# Patient Record
Sex: MALE | Race: OTHER | HISPANIC OR LATINO | Employment: OTHER | URBAN - METROPOLITAN AREA
[De-identification: names, ages, dates, MRNs, and addresses within clinical notes are randomized per-mention and may not be internally consistent; named-entity substitution may affect disease eponyms.]

---

## 2018-04-16 ENCOUNTER — APPOINTMENT (OUTPATIENT)
Dept: LAB | Facility: HOSPITAL | Age: 73
End: 2018-04-16
Payer: COMMERCIAL

## 2018-04-16 ENCOUNTER — OFFICE VISIT (OUTPATIENT)
Dept: FAMILY MEDICINE CLINIC | Facility: CLINIC | Age: 73
End: 2018-04-16
Payer: COMMERCIAL

## 2018-04-16 VITALS
BODY MASS INDEX: 23.91 KG/M2 | TEMPERATURE: 98 F | RESPIRATION RATE: 18 BRPM | HEART RATE: 70 BPM | HEIGHT: 60 IN | WEIGHT: 121.8 LBS | DIASTOLIC BLOOD PRESSURE: 70 MMHG | SYSTOLIC BLOOD PRESSURE: 110 MMHG

## 2018-04-16 DIAGNOSIS — E11.9 TYPE 2 DIABETES MELLITUS WITHOUT COMPLICATION, WITHOUT LONG-TERM CURRENT USE OF INSULIN (HCC): Primary | ICD-10-CM

## 2018-04-16 DIAGNOSIS — L24.89 IRRITANT CONTACT DERMATITIS DUE TO OTHER AGENTS: ICD-10-CM

## 2018-04-16 DIAGNOSIS — E11.9 TYPE 2 DIABETES MELLITUS WITHOUT COMPLICATION, WITHOUT LONG-TERM CURRENT USE OF INSULIN (HCC): ICD-10-CM

## 2018-04-16 DIAGNOSIS — Z97.2 ILL-FITTING DENTURES: ICD-10-CM

## 2018-04-16 DIAGNOSIS — M54.2 CERVICAL PAIN: ICD-10-CM

## 2018-04-16 DIAGNOSIS — K08.89 ILL-FITTING DENTURES: ICD-10-CM

## 2018-04-16 DIAGNOSIS — H91.93 HEARING DIFFICULTY OF BOTH EARS: ICD-10-CM

## 2018-04-16 DIAGNOSIS — Z12.5 SCREENING PSA (PROSTATE SPECIFIC ANTIGEN): ICD-10-CM

## 2018-04-16 DIAGNOSIS — M53.82 DECREASED ROM OF INTERVERTEBRAL DISCS OF CERVICAL SPINE: ICD-10-CM

## 2018-04-16 LAB
ALBUMIN SERPL BCP-MCNC: 3.8 G/DL (ref 3.5–5)
ALP SERPL-CCNC: 121 U/L (ref 46–116)
ALT SERPL W P-5'-P-CCNC: 23 U/L (ref 12–78)
ANION GAP SERPL CALCULATED.3IONS-SCNC: 7 MMOL/L (ref 4–13)
AST SERPL W P-5'-P-CCNC: 21 U/L (ref 5–45)
BASOPHILS # BLD AUTO: 0.02 THOUSANDS/ΜL (ref 0–0.1)
BASOPHILS NFR BLD AUTO: 0 % (ref 0–1)
BILIRUB SERPL-MCNC: 0.44 MG/DL (ref 0.2–1)
BUN SERPL-MCNC: 19 MG/DL (ref 5–25)
CALCIUM SERPL-MCNC: 9 MG/DL
CHLORIDE SERPL-SCNC: 101 MMOL/L (ref 100–108)
CHOLEST SERPL-MCNC: 210 MG/DL (ref 50–200)
CO2 SERPL-SCNC: 29 MMOL/L (ref 21–32)
CREAT SERPL-MCNC: 0.73 MG/DL (ref 0.6–1.3)
EOSINOPHIL # BLD AUTO: 0.21 THOUSAND/ΜL (ref 0–0.61)
EOSINOPHIL NFR BLD AUTO: 5 % (ref 0–6)
ERYTHROCYTE [DISTWIDTH] IN BLOOD BY AUTOMATED COUNT: 13.2 % (ref 11.6–15.1)
EST. AVERAGE GLUCOSE BLD GHB EST-MCNC: 295 MG/DL
GFR SERPL CREATININE-BSD FRML MDRD: 93 ML/MIN/1.73SQ M
GLUCOSE P FAST SERPL-MCNC: 245 MG/DL (ref 65–99)
HBA1C MFR BLD: 11.9 % (ref 4.2–6.3)
HCT VFR BLD AUTO: 40.5 % (ref 36.5–49.3)
HDLC SERPL-MCNC: 35 MG/DL (ref 40–60)
HGB BLD-MCNC: 14.3 G/DL (ref 12–17)
LDLC SERPL CALC-MCNC: 139 MG/DL (ref 0–100)
LYMPHOCYTES # BLD AUTO: 1.78 THOUSANDS/ΜL (ref 0.6–4.47)
LYMPHOCYTES NFR BLD AUTO: 39 % (ref 14–44)
MCH RBC QN AUTO: 31.2 PG (ref 26.8–34.3)
MCHC RBC AUTO-ENTMCNC: 35.3 G/DL (ref 31.4–37.4)
MCV RBC AUTO: 88 FL (ref 82–98)
MONOCYTES # BLD AUTO: 0.18 THOUSAND/ΜL (ref 0.17–1.22)
MONOCYTES NFR BLD AUTO: 4 % (ref 4–12)
NEUTROPHILS # BLD AUTO: 2.39 THOUSANDS/ΜL (ref 1.85–7.62)
NEUTS SEG NFR BLD AUTO: 52 % (ref 43–75)
NONHDLC SERPL-MCNC: 175 MG/DL
NRBC BLD AUTO-RTO: 0 /100 WBCS
PLATELET # BLD AUTO: 201 THOUSANDS/UL (ref 149–390)
PMV BLD AUTO: 11.5 FL (ref 8.9–12.7)
POTASSIUM SERPL-SCNC: 4.2 MMOL/L (ref 3.5–5.3)
PROT SERPL-MCNC: 7.4 G/DL (ref 6.4–8.2)
RBC # BLD AUTO: 4.58 MILLION/UL (ref 3.88–5.62)
SODIUM SERPL-SCNC: 137 MMOL/L (ref 136–145)
TRIGL SERPL-MCNC: 178 MG/DL
WBC # BLD AUTO: 4.58 THOUSAND/UL (ref 4.31–10.16)

## 2018-04-16 PROCEDURE — 80061 LIPID PANEL: CPT | Performed by: NURSE PRACTITIONER

## 2018-04-16 PROCEDURE — 80053 COMPREHEN METABOLIC PANEL: CPT | Performed by: NURSE PRACTITIONER

## 2018-04-16 PROCEDURE — 36415 COLL VENOUS BLD VENIPUNCTURE: CPT | Performed by: NURSE PRACTITIONER

## 2018-04-16 PROCEDURE — 84153 ASSAY OF PSA TOTAL: CPT

## 2018-04-16 PROCEDURE — 84154 ASSAY OF PSA FREE: CPT

## 2018-04-16 PROCEDURE — 99204 OFFICE O/P NEW MOD 45 MIN: CPT | Performed by: NURSE PRACTITIONER

## 2018-04-16 PROCEDURE — 85025 COMPLETE CBC W/AUTO DIFF WBC: CPT

## 2018-04-16 PROCEDURE — 83036 HEMOGLOBIN GLYCOSYLATED A1C: CPT | Performed by: NURSE PRACTITIONER

## 2018-04-16 RX ORDER — FENOPROFEN CALCIUM 200 MG
CAPSULE ORAL 2 TIMES DAILY
Qty: 118 ML | Refills: 0 | Status: SHIPPED | OUTPATIENT
Start: 2018-04-16 | End: 2018-08-13

## 2018-04-16 RX ORDER — BLOOD-GLUCOSE METER
EACH MISCELLANEOUS 2 TIMES DAILY
Qty: 1 KIT | Refills: 0 | Status: SHIPPED | OUTPATIENT
Start: 2018-04-16 | End: 2018-10-17

## 2018-04-16 RX ORDER — LANCETS
EACH MISCELLANEOUS 2 TIMES DAILY
Qty: 102 EACH | Refills: 3 | Status: SHIPPED | OUTPATIENT
Start: 2018-04-16 | End: 2018-11-29

## 2018-04-16 RX ORDER — SIMVASTATIN 20 MG
20 TABLET ORAL
Qty: 90 TABLET | Refills: 0 | Status: SHIPPED | OUTPATIENT
Start: 2018-04-16 | End: 2018-10-22 | Stop reason: SDUPTHER

## 2018-04-16 NOTE — ASSESSMENT & PLAN NOTE
Patient has been out of medication since January due to move  He does not have glucometer currently

## 2018-04-16 NOTE — PROGRESS NOTES
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  Never received therapy after this  Irritant contact dermatitis due to other agents  Patient has sensitivity to the water in his home  Recommend filter or change water softener to avoid exposure  Use cortisone cream as needed  Type 2 diabetes mellitus without complication, without long-term current use of insulin [facility])  Patient has been out of medication since January due to move  He does not have glucometer currently  Diagnoses and all orders for this visit:    Type 2 diabetes mellitus without complication, without long-term current use of insulin ([facility])  -     Lipid panel  -     HEMOGLOBIN A1C W/ EAG ESTIMATION  -     Comprehensive metabolic panel  -     CBC and differential; Future  -     PSA, total and free; Future  -     metFORMIN (GLUCOPHAGE) 500 mg tablet; Take 1 tablet (500 mg total) by mouth 2 (two) times a day with meals  -     simvastatin (ZOCOR) 20 mg tablet; Take 1 tablet (20 mg total) by mouth daily at bedtime  -     Ambulatory referral to Ophthalmology; Future  -     Blood Glucose Monitoring Suppl (ACCU-CHEK CHUY PLUS) w/Device KIT; by Does not apply route 2 (two) times a day  -     glucose blood (ACCU-CHEK CHUY PLUS) test strip; 1 each by Other route 2 (two) times a day Use as instructed  -     ACCU-CHEK FASTCLIX LANCETS MISC; by Does not apply route 2 (two) times a day    Cervical pain  Comments:  wait on PT until cleared from X-ray results  Orders:  -     Ambulatory referral to Physical Therapy; Future  -     XR spine cervical complete 4 or 5 vw non injury; Future    Screening PSA (prostate specific antigen)  -     PSA, total and free; Future    Irritant contact dermatitis due to other agents  -     hydrocortisone 1 % lotion;  Apply topically 2 (two) times a day    Ill-fitting dentures  -     Ambulatory referral to Dentistry; Future    Decreased ROM of intervertebral discs of cervical spine  -     XR spine cervical complete 4 or 5 vw non injury; Future    Hearing difficulty of both ears  -     Ambulatory referral to Audiology; Future          Subjective:      Patient ID: Lorin Lamb is a 71 y o  male  HPI    The following portions of the patient's history were reviewed and updated as appropriate: allergies, current medications, past family history, past medical history, past social history, past surgical history and problem list     Review of Systems   Constitutional: Negative for fatigue and fever  HENT: Positive for hearing loss  Eyes: Negative for visual disturbance  Respiratory: Negative for chest tightness and shortness of breath  Cardiovascular: Negative for chest pain  Gastrointestinal: Positive for constipation  Endocrine: Negative for polyuria  Genitourinary: Negative for difficulty urinating  Musculoskeletal: Positive for neck pain and neck stiffness  Skin: Positive for rash  Neurological: Positive for weakness  Negative for headaches  Psychiatric/Behavioral: Negative for sleep disturbance  Objective:      /70 (BP Location: Left arm, Patient Position: Sitting, Cuff Size: Adult)   Pulse 70   Temp 98 °F (36 7 °C) (Temporal)   Resp 18   Ht 4' 10 75" (1 492 m)   Wt 55 2 kg (121 lb 12 8 oz)   BMI 24 81 kg/m²          Physical Exam   Constitutional: He is oriented to person, place, and time  Vital signs are normal  He appears well-developed and well-nourished  He does not have a sickly appearance  He does not appear ill  HENT:   Head: Normocephalic and atraumatic  Right Ear: Tympanic membrane normal    Left Ear: Tympanic membrane is scarred  Mouth/Throat: Mucous membranes are normal  He has dentures  Oral lesions present  Abnormal dentition  No posterior oropharyngeal erythema  Neck: No JVD present  Spinous process tenderness present  Carotid bruit is not present  Neck rigidity present  Decreased range of motion present  Cardiovascular: Normal rate, regular rhythm, S1 normal and S2 normal     No murmur heard  Pulmonary/Chest: Effort normal and breath sounds normal  No respiratory distress  Abdominal: Soft  Bowel sounds are normal  He exhibits no distension  Musculoskeletal:        Cervical back: He exhibits decreased range of motion and deformity  He exhibits no swelling and no edema  Neurological: He is alert and oriented to person, place, and time  Skin: Skin is warm and dry  Rash noted  Psychiatric: He has a normal mood and affect  Thought content normal         Hearing Screening    125Hz 250Hz 500Hz 1000Hz 2000Hz 3000Hz 4000Hz 6000Hz 8000Hz   Right ear: Fail Fail Pass Fail      Left ear:    Fail Fail Pass Fail

## 2018-04-16 NOTE — ASSESSMENT & PLAN NOTE
Patient has sensitivity to the water in his home  Recommend filter or change water softener to avoid exposure  Use cortisone cream as needed

## 2018-04-17 ENCOUNTER — TELEPHONE (OUTPATIENT)
Dept: FAMILY MEDICINE CLINIC | Facility: CLINIC | Age: 73
End: 2018-04-17

## 2018-04-17 DIAGNOSIS — R97.20 ELEVATED PSA, BETWEEN 10 AND LESS THAN 20 NG/ML: Primary | ICD-10-CM

## 2018-04-17 LAB
PSA FREE MFR SERPL: 6.7 %
PSA FREE SERPL-MCNC: 0.97 NG/ML
PSA SERPL-MCNC: 14.4 NG/ML (ref 0–4)

## 2018-04-17 NOTE — TELEPHONE ENCOUNTER
Patient's spouse notified of results and recommendations patient will come in some time tomorrow to  the referral           ----- Message from [address] [place] [address] sent at 4/17/2018  2:33 PM EDT -----  Cholesterol and sugar too high  We know since he was out of his medication to expect this  I would like him to call his blood sugars in after a month  take his blood sugar twice a day before breakfast and before dinner  Make sure to take medication every day  Now I did blood test for his prostate to check for if elevated  The blood level for his prostate is really really high  He needs to see urology very soon  When it is high we worry about prostate cancer   I will make referral

## 2018-06-05 ENCOUNTER — TELEPHONE (OUTPATIENT)
Dept: UROLOGY | Facility: AMBULATORY SURGERY CENTER | Age: 73
End: 2018-06-05

## 2018-06-05 ENCOUNTER — TELEPHONE (OUTPATIENT)
Dept: FAMILY MEDICINE CLINIC | Facility: CLINIC | Age: 73
End: 2018-06-05

## 2018-06-05 NOTE — TELEPHONE ENCOUNTER
Reason for appointment/Complaint/Diagnosis : ELEVATED PSA    Insurance: [employer] [place]  If yes, what kind? Previous urologist? NO                 Records requested/where? IN [facility]    Outside testing/where? Location Preference for office visit?  [place]

## 2018-06-05 NOTE — TELEPHONE ENCOUNTER
Rosa Jarrett from [address] called  Did not leave phone number to call back or ref#  Stated she still has not received clinicals on pt  I spoke w/ Regla Cox and she stated that pt needs to go for physical therapy first  They are looking for those clinicals  Once pt goes for p t  Can fax to [phone]  Did not leave number to call back

## 2018-06-12 ENCOUNTER — OFFICE VISIT (OUTPATIENT)
Dept: UROLOGY | Facility: CLINIC | Age: 73
End: 2018-06-12
Payer: COMMERCIAL

## 2018-06-12 VITALS
SYSTOLIC BLOOD PRESSURE: 120 MMHG | WEIGHT: 121 LBS | HEIGHT: 60 IN | DIASTOLIC BLOOD PRESSURE: 70 MMHG | HEART RATE: 76 BPM | BODY MASS INDEX: 23.75 KG/M2 | RESPIRATION RATE: 20 BRPM

## 2018-06-12 DIAGNOSIS — N40.2 PROSTATE NODULE: ICD-10-CM

## 2018-06-12 DIAGNOSIS — M54.2 CERVICAL PAIN: ICD-10-CM

## 2018-06-12 DIAGNOSIS — R97.20 ELEVATED PSA, BETWEEN 10 AND LESS THAN 20 NG/ML: Primary | ICD-10-CM

## 2018-06-12 DIAGNOSIS — M53.82 DECREASED ROM OF INTERVERTEBRAL DISCS OF CERVICAL SPINE: ICD-10-CM

## 2018-06-12 PROCEDURE — 99204 OFFICE O/P NEW MOD 45 MIN: CPT | Performed by: UROLOGY

## 2018-06-12 RX ORDER — CIPROFLOXACIN 500 MG/1
500 TABLET, FILM COATED ORAL EVERY 12 HOURS SCHEDULED
Qty: 6 TABLET | Refills: 0 | Status: SHIPPED | OUTPATIENT
Start: 2018-06-12 | End: 2018-06-15

## 2018-06-12 NOTE — PROGRESS NOTES
UROLOGY NEW CONSULT NOTE     CHIEF COMPLAINT   Josee Winchester is a 71 y o  male with a complaint of   Chief Complaint   Patient presents with    Elevated PSA     PSA 14 4 drawn on 04/16/2018       History of Present Illness:     71 y o  male with LUTS and an elevated PSA obtained by his PCP  Unclear if patient has had prior prostate cancer screening  He presents today with his wife who speaks in which  The patient is primarily [address] speaking  Patient has relatively mild urinary symptoms  He had a PSA drawn as part of his routine screening  This was elevated at 14 4  He presents today for discussion  It is unclear whether the patient has a family history of prostate cancer      Past Medical History:     Past Medical History:   Diagnosis Date    Diabetes mellitus ([address] )     Elevated PSA     Hyperlipidemia        PAST SURGICAL HISTORY:     Past Surgical History:   Procedure Laterality Date    NECK SURGERY         CURRENT MEDICATIONS:     Current Outpatient Prescriptions   Medication Sig Dispense Refill    ACCU-CHEK FASTCLIX LANCETS MISC by Does not apply route 2 (two) times a day 102 each 3    Blood Glucose Monitoring Suppl (ACCU-CHEK CHUY PLUS) w/Device KIT by Does not apply route 2 (two) times a day 1 kit 0    glucose blood (ACCU-CHEK CHUY PLUS) test strip 1 each by Other route 2 (two) times a day Use as instructed 100 each 3    metFORMIN (GLUCOPHAGE) 500 mg tablet Take 1 tablet (500 mg total) by mouth 2 (two) times a day with meals 180 tablet 0    simvastatin (ZOCOR) 20 mg tablet Take 1 tablet (20 mg total) by mouth daily at bedtime 90 tablet 0    bisacodyl (FLEET) 10 MG/30ML ENEM Insert 30 mL (10 mg total) into the rectum once for 1 dose Day of biopsy 30 mL 0    ciprofloxacin (CIPRO) 500 mg tablet Take 1 tablet (500 mg total) by mouth every 12 (twelve) hours for 3 days Start day prior to biopsy 6 tablet 0    hydrocortisone 1 % lotion Apply topically 2 (two) times a day 118 mL 0     No current facility-administered medications for this visit  ALLERGIES:   No Known Allergies    SOCIAL HISTORY:     Social History     Social History    Marital status: /Civil Union     Spouse name: N/A    Number of children: N/A    Years of education: N/A     Social History Main Topics    Smoking status: Never Smoker    Smokeless tobacco: Never Used    Alcohol use No    Drug use: No    Sexual activity: Not Currently     Other Topics Concern    None     Social History Narrative    None       SOCIAL HISTORY:     Family History   Problem Relation Age of Onset    No Known Problems Mother     No Known Problems Father        REVIEW OF SYSTEMS:     Review of Systems   Constitutional: Negative  Respiratory: Negative  Cardiovascular: Negative  Gastrointestinal: Negative  Genitourinary: Negative  Musculoskeletal: Negative  Neurological: Negative  Psychiatric/Behavioral: Negative  PHYSICAL EXAM:     /70   Pulse 76   Resp 20   Ht 4' 10" (1 473 m)   Wt 54 9 kg (121 lb)   BMI 25 29 kg/m²     General:  Healthy appearing male in no acute distress  They have a normal affect  There is not appear to be any gross neurologic defects or abnormalities  HEENT:  Normocephalic, atraumatic  Neck is supple without any palpable lymphadenopathy  Cardiovascular:  Patient has normal palpable distal radial pulses  There is no significant peripheral edema  No JVD is noted  Respiratory:  Patient has unlabored respirations  There is no audible wheeze or rhonchi  Abdomen:  Abdomen is without surgical scars  Abdomen is soft and nontender  There is no tympany  Inguinal and umbilical hernia are not appreciated      Genitourinary: no penile lesions or discharge, no testicular masses or tenderness, no hernias, JUANA very hard indurated 2cm nodule to the right of median sulcus toward apex    Musculoskeletal:  Patient does not have significant CVA tenderness in the  flank with palpation or percussion  They full range of motion in all 4 extremities  Strength in all 4 extremities appears congruent  Patient is able to ambulate without assistance or difficulty  Dermatologic:  Patient has no skin abnormalities or rashes  LABS:     CBC:   Lab Results   Component Value Date    WBC 4 58 04/16/2018    HGB 14 3 04/16/2018    HCT 40 5 04/16/2018    MCV 88 04/16/2018     04/16/2018       BMP:   Lab Results   Component Value Date    CALCIUM 9 0 04/16/2018     04/16/2018    K 4 2 04/16/2018    CO2 29 04/16/2018     04/16/2018    BUN 19 04/16/2018    CREATININE 0 73 04/16/2018     No results found for: PSA    ASSESSMENT:     71 y o  male with elevated PSA and prostate nodule    PLAN:     Patient is prostate biopsy  I have discussed with the patient's wife my concerns about his PSA and prostate nodule  We have discussed how the biopsy is performed its inherent risks and benefits  Fortunately patient is not taking any blood thinners  I did pass along the importance of starting antibiotics the day prior to the biopsy to reduce infection rate  Given the patient's language barrier, I have offered to ask one of our [place] speaking team to reach out to the patient by phone to answer any questions  The day of the procedure, we will call a  to obtain consent  Handouts about prostate cancer and prostate biopsies were given to the patient in his native [place]

## 2018-06-12 NOTE — PATIENT INSTRUCTIONS
Ayuda para la letty de decisiones en casos de cáncer de próstata clínicamente localizado   CUIDADO AMBULATORIO:   Lo que necesita saber acerca de la letty de decisiones en los casos de cáncer de próstata clínicamente localizado:  Puede trabajar con forbes médico para karishma decisiones acerca de la detección o el tratamiento del cáncer de próstata  La detección es elier prueba que se realiza para determinar la presencia de cáncer de próstata de manera temprana  La detección es diferente del diagnóstico porque la detección se utiliza cuando comienza a tener signos o síntomas  [place] implica que el control o el tratamiento puede comenzar de Angi temprana  También puede ayudar a planificar el tratamiento si se determina la presencia de cáncer en la detección o si maria luisa se desarrolla luego  Lo que necesita saber sobre el cáncer de próstata clínicamente localizado:   · La próstata es elier pequeña glándula que forma parte del sistema reproductivo  Se encuentra cerca de la uretra (tubo por donde la orina sale de la vejiga)  Las células cancerosas comienzan a crecer dentro de la glándula prostática  Las células janell elier masa que sigue creciendo si no se trata  Clínicamente localizado significa que el cáncer no se ha propagado más allá de la glándula prostática  · El cáncer de próstata es la segunda forma más frecuente de cáncer en los hombres (el cáncer de piel es el más frecuente)  Alrededor del 17 % de los hombres de los [place] desarrollan cáncer de próstata  Alrededor del 3 % de los hombres de los [place] mueren a causa del cáncer de próstata  · El cáncer de próstata a menudo crece lentamente  A veces el tumor no crece en absoluto  Es posible que en toda forbes dre el cáncer no crezca con la suficiente rapidez nanette para ser potencialmente mortal     · El riesgo de padecer cáncer de próstata aumenta con la edad  El riesgo es mayor si tiene más de 72 años  El riesgo es más bajo si tiene menos de [place]  El [address]  es mayor si forbes padre, un edvin o un hijo tienen antecedentes de cáncer de próstata  · Es posible que no presente signos ni síntomas del cáncer de próstata hasta que el tumor se vuelva de gran tamaño  Puede tener problemas para orinar o mantener un flujo justino de la orina debido al tumor  En etapas posteriores, el cáncer de próstata puede extenderse a los huesos o los ganglios linfáticos  Usted puede tener dolor o fracturas en los [place]  Cómo saber si es un buen candidato para la detección del cáncer de próstata:  La detección puede ser útil para usted si algo de lo siguiente es verdadero:  · Tiene 65 años o más de edad  · Usted tiene antecedentes familiares de cáncer de próstata u otros problemas de próstata  · No tiene ninguna otra enfermedad que le impediría vivir por más de otros 10 años  · Usted quiere recibir tratamiento tan pronto nanette sea posible, si es necesario  · Está dispuesto a someterse a detección con la frecuencia que forbes médico le recomiende  Cómo se realiza la detección:   · Un tacto rectal  se utiliza para revisar forbes próstata  Forbes médico introducirá un dedo cubierto con un guante en el recto  El médico será capaz de palpar la próstata para detectar bultos o zonas duras que podrían ser tumores  El examen se puede repetir con el paso del tiempo para verificar la aparición o el empeoramiento de [place]  · Un examen de APE  se utiliza para determinar la cantidad de elier proteína que la glándula prostática produce  Se letty elier muestra de stefanie para maria luisa examen  Un alto nivel de APE puede ser elier señal de cáncer de próstata  Beneficios y riesgos de la detección:  Hable con forbes médico sobre los riesgos y los beneficios de la detección:  · Los beneficios  incluyen el hallazgo del cáncer de próstata de [employer] temprana  El tratamiento para el cáncer a menudo es más exitoso cuando comienza de [employer] temprana   [place] significa que puede karishma [employer] tratamiento  · Los riesgos  incluye los siguientes:     ¨ Puede tener elier falsa creencia de que no desarrollará cáncer de próstata si el resultado de la detección es negativo  De todos modos, puede desarrollar cáncer de próstata más adelante  ¨ El examen de APE puede netta un resultado falso negativo   Zap significa que según el resultado parece que no tiene cáncer, justice en realidad sí lo tiene  El tratamiento o el control se pueden retrasar porque las pruebas indicaron que no tiene cáncer  ¨ El examen de APE también puede netta un resultado falso positivo   Zap significa que realmente no tiene cáncer, justice, según el examen, sí tiene  Usted puede realizarse otras pruebas, elier biopsia o incluso tratamientos innecesarios  También puede ser estresante creer que tiene cáncer de próstata cuando no es así  Preguntas que debe hacerle a forbes médico para que lo ayude a karishma decisiones sobre la detección:   · ¿Qué riesgo presento de padecer cáncer de próstata? · ¿Con qué frecuencia jason realizarme la detección? · ¿Dónde se realiza la detección? · ¿Necesito hacer algo para estar listo para someterme a la detección? Qué sucede después de la detección:   · Usted se reunirá con forbes médico para repasar los [employer] de la detección  · Jonathon vez necsite más pruebas para diagnosticar cualquier problema que se haya observado en la detección  Solo elier biopsia (muestra de tejido) puede demostrar con certeza si tiene cáncer de próstata  · Cuando se determina la presencia de cáncer, forbes médico le asignará un número llamado puntuación de [place]  El número puede ayudarlo a entender cuán rápidamente es probable que el cáncer crezca y si se puede propagar:     ¨ Elier puntuación de 6 o inferior significa que es probable que el cáncer crezca más lentamente  ¨ Elier puntuación de 7 significa que es probable que crezca más rápido, justice que puede no propagarse a otras partes       ¨ Elier puntuación de 8 a 10 significa que es probable que crezca más rápidamente y [place] que se propague  · Forbes médico también le asignará elier etapa T al tumor  Maria Luisa número muestra el crecimiento del tumor y si es probable que se extienda a otras partes  · Usted y forbes médico utilizarán la puntuación de [place], la etapa T y [place] de APE para conversar sobre las opciones de [place]  Forbes médico le dirá si el cáncer de próstata presenta bajo, medio o alto riesgo de crecer y propagarse  La necesidad de [facility] y la variedad de las opciones de tratamiento dependerán del nivel de [place]  Usted y el médico pueden crear un plan de tratamiento que sea adecuado para usted  Cómo se trata el cáncer de próstata clínicamente localizado, y los beneficios del tratamiento:   · Conducta expectante  significa que no recibirá tratamiento de inmediato  Si el cáncer no crece ni se propaga, es posible que nunca necesite tratamiento  La conducta expectante puede usarse si el riesgo del tumor es bajo  El beneficio de esta opción es que usted no se someterá a tratamientos invasivos o difíciles  Si los exámenes [employer] tumor crece o se propaga con el paso del [place], puede elegir otra opción de [place]  · Vigilancia activa  también significa que no recibirá tratamiento de inmediato  Usted deberá realizarse exámenes con el paso del tiempo para continuar controlando el tumor  Elier ventaja de esta opción es que los exámenes de seguimiento pueden mostrar un cambio de Angi temprana  Las opciones de tratamiento pueden ser menos invasivas que si el tumor se encuentra en elier etapa tardía  · La crioablación  se utiliza para eliminar las células cancerosas congelándolas  Se trata de un tratamiento menos invasivo  Es posible que no tenga dolor o que el dolor sea muy leve tras maria luisa procedimiento  · La radioterapia  puede usarse para destruir las células cancerosas   La radioterapia es clark tan eficaz nanette la cirugía para extirpar la glándula prostática  Yamilka tratamiento karin la próstata en el lugar y solo se dirige a las células cancerosas  · Cirugía  se [place] para extraer la glándula prostática  El tumor se encuentra en la glándula, por lo que se lo extraerá junto con la glándula  · La terapia hormonal  puede añadirse a la cirugía o la radioterapia  Forbes nivel de testosterona se reduce o se bloquea  [place] puede hacer que las células cancerosas dejen de crecer o demorar el crecimiento  La terapia hormonal puede administrarse nanette elier inyección cada 1 a 6 meses  Otra forma de disminuir el nivel de testosterona es la cirugía para extirpar los testículos  Forbes cuerpo ya no producirá testosterona si se extirpan los testículos  Riesgos del tratamiento:   · La conducta expectante y la vigilancia activa  pueden hacer que se preocupe acerca del crecimiento o la propagación del cáncer  · Cirugía  puede dañar el tejido que se encuentra alrededor de la próstata  La cirugía puede aumentar el riesgo de presentar dificultad para orinar, incontinencia (escapes) o retención Delia Cleaton  La cirugía también puede causar problemas con forbes capacidad para tener o mantener elier erección  Podría sangrar más de lo esperado leo la cirugía o contraer elier infección  También es posible que deba recibir tratamiento otra vez si la cirugía no dusty sandeep síntomas  Es posible que en la cirugía no se puedan extirpar todas las células tumorales  · La radioterapia  puede no eliminar todas las células cancerosas  La radioterapia puede aumentar el riesgo de presentar dificultad para orinar, incontinencia (escapes) o retención Delia Cleaton  Usted puede tener pérdida de vello temporal o permanente en el escroto  Forbes piel puede secarse o irritarse  Es posible que presente problemas para orinar o para tener elier evacuación intestinal  La radiación también puede causar problemas con forbes capacidad para tener o mantener elier erección       · La crioablación  puede no eliminar todas las células cancerosas  Usted podría desarrollar tejido cicatrizante  También puede tener hinchazón, problemas para orinar o dolor en la pelvis o el escroto  El tejido que se encuentra fuera de la próstata puede dañarse leo la crioablación  Puede tener problemas de erección permanentes  Preguntas que debe hacerle a forbes médico para que lo ayude a karishma decisiones sobre el tratamiento:   · ¿Cuáles son mi puntuación de [place], mi puntuación T y mi número de riesgo? · ¿Con qué frecuencia deberé realizarme exámenes de seguimiento si elijo la vigilancia activa elmer? · ¿Cómo afectará cada opción de tratamiento mis actividades diarias? · ¿Cuál es el riesgo de presentar disfunción eréctil o impotencia con cada tratamiento? · ¿Soy un buen candidato para la cirugía? · ¿Qué cirugía puede netta mejores resultados para tratar mis síntomas? · ¿Dónde se realiza la cirugía? · ¿Cuánto dura la recuperación tras la cirugía? · ¿Mi seguro cubrirá el tratamiento? Preguntas a tener en cuenta para ayudarse a karishma decisiones sobre el tratamiento:   · Si el riesgo de cáncer es [place], [place] sentiré cómodo con la conducta expectante o la vigilancia activa en lugar del tratamiento inmediato? ¿Cómo me sentiré si el cáncer crece o se propaga? · ¿Deberé realizarme exámenes de seguimiento con el paso del tiempo si no quiero someterme a tratamiento inmediato? · Si recibo tratamiento y pierdo la capacidad de tener elier erección, ¿cómo me sentiré? · ¿Estoy dispuesto a aceptar los problemas relacionados con la orina y las evacuaciones intestinales que podrían ocurrir con el tratamiento? · ¿Cómo se verán afectados mi jeromy y otras personas cercanas debido a mis decisiones de tratamiento? © 2017 [address] Information is for End User's use only and may not be sold, redistributed or otherwise used for commercial purposes   All illustrations and images included in CareNotes® are the copyrighted property of A  D A M [employer] Inc  or Peter Roosevelt  Esta información es sólo para uso en educación  Forbes intención no es darle un consejo médico sobre enfermedades o tratamientos  Colsulte con forbes [employer] farmacéutico antes de seguir cualquier régimen médico para saber si es seguro y efectivo para usted  Biopsia de próstata   CUIDADO AMBULATORIO:   Lo que usted necesita saber acerca de la biopsia de próstata:  Elier biopsia de próstata es un procedimiento para extraer muestras de tejido de forbes próstata  La próstata es la glándula sexual masculina que produce el líquido que se encuentra en el semen  Se localiza siobhan debajo de la vejiga  Después de que se francisco las Los natalie, se mandan al laboratorio para examinarla para cáncer  Cómo prepararse para elier biopsia de próstata:   · Forbes médico hablará con usted sobre cómo prepararse para maria luisa procedimiento  Le puede indicar que no consuma ningún alimento ni bebida después de la medianoche del día de la [place]  Usted necesitará dejar de karishma anticoagulantes varios días antes de forbes procedimiento  Ejemplos de anticoagulantes son Mariam [place] y los AINEs  Es posible que también necesite dejar de karishma suplementos herbales antes de forbes procedimiento  Forbes médico le indicará qué otros medicamentos karishma o no karishma en el día de forbes procedimiento  · A usted le darán antibióticos para ayudar a evitar elier infección bacteriana  Es posible que deba aplicarse un (medicamento líquido que se coloca en el recto) para vaciar sandeep intestinos antes de forbes procedimiento  Qué sucederá leo elier biopsia de próstata:   · Es posible que usted tenga que acostarse de lado con las rodillas levantadas hacia forbes pecho  Podrían aplicarle crema o gel para adormecer dentro de forbes recto, o podrían inyectarle medicamento anestésico cerca de forbes próstata  Es posible que en lugar de eso le administren anestesia general para mantenerlo dormido y sin dolor leo el procedimiento   Podrían tomarle [facility] muestra de biopsia a través del recto, la uretra o del perineo  El perineo es el [place] y el recto  La mayor parte del [place], las Los natalie de biopsia se francisco a través del recto  · Si forbes médico letty elier [employer] [place] a través del recto, introducirá elier sonda pequeña de ultrasonido dentro del mismo  El ultrasonido [place] imágenes de forbes próstata en un monitor y se [place] para guiar la aguja de la biopsia  Forbes médico empujará la aguja de la biopsia a través de la pared de forbes recto y hacia dentro de la glándula prostática  Forbes médico extraerá entre 6 a 12 muestras de tejido de diferentes áreas de forbes glándula prostática  Las muestras serán mandadas al laboratorio y examinadas para cáncer  Qué sucederá después de elier biopsia de próstata:  Usted podría sentir dolor en el área de la biopsia  Es posible que usted necesite karishma antibióticos hasta por 2 días después de forbes procedimiento para ayudar a evitar elier infección  Es posible que usted tenga sangrado en forbes recto  También podría tener stefanie en forbes orina, en sandeep evacuaciones intestinales o en forbes semen  Riesgos de elier biopsia de próstata:  Usted podría sangrar más de lo esperado o contraer elier infección en el tracto urinario o en la glándula prostática  La infección se podría propagar a forbes stefanie y al varghese de forbes cuerpo  Es posible que forbes vejiga no se vacíe completamente cuando orine  Usted podría necesitar un catéter para ayudar a vaciar forbes vejiga por un plazo corto de [place]  Es posible que no se detecten las células cancerosas leo forbes procedimiento de biopsia  Usted podría necesitar que le realicen otra biopsia de próstata para determinar si tiene cáncer  Busque atención médica de inmediato si:   · Usted tiene sangrado abundante saliendo de forbes recto  · Orina muy poco o no orina nada  · Usted tiene dolor debido a forbes procedimiento que empeora aun después de karishma medicamento para el dolor    Pregúntele a forbes [place] vitaminas y minerales son adecuados para usted  · Usted tiene fiebre o escalofríos  · Usted siente dolor o ardor al orinar  · [place] orina está turbia o tiene mal olor  · Usted tiene preguntas o inquietudes acerca de t[place] condición o cuidado  Medicamentos:  · Medicamentos,  pueden ayudar a aliviar el dolor  Usted podría necesitar medicamento para aliviar o tratar elier infección bacteriana  Pregunte cómo se debe karishma los analgésicos de forma granados  · [place] sandeep medicamentos nanette se le haya indicado  Consulte con tijerina médico si usted nina que tijerina medicamento no le está ayudando o si presenta efectos secundarios  Infórmele si es alérgico a cualquier medicamento  Mantenga elier lista actualizada de los [place], las vitaminas y los productos herbales que letty  Incluya los siguientes datos de los medicamentos: cantidad, frecuencia y motivo de administración  Traiga con usted la lista o los envases de la píldoras a sandeep citas de seguimiento  Lleve la lista de los medicamentos con usted en christy de elier emergencia  Programe elier eric con tijer[place] médico o urólogo nanette se le indique:  Es posible que usted necesite volver para que le realicen más exámenes o procedimientos  Anote sandeep preguntas para que se acuerde de hacerlas leo sandeep visitas  © 2017 [address] Information is for End User's use only and may not be sold, redistributed or otherwise used for commercial purposes  All illustrations and images included in CareNotes® are the copyrighted property of A D A M , Inc  or Peter Albert  Esta información es sólo para uso en educación  [place] intención no es darle un consejo médico sobre enfermedades o tratamientos  Colsulte con tijerina Jey Demetrio farmacéutico antes de seguir cualquier régimen médico para saber si es seguro y efectivo para usted

## 2018-07-18 ENCOUNTER — PROCEDURE VISIT (OUTPATIENT)
Dept: UROLOGY | Facility: CLINIC | Age: 73
End: 2018-07-18
Payer: COMMERCIAL

## 2018-07-18 VITALS
HEIGHT: 60 IN | BODY MASS INDEX: 23.75 KG/M2 | HEART RATE: 76 BPM | WEIGHT: 121 LBS | SYSTOLIC BLOOD PRESSURE: 130 MMHG | DIASTOLIC BLOOD PRESSURE: 80 MMHG | RESPIRATION RATE: 20 BRPM

## 2018-07-18 DIAGNOSIS — R97.20 ELEVATED PSA, BETWEEN 10 AND LESS THAN 20 NG/ML: ICD-10-CM

## 2018-07-18 DIAGNOSIS — N40.2 PROSTATE NODULE: Primary | ICD-10-CM

## 2018-07-18 PROCEDURE — G0416 PROSTATE BIOPSY, ANY MTHD: HCPCS | Performed by: PATHOLOGY

## 2018-07-18 PROCEDURE — 76942 ECHO GUIDE FOR BIOPSY: CPT | Performed by: UROLOGY

## 2018-07-18 PROCEDURE — 55700 PR BIOPSY OF PROSTATE,NEEDLE/PUNCH: CPT | Performed by: UROLOGY

## 2018-07-18 PROCEDURE — 76872 US TRANSRECTAL: CPT | Performed by: UROLOGY

## 2018-07-18 NOTE — PROGRESS NOTES
UROLOGY FOLLOWUP NOTE     CHIEF COMPLAINT   Liam Schwartz is a 71 y o  male with a complaint of   Chief Complaint   Patient presents with    Elevated PSA    Prostate Biopsy       History of Present Illness:     71 y o  male with LUTS and an elevated PSA obtained by his PCP  Unclear if patient has had prior prostate cancer screening  He presents today with his wife who speaks [place]  The patient is primarily [address] speaking  Patient has relatively mild urinary symptoms  He had a PSA drawn as part of his routine screening  This was elevated at 14 4  He presented initially discussion  It is unclear whether the patient has a family history of prostate cancer  Returns for prostate biopsy  Past Medical History:     Past Medical History:   Diagnosis Date    Diabetes mellitus ([address] )     Elevated PSA     Hyperlipidemia        PAST SURGICAL HISTORY:     Past Surgical History:   Procedure Laterality Date    NECK SURGERY      PROSTATE BIOPSY  07/18/2018       CURRENT MEDICATIONS:     Current Outpatient Prescriptions   Medication Sig Dispense Refill    ACCU-CHEK FASTCLIX LANCETS MISC by Does not apply route 2 (two) times a day 102 each 3    Blood Glucose Monitoring Suppl (ACCU-CHEK CHUY PLUS) w/Device KIT by Does not apply route 2 (two) times a day 1 kit 0    glucose blood (ACCU-CHEK CHUY PLUS) test strip 1 each by Other route 2 (two) times a day Use as instructed 100 each 3    hydrocortisone 1 % lotion Apply topically 2 (two) times a day 118 mL 0    metFORMIN (GLUCOPHAGE) 500 mg tablet Take 1 tablet (500 mg total) by mouth 2 (two) times a day with meals 180 tablet 0    simvastatin (ZOCOR) 20 mg tablet Take 1 tablet (20 mg total) by mouth daily at bedtime 90 tablet 0    bisacodyl (FLEET) 10 MG/30ML ENEM Insert 30 mL (10 mg total) into the rectum once for 1 dose Day of biopsy 30 mL 0     No current facility-administered medications for this visit          ALLERGIES:   No Known Allergies    SOCIAL HISTORY:     Social History     Social History    Marital status: /Civil Union     Spouse name: N/A    Number of children: N/A    Years of education: N/A     Social History Main Topics    Smoking status: Never Smoker    Smokeless tobacco: Never Used    Alcohol use No    Drug use: No    Sexual activity: Not Currently     Other Topics Concern    None     Social History Narrative    None       SOCIAL HISTORY:     Family History   Problem Relation Age of Onset    No Known Problems Mother     No Known Problems Father        REVIEW OF SYSTEMS:     Review of Systems   Constitutional: Negative  Respiratory: Negative  Cardiovascular: Negative  Gastrointestinal: Negative  Genitourinary: Negative  Musculoskeletal: Negative  Neurological: Negative  Psychiatric/Behavioral: Negative  PHYSICAL EXAM:     /80   Pulse 76   Resp 20   Ht 4' 10" (1 473 m)   Wt 54 9 kg (121 lb)   BMI 25 29 kg/m²     General:  Healthy appearing male in no acute distress  They have a normal affect  There is not appear to be any gross neurologic defects or abnormalities  HEENT:  Normocephalic, atraumatic  Neck is supple without any palpable lymphadenopathy  Cardiovascular:  Patient has normal palpable distal radial pulses  There is no significant peripheral edema  No JVD is noted  Respiratory:  Patient has unlabored respirations  There is no audible wheeze or rhonchi  Abdomen:  Abdomen is without surgical scars  Abdomen is soft and nontender  There is no tympany  Inguinal and umbilical hernia are not appreciated  Genitourinary: no penile lesions or discharge, no testicular masses or tenderness, no hernias, JUANA very hard indurated 2cm nodule to the right of median sulcus toward apex    Musculoskeletal:  Patient does not have significant CVA tenderness in the  flank with palpation or percussion  They full range of motion in all 4 extremities    Strength in all 4 extremities appears congruent  Patient is able to ambulate without assistance or difficulty  Dermatologic:  Patient has no skin abnormalities or rashes  LABS:     CBC:   Lab Results   Component Value Date    WBC 4 58 04/16/2018    HGB 14 3 04/16/2018    HCT 40 5 04/16/2018    MCV 88 04/16/2018     04/16/2018       BMP:   Lab Results   Component Value Date    CALCIUM 9 0 04/16/2018     04/16/2018    K 4 2 04/16/2018    CO2 29 04/16/2018     04/16/2018    BUN 19 04/16/2018    CREATININE 0 73 04/16/2018     No results found for: PSA    PROCEDURE:     SEE NOTE    ASSESSMENT:     71 y o  male with elevated PSA and prostate nodule    PLAN:     I discussed with the patient potential side effects from prostate biopsy including bleeding from his bladder, bleeding from his rectum, and bleeding in his semen up to about 2-4 weeks  The patient knows that should he have severe uncontrolled bleeding he is to call the office or proceed immediately to the emergency room  Additionally counseled the patient to monitor for fevers greater 100 3  He will finish out his course of antibiotics  Patient will return in 2 weeks for discussion of his pathology

## 2018-07-18 NOTE — PROGRESS NOTES
Biopsy prostate     Date/Time 7/18/2018 10:43 AM     Performed by  Olivia Hwang by Benjy Morton       Consent: Verbal consent obtained  Written consent obtained  Risks and benefits: risks, benefits and alternatives were discussed  Consent given by: patient and spouse  Patient understanding: patient states understanding of the procedure being performed  Patient identity confirmed: verbally with patient      Preparation: Patient was prepped and draped in the usual sterile fashion  Local anesthesia used: yes      Anesthesia: local infiltration and nerve block     Anesthesia   Local anesthesia used: yes  Local Anesthetic: lidocaine 1% without epinephrine     Sedation   Patient sedated: no      Specimen: yes   Procedure Details   Procedure Notes:   Prostate Biopsy note: The patient returns to the office today to undergo a transrectal ultrasound-guided biopsy of the prostate secondary to abnormal rectal exam and elevated PSA  Risks and benefits of the procedure were discussed  Informed consent was obtained  The patient's prebiopsy preparation was deemed to be adequate  Antibiotics had been taken as prescribed  If appropriate blood thinners, had been placed on hold  The patient completed an enema as prescribed  The patient was placed in the lateral decubitus position  Digital rectal examination was performed revealing a 30 gram prostate  Viscous lidocaine jelly was instilled into the rectum  Transrectal ultrasonography was then performed  The prostate measured 27 07 grams  5 cc of 2% lidocaine were then injected bilaterally between the junction of the base of the prostate and the seminal vesicles  Ultrasound guidance was utilized to place the needle into proper position for the administration of the local anesthetic    A standard 12 core biopsy was then performed with one core from each the right later base, mid and apex; right medial base, mid and apex; left medial base, mid and apex; left lateral base, mid, apex  Direct pressure was held for 5 minutes for hemostasis  Overall the patient tolerated the procedure and there were no complications

## 2018-07-27 ENCOUNTER — OFFICE VISIT (OUTPATIENT)
Dept: UROLOGY | Facility: CLINIC | Age: 73
End: 2018-07-27
Payer: COMMERCIAL

## 2018-07-27 VITALS
SYSTOLIC BLOOD PRESSURE: 120 MMHG | HEIGHT: 60 IN | HEART RATE: 80 BPM | BODY MASS INDEX: 23.75 KG/M2 | WEIGHT: 121 LBS | DIASTOLIC BLOOD PRESSURE: 70 MMHG

## 2018-07-27 DIAGNOSIS — C61 PROSTATE CANCER (HCC): ICD-10-CM

## 2018-07-27 DIAGNOSIS — N40.2 PROSTATE NODULE: Primary | ICD-10-CM

## 2018-07-27 PROCEDURE — 99214 OFFICE O/P EST MOD 30 MIN: CPT | Performed by: UROLOGY

## 2018-07-27 PROCEDURE — 96402 CHEMO HORMON ANTINEOPL SQ/IM: CPT | Performed by: UROLOGY

## 2018-07-27 NOTE — PATIENT INSTRUCTIONS
Cáncer de próstata   CUIDADO AMBULATORIO:   El cáncer de próstata  se desarrolla en la glándula sexual masculina que ayuda a la producción del semen  Es aproximadamente del tamaño de [place] y envuelve a la uretra  La uretra es el tubo que transporta la orina de la vejiga al final del pene  En la [employer], el cáncer de próstata se desarrolla lentamente  Los síntomas más comunes incluyen los siguientes:   · Dificultad para empezar o dejar de orinar    · Sentir la necesidad de orinar con frecuencia, especialmente leo la noche    · Sentir dolor o ardor al orinar o al eyacular semen    · Dificultad para [employer] erección    · [employer] en la orina o en el semen    · No poder orinar en lo absoluto    · Dolor o rigidez en la parte inferior de la espalda, las caderas o en la parte superior de los muslos  Llame al 911 en christy de presentar lo siguiente:   · Forbes pierna se siente cálida, sensible y adolorida  Se podría laney inflamado y prather  · Le duele el pecho cuando respira hondo o tose  · De repente se siente mareado y sin aire  · Usted expectora stefanie  Comuníquese con forbes urólogo u oncólogo si:   · Usted tiene fiebre  · Usted siente que no puede hacerle frente a forbes enfermedad  · Usted tiene stefanie en la orina o dificultad para orinar  · Usted tiene dolor que no disminuye ni desaparece aun después de karishma forbes medicamento  · Usted tiene dificultad para tener elier erección  · Usted tiene preguntas o inquietudes acerca de forbes condición o cuidado  El tratamiento para el cáncer de próstata:  Si usted está en la etapa temprana del cáncer, forbes médico podría recomendarle exámenes frecuentes y citas de seguimiento regulares para vigilar si hay cambios  Es posible que también necesite alguno de los siguientes tratamientos:  · La terapia hormonal  es un medicamento que se [place] para disminuir los niveles de testosterona (hormona Dobson)      · La radioterapia  [place] saud x de jayla energía para [address] células cancerosas  Usted podría recibir radioterapia desde fuera de forbes cuerpo o de pequeños gránulos o varillas dentro de la próstata  · Cirugía  puede necesitarse dependiendo de la etapa del cáncer  Es posible que le extraigan parte o la totalidad de forbes próstata  Usted también podría necesitar que le extraigan algunos ganglios linfáticos  [place] podría ayudar a evitar que el cáncer se propague a otras partes del cuerpo  Maneje forbes cáncer de próstata:   · No fume  La nicotina puede dañar vasos sanguíneos y dificultarle el control de forbes cáncer de próstata  El fumar aumenta forbes riesgo de presentar un nuevo cáncer o que el cáncer reaparezca y demorar el tiempo de recuperación después del [place]  No use cigarrillos electrónicos o tabaco sin humo en vez de cigarrillos o para tratar de dejar de fumar  Todos estos aún contienen nicotina  Pida a forbes médico información si usted fuma actualmente y [place] para dejar de hacerlo  · Limite o no consuma bebidas alcohólicas, según indicaciones  Limite el consumo de alcohol a 2 tragos por día  Malinda bebida equivale a 12 onzas de cerveza, 1½ onzas de licor o 5 onzas de vino  · Consuma alimentos saludables y variados  Los alimentos saludables incluyen frutas, verduras, pan integral, productos lácteos bajos en grasa, frijoles, sedrick magras y pescado  Forbes médico también podría recomendarle cambios en la cantidad de calcio y vitamina D que usted consume cada día  · Controle forbes peso  La obesidad podría aumentar el riesgo de cáncer de próstata  Limite o no consuma alimentos o bebidas altos en calorías  · Ejercítese según indicaciones  El ejercicio podría ayudarlo a recuperarse después del tratamiento y podría ayudarlo a evitar que regrese forbes cáncer de próstata  También puede ayudarle a controlar forbes peso  Trate de hacer ejercicio por lo menos 30 minutos 5 días por semana, nanette por ejemplo caminar       · Pregunte sobre la actividad sexual   Lloyd Weiss a tijerina médico cuándo [employer] a llevar a cabo la actividad sexual sin peligro después del tratamiento  Le pueden recetar medicamentos en christy que tenga dificultad para tener o mantener elier erección  · Controle la incontinencia  Es posible que presente incontinencia (dificultad para controlar las ganas de orinar) después del [place]  Pida a tijerina médico más información para lidiar con la incontinencia urinaria  Es posible que pueda volver a tener el control para orinar con técnicas o medicamentos  · [employer] se le haya indicado  Pregunte cuánto líquido debe karishma cada día y cuáles líquidos son los más adecuados para usted  [place] más líquidos para evitar la deshidratación  Usted también tendrá que reemplazar líquidos si vomita o tiene diarrea debido a los tratamientos del cáncer  Programe elier eric de seguimiento con tijerina urólogo o con tijerina oncólogo según indicaciones:  Anote sandeep preguntas para que se acuerde de hacerlas leo sandeep visitas  © 2017 [address] Information is for End User's use only and may not be sold, redistributed or otherwise used for commercial purposes  All illustrations and images included in CareNotes® are the copyrighted property of [employer]  or Peter Albert  Esta información es sólo para uso en educación  Tijerina intención no es darle un consejo médico sobre enfermedades o tratamientos  Colsulte con tijerina [place] farmacéutico antes de seguir cualquier régimen médico para saber si es seguro y efectivo para usted

## 2018-07-27 NOTE — PROGRESS NOTES
Subq hormone pellet implantation     Date/Time 7/27/2018 2:40 PM     Performed by  Margareth Méndez by Kyara Patton      Procedure Details   Procedure Notes: 240mg IM Degarelix administered

## 2018-07-27 NOTE — PROGRESS NOTES
UROLOGY FOLLOWUP NOTE     CHIEF COMPLAINT   Laith Brown is a 68 y o  male with a complaint of   Chief Complaint   Patient presents with    Elevated PSA     S/P PROSTATE BIOPSY 7/18/2018 REVIEW PATH REPORT    Prostate Cancer       History of Present Illness:     68 y o  male with LUTS and an elevated PSA obtained by his PCP  Unclear if patient has had prior prostate cancer screening  He presents today with his wife who speaks [place]  The patient is primarily [address] speaking  Patient has relatively mild urinary symptoms  He had a PSA drawn as part of his routine screening  This was elevated at 14 4  He presented initially discussion  It is unclear whether the patient has a family history of prostate cancer  Returns for prostate biopsy results  No issues following biopsy      Past Medical History:     Past Medical History:   Diagnosis Date    [facility] [address] [facility]     Elevated PSA     Hyperlipidemia     Prostate cancer ([facility] [address] )        PAST SURGICAL HISTORY:     Past Surgical History:   Procedure Laterality Date    NECK SURGERY      PROSTATE BIOPSY  07/18/2018       CURRENT MEDICATIONS:     Current Outpatient Prescriptions   Medication Sig Dispense Refill    ACCU-CHEK FASTCLIX LANCETS MISC by Does not apply route 2 (two) times a day 102 each 3    Blood Glucose Monitoring Suppl (ACCU-CHEK CHUY PLUS) w/Device KIT by Does not apply route 2 (two) times a day 1 kit 0    glucose blood (ACCU-CHEK CHUY PLUS) test strip 1 each by Other route 2 (two) times a day Use as instructed 100 each 3    hydrocortisone 1 % lotion Apply topically 2 (two) times a day 118 mL 0    metFORMIN (GLUCOPHAGE) 500 mg tablet Take 1 tablet (500 mg total) by mouth 2 (two) times a day with meals 180 tablet 0    simvastatin (ZOCOR) 20 mg tablet Take 1 tablet (20 mg total) by mouth daily at bedtime 90 tablet 0    bisacodyl (FLEET) 10 MG/30ML ENEM Insert 30 mL (10 mg total) into the rectum once for 1 dose Day of biopsy 30 mL 0 Current Facility-Administered Medications   Medication Dose Route Frequency Provider Last Rate Last Dose    degarelix acetate (FIRMAGON) injection 240 mg  240 mg Subcutaneous Once Yana Chery MD           ALLERGIES:   No Known Allergies    SOCIAL HISTORY:     Social History     Social History    Marital status: /Civil Union     Spouse name: N/A    Number of children: N/A    Years of education: N/A     Social History Main Topics    Smoking status: Never Smoker    Smokeless tobacco: Never Used    Alcohol use No    Drug use: No    Sexual activity: Not Currently     Other Topics Concern    None     Social History Narrative    None       SOCIAL HISTORY:     Family History   Problem Relation Age of Onset    No Known Problems Mother     No Known Problems Father        REVIEW OF SYSTEMS:     Review of Systems   Constitutional: Negative  Respiratory: Negative  Cardiovascular: Negative  Gastrointestinal: Negative  Genitourinary: Negative  Musculoskeletal: Negative  Neurological: Negative  Psychiatric/Behavioral: Negative  PHYSICAL EXAM:     /70   Pulse 80   Ht 4' 10" (1 473 m)   Wt 54 9 kg (121 lb)   BMI 25 29 kg/m²     General:  Healthy appearing male in no acute distress  They have a normal affect  There is not appear to be any gross neurologic defects or abnormalities  HEENT:  Normocephalic, atraumatic  Neck is supple without any palpable lymphadenopathy  Cardiovascular:  Patient has normal palpable distal radial pulses  There is no significant peripheral edema  No JVD is noted  Respiratory:  Patient has unlabored respirations  There is no audible wheeze or rhonchi  Abdomen:  Abdomen is without surgical scars  Abdomen is soft and nontender  There is no tympany  Inguinal and umbilical hernia are not appreciated      Genitourinary: no penile lesions or discharge, no testicular masses or tenderness, no hernias, JUANA very hard indurated 2cm nodule to the right of median sulcus toward apex    Musculoskeletal:  Patient does not have significant CVA tenderness in the  flank with palpation or percussion  They full range of motion in all 4 extremities  Strength in all 4 extremities appears congruent  Patient is able to ambulate without assistance or difficulty  Dermatologic:  Patient has no skin abnormalities or rashes  LABS:     CBC:   Lab Results   Component Value Date    WBC 4 58 04/16/2018    HGB 14 3 04/16/2018    HCT 40 5 04/16/2018    MCV 88 04/16/2018     04/16/2018       BMP:   Lab Results   Component Value Date    CALCIUM 9 0 04/16/2018     04/16/2018    K 4 2 04/16/2018    CO2 29 04/16/2018     04/16/2018    BUN 19 04/16/2018    CREATININE 0 73 04/16/2018 4/16/18 PSA 14 4    PATHOLOGY:     Final Diagnosis   A  Prostate, right lateral base:  - Prostatic adenocarcinoma, Jennie score 4 + 4, Prognostic Grade Group IV, discontinuously involving 80% of one of one core:   * periprostatic fat invasion: not identified  * lymph-vascular invasion:  not identified  * perineural invasion: focally present  - Additional pathologic findings: N/A     B  Prostate, right lateral mid:  - Prostatic adenocarcinoma, Jennie score 4 + 3, Prognostic Grade Group III, discontinuously involving 100% of one of one core:   * periprostatic fat invasion: not identified  * lymph-vascular invasion:  not identified  * perineural invasion: not identified   - Additional pathologic findings: N/A     Note: Unstained slides from Block B1 are suggested if additional studies are indicated (at least 0 5 mm of tumor for Prolaris)     C  Prostate, right lateral apex:  - Prostatic adenocarcinoma, [place] score 4 + 4, Prognostic Grade Group IV, discontinuously involving 100% of one of one core:   * periprostatic fat invasion: not identified  * lymph-vascular invasion:  not identified     * perineural invasion: focally present  - Additional pathologic findings: N/A     D  Prostate, right medial base:  - Prostatic adenocarcinoma, Jennie score 4 + 5, Prognostic Grade Group V, discontinuously involving 100% of one of one core:   * periprostatic fat invasion: not identified  * lymph-vascular invasion:  not identified  * perineural invasion: not identified   - Additional pathologic findings: N/A     E  Prostate, right medial mid:  - Prostatic adenocarcinoma, Jennie score 4 + 4, Prognostic Grade Group IV, discontinuously involving 70% of one of one core:   * periprostatic fat invasion: not identified  * lymph-vascular invasion:  not identified  * perineural invasion: not identified   - Additional pathologic findings: N/A     F  Prostate, right medial apex:  - Prostatic adenocarcinoma, [place] score 4 + 4, Prognostic Grade Group IV, discontinuously involving 75% of one of one core:   * periprostatic fat invasion: not identified  * lymph-vascular invasion:  not identified  * perineural invasion: not identified   - Additional pathologic findings: N/A     G  Prostate, left medial base:  - Prostatic adenocarcinoma, Jennie score 4 + 5, Prognostic Grade Group V, discontinuously involving 12% of one of one core:   * periprostatic fat invasion: not identified  * lymph-vascular invasion:  not identified  * perineural invasion: not identified   - Additional pathologic findings: N/A     H  Prostate, left medial mid:  - Prostatic adenocarcinoma, [place] score 4 + 4, Prognostic Grade Group IV, continuously involving 12% of one of one core:   * periprostatic fat invasion: not identified  * lymph-vascular invasion:  not identified  * perineural invasion: not identified   - Additional pathologic findings: N/A     I   Prostate, left medial apex:  - Benign prostate tissue      J  Prostate, left lateral base:  - Prostatic adenocarcinoma, [place] score 4 + 4, Prognostic Grade Group IV, continuously involving 6% of one of one core:   * periprostatic fat invasion: not identified  * lymph-vascular invasion:  not identified  * perineural invasion: not identified   - Additional pathologic findings: N/A     K  Prostate, left lateral mid:  - Prostatic adenocarcinoma, Jennie score 4 + 4, Prognostic Grade Group IV, continuously involving 6% of one of one core:   * periprostatic fat invasion: not identified  * lymph-vascular invasion:  not identified  * perineural invasion: not identified   - Additional pathologic findings: N/A     L  Prostate, left lateral apex:  - Benign prostate tissue      M  Prostate, right lateral nodule:  - Prostatic adenocarcinoma, [place] score 4 + 4, Prognostic Grade Group IV, discontinuously involving 100% of each of two cores:   * periprostatic fat invasion: not identified  * lymph-vascular invasion:  not identified  * perineural invasion: not identified   - Additional pathologic findings: N/A     ASSESSMENT:     68 y o  male with high volume, high risk [place] 4+5=9 prostate cancer    PLAN:      #[id number]    I had a very lengthy discussion with the patient detailing the pathology from his prostate biopsy  At this point time, he has a clinical T2a prostate cancer, Jennie score 4+ 5 equals 9 in 2 cores, 4+ 4 equals 8 in 8 cores, 4+ 3 equals 7 in 1 core  This can be considered a high volume high risk prostate cancer  We discussed all potential options for the treatment of prostate cancer  These include watchful waiting, active surveillance, local thermal therapies like cryoablation or high-frequency ultrasound, surgical extirpation via open and robotic approaches, and external beam radiation  Based on their NCCN risk profile, I have opted to obtain additional imaging in the form or CT scan of the pelvis and bone scan  I gave the patient a brief overview of each of these options    Patient understands that I am recommending concomitant hormonal manipulation and external beam radiotherapy assuming that the cancer is not diffusely widespread  I have provided the patient a copy of the pathology report  I've given the patient resources to more completely understand their diagnosis  The patient was administered Firmagon 240 mg injection today to start his hormonal manipulation plans for upcoming radiation therapy  He and his wife understand they will need a another injection of Lupron in 1 month  Lupron injections will continue every 6 months for at least 3 years if not indefinitely  I also discussed pearly injections for bone health with the patient and his wife  These will be administered again in 1 month  I will plan to see the patient back within the month to further discuss their decision for treatment after they have had time to digest their diagnosis  All questions and concerns have been answered  I've made myself available for a sooner appointment or available to discuss further by phone

## 2018-08-02 ENCOUNTER — APPOINTMENT (OUTPATIENT)
Dept: LAB | Facility: HOSPITAL | Age: 73
End: 2018-08-02
Attending: UROLOGY
Payer: COMMERCIAL

## 2018-08-02 LAB
ANION GAP SERPL CALCULATED.3IONS-SCNC: 6 MMOL/L (ref 4–13)
BUN SERPL-MCNC: 15 MG/DL (ref 5–25)
CALCIUM SERPL-MCNC: 9.6 MG/DL (ref 8.3–10.1)
CHLORIDE SERPL-SCNC: 99 MMOL/L (ref 100–108)
CO2 SERPL-SCNC: 29 MMOL/L (ref 21–32)
CREAT SERPL-MCNC: 0.82 MG/DL (ref 0.6–1.3)
GFR SERPL CREATININE-BSD FRML MDRD: 88 ML/MIN/1.73SQ M
GLUCOSE P FAST SERPL-MCNC: 259 MG/DL (ref 65–99)
POTASSIUM SERPL-SCNC: 4.2 MMOL/L (ref 3.5–5.3)
SODIUM SERPL-SCNC: 134 MMOL/L (ref 136–145)

## 2018-08-02 PROCEDURE — 36415 COLL VENOUS BLD VENIPUNCTURE: CPT | Performed by: UROLOGY

## 2018-08-02 PROCEDURE — 80048 BASIC METABOLIC PNL TOTAL CA: CPT | Performed by: UROLOGY

## 2018-08-06 ENCOUNTER — OFFICE VISIT (OUTPATIENT)
Dept: FAMILY MEDICINE CLINIC | Facility: CLINIC | Age: 73
End: 2018-08-06
Payer: COMMERCIAL

## 2018-08-06 VITALS
SYSTOLIC BLOOD PRESSURE: 110 MMHG | HEART RATE: 80 BPM | TEMPERATURE: 98.5 F | HEIGHT: 60 IN | DIASTOLIC BLOOD PRESSURE: 70 MMHG | WEIGHT: 119 LBS | BODY MASS INDEX: 23.36 KG/M2

## 2018-08-06 DIAGNOSIS — R19.2: ICD-10-CM

## 2018-08-06 DIAGNOSIS — R63.0 POOR APPETITE: Primary | ICD-10-CM

## 2018-08-06 PROCEDURE — 3008F BODY MASS INDEX DOCD: CPT | Performed by: NURSE PRACTITIONER

## 2018-08-06 PROCEDURE — 1160F RVW MEDS BY RX/DR IN RCRD: CPT | Performed by: NURSE PRACTITIONER

## 2018-08-06 PROCEDURE — 99213 OFFICE O/P EST LOW 20 MIN: CPT | Performed by: NURSE PRACTITIONER

## 2018-08-06 RX ORDER — FAMOTIDINE 20 MG/1
20 TABLET, FILM COATED ORAL DAILY
Qty: 30 TABLET | Refills: 0 | Status: SHIPPED | OUTPATIENT
Start: 2018-08-06 | End: 2018-09-11 | Stop reason: SDUPTHER

## 2018-08-06 NOTE — PROGRESS NOTES
Chief Complaint   Patient presents with    Nausea     69 y/o male complains of nausea and states he think he may have a "parasite"       Assessment/Plan:       Diagnoses and all orders for this visit:    Poor appetite  -     Ambulatory referral to Gastroenterology; Future  -     famotidine (PEPCID) 20 mg tablet; Take 1 tablet (20 mg total) by mouth daily    Increased peristalsis  -     famotidine (PEPCID) 20 mg tablet; Take 1 tablet (20 mg total) by mouth daily          Subjective:      Patient ID: Marie Zamorano is a 68 y o  male  Daughter states he was diagnosed with prostate cancer and he is receiving treatment already  Nausea   This is a new problem  The current episode started more than 1 year ago  The problem occurs constantly  The problem has been waxing and waning  Associated symptoms include fatigue and nausea  Pertinent negatives include no abdominal pain, chest pain, diaphoresis, fever, rash or vomiting  The following portions of the patient's history were reviewed and updated as appropriate: allergies, current medications, past family history, past medical history, past social history, past surgical history and problem list     Review of Systems   Constitutional: Positive for fatigue and unexpected weight change  Negative for diaphoresis and fever  HENT: Negative for trouble swallowing  Cardiovascular: Negative for chest pain and palpitations  Gastrointestinal: Positive for constipation and nausea  Negative for abdominal distention, abdominal pain, diarrhea and vomiting  Skin: Negative for rash  Objective:      /70   Pulse 80   Temp 98 5 °F (36 9 °C)   Ht 4' 10" (1 473 m)   Wt 54 kg (119 lb)   BMI 24 87 kg/m²          Physical Exam   Constitutional: He is oriented to person, place, and time  He appears well-developed  No distress  HENT:   Head: Normocephalic and atraumatic     Right Ear: Tympanic membrane normal    Left Ear: Tympanic membrane normal    Nose: Nose normal    Mouth/Throat: Uvula is midline, oropharynx is clear and moist and mucous membranes are normal    Cardiovascular: Normal rate, regular rhythm, S1 normal, S2 normal and normal heart sounds  No murmur heard  Pulmonary/Chest: Effort normal and breath sounds normal  No respiratory distress  Abdominal: Soft  He exhibits no distension and no mass  Bowel sounds are increased  There is tenderness in the epigastric area  There is no rebound  Neurological: He is alert and oriented to person, place, and time

## 2018-08-09 ENCOUNTER — HOSPITAL ENCOUNTER (OUTPATIENT)
Dept: CT IMAGING | Facility: HOSPITAL | Age: 73
Discharge: HOME | End: 2018-08-09
Attending: UROLOGY
Payer: COMMERCIAL

## 2018-08-09 ENCOUNTER — HOSPITAL ENCOUNTER (OUTPATIENT)
Dept: NUCLEAR MEDICINE | Facility: HOSPITAL | Age: 73
Discharge: HOME | End: 2018-08-09
Attending: UROLOGY
Payer: COMMERCIAL

## 2018-08-09 DIAGNOSIS — N40.2 PROSTATE NODULE: ICD-10-CM

## 2018-08-09 DIAGNOSIS — C61 PROSTATE CANCER (HCC): ICD-10-CM

## 2018-08-09 PROCEDURE — 72193 CT PELVIS W/DYE: CPT

## 2018-08-09 PROCEDURE — A9503 TC99M MEDRONATE: HCPCS

## 2018-08-09 PROCEDURE — 78306 BONE IMAGING WHOLE BODY: CPT

## 2018-08-09 RX ADMIN — IOHEXOL 100 ML: 350 INJECTION, SOLUTION INTRAVENOUS at 12:28

## 2018-08-13 ENCOUNTER — CLINICAL SUPPORT (OUTPATIENT)
Dept: RADIATION ONCOLOGY | Facility: CLINIC | Age: 73
End: 2018-08-13
Attending: RADIOLOGY
Payer: COMMERCIAL

## 2018-08-13 VITALS
HEIGHT: 60 IN | BODY MASS INDEX: 23.44 KG/M2 | RESPIRATION RATE: 12 BRPM | WEIGHT: 119.4 LBS | SYSTOLIC BLOOD PRESSURE: 110 MMHG | TEMPERATURE: 98 F | DIASTOLIC BLOOD PRESSURE: 62 MMHG | OXYGEN SATURATION: 98 % | HEART RATE: 76 BPM

## 2018-08-13 DIAGNOSIS — C61 PROSTATE CANCER (HCC): ICD-10-CM

## 2018-08-13 DIAGNOSIS — C61 PROSTATE CANCER (HCC): Primary | ICD-10-CM

## 2018-08-13 PROCEDURE — 99215 OFFICE O/P EST HI 40 MIN: CPT | Performed by: RADIOLOGY

## 2018-08-13 NOTE — PROGRESS NOTES
Rosannefinesse Bhaton  1945  Mr Jeffy Benton is a 68 y o  male    Chief Complaint   Patient presents with    Consult     Radiation Oncology       Cancer Staging  No matching staging information was found for the patient  68year old with prostatic adenocarcinoma, [place] 4+5=9  PSA obtained by PCP  Primarily Turkmen speaking  Referred by Dr Michelle Lund  PSA  4/16/2018 - 14 4 ng/mL    6/12/2018 -Dr Gabrielle Gilbert, urology  Discussed need for biopsy  7/18/18 prostate biopsy    7/27/2018 - Dr Michelle Lund  240 mg IM Degarelix administered  Patient has LUTS and elevated PSA  He will need another injection of Lupron in 1 month and will continue every 6 months for at least 3 years  Discussed pearly injections for bone health  8/9/2019 - CT pelvis: Heterogeneous prostate gland with 1 3 cm enhancing right central zone nodule  No evidence of metastatic disease in the pelvis  Bone scan: No osseous mets  8/29/2018 - F/U with Dr Michelle Lund         Prostate cancer [facility])    7/18/2018 Initial Diagnosis     Prostate cancer ([facility] [address] )         7/18/2018 Biopsy     Biopsy Pathology  Prostatic adenocarcinoma   LT Lateral LT Medial RT Medial RT Lateral   [place] 4+4=8, discontinuously involving 6% of 1 of 1 core  [place] 4+5=9, discontinuously involving 12% of 1 of 1 core  [place] 4+5=9, discontinuously involving 100% of 1 of 1 core  [place] 4+4=8, discontinuously involving 80% of 1 of 1 core  PNI   [place] 4+4=8, discontinuously involving 6% of 1 of 1 core  [place] 4+4=8, discontinuously involving 12% of 1 of 1 core  [place] 4+4=8, discontinuously involving 70% of 1 of 1 core  [place] 4+3=7, discontinuously involving 100% of 1 of 1 core  [place] Benign Benign [place] 4+4=8, discontinuously involving 75% of 1 of 1 core  [place] 4+4=8, discontinuously involving 100% of 1 of 1 core   PNI     Prostate, right lateral nodule:  - Prostatic adenocarcinoma, [place]ie score 4 + 4, Prognostic Grade Group IV, discontinuously involving 100% of each of two cores:   * periprostatic fat invasion: not identified  * lymph-vascular invasion:  not identified  * perineural invasion: not identified   - Additional pathologic findings: N/A         7/27/2018 -  Hormone Therapy     240mg IM Degarelix administered  Lupron in 1 month then every 6 months for 3 years or indefinitely  Clinical Trial: no    Screening  Tobacco  Current tobacco user: no  If yes, brief counseling provided: Yes    Hypertension  Hypertension screening performed: no  Normotensive:  yes  If no, referred to PCP: n/a    Depression Screening  Screened for depression using PHQ-2: yes    Screened for depression using PHQ-9:  no  Screening positive or negative:  negative  If score >4, was any of the following actions taken? Additional evaluation for depression, suicide risk assesment, referral to PCP or psychiatry, medication started:  [address] [place] [address] for Patients >65 years  Advanced Care Planning Discussed:  no  Patient named surrogate decision maker or care plan in chart: no    Will refer to MSW per request of patient      Health Maintenance   Topic Date Due    Hepatitis C Screening  1945    Depression Screening PHQ-9  1945    CRC Screening: Colonoscopy  1945    Diabetic Foot Exam  07/21/1956    DM Eye Exam  07/21/1956    URINE MICROALBUMIN  07/21/1956    DTaP,Tdap,and Td Vaccines (1 - Tdap) 07/21/1967    Fall Risk  07/21/2011    PNEUMOCOCCAL POLYSACCHARIDE VACCINE AGE 65 AND OVER  07/21/2011    GLAUCOMA SCREENING 72 + YR  07/21/2013    INFLUENZA VACCINE  09/01/2018    HEMOGLOBIN A1C  10/16/2018       Patient Active Problem List   Diagnosis    Type 2 diabetes mellitus without complication, without long-term current use of insulin (HCC)    Cervical pain    Irritant contact dermatitis due to other agents    Ill-fitting dentures    Hearing difficulty of both ears    Prostate cancer ([facility] [address] )    Prostate nodule     Past Medical History:   Diagnosis Date    Diabetes mellitus ([address] )     Elevated PSA     Hyperlipidemia     Prostate cancer [facility])      Past Surgical History:   Procedure Laterality Date    NECK SURGERY      PROSTATE BIOPSY  07/18/2018     Family History   Problem Relation Age of Onset    No Known Problems Mother     No Known Problems Father      Social History     Social History    Marital status: /Civil Union     Spouse name: N/A    Number of children: N/A    Years of education: N/A     Occupational History    Not on file  Social History Main Topics    Smoking status: Never Smoker    Smokeless tobacco: Never Used    Alcohol use No    Drug use: No    Sexual activity: Not Currently     Other Topics Concern    Not on file     Social History Narrative    No narrative on file       Current Outpatient Prescriptions:     ACCU-CHEK FASTCLIX LANCETS MISC, by Does not apply route 2 (two) times a day, Disp: 102 each, Rfl: 3    Blood Glucose Monitoring Suppl (ACCU-CHEK CHUY PLUS) w/Device KIT, by Does not apply route 2 (two) times a day, Disp: 1 kit, Rfl: 0    glucose blood (ACCU-CHEK CHUY PLUS) test strip, 1 each by Other route 2 (two) times a day Use as instructed, Disp: 100 each, Rfl: 3    metFORMIN (GLUCOPHAGE) 500 mg tablet, Take 1 tablet (500 mg total) by mouth 2 (two) times a day with meals, Disp: 180 tablet, Rfl: 0    simvastatin (ZOCOR) 20 mg tablet, Take 1 tablet (20 mg total) by mouth daily at bedtime, Disp: 90 tablet, Rfl: 0    bisacodyl (FLEET) 10 MG/30ML ENEM, Insert 30 mL (10 mg total) into the rectum once for 1 dose Day of biopsy, Disp: 30 mL, Rfl: 0    famotidine (PEPCID) 20 mg tablet, Take 1 tablet (20 mg total) by mouth daily, Disp: 30 tablet, Rfl: 0    No Known Allergies    Review of Systems:  Review of Systems   Constitutional: Positive for appetite change (Decreased appetite X 1 week  )  HENT: Negative  Eyes: Negative  Respiratory: Negative      Cardiovascular: Positive for palpitations (Feels a skipped beat occasionally)  Gastrointestinal: Negative  Endocrine: Negative  Genitourinary:        Sometimes only a small amount of urine  Nocturia X 2   Musculoskeletal: Positive for neck pain (Still has some neck pain )  Skin: Negative  Allergic/Immunologic: Negative  Neurological: Positive for headaches (Occasional mild)  Hematological: Negative  Psychiatric/Behavioral: Negative  Vitals:    08/13/18 1308   BP: 110/62   BP Location: Left arm   Patient Position: Sitting   Cuff Size: Standard   Pulse: 76   Resp: 12   Temp: 98 °F (36 7 °C)   TempSrc: Tympanic   SpO2: 98%   Weight: 54 2 kg (119 lb 6 4 oz)   Height: 4' 10" (1 473 m)       Pain Score: 0-No pain    Imaging:Ct Pelvis W Contrast    Result Date: 8/13/2018  Narrative: CT PELVIS WITH IV CONTRAST INDICATION:   N40 2: Nodular prostate without lower urinary tract symptoms C61: Malignant neoplasm of prostate  COMPARISON:  None  TECHNIQUE: CT examination of the pelvis was performed  Axial, sagittal, and coronal 2D reformatted images were created from the source data and submitted for interpretation  Radiation dose length product (DLP) for this visit:  143 mGy-cm   This examination, like all CT scans performed in the [facility], was performed utilizing techniques to minimize radiation dose exposure, including the use of iterative reconstruction and automated exposure control  IV Contrast:  100 mL of iohexol (OMNIPAQUE) Enteric Contrast:  Enteric contrast was not administered  FINDINGS: VISUALIZED KIDNEYS/URETERS:  No significant abnormality identified in the partially imaged right kidney and ureters  REPRODUCTIVE ORGANS:  Heterogeneous prostate gland with enhancing nodule in the right central zone measuring 1 3 cm  URINARY BLADDER:  Unremarkable  APPENDIX:  No findings to suggest appendicitis  VISUALIZED BOWEL:  Colonic diverticulosis  No bowel obstruction   ABDOMINOPELVIC CAVITY:  No ascites or free intraperitoneal air  No lymphadenopathy  VISUALIZED VESSELS:  Unremarkable for patient's age  ABDOMINOPELVIC WALL/INGUINAL REGIONS: Unremarkable  OSSEOUS STRUCTURES:  No acute fracture or destructive osseous lesion  Impression: Heterogeneous prostate gland with 1 3 cm enhancing right central zone nodule  No evidence of metastatic disease in the pelvis  Colonic diverticulosis  Workstation performed: [id number]     Nm Bone Scan Whole Body    Result Date: 8/9/2018  Narrative: BONE SCAN  WHOLE BODY INDICATION:  Staging for treatment management, N40 2: Nodular prostate without lower urinary tract symptoms C61: Malignant neoplasm of prostate PREVIOUS FILM CORRELATION:    No prior pertinent studies for comparison  TECHNIQUE:   This study was performed following the intravenous administration of 25 8 mCi Tc-99m labeled MDP  Delayed, anterior and posterior whole body images were acquired, 2-3 hours after radiopharmaceutical administration  FINDINGS:  Probable bilateral shoulder degenerative change, right greater than left  Slightly greater activity in the right [facility] joint region may be correlated with radiographs  Additional degenerative change in the spine, with more prominent endplate degenerative change in the lumbar spine  Degenerative change in the bilateral wrists and knees  Symmetric renal uptake  Probable dental disease involving the mandible  There is no scintigraphic evidence of osseous metastasis  Impression: 1  No scintigraphic evidence of osseous metastasis  2   Bilateral shoulder degenerative change  Slightly greater activity in the right [facility] joint region may be correlated with radiographs, if clinically warranted  Workstation performed: [id number]       Teaching NCI packet given and explained  Patient speaks [place]  Wife present and speaks [place] and [address]

## 2018-08-13 NOTE — PROGRESS NOTES
Consultation - Radiation Oncology     MQR:[id number] : 1945  Encounter: [id number]  Patient Information: [address]  Chief Complaint   Patient presents with    Consult     Radiation Oncology     Cancer Staging  No matching staging information was found for the patient  History of Present Illness   Claudia Ash is a 68y o  year old male who presentED with ELEVATED PSA obtained by PCP  Primarily Danish speaking  Referred by Dr Gordon Horowitz  [place] LINE  WAS UTILIZED DURING THE CONSULTATION [id number]       PSA  2018 - 14 4 ng/mL     2018 -Dr Heydi Luo, urology  Discussed need for biopsy       18 prostate biopsy     2018 - Dr Gordon Horowitz  240 mg IM Degarelix administered  Patient has LUTS and elevated PSA  He will need another injection of Lupron in 1 month and will continue every 6 months for at least 3 years  Discussed pearly injections for bone health       2019 - CT pelvis: Heterogeneous prostate gland with 1 3 cm enhancing right central zone nodule   No evidence of metastatic disease in the pelvis  Bone scan: No osseous mets      2018 - F/U with Dr Gordon Horowitz     He denies any significant urinary symptomatology  No bowel symptomatology  Historical Information      Prostate cancer ([facility] [address] )    2018 Initial Diagnosis     Prostate cancer ([facility] [address] )         2018 Biopsy     Biopsy Pathology  Prostatic adenocarcinoma   LT Lateral LT Medial RT Medial RT Lateral   Base Jennie 4+4=8, discontinuously involving 6% of 1 of 1 core  Jennie 4+5=9, discontinuously involving 12% of 1 of 1 core  Jennie 4+5=9, discontinuously involving 100% of 1 of 1 core  Jennie 4+4=8, discontinuously involving 80% of 1 of 1 core  PNI   Mid Jennie 4+4=8, discontinuously involving 6% of 1 of 1 core  Jennie 4+4=8, discontinuously involving 12% of 1 of 1 core  Jennie 4+4=8, discontinuously involving 70% of 1 of 1 core    Jennie 4+3=7, discontinuously involving 100% of 1 of 1 core  [place] Benign Benign Jennie 4+4=8, discontinuously involving 75% of 1 of 1 core  Jennie 4+4=8, discontinuously involving 100% of 1 of 1 core  PNI     Prostate, right lateral nodule:  - Prostatic adenocarcinoma, [place] score 4 + 4, Prognostic Grade Group IV, discontinuously involving 100% of each of two cores:   * periprostatic fat invasion: not identified  * lymph-vascular invasion:  not identified  * perineural invasion: not identified   - Additional pathologic findings: N/A         7/27/2018 -  Hormone Therapy     240mg IM Degarelix administered  Lupron in 1 month then every 6 months for 3 years or indefinitely                Past Medical History:   Diagnosis Date    [facility] mellitus ([facility] [address] )     Elevated PSA     Hyperlipidemia     Prostate cancer [facility])      Past Surgical History:   Procedure Laterality Date    NECK SURGERY      PROSTATE BIOPSY  07/18/2018       Family History   Problem Relation Age of Onset    No Known Problems Mother     No Known Problems Father        Social History   History   Alcohol Use No     History   Drug Use No     History   Smoking Status    Never Smoker   Smokeless Tobacco    Never Used         Meds/Allergies     Current Outpatient Prescriptions:     ACCU-CHEK FASTCLIX LANCETS MISC, by Does not apply route 2 (two) times a day, Disp: 102 each, Rfl: 3    Blood Glucose Monitoring Suppl (ACCU-CHEK CHUY PLUS) w/Device KIT, by Does not apply route 2 (two) times a day, Disp: 1 kit, Rfl: 0    glucose blood (ACCU-CHEK CHUY PLUS) test strip, 1 each by Other route 2 (two) times a day Use as instructed, Disp: 100 each, Rfl: 3    metFORMIN (GLUCOPHAGE) 500 mg tablet, Take 1 tablet (500 mg total) by mouth 2 (two) times a day with meals, Disp: 180 tablet, Rfl: 0    simvastatin (ZOCOR) 20 mg tablet, Take 1 tablet (20 mg total) by mouth daily at bedtime, Disp: 90 tablet, Rfl: 0    bisacodyl (FLEET) 10 MG/30ML ENEM, Insert 30 mL (10 mg total) into the rectum once for 1 dose Day of biopsy, Disp: 30 mL, Rfl: 0    famotidine (PEPCID) 20 mg tablet, Take 1 tablet (20 mg total) by mouth daily, Disp: 30 tablet, Rfl: 0  No Known Allergies      Review of Systems  Constitutional: Positive for appetite change (Decreased appetite X 1 week  )  HENT: Negative  Eyes: Negative  Respiratory: Negative  Cardiovascular: Positive for palpitations (Feels a skipped beat occasionally)  Gastrointestinal: Negative  Endocrine: Negative  Genitourinary:        Sometimes only a small amount of urine  Nocturia X 2   Musculoskeletal: Positive for neck pain (Still has some neck pain )  Skin: Negative  Allergic/Immunologic: Negative  Neurological: Positive for headaches (Occasional mild)  Hematological: Negative  Psychiatric/Behavioral: Negative          Screening  Tobacco  Current tobacco user: no  If yes, brief counseling provided: Yes     Hypertension  Hypertension screening performed: no  Normotensive:  yes  If no, referred to PCP: n/a     Depression Screening  Screened for depression using PHQ-2: yes     Screened for depression using PHQ-9:  no  Screening positive or negative:  negative  If score >4, was any of the following actions taken? Additional evaluation for depression, suicide risk assesment, referral to PCP or psychiatry, medication started:  n/a     Advanced Care Planning for Patients >65 years  Advanced Care Planning Discussed:  no  Patient named surrogate decision maker or care plan in chart: no       OBJECTIVE:   /62 (BP Location: Left arm, Patient Position: Sitting, Cuff Size: Standard)   Pulse 76   Temp 98 °F (36 7 °C) (Tympanic)   Resp 12   Ht 4' 10" (1 473 m)   Wt 54 2 kg (119 lb 6 4 oz)   SpO2 98%   BMI 24 95 kg/m²   Pain Assessment:  0  Performance Status: ECOG/Zubrod/WHO: 0 - Asymptomatic    Physical Exam   Constitutional: He appears well-developed  HENT:   Head: Normocephalic     Mouth/Throat: Oropharynx is clear and moist    Eyes: EOM are normal  Pupils are equal, round, and reactive to light  Neck: Normal range of motion  Neck supple  Cardiovascular: Normal rate, regular rhythm and normal heart sounds  Pulmonary/Chest: Effort normal and breath sounds normal  He has no wheezes  He exhibits no tenderness  Abdominal: Soft  Bowel sounds are normal  He exhibits no distension and no mass  There is no tenderness  Genitourinary: Rectum normal    Genitourinary Comments: He has a hard nodule in the right lobe of the prostate  No blood at the tip of the examining glove   Musculoskeletal: Normal range of motion  He exhibits no edema  Lymphadenopathy:     He has no cervical adenopathy  Neurological: He is alert  No cranial nerve deficit  Coordination normal    Skin: Skin is warm  No rash noted  No erythema  Psychiatric: He has a normal mood and affect  His behavior is normal          RESULTS  Lab Results    Chemistry        Component Value Date/Time     (L) 08/02/2018 1155    K 4 2 08/02/2018 1155    CL 99 (L) 08/02/2018 1155    CO2 29 08/02/2018 1155    BUN 15 08/02/2018 1155    CREATININE 0 82 08/02/2018 1155        Component Value Date/Time    CALCIUM 9 6 08/02/2018 1155    ALKPHOS 121 (H) 04/16/2018 1415    AST 21 04/16/2018 1415    ALT 23 04/16/2018 1415    BILITOT 0 44 04/16/2018 1415            Lab Results   Component Value Date    WBC 4 58 04/16/2018    HGB 14 3 04/16/2018    HCT 40 5 04/16/2018    MCV 88 04/16/2018     04/16/2018         Imaging Studies  Ct Pelvis W Contrast    Result Date: 8/13/2018  Narrative: CT PELVIS WITH IV CONTRAST INDICATION:   N40 2: Nodular prostate without lower urinary tract symptoms C61: Malignant neoplasm of prostate  COMPARISON:  None  TECHNIQUE: CT examination of the pelvis was performed  Axial, sagittal, and coronal 2D reformatted images were created from the source data and submitted for interpretation   Radiation dose length product (DLP) for this visit:  143 mGy-cm   This examination, like all CT scans performed in the [facility], was performed utilizing techniques to minimize radiation dose exposure, including the use of iterative reconstruction and automated exposure control  IV Contrast:  100 mL of iohexol (OMNIPAQUE) Enteric Contrast:  Enteric contrast was not administered  FINDINGS: VISUALIZED KIDNEYS/URETERS:  No significant abnormality identified in the partially imaged right kidney and ureters  REPRODUCTIVE ORGANS:  Heterogeneous prostate gland with enhancing nodule in the right central zone measuring 1 3 cm  URINARY BLADDER:  Unremarkable  APPENDIX:  No findings to suggest appendicitis  VISUALIZED BOWEL:  Colonic diverticulosis  No bowel obstruction  ABDOMINOPELVIC CAVITY:  No ascites or free intraperitoneal air  No lymphadenopathy  VISUALIZED VESSELS:  Unremarkable for patient's age  ABDOMINOPELVIC WALL/INGUINAL REGIONS: Unremarkable  OSSEOUS STRUCTURES:  No acute fracture or destructive osseous lesion  Impression: Heterogeneous prostate gland with 1 3 cm enhancing right central zone nodule  No evidence of metastatic disease in the pelvis  Colonic diverticulosis  Workstation performed: [id number]     Nm Bone Scan Whole Body    Result Date: 8/9/2018  Narrative: BONE SCAN  WHOLE BODY INDICATION:  Staging for treatment management, N40 2: Nodular prostate without lower urinary tract symptoms C61: Malignant neoplasm of prostate PREVIOUS FILM CORRELATION:    No prior pertinent studies for comparison  TECHNIQUE:   This study was performed following the intravenous administration of 25 8 mCi Tc-99m labeled MDP  Delayed, anterior and posterior whole body images were acquired, 2-3 hours after radiopharmaceutical administration  FINDINGS:  Probable bilateral shoulder degenerative change, right greater than left  Slightly greater activity in the right [facility] joint region may be correlated with radiographs    Additional degenerative change in the spine, with more prominent endplate degenerative change in the lumbar spine  Degenerative change in the bilateral wrists and knees  Symmetric renal uptake  Probable dental disease involving the mandible  There is no scintigraphic evidence of osseous metastasis  Impression: 1  No scintigraphic evidence of osseous metastasis  2   Bilateral shoulder degenerative change  Slightly greater activity in the right [facility] joint region may be correlated with radiographs, if clinically warranted  Workstation performed: [id number]         Pathology:    Prostate, right lateral base:  - Prostatic adenocarcinoma, [place] score 4 + 4, Prognostic Grade Group IV, discontinuously involving 80% of one of one core:   * periprostatic fat invasion: not identified  * lymph-vascular invasion:  not identified  * perineural invasion: focally present  - Additional pathologic findings: N/A     B  Prostate, right lateral mid:  - Prostatic adenocarcinoma, [place] score 4 + 3, Prognostic Grade Group III, discontinuously involving 100% of one of one core:   * periprostatic fat invasion: not identified  * lymph-vascular invasion:  not identified  * perineural invasion: not identified   - Additional pathologic findings: N/A     Note: Unstained slides from Block B1 are suggested if additional studies are indicated (at least 0 5 mm of tumor for Prolaris)     C  Prostate, right lateral apex:  - Prostatic adenocarcinoma, [place] score 4 + 4, Prognostic Grade Group IV, discontinuously involving 100% of one of one core:   * periprostatic fat invasion: not identified  * lymph-vascular invasion:  not identified  * perineural invasion: focally present  - Additional pathologic findings: N/A     D  Prostate, right medial base:  - Prostatic adenocarcinoma, [place] score 4 + 5, Prognostic Grade Group V, discontinuously involving 100% of one of one core:   * periprostatic fat invasion: not identified     * lymph-vascular invasion:  not identified  * perineural invasion: not identified   - Additional pathologic findings: N/A     E  Prostate, right medial mid:  - Prostatic adenocarcinoma, [place] score 4 + 4, Prognostic Grade Group IV, discontinuously involving 70% of one of one core:   * periprostatic fat invasion: not identified  * lymph-vascular invasion:  not identified  * perineural invasion: not identified   - Additional pathologic findings: N/A     F  Prostate, right medial apex:  - Prostatic adenocarcinoma, [place] score 4 + 4, Prognostic Grade Group IV, discontinuously involving 75% of one of one core:   * periprostatic fat invasion: not identified  * lymph-vascular invasion:  not identified  * perineural invasion: not identified   - Additional pathologic findings: N/A     G  Prostate, left medial base:  - Prostatic adenocarcinoma, [place] score 4 + 5, Prognostic Grade Group V, discontinuously involving 12% of one of one core:   * periprostatic fat invasion: not identified  * lymph-vascular invasion:  not identified  * perineural invasion: not identified   - Additional pathologic findings: N/A     H  Prostate, left medial mid:  - Prostatic adenocarcinoma, [place] score 4 + 4, Prognostic Grade Group IV, continuously involving 12% of one of one core:   * periprostatic fat invasion: not identified  * lymph-vascular invasion:  not identified  * perineural invasion: not identified   - Additional pathologic findings: N/A     I  Prostate, left medial apex:  - Benign prostate tissue      J  Prostate, left lateral base:  - Prostatic adenocarcinoma, [place] score 4 + 4, Prognostic Grade Group IV, continuously involving 6% of one of one core:   * periprostatic fat invasion: not identified  * lymph-vascular invasion:  not identified  * perineural invasion: not identified   - Additional pathologic findings: N/A     K   Prostate, left lateral mid:  - Prostatic adenocarcinoma, [place] score 4 + 4, Prognostic Grade Group IV, continuously involving 6% of one of one core:   * periprostatic fat invasion: not identified  * lymph-vascular invasion:  not identified  * perineural invasion: not identified   - Additional pathologic findings: N/A     L  Prostate, left lateral apex:  - Benign prostate tissue      M  Prostate, right lateral nodule:  - Prostatic adenocarcinoma, [place] score 4 + 4, Prognostic Grade Group IV, discontinuously involving 100% of each of two cores:   * periprostatic fat invasion: not identified  * lymph-vascular invasion:  not identified  * perineural invasion: not identified  ASSESSMENT  1  Prostate cancer [facility])  Ambulatory referral to Radiation Oncology     Cancer Staging  No matching staging information was found for the patient  PLAN/DISCUSSION  No orders of the defined types were placed in this encounter  Harmony Gupta is a 68y o  year old male with clinical T2 a [place] 9 (4+ 5) prostate carcinoma  He has high-volume high risk prostate cancer  I have reviewed his imaging studies with CT scan and bone scan which does not identify any extraprostatic disease  Therefore we discussed local treatment options including observation, surgery or radiation treatment  He would like to pursue external beam radiation therapy  He has initiated androgen deprivation On 7/27/2018  I have described with him and his wife along with utilizing the   phone line the rationale between behind definitive external beam radiation  In addition we discussed the possible acute as well as long-term side effects including sexual dysfunction, cystitis and proctitis  He has been instructed that prior to his radiation treatment he should keep his bladder full  We will contact the urologist for fiducial marker placement  He will return following marker placement for CT simulation  The plan will be for him to initiate is radiation approximately 8 weeks from his hormone injection    He is agreeable to the above outlined plan  Bryan Koroma MD  1/39/1578,6:65 PM      Portions of the record may have been created with voice recognition software   Occasional wrong word or "sound a like" substitutions may have occurred due to the inherent limitations of voice recognition software   Read the chart carefully and recognize, using context, where substitutions have occurred

## 2018-08-14 ENCOUNTER — TELEPHONE (OUTPATIENT)
Dept: UROLOGY | Facility: CLINIC | Age: 73
End: 2018-08-14

## 2018-08-14 DIAGNOSIS — N40.2 PROSTATE NODULE: ICD-10-CM

## 2018-08-14 DIAGNOSIS — R97.20 ELEVATED PSA, BETWEEN 10 AND LESS THAN 20 NG/ML: ICD-10-CM

## 2018-08-14 DIAGNOSIS — C61 PROSTATE CANCER (HCC): Primary | ICD-10-CM

## 2018-08-14 RX ORDER — CIPROFLOXACIN 500 MG/1
500 TABLET, FILM COATED ORAL EVERY 12 HOURS SCHEDULED
Qty: 6 TABLET | Refills: 0 | Status: SHIPPED | OUTPATIENT
Start: 2018-08-14 | End: 2018-08-17

## 2018-08-14 NOTE — TELEPHONE ENCOUNTER
Pt managed by Dr Paula Hunter at the [facility]  Recently pt diagnosed with prostate cancer  Received phone call from Cassie at [facility] [phone] re gold seed marker placement to be done at the same time pt receiving Lupron injection  Plan to have one of our Tuvaluan speaking co workers call the pt and  review instructions for the procedure  Ivana  Please prescribe antibiotics for the procedure    Thanks

## 2018-08-21 NOTE — PROGRESS NOTES
I have passed this information on to Alberta Rosales so that she can call [facility] to give him instructions on his marker placement  I will also tell her to remind him of his appt

## 2018-08-21 NOTE — PROGRESS NOTES
Spoke with patients wife and she confirmed appointment  Patient is aware that he is to take antibiotics and fleet before appointment and is aware to take fleet 2 hrs prior to appointment  All questions were answered at time of call  Patient and wife are aware that if they have any questions they can call the office and ask to speak with me

## 2018-08-29 ENCOUNTER — OFFICE VISIT (OUTPATIENT)
Dept: UROLOGY | Facility: CLINIC | Age: 73
End: 2018-08-29
Payer: COMMERCIAL

## 2018-08-29 VITALS
SYSTOLIC BLOOD PRESSURE: 120 MMHG | HEIGHT: 60 IN | DIASTOLIC BLOOD PRESSURE: 80 MMHG | BODY MASS INDEX: 23.36 KG/M2 | WEIGHT: 119 LBS | HEART RATE: 84 BPM

## 2018-08-29 DIAGNOSIS — C61 PROSTATE CANCER (HCC): Primary | ICD-10-CM

## 2018-08-29 PROCEDURE — 96402 CHEMO HORMON ANTINEOPL SQ/IM: CPT | Performed by: UROLOGY

## 2018-08-29 PROCEDURE — 76872 US TRANSRECTAL: CPT | Performed by: UROLOGY

## 2018-08-29 PROCEDURE — 96372 THER/PROPH/DIAG INJ SC/IM: CPT | Performed by: UROLOGY

## 2018-08-29 PROCEDURE — 55876 PLACE RT DEVICE/MARKER PROS: CPT | Performed by: UROLOGY

## 2018-08-29 NOTE — PROGRESS NOTES
Placement of interstitial device     Date/Time 8/29/2018 11:33 AM     Performed by  Jose Milner by Jackie Lynch       Consent: Verbal consent obtained  Written consent obtained  Risks and benefits: risks, benefits and alternatives were discussed  Consent given by: patient  Patient understanding: patient states understanding of the procedure being performed      Local anesthesia used: yes      Anesthesia: local infiltration and nerve block     Anesthesia   Local anesthesia used: yes  Local Anesthetic: lidocaine 1% without epinephrine     Sedation   Patient sedated: no      Specimen: no    Culture: no   Procedure Details   Procedure Notes:   IMRT marker insertion procedure note:    Risk and benefits of marker insertion were discussed in the office today  Informed consent was obtained  His prep was deemed to be adequate  The patient was placed in the lateral decubitus position  Transrectal ultrasonography was performed after viscous lidocaine was instilled into the rectum  5 cc of 2% lidocaine were injected bilaterally between the junction of the base of the prostate and the seminal vesicles  3 gold markers were then inserted under ultrasound guidance  The first was inserted laterally at the right base  The second was inserted laterally towards the right apex  The third marker was inserted in the left mid prostate laterally  Six month depot of Lupron and Prolia administered today

## 2018-08-29 NOTE — PROGRESS NOTES
UROLOGY FOLLOWUP NOTE     CHIEF COMPLAINT   Claudia Ash is a 68 y o  male with a complaint of   Chief Complaint   Patient presents with    Prostate Cancer       History of Present Illness:     68 y o  male with LUTS and an elevated PSA obtained by his PCP  Unclear if patient has had prior prostate cancer screening  He presents today with his wife who speaks [place]  The patient is primarily [address] speaking  Patient has relatively mild urinary symptoms  He had a PSA drawn as part of his routine screening  This was elevated at 14 4  He presented initially discussion  It is unclear whether the patient has a family history of prostate cancer  Patient has seen radiation oncology  Returns for fiducial marker placement  Received Firmagon at last visit  Will need Lupron and Prolia today      Past Medical History:     Past Medical History:   Diagnosis Date    Diabetes mellitus ([facility] [address] )     Elevated PSA     Hyperlipidemia     Prostate cancer ([facility] [address] )        PAST SURGICAL HISTORY:     Past Surgical History:   Procedure Laterality Date    NECK SURGERY      PROSTATE BIOPSY  07/18/2018       CURRENT MEDICATIONS:     Current Outpatient Prescriptions   Medication Sig Dispense Refill    ACCU-CHEK FASTCLIX LANCETS MISC by Does not apply route 2 (two) times a day 102 each 3    Blood Glucose Monitoring Suppl (ACCU-CHEK CHUY PLUS) w/Device KIT by Does not apply route 2 (two) times a day 1 kit 0    famotidine (PEPCID) 20 mg tablet Take 1 tablet (20 mg total) by mouth daily 30 tablet 0    glucose blood (ACCU-CHEK CHUY PLUS) test strip 1 each by Other route 2 (two) times a day Use as instructed 100 each 3    metFORMIN (GLUCOPHAGE) 500 mg tablet Take 1 tablet (500 mg total) by mouth 2 (two) times a day with meals 180 tablet 0    simvastatin (ZOCOR) 20 mg tablet Take 1 tablet (20 mg total) by mouth daily at bedtime 90 tablet 0    bisacodyl (FLEET) 10 MG/30ML ENEM Insert 30 mL (10 mg total) into the rectum once for 1 dose Day of biopsy 30 mL 0     No current facility-administered medications for this visit  ALLERGIES:   No Known Allergies    SOCIAL HISTORY:     Social History     Social History    Marital status: /Civil Union     Spouse name: N/A    Number of children: N/A    Years of education: N/A     Social History Main Topics    Smoking status: Never Smoker    Smokeless tobacco: Never Used    Alcohol use No    Drug use: No    Sexual activity: Not Currently     Other Topics Concern    None     Social History Narrative    None       SOCIAL HISTORY:     Family History   Problem Relation Age of Onset    No Known Problems Mother     No Known Problems Father        REVIEW OF SYSTEMS:     Review of Systems   Constitutional: Negative  Respiratory: Negative  Cardiovascular: Negative  Gastrointestinal: Negative  Genitourinary: Negative  Musculoskeletal: Negative  Neurological: Negative  Psychiatric/Behavioral: Negative  PHYSICAL EXAM:     /80   Pulse 84   Ht 4' 10" (1 473 m)   Wt 54 kg (119 lb)   BMI 24 87 kg/m²     General:  Healthy appearing male in no acute distress  They have a normal affect  There is not appear to be any gross neurologic defects or abnormalities  HEENT:  Normocephalic, atraumatic  Neck is supple without any palpable lymphadenopathy  Cardiovascular:  Patient has normal palpable distal radial pulses  There is no significant peripheral edema  No JVD is noted  Respiratory:  Patient has unlabored respirations  There is no audible wheeze or rhonchi  Abdomen:  Abdomen is without surgical scars  Abdomen is soft and nontender  There is no tympany  Inguinal and umbilical hernia are not appreciated      Genitourinary: no penile lesions or discharge, no testicular masses or tenderness, no hernias, JUANA very hard indurated 2cm nodule to the right of median sulcus toward apex    Musculoskeletal:  Patient does not have significant CVA tenderness in the flank with palpation or percussion  They full range of motion in all 4 extremities  Strength in all 4 extremities appears congruent  Patient is able to ambulate without assistance or difficulty  Dermatologic:  Patient has no skin abnormalities or rashes  LABS:     CBC:   Lab Results   Component Value Date    WBC 4 58 2018    HGB 14 3 2018    HCT 40 5 2018    MCV 88 2018     2018       BMP:   Lab Results   Component Value Date    CALCIUM 9 6 2018     (L) 2018    K 4 2 2018    CO2 29 2018    CL 99 (L) 2018    BUN 15 2018    CREATININE 0 82 2018 PSA 14 4    IMAGIN/9/18  BONE SCAN  WHOLE BODY     INDICATION:  Staging for treatment management, N40 2: Nodular prostate without lower urinary tract symptoms  C61: Malignant neoplasm of prostate     PREVIOUS FILM CORRELATION:    No prior pertinent studies for comparison      TECHNIQUE:   This study was performed following the intravenous administration of 25 8 mCi Tc-99m labeled MDP  Delayed, anterior and posterior whole body images were acquired, 2-3 hours after radiopharmaceutical administration      FINDINGS:     Probable bilateral shoulder degenerative change, right greater than left  Slightly greater activity in the right [facility] [place], operated by [facility] joint region may be correlated with radiographs  Additional degenerative change in the spine, with more prominent endplate degenerative   change in the lumbar spine  Degenerative change in the bilateral wrists and knees  Symmetric renal uptake  Probable dental disease involving the mandible  There is no scintigraphic evidence of osseous metastasis        IMPRESSION:     1  No scintigraphic evidence of osseous metastasis  2   Bilateral shoulder degenerative change    Slightly greater activity in the right [facility] [place] by [facility] joint region may be correlated with radiographs, if clinically warranted      18  CT PELVIS WITH IV CONTRAST     INDICATION:   N40 2: Nodular prostate without lower urinary tract symptoms  C61: Malignant neoplasm of prostate      COMPARISON:  None      TECHNIQUE: CT examination of the pelvis was performed  Axial, sagittal, and coronal 2D reformatted images were created from the source data and submitted for interpretation      Radiation dose length product (DLP) for this visit:  143 mGy-cm   This examination, like all CT scans performed in the [facility], was performed utilizing techniques to minimize radiation dose exposure, including the use of iterative   reconstruction and automated exposure control      IV Contrast:  100 mL of iohexol (OMNIPAQUE)  Enteric Contrast:  Enteric contrast was not administered       FINDINGS:     VISUALIZED KIDNEYS/URETERS:  No significant abnormality identified in the partially imaged right kidney and ureters      REPRODUCTIVE ORGANS:  Heterogeneous prostate gland with enhancing nodule in the right central zone measuring 1 3 cm      URINARY BLADDER:  Unremarkable      APPENDIX:  No findings to suggest appendicitis      VISUALIZED BOWEL:  Colonic diverticulosis  No bowel obstruction      ABDOMINOPELVIC CAVITY:  No ascites or free intraperitoneal air  No lymphadenopathy  VISUALIZED VESSELS:  Unremarkable for patient's age  ABDOMINOPELVIC WALL/INGUINAL REGIONS: Unremarkable      OSSEOUS STRUCTURES:  No acute fracture or destructive osseous lesion      IMPRESSION:     Heterogeneous prostate gland with 1 3 cm enhancing right central zone nodule  No evidence of metastatic disease in the pelvis      Colonic diverticulosis  PATHOLOGY:     Final Diagnosis   A  Prostate, right lateral base:  - Prostatic adenocarcinoma, Jennie score 4 + 4, Prognostic Grade Group IV, discontinuously involving 80% of one of one core:   * periprostatic fat invasion: not identified  * lymph-vascular invasion:  not identified     * perineural invasion: focally present  - Additional pathologic findings: N/A     B  Prostate, right lateral mid:  - Prostatic adenocarcinoma, Windham score 4 + 3, Prognostic Grade Group III, discontinuously involving 100% of one of one core:   * periprostatic fat invasion: not identified  * lymph-vascular invasion:  not identified  * perineural invasion: not identified   - Additional pathologic findings: N/A     Note: Unstained slides from Block B1 are suggested if additional studies are indicated (at least 0 5 mm of tumor for Prolaris)     C  Prostate, right lateral apex:  - Prostatic adenocarcinoma, Jennie score 4 + 4, Prognostic Grade Group IV, discontinuously involving 100% of one of one core:   * periprostatic fat invasion: not identified  * lymph-vascular invasion:  not identified  * perineural invasion: focally present  - Additional pathologic findings: N/A     D  Prostate, right medial base:  - Prostatic adenocarcinoma, Windham score 4 + 5, Prognostic Grade Group V, discontinuously involving 100% of one of one core:   * periprostatic fat invasion: not identified  * lymph-vascular invasion:  not identified  * perineural invasion: not identified   - Additional pathologic findings: N/A     E  Prostate, right medial mid:  - Prostatic adenocarcinoma, Jennie score 4 + 4, Prognostic Grade Group IV, discontinuously involving 70% of one of one core:   * periprostatic fat invasion: not identified  * lymph-vascular invasion:  not identified  * perineural invasion: not identified   - Additional pathologic findings: N/A     F  Prostate, right medial apex:  - Prostatic adenocarcinoma, Jennie score 4 + 4, Prognostic Grade Group IV, discontinuously involving 75% of one of one core:   * periprostatic fat invasion: not identified  * lymph-vascular invasion:  not identified  * perineural invasion: not identified   - Additional pathologic findings: N/A     G   Prostate, left medial base:  - Prostatic adenocarcinoma, Jennie score 4 + 5, Prognostic Grade Group V, discontinuously involving 12% of one of one core:   * periprostatic fat invasion: not identified  * lymph-vascular invasion:  not identified  * perineural invasion: not identified   - Additional pathologic findings: N/A     H  Prostate, left medial mid:  - Prostatic adenocarcinoma, Coggon score 4 + 4, Prognostic Grade Group IV, continuously involving 12% of one of one core:   * periprostatic fat invasion: not identified  * lymph-vascular invasion:  not identified  * perineural invasion: not identified   - Additional pathologic findings: N/A     I  Prostate, left medial apex:  - Benign prostate tissue      J  Prostate, left lateral base:  - Prostatic adenocarcinoma, Coggon score 4 + 4, Prognostic Grade Group IV, continuously involving 6% of one of one core:   * periprostatic fat invasion: not identified  * lymph-vascular invasion:  not identified  * perineural invasion: not identified   - Additional pathologic findings: N/A     K  Prostate, left lateral mid:  - Prostatic adenocarcinoma, Jennie score 4 + 4, Prognostic Grade Group IV, continuously involving 6% of one of one core:   * periprostatic fat invasion: not identified  * lymph-vascular invasion:  not identified  * perineural invasion: not identified   - Additional pathologic findings: N/A     L  Prostate, left lateral apex:  - Benign prostate tissue      M  Prostate, right lateral nodule:  - Prostatic adenocarcinoma, Coggon score 4 + 4, Prognostic Grade Group IV, discontinuously involving 100% of each of two cores:   * periprostatic fat invasion: not identified  * lymph-vascular invasion:  not identified  * perineural invasion: not identified   - Additional pathologic findings: N/A     ASSESSMENT:     See NOTE for fiducial marker placement    6month depot of Lupron and Prolia administered today      ASSESSMENT:     68 y o  male with high volume, high risk Jennie 4+5=9 prostate cancer    PLAN:     I discussed with the patient potential side effects from prostate fiducial placement including bleeding from his bladder, bleeding from his rectum, and bleeding in his semen up to about 2-4 weeks  The patient knows that should he have severe uncontrolled bleeding he is to call the office or proceed immediately to the emergency room  Additionally counseled the patient to monitor for fevers greater 100 3  He will finish out his course of antibiotics  Patient was administered 6 month depot of Lupron/Prolia today  Will return after XRT  6 months at least for repeat Lupron/Prolia

## 2018-08-29 NOTE — LETTER
August 29, 2018     Zoie Leone MD  [address]     Patient: Rosemarie Gamino   YOB: 1945   Date of Visit: 8/29/2018       Dear Dr Arleen Barr: Thank you for referring Vane Lorenzo to me for evaluation  Below are my notes for this consultation  If you have questions, please do not hesitate to call me  I look forward to following your patient along with you  Sincerely,        Andreas Mandel MD        CC: No Recipients  Andreas Mandel MD  8/29/2018 11:33 AM  Sign at close encounter       Placement of interstitial device     Date/Time 8/29/2018 11:33 AM     Performed by  Velma Fraga by Lety Gaston       Consent: Verbal consent obtained  Written consent obtained  Risks and benefits: risks, benefits and alternatives were discussed  Consent given by: patient  Patient understanding: patient states understanding of the procedure being performed      Local anesthesia used: yes      Anesthesia: local infiltration and nerve block     Anesthesia   Local anesthesia used: yes  Local Anesthetic: lidocaine 1% without epinephrine     Sedation   Patient sedated: no      Specimen: no    Culture: no   Procedure Details   Procedure Notes:   IMRT marker insertion procedure note:    Risk and benefits of marker insertion were discussed in the office today  Informed consent was obtained  His prep was deemed to be adequate  The patient was placed in the lateral decubitus position  Transrectal ultrasonography was performed after viscous lidocaine was instilled into the rectum  5 cc of 2% lidocaine were injected bilaterally between the junction of the base of the prostate and the seminal vesicles  3 gold markers were then inserted under ultrasound guidance  The first was inserted laterally at the right base  The second was inserted laterally towards the right apex  The third marker was inserted in the left mid prostate laterally          Six month depot of Lupron and Prolia administered today  Turner Grady MD  8/29/2018 11:32 AM  Sign at close encounter    Rosalva Okeefe is a 68 y o  male with a complaint of   Chief Complaint   Patient presents with    Prostate Cancer       History of Present Illness:     68 y o  male with LUTS and an elevated PSA obtained by his PCP  Unclear if patient has had prior prostate cancer screening  He presents today with his wife who speaks [place]  The patient is primarily [place] speaking  Patient has relatively mild urinary symptoms  He had a PSA drawn as part of his routine screening  This was elevated at 14 4  He presented initially discussion  It is unclear whether the patient has a family history of prostate cancer  Patient has seen radiation oncology  Returns for fiducial marker placement  Received Firmagon at last visit  Will need Lupron and Prolia today      Past Medical History:     Past Medical History:   Diagnosis Date    Diabetes mellitus ([facility] [address] )     Elevated PSA     Hyperlipidemia     Prostate cancer ([facility] [address] )        PAST SURGICAL HISTORY:     Past Surgical History:   Procedure Laterality Date    NECK SURGERY      PROSTATE BIOPSY  07/18/2018       CURRENT MEDICATIONS:     Current Outpatient Prescriptions   Medication Sig Dispense Refill    ACCU-CHEK FASTCLIX LANCETS MISC by Does not apply route 2 (two) times a day 102 each 3    Blood Glucose Monitoring Suppl (ACCU-CHEK CHUY PLUS) w/Device KIT by Does not apply route 2 (two) times a day 1 kit 0    famotidine (PEPCID) 20 mg tablet Take 1 tablet (20 mg total) by mouth daily 30 tablet 0    glucose blood (ACCU-CHEK CHUY PLUS) test strip 1 each by Other route 2 (two) times a day Use as instructed 100 each 3    metFORMIN (GLUCOPHAGE) 500 mg tablet Take 1 tablet (500 mg total) by mouth 2 (two) times a day with meals 180 tablet 0    simvastatin (ZOCOR) 20 mg tablet Take 1 tablet (20 mg total) by mouth daily at bedtime 90 tablet 0    bisacodyl (FLEET) 10 MG/30ML ENEM Insert 30 mL (10 mg total) into the rectum once for 1 dose Day of biopsy 30 mL 0     No current facility-administered medications for this visit  ALLERGIES:   No Known Allergies    SOCIAL HISTORY:     Social History     Social History    Marital status: /Civil Union     Spouse name: N/A    Number of children: N/A    Years of education: N/A     Social History Main Topics    Smoking status: Never Smoker    Smokeless tobacco: Never Used    Alcohol use No    Drug use: No    Sexual activity: Not Currently     Other Topics Concern    None     Social History Narrative    None       SOCIAL HISTORY:     Family History   Problem Relation Age of Onset    No Known Problems Mother     No Known Problems Father        REVIEW OF SYSTEMS:     Review of Systems   Constitutional: Negative  Respiratory: Negative  Cardiovascular: Negative  Gastrointestinal: Negative  Genitourinary: Negative  Musculoskeletal: Negative  Neurological: Negative  Psychiatric/Behavioral: Negative  PHYSICAL EXAM:     /80   Pulse 84   Ht 4' 10" (1 473 m)   Wt 54 kg (119 lb)   BMI 24 87 kg/m²      General:  Healthy appearing male in no acute distress  They have a normal affect  There is not appear to be any gross neurologic defects or abnormalities  HEENT:  Normocephalic, atraumatic  Neck is supple without any palpable lymphadenopathy  Cardiovascular:  Patient has normal palpable distal radial pulses  There is no significant peripheral edema  No JVD is noted  Respiratory:  Patient has unlabored respirations  There is no audible wheeze or rhonchi  Abdomen:  Abdomen is without surgical scars  Abdomen is soft and nontender  There is no tympany  Inguinal and umbilical hernia are not appreciated      Genitourinary: no penile lesions or discharge, no testicular masses or tenderness, no hernias, JUANA very hard indurated 2cm nodule to the right of median sulcus toward apex    Musculoskeletal:  Patient does not have significant CVA tenderness in the  flank with palpation or percussion  They full range of motion in all 4 extremities  Strength in all 4 extremities appears congruent  Patient is able to ambulate without assistance or difficulty  Dermatologic:  Patient has no skin abnormalities or rashes  LABS:     CBC:   Lab Results   Component Value Date    WBC 4 58 2018    HGB 14 3 2018    HCT 40 5 2018    MCV 88 2018     2018       BMP:   Lab Results   Component Value Date    CALCIUM 9 6 2018     (L) 2018    K 4 2 2018    CO2 29 2018    CL 99 (L) 2018    BUN 15 2018    CREATININE 0 82 2018 PSA 14 4    IMAGIN/9/18  BONE SCAN  WHOLE BODY     INDICATION:  Staging for treatment management, N40 2: Nodular prostate without lower urinary tract symptoms  C61: Malignant neoplasm of prostate     PREVIOUS FILM CORRELATION:    No prior pertinent studies for comparison      TECHNIQUE:   This study was performed following the intravenous administration of 25 8 mCi Tc-99m labeled MDP  Delayed, anterior and posterior whole body images were acquired, 2-3 hours after radiopharmaceutical administration      FINDINGS:     Probable bilateral shoulder degenerative change, right greater than left  Slightly greater activity in the right [facility] joint region may be correlated with radiographs  Additional degenerative change in the spine, with more prominent endplate degenerative   change in the lumbar spine  Degenerative change in the bilateral wrists and knees  Symmetric renal uptake  Probable dental disease involving the mandible  There is no scintigraphic evidence of osseous metastasis        IMPRESSION:     1  No scintigraphic evidence of osseous metastasis  2   Bilateral shoulder degenerative change    Slightly greater activity in the right [facility] joint region may be correlated with radiographs, if clinically warranted      8/9/18  CT PELVIS WITH IV CONTRAST     INDICATION:   N40 2: Nodular prostate without lower urinary tract symptoms  C61: Malignant neoplasm of prostate      COMPARISON:  None      TECHNIQUE: CT examination of the pelvis was performed  Axial, sagittal, and coronal 2D reformatted images were created from the source data and submitted for interpretation      Radiation dose length product (DLP) for this visit:  143 mGy-cm   This examination, like all CT scans performed in the [facility], was performed utilizing techniques to minimize radiation dose exposure, including the use of iterative   reconstruction and automated exposure control      IV Contrast:  100 mL of iohexol (OMNIPAQUE)  Enteric Contrast:  Enteric contrast was not administered       FINDINGS:     VISUALIZED KIDNEYS/URETERS:  No significant abnormality identified in the partially imaged right kidney and ureters      REPRODUCTIVE ORGANS:  Heterogeneous prostate gland with enhancing nodule in the right central zone measuring 1 3 cm      URINARY BLADDER:  Unremarkable      APPENDIX:  No findings to suggest appendicitis      VISUALIZED BOWEL:  Colonic diverticulosis  No bowel obstruction      ABDOMINOPELVIC CAVITY:  No ascites or free intraperitoneal air  No lymphadenopathy  VISUALIZED VESSELS:  Unremarkable for patient's age  ABDOMINOPELVIC WALL/INGUINAL REGIONS: Unremarkable      OSSEOUS STRUCTURES:  No acute fracture or destructive osseous lesion      IMPRESSION:     Heterogeneous prostate gland with 1 3 cm enhancing right central zone nodule  No evidence of metastatic disease in the pelvis      Colonic diverticulosis  PATHOLOGY:     Final Diagnosis   A   Prostate, right lateral base:  - Prostatic adenocarcinoma, [place] score 4 + 4, Prognostic Grade Group IV, discontinuously involving 80% of one of one core:   * periprostatic fat invasion: not identified  * lymph-vascular invasion:  not identified  * perineural invasion: focally present  - Additional pathologic findings: N/A     B  Prostate, right lateral mid:  - Prostatic adenocarcinoma, [place] score 4 + 3, Prognostic Grade Group III, discontinuously involving 100% of one of one core:   * periprostatic fat invasion: not identified  * lymph-vascular invasion:  not identified  * perineural invasion: not identified   - Additional pathologic findings: N/A     Note: Unstained slides from Block B1 are suggested if additional studies are indicated (at least 0 5 mm of tumor for Prolaris)     C  Prostate, right lateral apex:  - Prostatic adenocarcinoma, Jennie score 4 + 4, Prognostic Grade Group IV, discontinuously involving 100% of one of one core:   * periprostatic fat invasion: not identified  * lymph-vascular invasion:  not identified  * perineural invasion: focally present  - Additional pathologic findings: N/A     D  Prostate, right medial base:  - Prostatic adenocarcinoma, Jennie score 4 + 5, Prognostic Grade Group V, discontinuously involving 100% of one of one core:   * periprostatic fat invasion: not identified  * lymph-vascular invasion:  not identified  * perineural invasion: not identified   - Additional pathologic findings: N/A     E  Prostate, right medial mid:  - Prostatic adenocarcinoma, Jennie score 4 + 4, Prognostic Grade Group IV, discontinuously involving 70% of one of one core:   * periprostatic fat invasion: not identified  * lymph-vascular invasion:  not identified  * perineural invasion: not identified   - Additional pathologic findings: N/A     F  Prostate, right medial apex:  - Prostatic adenocarcinoma, [place] score 4 + 4, Prognostic Grade Group IV, discontinuously involving 75% of one of one core:   * periprostatic fat invasion: not identified  * lymph-vascular invasion:  not identified     * perineural invasion: not identified   - Additional pathologic findings: N/A     G  Prostate, left medial base:  - Prostatic adenocarcinoma, Jennie score 4 + 5, Prognostic Grade Group V, discontinuously involving 12% of one of one core:   * periprostatic fat invasion: not identified  * lymph-vascular invasion:  not identified  * perineural invasion: not identified   - Additional pathologic findings: N/A     H  Prostate, left medial mid:  - Prostatic adenocarcinoma, [place] score 4 + 4, Prognostic Grade Group IV, continuously involving 12% of one of one core:   * periprostatic fat invasion: not identified  * lymph-vascular invasion:  not identified  * perineural invasion: not identified   - Additional pathologic findings: N/A     I  Prostate, left medial apex:  - Benign prostate tissue      J  Prostate, left lateral base:  - Prostatic adenocarcinoma, Jennie score 4 + 4, Prognostic Grade Group IV, continuously involving 6% of one of one core:   * periprostatic fat invasion: not identified  * lymph-vascular invasion:  not identified  * perineural invasion: not identified   - Additional pathologic findings: N/A     K  Prostate, left lateral mid:  - Prostatic adenocarcinoma, [place] score 4 + 4, Prognostic Grade Group IV, continuously involving 6% of one of one core:   * periprostatic fat invasion: not identified  * lymph-vascular invasion:  not identified  * perineural invasion: not identified   - Additional pathologic findings: N/A     L  Prostate, left lateral apex:  - Benign prostate tissue      M  Prostate, right lateral nodule:  - Prostatic adenocarcinoma, Jennie score 4 + 4, Prognostic Grade Group IV, discontinuously involving 100% of each of two cores:   * periprostatic fat invasion: not identified  * lymph-vascular invasion:  not identified     * perineural invasion: not identified   - Additional pathologic findings: N/A     ASSESSMENT:     See NOTE for fiducial marker placement    6month depot of Lupron and Prolia administered today     ASSESSMENT:     68 y o  male with high volume, high risk Jennie 4+5=9 prostate cancer    PLAN:     I discussed with the patient potential side effects from prostate fiducial placement including bleeding from his bladder, bleeding from his rectum, and bleeding in his semen up to about 2-4 weeks  The patient knows that should he have severe uncontrolled bleeding he is to call the office or proceed immediately to the emergency room  Additionally counseled the patient to monitor for fevers greater 100 3  He will finish out his course of antibiotics  Patient was administered 6 month depot of Lupron/Prolia today  Will return after XRT  6 months at least for repeat Lupron/Prolia

## 2018-09-11 ENCOUNTER — APPOINTMENT (OUTPATIENT)
Dept: RADIATION ONCOLOGY | Facility: CLINIC | Age: 73
End: 2018-09-11
Attending: RADIOLOGY
Payer: COMMERCIAL

## 2018-09-11 DIAGNOSIS — R19.2: ICD-10-CM

## 2018-09-11 DIAGNOSIS — R63.0 POOR APPETITE: ICD-10-CM

## 2018-09-11 PROCEDURE — 77334 RADIATION TREATMENT AID(S): CPT | Performed by: RADIOLOGY

## 2018-09-11 RX ORDER — FAMOTIDINE 20 MG/1
TABLET, FILM COATED ORAL
Qty: 30 TABLET | Refills: 0 | Status: SHIPPED | OUTPATIENT
Start: 2018-09-11 | End: 2019-02-14 | Stop reason: SDUPTHER

## 2018-09-19 PROCEDURE — 77300 RADIATION THERAPY DOSE PLAN: CPT | Performed by: RADIOLOGY

## 2018-09-19 PROCEDURE — 77338 DESIGN MLC DEVICE FOR IMRT: CPT | Performed by: RADIOLOGY

## 2018-09-19 PROCEDURE — 77301 RADIOTHERAPY DOSE PLAN IMRT: CPT | Performed by: RADIOLOGY

## 2018-09-24 DIAGNOSIS — C61 MALIGNANT NEOPLASM OF PROSTATE (HCC): Primary | ICD-10-CM

## 2018-09-25 PROCEDURE — 77417 THER RADIOLOGY PORT IMAGE(S): CPT | Performed by: RADIOLOGY

## 2018-09-26 PROCEDURE — 77385 HB NTSTY MODUL RAD TX DLVR SMPL: CPT | Performed by: RADIOLOGY

## 2018-09-27 PROCEDURE — 77385 HB NTSTY MODUL RAD TX DLVR SMPL: CPT | Performed by: RADIOLOGY

## 2018-09-28 PROCEDURE — 77385 HB NTSTY MODUL RAD TX DLVR SMPL: CPT | Performed by: RADIOLOGY

## 2018-10-01 ENCOUNTER — APPOINTMENT (OUTPATIENT)
Dept: RADIATION ONCOLOGY | Facility: CLINIC | Age: 73
End: 2018-10-01
Attending: RADIOLOGY
Payer: COMMERCIAL

## 2018-10-01 ENCOUNTER — APPOINTMENT (OUTPATIENT)
Dept: LAB | Facility: CLINIC | Age: 73
End: 2018-10-01
Attending: RADIOLOGY
Payer: COMMERCIAL

## 2018-10-01 DIAGNOSIS — C61 MALIGNANT NEOPLASM OF PROSTATE (HCC): ICD-10-CM

## 2018-10-01 LAB
ERYTHROCYTE [DISTWIDTH] IN BLOOD BY AUTOMATED COUNT: 13.3 % (ref 11.6–15.1)
GRANULOCYTES NFR BLD AUTO: 68.4 % (ref 47–80)
GRANULOCYTES NFR BLD: 3.6 THOUSAND/ΜL (ref 1.85–7.82)
HCT VFR BLD AUTO: 42.3 % (ref 36.5–49.3)
HGB BLD-MCNC: 13.3 G/DL (ref 12–17)
LYMPHOCYTES # BLD AUTO: 1.4 THOUSANDS/ΜL (ref 0.6–4.47)
LYMPHOCYTES NFR BLD AUTO: 26 % (ref 14–44)
MCH RBC QN AUTO: 28.6 PG (ref 26.8–34.3)
MCHC RBC AUTO-ENTMCNC: 31.5 G/DL (ref 31.4–37.4)
MCV RBC AUTO: 91 FL (ref 82–98)
MONOCYTES # BLD AUTO: 0.3 THOUSAND/ΜL (ref 0.17–1.22)
MONOCYTES NFR BLD AUTO: 6 % (ref 4–12)
PLATELET # BLD AUTO: 275 THOUSANDS/UL (ref 149–390)
PMV BLD AUTO: 7.7 FL (ref 8.9–12.7)
PSA SERPL-MCNC: 1.2 NG/ML (ref 0–4)
RBC # BLD AUTO: 4.66 MILLION/UL (ref 3.88–5.62)
WBC # BLD AUTO: 5.3 THOUSAND/UL (ref 4.31–10.16)
WBC NRBC COR # BLD: 5.3 THOUSAND/UL (ref 4.31–10.16)

## 2018-10-01 PROCEDURE — 77385 HB NTSTY MODUL RAD TX DLVR SMPL: CPT | Performed by: RADIOLOGY

## 2018-10-01 PROCEDURE — 36415 COLL VENOUS BLD VENIPUNCTURE: CPT

## 2018-10-01 PROCEDURE — 84153 ASSAY OF PSA TOTAL: CPT

## 2018-10-01 PROCEDURE — 85025 COMPLETE CBC W/AUTO DIFF WBC: CPT

## 2018-10-02 PROCEDURE — 77336 RADIATION PHYSICS CONSULT: CPT | Performed by: RADIOLOGY

## 2018-10-02 PROCEDURE — 77385 HB NTSTY MODUL RAD TX DLVR SMPL: CPT | Performed by: RADIOLOGY

## 2018-10-02 PROCEDURE — 77417 THER RADIOLOGY PORT IMAGE(S): CPT | Performed by: RADIOLOGY

## 2018-10-03 PROCEDURE — 77385 HB NTSTY MODUL RAD TX DLVR SMPL: CPT | Performed by: RADIOLOGY

## 2018-10-04 PROCEDURE — 77385 HB NTSTY MODUL RAD TX DLVR SMPL: CPT | Performed by: RADIOLOGY

## 2018-10-05 PROCEDURE — 77385 HB NTSTY MODUL RAD TX DLVR SMPL: CPT | Performed by: RADIOLOGY

## 2018-10-09 PROCEDURE — 77385 HB NTSTY MODUL RAD TX DLVR SMPL: CPT | Performed by: RADIOLOGY

## 2018-10-10 PROCEDURE — 77336 RADIATION PHYSICS CONSULT: CPT | Performed by: RADIOLOGY

## 2018-10-10 PROCEDURE — 77385 HB NTSTY MODUL RAD TX DLVR SMPL: CPT | Performed by: RADIOLOGY

## 2018-10-11 PROCEDURE — 77385 HB NTSTY MODUL RAD TX DLVR SMPL: CPT | Performed by: RADIOLOGY

## 2018-10-11 PROCEDURE — 77417 THER RADIOLOGY PORT IMAGE(S): CPT | Performed by: RADIOLOGY

## 2018-10-12 PROCEDURE — 77385 HB NTSTY MODUL RAD TX DLVR SMPL: CPT | Performed by: RADIOLOGY

## 2018-10-16 ENCOUNTER — TELEPHONE (OUTPATIENT)
Dept: FAMILY MEDICINE CLINIC | Facility: CLINIC | Age: 73
End: 2018-10-16

## 2018-10-16 DIAGNOSIS — E11.9 TYPE 2 DIABETES MELLITUS WITHOUT COMPLICATION, WITHOUT LONG-TERM CURRENT USE OF INSULIN (HCC): Primary | ICD-10-CM

## 2018-10-16 PROCEDURE — 77385 HB NTSTY MODUL RAD TX DLVR SMPL: CPT | Performed by: RADIOLOGY

## 2018-10-16 NOTE — TELEPHONE ENCOUNTER
Patient wife states she wants patients to have a combo med including metformin, not just metformin plain due to the sugars coming down faster when its combined  She states he was taking a combine metformin in [place]  Please advise?

## 2018-10-17 ENCOUNTER — TELEPHONE (OUTPATIENT)
Dept: FAMILY MEDICINE CLINIC | Facility: CLINIC | Age: 73
End: 2018-10-17

## 2018-10-17 RX ORDER — LANCETS 28 GAUGE
EACH MISCELLANEOUS
Qty: 100 EACH | Refills: 3 | Status: SHIPPED | OUTPATIENT
Start: 2018-10-17 | End: 2018-11-29

## 2018-10-17 RX ORDER — BLOOD-GLUCOSE METER
KIT MISCELLANEOUS ONCE
Qty: 1 EACH | Refills: 0 | Status: SHIPPED | OUTPATIENT
Start: 2018-10-17 | End: 2018-11-29

## 2018-10-17 NOTE — TELEPHONE ENCOUNTER
Pt called in and stated back in April we called in a BS meter for accu check that was not covered pt will like to change rx to free style meter  please advise

## 2018-10-18 PROCEDURE — 77385 HB NTSTY MODUL RAD TX DLVR SMPL: CPT | Performed by: RADIOLOGY

## 2018-10-19 PROCEDURE — 77385 HB NTSTY MODUL RAD TX DLVR SMPL: CPT | Performed by: RADIOLOGY

## 2018-10-19 PROCEDURE — 77336 RADIATION PHYSICS CONSULT: CPT | Performed by: RADIOLOGY

## 2018-10-22 DIAGNOSIS — E11.9 TYPE 2 DIABETES MELLITUS WITHOUT COMPLICATION, WITHOUT LONG-TERM CURRENT USE OF INSULIN (HCC): ICD-10-CM

## 2018-10-22 PROCEDURE — 77385 HB NTSTY MODUL RAD TX DLVR SMPL: CPT | Performed by: RADIOLOGY

## 2018-10-22 RX ORDER — SIMVASTATIN 20 MG
20 TABLET ORAL
Qty: 90 TABLET | Refills: 0 | Status: SHIPPED | OUTPATIENT
Start: 2018-10-22 | End: 2019-02-14 | Stop reason: SDUPTHER

## 2018-10-23 PROCEDURE — 77385 HB NTSTY MODUL RAD TX DLVR SMPL: CPT | Performed by: RADIOLOGY

## 2018-10-23 PROCEDURE — 77417 THER RADIOLOGY PORT IMAGE(S): CPT | Performed by: RADIOLOGY

## 2018-10-24 DIAGNOSIS — C61 PROSTATE CANCER (HCC): Primary | ICD-10-CM

## 2018-10-24 PROCEDURE — 77385 HB NTSTY MODUL RAD TX DLVR SMPL: CPT | Performed by: RADIOLOGY

## 2018-10-24 RX ORDER — TAMSULOSIN HYDROCHLORIDE 0.4 MG/1
0.4 CAPSULE ORAL
Qty: 30 CAPSULE | Refills: 0 | Status: SHIPPED | OUTPATIENT
Start: 2018-10-24 | End: 2019-06-03 | Stop reason: ALTCHOICE

## 2018-10-25 PROCEDURE — 77385 HB NTSTY MODUL RAD TX DLVR SMPL: CPT | Performed by: RADIOLOGY

## 2018-10-26 PROCEDURE — 77385 HB NTSTY MODUL RAD TX DLVR SMPL: CPT | Performed by: RADIOLOGY

## 2018-10-26 PROCEDURE — 77336 RADIATION PHYSICS CONSULT: CPT | Performed by: RADIOLOGY

## 2018-10-29 PROCEDURE — 77385 HB NTSTY MODUL RAD TX DLVR SMPL: CPT | Performed by: RADIOLOGY

## 2018-10-30 PROCEDURE — 77385 HB NTSTY MODUL RAD TX DLVR SMPL: CPT | Performed by: RADIOLOGY

## 2018-10-30 PROCEDURE — 77417 THER RADIOLOGY PORT IMAGE(S): CPT | Performed by: RADIOLOGY

## 2018-10-31 PROCEDURE — 77385 HB NTSTY MODUL RAD TX DLVR SMPL: CPT | Performed by: RADIOLOGY

## 2018-11-01 ENCOUNTER — APPOINTMENT (OUTPATIENT)
Dept: RADIATION ONCOLOGY | Facility: CLINIC | Age: 73
End: 2018-11-01
Attending: RADIOLOGY
Payer: COMMERCIAL

## 2018-11-01 PROCEDURE — 77385 HB NTSTY MODUL RAD TX DLVR SMPL: CPT | Performed by: RADIOLOGY

## 2018-11-02 PROCEDURE — 77336 RADIATION PHYSICS CONSULT: CPT | Performed by: RADIOLOGY

## 2018-11-02 PROCEDURE — 77385 HB NTSTY MODUL RAD TX DLVR SMPL: CPT | Performed by: RADIOLOGY

## 2018-11-05 PROCEDURE — 77385 HB NTSTY MODUL RAD TX DLVR SMPL: CPT | Performed by: RADIOLOGY

## 2018-11-07 PROCEDURE — 77385 HB NTSTY MODUL RAD TX DLVR SMPL: CPT | Performed by: RADIOLOGY

## 2018-11-08 PROCEDURE — 77417 THER RADIOLOGY PORT IMAGE(S): CPT | Performed by: RADIOLOGY

## 2018-11-08 PROCEDURE — 77385 HB NTSTY MODUL RAD TX DLVR SMPL: CPT | Performed by: RADIOLOGY

## 2018-11-09 PROCEDURE — 77385 HB NTSTY MODUL RAD TX DLVR SMPL: CPT | Performed by: RADIOLOGY

## 2018-11-12 PROCEDURE — 77385 HB NTSTY MODUL RAD TX DLVR SMPL: CPT | Performed by: RADIOLOGY

## 2018-11-12 PROCEDURE — 77336 RADIATION PHYSICS CONSULT: CPT | Performed by: RADIOLOGY

## 2018-11-13 PROCEDURE — 77385 HB NTSTY MODUL RAD TX DLVR SMPL: CPT | Performed by: RADIOLOGY

## 2018-11-14 PROCEDURE — 77385 HB NTSTY MODUL RAD TX DLVR SMPL: CPT | Performed by: RADIOLOGY

## 2018-11-15 PROCEDURE — 77417 THER RADIOLOGY PORT IMAGE(S): CPT | Performed by: RADIOLOGY

## 2018-11-15 PROCEDURE — 77385 HB NTSTY MODUL RAD TX DLVR SMPL: CPT | Performed by: RADIOLOGY

## 2018-11-16 PROCEDURE — 77385 HB NTSTY MODUL RAD TX DLVR SMPL: CPT | Performed by: RADIOLOGY

## 2018-11-19 PROCEDURE — 77336 RADIATION PHYSICS CONSULT: CPT | Performed by: RADIOLOGY

## 2018-11-19 PROCEDURE — 77385 HB NTSTY MODUL RAD TX DLVR SMPL: CPT | Performed by: RADIOLOGY

## 2018-11-20 PROCEDURE — 77385 HB NTSTY MODUL RAD TX DLVR SMPL: CPT | Performed by: RADIOLOGY

## 2018-11-21 PROCEDURE — 77385 HB NTSTY MODUL RAD TX DLVR SMPL: CPT | Performed by: RADIOLOGY

## 2018-11-23 PROCEDURE — 77385 HB NTSTY MODUL RAD TX DLVR SMPL: CPT | Performed by: RADIOLOGY

## 2018-11-23 PROCEDURE — 77417 THER RADIOLOGY PORT IMAGE(S): CPT | Performed by: RADIOLOGY

## 2018-11-26 PROCEDURE — 77385 HB NTSTY MODUL RAD TX DLVR SMPL: CPT | Performed by: RADIOLOGY

## 2018-11-27 PROCEDURE — 77336 RADIATION PHYSICS CONSULT: CPT | Performed by: RADIOLOGY

## 2018-11-27 PROCEDURE — 77385 HB NTSTY MODUL RAD TX DLVR SMPL: CPT | Performed by: RADIOLOGY

## 2018-11-28 ENCOUNTER — TELEPHONE (OUTPATIENT)
Dept: FAMILY MEDICINE CLINIC | Facility: CLINIC | Age: 73
End: 2018-11-28

## 2018-11-28 DIAGNOSIS — E11.9 TYPE 2 DIABETES MELLITUS WITHOUT COMPLICATION, WITHOUT LONG-TERM CURRENT USE OF INSULIN (HCC): Primary | ICD-10-CM

## 2018-11-28 PROCEDURE — 77385 HB NTSTY MODUL RAD TX DLVR SMPL: CPT | Performed by: RADIOLOGY

## 2018-11-29 PROCEDURE — 77385 HB NTSTY MODUL RAD TX DLVR SMPL: CPT | Performed by: RADIOLOGY

## 2018-11-29 RX ORDER — BLOOD-GLUCOSE METER
EACH MISCELLANEOUS 2 TIMES DAILY
Qty: 1 KIT | Refills: 0 | Status: SHIPPED | OUTPATIENT
Start: 2018-11-29

## 2018-11-30 PROCEDURE — 77385 HB NTSTY MODUL RAD TX DLVR SMPL: CPT | Performed by: RADIOLOGY

## 2018-12-03 ENCOUNTER — APPOINTMENT (OUTPATIENT)
Dept: RADIATION ONCOLOGY | Facility: CLINIC | Age: 73
End: 2018-12-03
Attending: RADIOLOGY
Payer: COMMERCIAL

## 2018-12-03 PROCEDURE — 77336 RADIATION PHYSICS CONSULT: CPT | Performed by: RADIOLOGY

## 2018-12-03 PROCEDURE — 77385 HB NTSTY MODUL RAD TX DLVR SMPL: CPT | Performed by: RADIOLOGY

## 2018-12-19 ENCOUNTER — OFFICE VISIT (OUTPATIENT)
Dept: FAMILY MEDICINE CLINIC | Facility: CLINIC | Age: 73
End: 2018-12-19
Payer: COMMERCIAL

## 2018-12-19 VITALS
RESPIRATION RATE: 18 BRPM | BODY MASS INDEX: 23.61 KG/M2 | DIASTOLIC BLOOD PRESSURE: 70 MMHG | HEART RATE: 72 BPM | TEMPERATURE: 98 F | SYSTOLIC BLOOD PRESSURE: 118 MMHG | WEIGHT: 120.25 LBS | HEIGHT: 60 IN

## 2018-12-19 DIAGNOSIS — Z11.59 ENCOUNTER FOR HEPATITIS C SCREENING TEST FOR LOW RISK PATIENT: ICD-10-CM

## 2018-12-19 DIAGNOSIS — Z00.00 MEDICARE ANNUAL WELLNESS VISIT, INITIAL: ICD-10-CM

## 2018-12-19 DIAGNOSIS — E11.9 TYPE 2 DIABETES MELLITUS WITHOUT COMPLICATION, WITHOUT LONG-TERM CURRENT USE OF INSULIN (HCC): Primary | ICD-10-CM

## 2018-12-19 DIAGNOSIS — T78.40XA ALLERGIC REACTION, INITIAL ENCOUNTER: ICD-10-CM

## 2018-12-19 DIAGNOSIS — E78.2 MIXED HYPERLIPIDEMIA: ICD-10-CM

## 2018-12-19 DIAGNOSIS — Z12.11 SCREEN FOR COLON CANCER: ICD-10-CM

## 2018-12-19 DIAGNOSIS — B35.1 NAIL FUNGUS: ICD-10-CM

## 2018-12-19 PROBLEM — E78.5 HYPERLIPIDEMIA: Status: ACTIVE | Noted: 2018-12-19

## 2018-12-19 LAB
ANION GAP SERPL CALCULATED.3IONS-SCNC: 6 MMOL/L (ref 4–13)
BUN SERPL-MCNC: 11 MG/DL (ref 5–25)
CALCIUM SERPL-MCNC: 9.7 MG/DL (ref 8.3–10.1)
CHLORIDE SERPL-SCNC: 102 MMOL/L (ref 100–108)
CHOLEST SERPL-MCNC: 203 MG/DL (ref 50–200)
CO2 SERPL-SCNC: 27 MMOL/L (ref 21–32)
CREAT SERPL-MCNC: 0.73 MG/DL (ref 0.6–1.3)
CREAT UR-MCNC: <13 MG/DL
GFR SERPL CREATININE-BSD FRML MDRD: 93 ML/MIN/1.73SQ M
GLUCOSE P FAST SERPL-MCNC: 129 MG/DL (ref 65–99)
HDLC SERPL-MCNC: 37 MG/DL (ref 40–60)
LDLC SERPL CALC-MCNC: 121 MG/DL (ref 0–100)
MICROALBUMIN UR-MCNC: <5 MG/L (ref 0–20)
NONHDLC SERPL-MCNC: 166 MG/DL
POTASSIUM SERPL-SCNC: 4.3 MMOL/L (ref 3.5–5.3)
SL AMB POCT HEMOGLOBIN AIC: 8.8
SODIUM SERPL-SCNC: 135 MMOL/L (ref 136–145)
TRIGL SERPL-MCNC: 226 MG/DL

## 2018-12-19 PROCEDURE — 82570 ASSAY OF URINE CREATININE: CPT | Performed by: NURSE PRACTITIONER

## 2018-12-19 PROCEDURE — 1170F FXNL STATUS ASSESSED: CPT | Performed by: NURSE PRACTITIONER

## 2018-12-19 PROCEDURE — 83036 HEMOGLOBIN GLYCOSYLATED A1C: CPT | Performed by: NURSE PRACTITIONER

## 2018-12-19 PROCEDURE — 80061 LIPID PANEL: CPT | Performed by: NURSE PRACTITIONER

## 2018-12-19 PROCEDURE — 99214 OFFICE O/P EST MOD 30 MIN: CPT | Performed by: NURSE PRACTITIONER

## 2018-12-19 PROCEDURE — G0438 PPPS, INITIAL VISIT: HCPCS | Performed by: NURSE PRACTITIONER

## 2018-12-19 PROCEDURE — 80048 BASIC METABOLIC PNL TOTAL CA: CPT | Performed by: NURSE PRACTITIONER

## 2018-12-19 PROCEDURE — 86803 HEPATITIS C AB TEST: CPT | Performed by: NURSE PRACTITIONER

## 2018-12-19 PROCEDURE — 1125F AMNT PAIN NOTED PAIN PRSNT: CPT | Performed by: NURSE PRACTITIONER

## 2018-12-19 PROCEDURE — 82043 UR ALBUMIN QUANTITATIVE: CPT | Performed by: NURSE PRACTITIONER

## 2018-12-19 RX ORDER — LORATADINE 10 MG/1
10 TABLET ORAL DAILY
Qty: 14 TABLET | Refills: 0 | Status: SHIPPED | OUTPATIENT
Start: 2018-12-19 | End: 2021-10-20 | Stop reason: ALTCHOICE

## 2018-12-19 RX ORDER — PRENATAL VIT 91/IRON/FOLIC/DHA 28-975-200
COMBINATION PACKAGE (EA) ORAL 2 TIMES DAILY
Qty: 42 G | Refills: 0 | Status: SHIPPED | OUTPATIENT
Start: 2018-12-19 | End: 2019-06-03 | Stop reason: ALTCHOICE

## 2018-12-19 NOTE — PROGRESS NOTES
Assessment and Plan:    Problem List Items Addressed This Visit        Endocrine    Type 2 diabetes mellitus without complication, without long-term current use of insulin ([facility] [address] ) - Primary     Lab Results   Component Value Date    HGBA1C 8 8 12/19/2018       No results for input(s): POCGLU in the last 72 hours  Blood Sugar Average: Last 72 hrs:    A1C is doing well  He is currently on janumet 50/500 BID  And diet control  Continue with making diet better  Relevant Medications    ONE TOUCH LANCETS MISC    Other Relevant Orders    POCT hemoglobin A1c (Completed)    Microalbumin / creatinine urine ratio    Lipid panel    Basic metabolic panel       Other    Mixed hyperlipidemia     Cue for cholesterol check  Currently on simvastatin 20mg  Relevant Orders    Lipid panel      Other Visit Diagnoses     Screen for colon cancer        Relevant Orders    Occult Blood, Fecal Immunochemical    Encounter for hepatitis C screening test for low risk patient        Relevant Orders    Hepatitis C antibody    Medicare annual wellness visit, initial        Allergic reaction, initial encounter        Relevant Medications    loratadine (CLARITIN) 10 mg tablet    Nail fungus        Relevant Medications    terbinafine (LamISIL) 1 % cream        Health Maintenance Due   Topic Date Due    Hepatitis C Screening  1945    Medicare Annual Wellness Visit (AWV)  1945    Diabetic Foot Exam  07/21/1956    DM Eye Exam  07/21/1956    URINE MICROALBUMIN  07/21/1956    CRC Screening: FOBTx3/FIT  07/21/1996    Fall Risk  07/21/2011    Pneumococcal PPSV23/PCV13 65+ Years / High and Highest Risk (1 of 2 - PCV13) 07/21/2011    HEMOGLOBIN A1C  10/16/2018         HPI:  Deanne Mccormick is a 68 y o  male here for his Initial Wellness Visit      Patient Active Problem List   Diagnosis    Type 2 diabetes mellitus without complication, without long-term current use of insulin (HCC)    Cervical pain    Irritant contact dermatitis due to other agents    Ill-fitting dentures    Hearing difficulty of both ears    Prostate cancer ([facility] [address] )    Prostate nodule    Mixed hyperlipidemia     Past Medical History:   Diagnosis Date    Diabetes mellitus ([facility] [address] )     Elevated PSA     Hyperlipidemia     Prostate cancer (HCC)      Past Surgical History:   Procedure Laterality Date    NECK SURGERY      PROSTATE BIOPSY  07/18/2018     Family History   Problem Relation Age of Onset    No Known Problems Mother     No Known Problems Father      History   Smoking Status    Never Smoker   Smokeless Tobacco    Never Used     History   Alcohol Use No      History   Drug Use No       Current Outpatient Prescriptions   Medication Sig Dispense Refill    simvastatin (ZOCOR) 20 mg tablet Take 1 tablet (20 mg total) by mouth daily at bedtime 90 tablet 0    sitaGLIPtin-metFORMIN (JANUMET)  MG per tablet Take 1 tablet by mouth 2 (two) times a day with meals 180 tablet 0    bisacodyl (FLEET) 10 MG/30ML ENEM Insert 30 mL (10 mg total) into the rectum once for 1 dose Day of biopsy 30 mL 0    Blood Glucose Monitoring Suppl (Milo Kohler) w/Device KIT by Does not apply route 2 (two) times a day 1 kit 0    famotidine (PEPCID) 20 mg tablet TAKE 1 TABLET BY MOUTH EVERY DAY (Patient not taking: Reported on 12/19/2018) 30 tablet 0    glucose blood (ONETOUCH VERIO) test strip Test two times daily 100 each 5    loratadine (CLARITIN) 10 mg tablet Take 1 tablet (10 mg total) by mouth daily 14 tablet 0    ONE TOUCH LANCETS MISC by Does not apply route 2 (two) times a day Test two times daily 100 each 5    tamsulosin (FLOMAX) 0 4 mg Take 1 capsule (0 4 mg total) by mouth daily with dinner (Patient not taking: Reported on 12/19/2018 ) 30 capsule 0    terbinafine (LamISIL) 1 % cream Apply topically 2 (two) times a day 42 g 0     No current facility-administered medications for this visit        No Known Allergies    There is no immunization history on file for this patient  Patient Care Team:  Kannan Schroeder as PCP - General (Family Medicine)  Carl Kelly MD (Radiation Oncology)  Peggyann Bosworth, MD (Urology)    Medicare Screening Tests and Risk Assessments:      Health Risk Assessment:  Patient rates overall health as good  Patient feels that their physical health rating is Much better  Eyesight was rated as Same  Hearing was rated as Same  Patient feels that their emotional and mental health rating is Much better  Pain experienced by patient in the last 7 days has been None  Emotional/Mental Health:  Patient has been feeling nervous/anxious  PHQ-9 Depression Screening:    Frequency of the following problems over the past two weeks:      1  Little interest or pleasure in doing things: 0 - not at all      2  Feeling down, depressed, or hopeless: 0 - not at all  PHQ-2 Score: 0          Broken Bones/Falls: Fall Risk Assessment:    In the past year, patient has experienced: No history of falling in past year          Bladder/Bowel:  Patient has not leaked urine accidently in the last six months  Patient reports no loss of bowel control  Immunizations:  Patient has not had a flu vaccination within the last year  Patient has not received a pneumonia shot  Patient has not received a shingles shot  Patient has not received tetanus/diphtheria shot  Home Safety:  Patient does not have trouble with stairs inside or outside of their home  Patient currently reports that there are no safety hazards present in home, working smoke alarms, no working carbon monoxide detectors  Preventative Screenings:   prostate cancer screen performed, colon cancer screen completed, cholesterol screen completed, no glaucoma eye exam completed    Nutrition:  Current diet: Limited junk food and Regular with servings of the following:    Medications:  Patient is currently taking over-the-counter supplements       List of OTC medications includes: Multivitamin  Patient is not able to manage medications  Lifestyle Choices:  Patient reports no tobacco use  Patient has not smoked or used tobacco in the past   Patient reports no alcohol use  Patient drives a vehicle  Patient wears seat belt  Activities of Daily Living:  Can get out of bed by his or her self, able to dress self, able to make own meals, able to do own shopping, able to bathe self, can do own laundry/housekeeping, can manage own money, pay bills and track expenses    Previous Hospitalizations:  No hospitalization or ED visit in past 12 months        Advanced Directives:  Patient has not decided on power of   Patient has not completed advanced directive  Preventative Screening/Counseling:      Cardiovascular:      General: Risks and Benefits Discussed and Screening Current      Counseling: Healthy Diet and Healthy Weight          Diabetes:      General: Risks and Benefits Discussed and Screening Current          Colorectal Cancer:      General: Risks and Benefits Discussed      Counseling: high fiber diet      Due for studies: Fecal Occult Blood          Prostate Cancer:      General: Risks and Benefits Discussed and Screening Current          Osteoporosis:      General: Screening Not Indicated          AAA:      General: Screening Not Indicated          Glaucoma:      General: Risks and Benefits Discussed      Comments: Patient has not gone yet but has family referral will make appointment in the near future  HIV:      General: Screening Not Indicated          Hepatitis C:      General: Risks and Benefits Discussed      Counseling: has received general HCV counseling        Advanced Directives:   Patient has no living will for healthcare, does not have durable POA for healthcare, patient does not have an advanced directive  Information on ACP and/or AD provided  5 wishes given       Immunizations:      Influenza: Risks & Benefits Discussed and Patient Declines Pneumococcal: Risks & Benefits Discussed and Patient Declines      Other Preventative Counseling (Non-Medicare):   Fall Prevention

## 2018-12-19 NOTE — PROGRESS NOTES
Chief Complaint   Patient presents with   [facility] Wellness Visit     Patient present today for Annual Wellness Visit and diabetic follow up  Per patient he does not have any concerns at this time  Assessment/Plan:    Type 2 diabetes mellitus without complication, without long-term current use of insulin (HCC)  Lab Results   Component Value Date    HGBA1C 8 8 12/19/2018       No results for input(s): POCGLU in the last 72 hours  Blood Sugar Average: Last 72 hrs:    A1C is doing well  He is currently on janumet 50/500 BID  And diet control  Continue with making diet better  Mixed hyperlipidemia  Cue for cholesterol check  Currently on simvastatin 20mg  Diagnoses and all orders for this visit:    Type 2 diabetes mellitus without complication, without long-term current use of insulin (HCC)  -     POCT hemoglobin A1c  -     Microalbumin / creatinine urine ratio  -     Lipid panel  -     Basic metabolic panel  -     ONE TOUCH LANCETS MISC; by Does not apply route 2 (two) times a day Test two times daily    Mixed hyperlipidemia  -     Lipid panel    Screen for colon cancer  -     Occult Blood, Fecal Immunochemical; Future    Encounter for hepatitis C screening test for low risk patient  -     Hepatitis C antibody; Future  -     Hepatitis C antibody    Medicare annual wellness visit, initial    Allergic reaction, initial encounter  -     loratadine (CLARITIN) 10 mg tablet; Take 1 tablet (10 mg total) by mouth daily    Nail fungus  -     terbinafine (LamISIL) 1 % cream; Apply topically 2 (two) times a day    Other orders  -     Cancel: PREFERRED: influenza vaccine, [id number], high-dose, PF 0 5 mL, for patients 65 yr+ (FLUZONE HIGH-DOSE)  -     Cancel: TDAP VACCINE GREATER THAN OR EQUAL TO 8YO IM  -     Cancel: PNEUMOCOCCAL CONJUGATE VACCINE 13-VALENT GREATER THAN 6 MONTHS          Subjective:      Patient ID: Miesha Looney is a 68 y o  male  Diabetes   He presents for his follow-up diabetic visit  He has type 2 diabetes mellitus  His disease course has been improving  There are no hypoglycemic associated symptoms  Pertinent negatives for hypoglycemia include no dizziness or headaches  Associated symptoms include polyuria  Pertinent negatives for diabetes include no blurred vision, no chest pain, no fatigue, no polydipsia, no polyphagia and no visual change  Symptoms are stable  Risk factors for coronary artery disease include male sex  Current diabetic treatment includes oral agent (dual therapy)  He is compliant with treatment all of the time  His weight is stable  He is following a generally healthy diet  Meal planning includes avoidance of concentrated sweets  He has not had a previous visit with a dietitian  He rarely participates in exercise  His home blood glucose trend is decreasing steadily  His overall blood glucose range is 130-140 mg/dl  An ACE inhibitor/angiotensin II receptor blocker is being taken  He does not see a podiatrist Eye exam is not current  Hyperlipidemia   This is a chronic problem  The current episode started more than 1 year ago  The problem is controlled  Recent lipid tests were reviewed and are normal  Exacerbating diseases include diabetes  Pertinent negatives include no chest pain, myalgias or shortness of breath  Current antihyperlipidemic treatment includes statins  The current treatment provides significant improvement of lipids  There are no compliance problems  The following portions of the patient's history were reviewed and updated as appropriate: allergies, current medications, past family history, past medical history, past social history, past surgical history and problem list     Review of Systems   Constitutional: Negative for diaphoresis, fatigue and unexpected weight change  HENT: Negative for trouble swallowing  Eyes: Negative for blurred vision and visual disturbance  Respiratory: Negative for cough, chest tightness and shortness of breath  Cardiovascular: Negative for chest pain, palpitations and leg swelling  Gastrointestinal: Negative for constipation  Endocrine: Positive for polyuria  Negative for polydipsia and polyphagia  Genitourinary: Negative for difficulty urinating  Musculoskeletal: Negative for arthralgias and myalgias  Neurological: Negative for dizziness, light-headedness and headaches  Psychiatric/Behavioral: Negative for sleep disturbance  Objective:      /70 (BP Location: Right arm, Patient Position: Sitting, Cuff Size: Standard)   Pulse 72   Temp 98 °F (36 7 °C) (Temporal)   Resp 18   Ht 4' 10" (1 473 m)   Wt 54 5 kg (120 lb 4 oz)   BMI 25 13 kg/m²          Physical Exam   Constitutional: Vital signs are normal  He appears well-developed and well-nourished  He does not have a sickly appearance  He does not appear ill  HENT:   Head: Normocephalic and atraumatic  Mouth/Throat: Uvula is midline, oropharynx is clear and moist and mucous membranes are normal    Eyes: Pupils are equal, round, and reactive to light  Neck: No JVD present  Carotid bruit is not present  No thyromegaly present  Cardiovascular: Normal rate, regular rhythm, S1 normal, S2 normal and normal heart sounds  Pulses are no weak pulses  No murmur heard  Pulses:       Dorsalis pedis pulses are 2+ on the right side, and 2+ on the left side  Posterior tibial pulses are 2+ on the right side, and 2+ on the left side  Pulmonary/Chest: Effort normal and breath sounds normal    Abdominal: Soft  Normal appearance and bowel sounds are normal    Musculoskeletal:        Feet:    Feet:   Right Foot:   Skin Integrity: Positive for callus and dry skin  Negative for ulcer, skin breakdown, erythema or warmth  Left Foot:   Skin Integrity: Positive for callus and dry skin  Negative for ulcer, skin breakdown, erythema or warmth  Lymphadenopathy:     He has no cervical adenopathy  Skin: Skin is warm, dry and intact       Patient's shoes and socks removed  Right Foot/Ankle   Right Foot Inspection  Skin Exam: skin normal, skin intact, dry skin, callus and callus no warmth, no erythema, no maceration, no abnormal color, no pre-ulcer and no ulcer                          Toe Exam: ROM and strength within normal limits  Sensory   Vibration: intact  Proprioception: intact   Monofilament testing: diminished  Vascular  Capillary refills: < 3 seconds  The right DP pulse is 2+  The right PT pulse is 2+  Left Foot/Ankle  Left Foot Inspection  Skin Exam: skin normal, skin intact, dry skin and callusno warmth, no erythema, no maceration, normal color, no pre-ulcer and no ulcer                         Toe Exam: ROM and strength within normal limits                   Sensory   Vibration: intact  Proprioception: intact  Monofilament: diminished  Vascular  Capillary refills: < 3 seconds  The left DP pulse is 2+  The left PT pulse is 2+  Assign Risk Category:  No deformity present; Loss of protective sensation;  No weak pulses       Risk: 1

## 2018-12-19 NOTE — ASSESSMENT & PLAN NOTE
Lab Results   Component Value Date    HGBA1C 8 8 12/19/2018       No results for input(s): POCGLU in the last 72 hours  Blood Sugar Average: Last 72 hrs:    A1C is doing well  He is currently on janumet 50/500 BID  And diet control  Continue with making diet better

## 2018-12-19 NOTE — PATIENT INSTRUCTIONS
Urinary Incontinence   WHAT YOU NEED TO KNOW:   What is urinary incontinence? Urinary incontinence (UI) is when you lose control of your bladder  What causes UI? UI occurs because your bladder cannot store or empty urine properly  The following are the most common types of UI:  · Stress incontinence  is when you leak urine due to increased bladder pressure  This may happen when you cough, sneeze, or exercise  · Urge incontinence  is when you feel the need to urinate right away and leak urine accidentally  · Mixed incontinence  is when you have both stress and urge UI  What are the signs and symptoms of UI?   · You feel like your bladder does not empty completely when you urinate  · You urinate often and need to urinate immediately  · You leak urine when you sleep, or you wake up with the urge to urinate  · You leak urine when you cough, sneeze, exercise, or laugh  How is UI diagnosed? Your healthcare provider will ask how often you leak urine and whether you have stress or urge symptoms  Tell him which medicines you take, how often you urinate, and how much liquid you drink each day  You may need any of the following tests:  · Urine tests  may show infection or kidney function  · A pelvic exam  may be done to check for blockages  A pelvic exam will also show if your bladder, uterus, or other organs have moved out of place  · An x-ray, ultrasound, or CT  may show problems with parts of your urinary system  You may be given contrast liquid to help your organs show up better in the pictures  Tell the healthcare provider if you have ever had an allergic reaction to contrast liquid  Do not enter the MRI room with anything metal  Metal can cause serious injury  Tell the healthcare provider if you have any metal in or on your body  · A bladder scan  will show how much urine is left in your bladder after you urinate   You will be asked to urinate and then healthcare providers will use a small ultrasound machine to check the urine left in your bladder  · Cystometry  is used to check the function of your urinary system  Your healthcare provider checks the pressure in your bladder while filling it with fluid  Your bladder pressure may also be tested when your bladder is full and while you urinate  How is UI treated? · Medicines  can help strengthen your bladder control  · Electrical stimulation  is used to send a small amount of electrical energy to your pelvic floor muscles  This helps control your bladder function  Electrodes may be placed outside your body or in your rectum  For women, the electrodes may be placed in the vagina  · A bulking agent  may be injected into the wall of your urethra to make it thicker  This helps keep your urethra closed and decreases urine leakage  · Devices  such as a clamp, pessary, or tampon may help stop urine leaks  Ask your healthcare provider for more information about these and other devices  · Surgery  may be needed if other treatments do not work  Several types of surgery can help improve your bladder control  Ask your healthcare provider for more information about the surgery you may need  How can I manage my symptoms? · Do pelvic muscle exercises often  Your pelvic muscles help you stop urinating  Squeeze these muscles tight for 5 seconds, then relax for 5 seconds  Gradually work up to squeezing for 10 seconds  Do 3 sets of 15 repetitions a day, or as directed  This will help strengthen your pelvic muscles and improve bladder control  · A catheter  may be used to help empty your bladder  A catheter is a tiny, plastic tube that is put into your bladder to drain your urine  Your healthcare provider may tell you to use a catheter to prevent your bladder from getting too full and leaking urine  · Keep a UI record  Write down how often you leak urine and how much you leak   Make a note of what you were doing when you leaked urine     · Train your bladder  Go to the bathroom at set times, such as every 2 hours, even if you do not feel the urge to go  You can also try to hold your urine when you feel the urge to go  For example, hold your urine for 5 minutes when you feel the urge to go  As that becomes easier, hold your urine for 10 minutes  · Drink liquids as directed  Ask your healthcare provider how much liquid to drink each day and which liquids are best for you  You may need to limit the amount of liquid you drink to help control your urine leakage  Limit or do not have drinks that contain caffeine or alcohol  Do not drink any liquid right before you go to bed  · Prevent constipation  Eat a variety of high-fiber foods  Good examples are high-fiber cereals, beans, vegetables, and whole-grain breads  Prune juice may help make your bowel movement softer  Walking is the best way to trigger your intestines to have a bowel movement  · Exercise regularly and maintain a healthy weight  Ask your healthcare provider how much you should weigh and about the best exercise plan for you  Weight loss and exercise will decrease pressure on your bladder and help you control your leakage  Ask him to help you create a weight loss plan if you are overweight  When should I seek immediate care? · You have severe pain  · You are confused or cannot think clearly  When should I contact my healthcare provider? · You have a fever  · You see blood in your urine  · You have pain when you urinate  · You have new or worse pain, even after treatment  · Your mouth feels dry or you have vision changes  · Your urine is cloudy or smells bad  · You have questions or concerns about your condition or care  CARE AGREEMENT:   You have the right to help plan your care  Learn about your health condition and how it may be treated  Discuss treatment options with your caregivers to decide what care you want to receive   You always have the right to refuse treatment  The above information is an  only  It is not intended as medical advice for individual conditions or treatments  Talk to your doctor, nurse or pharmacist before following any medical regimen to see if it is safe and effective for you  © 2017 [address] [employer] Information is for End User's use only and may not be sold, redistributed or otherwise used for commercial purposes  All illustrations and images included in CareNotes® are the copyrighted property of [employer]  or Peter Albert  Cigarette Smoking and Your Health   AMBULATORY CARE:   Risks to your health if you smoke:  Nicotine and other chemicals found in tobacco damage every cell in your body  Even if you are a light smoker, you have an increased risk for cancer, heart disease, and lung disease  If you are pregnant or have diabetes, smoking increases your risk for complications  Benefits to your health if you stop smoking:   · You decrease respiratory symptoms such as coughing, wheezing, and shortness of breath  · You reduce your risk for cancers of the lung, mouth, throat, kidney, bladder, pancreas, stomach, and cervix  If you already have cancer, you increase the benefits of chemotherapy  You also reduce your risk for cancer returning or a second cancer from developing  · You reduce your risk for heart disease, blood clots, heart attack, and stroke  · You reduce your risk for lung infections, and diseases such as pneumonia, asthma, chronic bronchitis, and emphysema  · Your circulation improves  More oxygen can be delivered to your body  If you have diabetes, you lower your risk for complications, such as kidney, artery, and eye diseases  You also lower your risk for nerve damage  Nerve damage can lead to amputations, poor vision, and blindness  · You improve your body's ability to heal and to fight infections    Benefits to the health of others if you stop smoking:  Tobacco is harmful to nonsmokers who breathe in your secondhand smoke  The following are ways the health of others around you may improve when you stop smoking:  · You lower the risks for lung cancer and heart disease in nonsmoking adults  · If you are pregnant, you lower the risk for miscarriage, early delivery, low birth weight, and stillbirth  You also lower your baby's risk for SIDS, obesity, developmental delay, and neurobehavioral problems, such as ADHD  · If you have children, you lower their risk for ear infections, colds, pneumonia, bronchitis, and asthma  For more information and support to stop smoking:   · [employer]  [employer]  Phone: 2- 972 - 209-4484  Web Address: www [employer]  Follow up with your healthcare provider as directed:  Write down your questions so you remember to ask them during your visits  © 2017 [address] [employer] Information is for End User's use only and may not be sold, redistributed or otherwise used for commercial purposes  All illustrations and images included in CareNotes® are the copyrighted property of A D A M [employer]  or Peter Albert  The above information is an  only  It is not intended as medical advice for individual conditions or treatments  Talk to your doctor, nurse or pharmacist before following any medical regimen to see if it is safe and effective for you  Fall Prevention   WHAT YOU NEED TO KNOW:   What is fall prevention? Fall prevention includes ways to make your home and other areas safer  It also includes ways you can move more carefully to prevent a fall  What increases my risk for falls?    · Lack of vitamin D    · Not getting enough sleep each night    · Trouble walking or keeping your balance, or foot problems    · Health conditions that cause changes in your blood pressure, vision, or muscle strength and coordination    · Medicines that make you dizzy, weak, or sleepy    · Problems seeing clearly    · Shoes that have high heels or are not supportive    · Tripping hazards, such as items left on the floor, no handrails on the stairs, or broken steps  How can I help protect myself from falls? · Stand or sit up slowly  This may help you keep your balance and prevent falls  If you need to get up during the night, sit up first  Be sure you are fully awake before you stand  Turn on the light before you start walking  Go slowly in case you are still sleepy  Make sure you will not trip over any pets sleeping in the bedroom  · Use assistive devices as directed  Your healthcare provider may suggest that you use a cane or walker to help you keep your balance  You may need to have grab bars put in your bathroom near the toilet or in the shower  · Wear shoes that fit well and have soles that   Wear shoes both inside and outside  Use slippers with good   Do not wear shoes with high heels  · Wear a personal alarm  This is a device that allows you to call 911 if you fall and need help  Ask your healthcare provider for more information  · Stay active  Exercise can help strengthen your muscles and improve your balance  Your healthcare provider may recommend water aerobics or walking  He or she may also recommend physical therapy to improve your coordination  Never start an exercise program without talking to your healthcare provider first      · Manage medical conditions  Keep all appointments with your healthcare providers  Visit your eye doctor as directed  How can I make my home safer? · Add items to prevent falls in the bathroom  Put nonslip strips on your bath or shower floor to prevent you from slipping  Use a bath mat if you do not have carpet in the bathroom  This will prevent you from falling when you step out of the bath or shower  Use a shower seat so you do not need to stand while you shower   Sit on the toilet or a chair in your bathroom to dry yourself and put on clothing  This will prevent you from losing your balance from drying or dressing yourself while you are standing  · Keep paths clear  Remove books, shoes, and other objects from walkways and stairs  Place cords for telephones and lamps out of the way so that you do not need to walk over them  Tape them down if you cannot move them  Remove small rugs  If you cannot remove a rug, secure it with double-sided tape  This will prevent you from tripping  · Install bright lights in your home  Use night lights to help light paths to the bathroom or kitchen  Always turn on the light before you start walking  · Keep items you use often on shelves within reach  Do not use a step stool to help you reach an item  · Paint or place reflective tape on the edges of your stairs  This will help you see the stairs better  Call 911 or have someone else call if:   · You have fallen and are unconscious  · You have fallen and cannot move part of your body  Contact your healthcare provider if:   · You have fallen and have pain or a headache  · You have questions or concerns about your condition or care  CARE AGREEMENT:   You have the right to help plan your care  Learn about your health condition and how it may be treated  Discuss treatment options with your caregivers to decide what care you want to receive  You always have the right to refuse treatment  The above information is an  only  It is not intended as medical advice for individual conditions or treatments  Talk to your doctor, nurse or pharmacist before following any medical regimen to see if it is safe and effective for you  © 2017 [address] [employer] Information is for End User's use only and may not be sold, redistributed or otherwise used for commercial purposes  All illustrations and images included in CareNotes® are the copyrighted property of A D A M , Inc  or Peter Albert    Advance Directives   WHAT YOU NEED TO KNOW:   What are advance directives? Advance directives are legal documents that state your wishes and plans for medical care  These plans are made ahead of time in case you lose your ability to make decisions for yourself  Advance directives can apply to any medical decision, such as the treatments you want, and if you want to donate organs  What are the types of advance directives? There are many types of advance directives, and each state has rules about how to use them  You may choose a combination of any of the following:  · Living will: This is a written record of the treatment you want  You can also choose which treatments you do not want, which to limit, and which to stop at a certain time  This includes surgery, medicine, IV fluid, and tube feedings  · Durable power of  for healthcare [facility] [place]): This is a written record that states who you want to make healthcare choices for you when you are unable to make them for yourself  This person, called a proxy, is usually a family member or a friend  You may choose more than 1 proxy  · Do not resuscitate (DNR) order:  A DNR order is used in case your heart stops beating or you stop breathing  It is a request not to have certain forms of treatment, such as CPR  A DNR order may be included in other types of advance directives  · Medical directive: This covers the care that you want if you are in a coma, near death, or unable to make decisions for yourself  You can list the treatments you want for each condition  Treatment may include pain medicine, surgery, blood transfusions, dialysis, IV or tube feedings, and a ventilator (breathing machine)  · Values history: This document has questions about your views, beliefs, and how you feel and think about life  This information can help others choose the care that you would choose  Why are advance directives important? An advance directive helps you control your care   Although spoken wishes may be used, it is better to have your wishes written down  Spoken wishes can be misunderstood, or not followed  Treatments may be given even if you do not want them  An advance directive may make it easier for your family to make difficult choices about your care  How do I decide what to put in my advance directives? · Make informed decisions:  Make sure you fully understand treatments or care you may receive  Think about the benefits and problems your decisions could cause for you or your family  Talk to healthcare providers if you have concerns or questions before you write down your wishes  You may also want to talk with your [employer] or , or a   Check your state laws to make sure that what you put in your advance directive is legal      · Sign all forms:  Sign and date your advance directive when you have finished  You may also need 2 witnesses to sign the forms  Witnesses cannot be your doctor or his staff, your spouse, heirs or beneficiaries, people you owe money to, or your chosen proxy  Talk to your family, proxy, and healthcare providers about your advance directive  Give each person a copy, and keep one for yourself in a place you can get to easily  Do not keep it hidden or locked away  · Review and revise your plans: You can revise your advance directive at any time, as long as you are able to make decisions  Review your plan every year, and when there are changes in your life, or your health  When you make changes, let your family, proxy, and healthcare providers know  Give each a new copy  Where can I find more information? · [employer]  [address] , [address]   Phone: 5- 338 - [phone]  Phone: 0- [phone] - [phone]  Web Address: http://www  aafp org  · [address] [facility])  [address], [address]  Phone: 2- [phone] - [phone]  Phone: 7- 116 - [phone]  Web Address: Janet bennett  CARE AGREEMENT:   You have the right to help plan your care  To help with this plan, you must learn about your health condition and treatment options  You must also learn about advance directives and how they are used  Work with your healthcare providers to decide what care will be used to treat you  You always have the right to refuse treatment  The above information is an  only  It is not intended as medical advice for individual conditions or treatments  Talk to your doctor, nurse or pharmacist before following any medical regimen to see if it is safe and effective for you  © 2017 [address] [facility] Information is for End User's use only and may not be sold, redistributed or otherwise used for commercial purposes  All illustrations and images included in CareNotes® are the copyrighted property of A D A M , Inc  or Peter Albert

## 2018-12-20 ENCOUNTER — TELEPHONE (OUTPATIENT)
Dept: FAMILY MEDICINE CLINIC | Facility: CLINIC | Age: 73
End: 2018-12-20

## 2018-12-20 LAB — HCV AB SER QL: NORMAL

## 2018-12-20 NOTE — TELEPHONE ENCOUNTER
Spoke to spouse and related to him regarding his BW, and Blood work ok  Cholesterol a little up but no change in medication  She said that will work on reducing the cholesterol, by cutting down on grease food  Jackelyn LAWRENCE

## 2018-12-20 NOTE — TELEPHONE ENCOUNTER
----- Message from [address] [place] [address] sent at 12/20/2018  2:49 PM EST -----  Blood work ok  Cholesterol a little up but no change in medication

## 2019-01-04 ENCOUNTER — CLINICAL SUPPORT (OUTPATIENT)
Dept: RADIATION ONCOLOGY | Facility: CLINIC | Age: 74
End: 2019-01-04

## 2019-01-04 ENCOUNTER — RADIATION ONCOLOGY FOLLOW-UP (OUTPATIENT)
Dept: RADIATION ONCOLOGY | Facility: CLINIC | Age: 74
End: 2019-01-04
Attending: RADIOLOGY
Payer: COMMERCIAL

## 2019-01-04 ENCOUNTER — ONCOLOGY SURVIVORSHIP (OUTPATIENT)
Dept: RADIATION ONCOLOGY | Facility: CLINIC | Age: 74
End: 2019-01-04

## 2019-01-04 VITALS
WEIGHT: 124.2 LBS | DIASTOLIC BLOOD PRESSURE: 64 MMHG | HEIGHT: 60 IN | SYSTOLIC BLOOD PRESSURE: 110 MMHG | OXYGEN SATURATION: 95 % | BODY MASS INDEX: 24.39 KG/M2 | RESPIRATION RATE: 16 BRPM | TEMPERATURE: 98.7 F | HEART RATE: 81 BPM

## 2019-01-04 DIAGNOSIS — C61 PROSTATE CANCER (HCC): Primary | ICD-10-CM

## 2019-01-04 PROCEDURE — 99215 OFFICE O/P EST HI 40 MIN: CPT | Performed by: RADIOLOGY

## 2019-01-04 NOTE — PROGRESS NOTES
Survivorship treatment summary and care plan provided to patient, in [place] and [facility] [place] and reviewed  All questions were answered and form signed  Copies made and sent to PCP on record and to [facility] Right Fax for scanning to be placed into electronic chart

## 2019-01-04 NOTE — PROGRESS NOTES
Follow-up - 0 [facility] 1945 68 y o  male [id number]      History of Present Illness   Cancer Staging  No matching staging information was found for the patient  Sukhjinder Witt is a 68y o  year old male with a history of prostate cancer      Interval History:  He returns today for first follow up post prostate radiation completed on 12/3/18  Overall he tolerated treatment well  During the initial course of treatment he did have some obstructive symptomatology and Flomax was prescribed however he never took this medication  At completion of treatment he denied dysuria or any symptoms of proctitis               Lab Results   Component Value Date     PSA 1 2 10/01/2018     PSA 14 4 (H) 04/16/2018            Future Appointments:  3/1/19 Dr Lorna Garcia for follow up, Lupron and Abbey Saldaña denies any dysuria  He claims a stream is back to baseline  No rectal or bowel symptomatology  Screening  Tobacco  Current tobacco user: no  If yes, brief counseling provided: NA     Hypertension  Hypertension screening performed: yes  Normotensive:  yes  If no, referred to PCP: n/a     Depression Screening  Screened for depression using PHQ-2: yes     Screened for depression using PHQ-9:  no  Screening positive or negative:  negative  If score >4, was any of the following actions taken?    Additional evaluation for depression, suicide risk assesment, referral to PCP or psychiatry, medication started:  n/a     Advanced Care Planning for Patients >65 years  Advanced Care Planning Discussed:  yes  Patient named surrogate decision maker or care plan in chart: no                Historical Information      Prostate cancer ([facility] [address] )    7/18/2018 Initial Diagnosis     Prostate cancer ([facility] [address] )    Pre-treatment PSA 14 4 ng/mL on 4/16/2018 7/18/2018 Biopsy     Biopsy Pathology  Prostatic adenocarcinoma   LT Lateral LT Medial RT Medial RT Lateral   Base Jennie 4+4=8, discontinuously involving 6% of 1 of 1 core  [place] 4+5=9, discontinuously involving 12% of 1 of 1 core  Jennie 4+5=9, discontinuously involving 100% of 1 of 1 core  [place] 4+4=8, discontinuously involving 80% of 1 of 1 core  PNI   Mid [place] 4+4=8, discontinuously involving 6% of 1 of 1 core  [place] 4+4=8, discontinuously involving 12% of 1 of 1 core  [place] 4+4=8, discontinuously involving 70% of 1 of 1 core  [place] 4+3=7, discontinuously involving 100% of 1 of 1 core  [place] Benign Benign Jennie 4+4=8, discontinuously involving 75% of 1 of 1 core  Jennie 4+4=8, discontinuously involving 100% of 1 of 1 core  PNI     Prostate, right lateral nodule:  - Prostatic adenocarcinoma, Jennie score 4 + 4, Prognostic Grade Group IV, discontinuously involving 100% of each of two cores:   * periprostatic fat invasion: not identified  * lymph-vascular invasion:  not identified  * perineural invasion: not identified   - Additional pathologic findings: N/A    - Dr Blanca Aldrich         7/27/2018 -  Hormone Therapy     240mg IM Degarelix administered  Lupron on 8/29/18 then every 6 months for 3 years or indefinitely           9/26/2018 - 12/3/2018 Radiation     Plan ID Energy Fractions Dose per Fraction (cGy) Dose Correction (cGy) Total Dose Delivered (cGy) Elapsed Days   Prostate_SV 6X 40 / 40 180 0 7,920 68      Treatment dates:  9/26/2018 - 12/3/2018     -  [facility]            Past Medical History:   Diagnosis Date    Diabetes mellitus ([facility] [address] )     Elevated PSA     Hyperlipidemia     Prostate cancer [facility])      Past Surgical History:   Procedure Laterality Date    NECK SURGERY      PROSTATE BIOPSY  07/18/2018       Social History   History   Alcohol Use No     History   Drug Use No     History   Smoking Status    Never Smoker   Smokeless Tobacco    Never Used         Meds/Allergies     Current Outpatient Prescriptions:     loratadine (CLARITIN) 10 mg tablet, Take 1 tablet (10 mg total) by mouth daily, Disp: 14 tablet, Rfl: 0    simvastatin (ZOCOR) 20 mg tablet, Take 1 tablet (20 mg total) by mouth daily at bedtime, Disp: 90 tablet, Rfl: 0    sitaGLIPtin-metFORMIN (JANUMET)  MG per tablet, Take 1 tablet by mouth 2 (two) times a day with meals, Disp: 180 tablet, Rfl: 0    bisacodyl (FLEET) 10 MG/30ML ENEM, Insert 30 mL (10 mg total) into the rectum once for 1 dose Day of biopsy, Disp: 30 mL, Rfl: 0    Blood Glucose Monitoring Suppl (ONETOUCH VERIO) w/Device KIT, by Does not apply route 2 (two) times a day, Disp: 1 kit, Rfl: 0    famotidine (PEPCID) 20 mg tablet, TAKE 1 TABLET BY MOUTH EVERY DAY (Patient not taking: Reported on 12/19/2018), Disp: 30 tablet, Rfl: 0    glucose blood (ONETOUCH VERIO) test strip, Test two times daily, Disp: 100 each, Rfl: 5    ONE TOUCH LANCETS MISC, by Does not apply route 2 (two) times a day Test two times daily, Disp: 100 each, Rfl: 5    tamsulosin (FLOMAX) 0 4 mg, Take 1 capsule (0 4 mg total) by mouth daily with dinner (Patient not taking: Reported on 12/19/2018 ), Disp: 30 capsule, Rfl: 0    terbinafine (LamISIL) 1 % cream, Apply topically 2 (two) times a day (Patient not taking: Reported on 1/4/2019 ), Disp: 42 g, Rfl: 0  No Known Allergies      Review of Systems    Constitutional: Positive for fatigue (mild)  HENT: Negative  Eyes: Negative  Respiratory: Negative  Cardiovascular: Negative  Gastrointestinal: Negative  Endocrine: Negative  Genitourinary:        Nocturia 2-3x  Musculoskeletal: Negative  Skin: Negative  Allergic/Immunologic: Positive for food allergies  Neurological: Negative  Hematological: Negative  Psychiatric/Behavioral: Negative          OBJECTIVE:   /64   Pulse 81   Temp 98 7 °F (37 1 °C) (Temporal)   Resp 16   Ht 4' 10" (1 473 m)   Wt 56 3 kg (124 lb 3 2 oz)   SpO2 95%   BMI 25 96 kg/m²   Pain Assessment:  0  ECOG/Zubrod/WHO: 0 - Asymptomatic    Physical Exam    Abdomen soft nontender  He is ambulating independently      RESULTS    Lab Results:   Recent Results (from the past 672 hour(s))   Lipid panel    Collection Time: 12/19/18 10:53 AM   Result Value Ref Range    Cholesterol 203 (H) 50 - 200 mg/dL    Triglycerides 226 (H) <=150 mg/dL    HDL, Direct 37 (L) 40 - 60 mg/dL    LDL Calculated 121 (H) 0 - 100 mg/dL    Non-HDL-Chol (CHOL-HDL) 166 mg/dl   Basic metabolic panel    Collection Time: 12/19/18 10:53 AM   Result Value Ref Range    Sodium 135 (L) 136 - 145 mmol/L    Potassium 4 3 3 5 - 5 3 mmol/L    Chloride 102 100 - 108 mmol/L    CO2 27 21 - 32 mmol/L    ANION GAP 6 4 - 13 mmol/L    BUN 11 5 - 25 mg/dL    Creatinine 0 73 0 60 - 1 30 mg/dL    Glucose, Fasting 129 (H) 65 - 99 mg/dL    Calcium 9 7 8 3 - 10 1 mg/dL    eGFR 93 ml/min/1 73sq m   Hepatitis C antibody    Collection Time: 12/19/18 10:53 AM   Result Value Ref Range    Hepatitis C Ab Non-reactive Non-reactive   POCT hemoglobin A1c    Collection Time: 12/19/18 10:55 AM   Result Value Ref Range    Hemoglobin A1C 8 8    Microalbumin / creatinine urine ratio    Collection Time: 12/19/18 10:55 AM   Result Value Ref Range    Creatinine, Ur <13 0 mg/dL    Microalbum  ,U,Random <5 0 0 0 - 20 0 mg/L    Microalb Creat Ratio  0 - 30 mg/g creatinine       Imaging Studies:No results found  Assessment/Plan:  No orders of the defined types were placed in this encounter  Carlotta Greco is a 68y o  year old male who is 1 month post radiation therapy for prostate carcinoma  He also receives androgen deprivation  He has tolerated radiation treatment will well without any significant post treatment side effects  He follows up with urology in March at which time he will undergo follow-up PS A  I have asked him to return in 6 months for follow-up        Mariela Silva MD  1/4/2019,2:04 PM    Portions of the record may have been created with voice recognition software   Occasional wrong word or "sound a like" substitutions may have occurred due to the inherent limitations of voice recognition software   Read the chart carefully and recognize, using context, where substitutions have occurred

## 2019-01-04 NOTE — PROGRESS NOTES
Bhavani Olivas  1945   Mr Jossy Sawyer is a 68 y o  male       Chief Complaint   Patient presents with    Follow-up     radiation oncology       Cancer Staging  No matching staging information was found for the patient  Bhavani Olivas is a 68y o  year old male with clinical T2 a [place] 9 (4+ 5) prostate carcinoma  He has high-volume high risk prostate cancer  Prostate cancer ([facility] [address] )    7/18/2018 Initial Diagnosis     Prostate cancer ([facility] [address] )    Pre-treatment PSA 14 4 ng/mL on 4/16/2018 7/18/2018 Biopsy     Biopsy Pathology  Prostatic adenocarcinoma   LT Lateral LT Medial RT Medial RT Lateral   Base [place] 4+4=8, discontinuously involving 6% of 1 of 1 core  [place] 4+5=9, discontinuously involving 12% of 1 of 1 core  [place] 4+5=9, discontinuously involving 100% of 1 of 1 core  [place] 4+4=8, discontinuously involving 80% of 1 of 1 core  PNI   Mid [place] 4+4=8, discontinuously involving 6% of 1 of 1 core  [place] 4+4=8, discontinuously involving 12% of 1 of 1 core  [place] 4+4=8, discontinuously involving 70% of 1 of 1 core  [place] 4+3=7, discontinuously involving 100% of 1 of 1 core  [place] Benign Benign [place] 4+4=8, discontinuously involving 75% of 1 of 1 core  [place] 4+4=8, discontinuously involving 100% of 1 of 1 core  PNI     Prostate, right lateral nodule:  - Prostatic adenocarcinoma, Jennie score 4 + 4, Prognostic Grade Group IV, discontinuously involving 100% of each of two cores:   * periprostatic fat invasion: not identified  * lymph-vascular invasion:  not identified  * perineural invasion: not identified   - Additional pathologic findings: N/A    - Dr Carli Andrews         7/27/2018 -  Hormone Therapy     240mg IM Degarelix administered  Lupron on 8/29/18 then every 6 months for 3 years or indefinitely           9/26/2018 - 12/3/2018 Radiation     Plan ID Energy Fractions Dose per Fraction (cGy) Dose Correction (cGy) Total Dose Delivered (cGy) Elapsed Days   Prostate_SV 6X 44 / 44 180 0 7,920 68      Treatment dates:  9/26/2018 - 12/3/2018     - Dr Blanquita Montesinos            Clinical Trial: no    Interval History:  He returns today for first follow up post prostate radiation completed on 12/3/18  Overall he tolerated treatment well  During the initial course of treatment he did have some obstructive symptomatology and Flomax was prescribed however he never took this medication  At completion of treatment he denied dysuria or any symptoms of proctitis  Lab Results   Component Value Date    PSA 1 2 10/01/2018    PSA 14 4 (H) 04/16/2018         Future Appointments:  3/1/19 Dr Tony Morse for follow up, Lupron and Prolia      Screening  Tobacco  Current tobacco user: no  If yes, brief counseling provided: NA    Hypertension  Hypertension screening performed: yes  Normotensive:  yes  If no, referred to PCP: n/a    Depression Screening  Screened for depression using PHQ-2: yes    Screened for depression using PHQ-9:  no  Screening positive or negative:  negative  If score >4, was any of the following actions taken?    Additional evaluation for depression, suicide risk assesment, referral to PCP or psychiatry, medication started:  n/a    Advanced Care Planning for Patients >65 years  Advanced Care Planning Discussed:  yes  Patient named surrogate decision maker or care plan in chart: no      Health Maintenance   Topic Date Due    DM Eye Exam  07/21/1955    CRC Screening: FOBTx3/FIT  07/21/1996    Pneumococcal PPSV23/PCV13 65+ Years / High and Highest Risk (1 of 2 - PCV13) 07/21/2011    INFLUENZA VACCINE  09/05/2019 (Originally 7/1/2018)    DTaP,Tdap,and Td Vaccines (1 - Tdap) 12/18/2019 (Originally 7/21/1967)    HEMOGLOBIN A1C  06/19/2019    Fall Risk  12/19/2019    Depression Screening PHQ  12/19/2019    Medicare Annual Wellness Visit (AWV)  12/19/2019    Diabetic Foot Exam  12/19/2019    URINE MICROALBUMIN  12/19/2019    Hepatitis C Screening  Completed       Patient Active Problem List Diagnosis    Type 2 diabetes mellitus without complication, without long-term current use of insulin (HCC)    Cervical pain    Irritant contact dermatitis due to other agents    Ill-fitting dentures    Hearing difficulty of both ears    Prostate cancer ([facility] [address] )    Prostate nodule    Mixed hyperlipidemia     Past Medical History:   Diagnosis Date    Diabetes mellitus ([facility] [address] )     Elevated PSA     Hyperlipidemia     Prostate [facility])      Past Surgical History:   Procedure Laterality Date    NECK SURGERY      PROSTATE BIOPSY  07/18/2018     Family History   Problem Relation Age of Onset    No Known Problems Mother     No Known Problems Father      Social History     Social History    Marital status: /Civil Union     Spouse name: N/A    Number of children: N/A    Years of education: N/A     Occupational History    Not on file       Social History Main Topics    Smoking status: Never Smoker    Smokeless tobacco: Never Used    Alcohol use No    Drug use: No    Sexual activity: Not Currently     Other Topics Concern    Not on file     Social History Narrative    No narrative on file       Current Outpatient Prescriptions:     loratadine (CLARITIN) 10 mg tablet, Take 1 tablet (10 mg total) by mouth daily, Disp: 14 tablet, Rfl: 0    simvastatin (ZOCOR) 20 mg tablet, Take 1 tablet (20 mg total) by mouth daily at bedtime, Disp: 90 tablet, Rfl: 0    sitaGLIPtin-metFORMIN (JANUMET)  MG per tablet, Take 1 tablet by mouth 2 (two) times a day with meals, Disp: 180 tablet, Rfl: 0    bisacodyl (FLEET) 10 MG/30ML ENEM, Insert 30 mL (10 mg total) into the rectum once for 1 dose Day of biopsy, Disp: 30 mL, Rfl: 0    Blood Glucose Monitoring Suppl (ONETOUCH VERIO) w/Device KIT, by Does not apply route 2 (two) times a day, Disp: 1 kit, Rfl: 0    famotidine (PEPCID) 20 mg tablet, TAKE 1 TABLET BY MOUTH EVERY DAY (Patient not taking: Reported on 12/19/2018), Disp: 30 tablet, Rfl: 0    glucose blood (ONETOUCH VERIO) test strip, Test two times daily, Disp: 100 each, Rfl: 5    ONE TOUCH LANCETS MISC, by Does not apply route 2 (two) times a day Test two times daily, Disp: 100 each, Rfl: 5    tamsulosin (FLOMAX) 0 4 mg, Take 1 capsule (0 4 mg total) by mouth daily with dinner (Patient not taking: Reported on 12/19/2018 ), Disp: 30 capsule, Rfl: 0    terbinafine (LamISIL) 1 % cream, Apply topically 2 (two) times a day (Patient not taking: Reported on 1/4/2019 ), Disp: 42 g, Rfl: 0  No Known Allergies    Review of Systems:  Review of Systems   Constitutional: Positive for fatigue (mild)  HENT: Negative  Eyes: Negative  Respiratory: Negative  Cardiovascular: Negative  Gastrointestinal: Negative  Endocrine: Negative  Genitourinary:        Nocturia 2-3x  Musculoskeletal: Negative  Skin: Negative  Allergic/Immunologic: Positive for food allergies  Neurological: Negative  Hematological: Negative  Psychiatric/Behavioral: Negative  Vitals:    01/04/19 1126   BP: 110/64   Pulse: 81   Resp: 16   Temp: 98 7 °F (37 1 °C)   TempSrc: Temporal   SpO2: 95%   Weight: 56 3 kg (124 lb 3 2 oz)   Height: 4' 10" (1 473 m)            Imaging:No results found

## 2019-02-14 DIAGNOSIS — R63.0 POOR APPETITE: ICD-10-CM

## 2019-02-14 DIAGNOSIS — E11.9 TYPE 2 DIABETES MELLITUS WITHOUT COMPLICATION, WITHOUT LONG-TERM CURRENT USE OF INSULIN (HCC): ICD-10-CM

## 2019-02-14 DIAGNOSIS — R19.2: ICD-10-CM

## 2019-02-15 RX ORDER — FAMOTIDINE 20 MG/1
TABLET, FILM COATED ORAL
Qty: 30 TABLET | Refills: 3 | Status: SHIPPED | OUTPATIENT
Start: 2019-02-15 | End: 2019-06-03 | Stop reason: ALTCHOICE

## 2019-02-15 RX ORDER — SIMVASTATIN 20 MG
20 TABLET ORAL
Qty: 90 TABLET | Refills: 0 | Status: SHIPPED | OUTPATIENT
Start: 2019-02-15 | End: 2019-05-20 | Stop reason: SDUPTHER

## 2019-02-15 RX ORDER — SITAGLIPTIN AND METFORMIN HYDROCHLORIDE 500; 50 MG/1; MG/1
TABLET, FILM COATED ORAL
Qty: 180 TABLET | Refills: 0 | Status: SHIPPED | OUTPATIENT
Start: 2019-02-15 | End: 2019-05-20 | Stop reason: SDUPTHER

## 2019-03-04 ENCOUNTER — TELEPHONE (OUTPATIENT)
Dept: UROLOGY | Facility: HOSPITAL | Age: 74
End: 2019-03-04

## 2019-03-04 DIAGNOSIS — C61 PROSTATE CANCER (HCC): Primary | ICD-10-CM

## 2019-03-04 NOTE — TELEPHONE ENCOUNTER
Patient seen in the [facility] office and being managed by Dr Rothman Sis  Was informed that patient has upcoming appointment on 3- to have Lupron Injection as well as Prolia in office  Patient was informed by staff to have PSA done as well  As of now patient currently does not have PSA done  Called home / mobile line and unable to reach patient or wife  RN informed that patient does not want injections due to side affects  Left detailed message in both English and [place] for patient to have PSA done prior to appointment with doctor  Noticed that PSA order was not in system so PSA order was put into Epic for patient  Left patient number to office and my name since patients chart states that he does not speak [address]

## 2019-03-06 ENCOUNTER — DOCUMENTATION (OUTPATIENT)
Dept: UROLOGY | Facility: CLINIC | Age: 74
End: 2019-03-06

## 2019-03-06 NOTE — PROGRESS NOTES
Patient was scheduled again today for post radiation follow-up of his high risk Jennie 9 prostate cancer  Patient was given androgen deprivation therapy prior and I recommended this for at least 3 years  The patient has now cancel 2 appointments on the same day as the appointment  There is some question as to whether not the patient will refused additional hormone therapy given the side effect profile  While this is certainly acceptable the patient refuses, I would like to discuss this with him in person and given his high risk disease, he will need close follow-up  He has been rescheduled for this Friday  If the patient cancel is a another appointment, I would recommend follow-up in 6 months with a PSA  We have stressed the importance of close follow-up to the patient and his wife given his aggressive disease

## 2019-03-08 ENCOUNTER — OFFICE VISIT (OUTPATIENT)
Dept: UROLOGY | Facility: CLINIC | Age: 74
End: 2019-03-08
Payer: COMMERCIAL

## 2019-03-08 VITALS
SYSTOLIC BLOOD PRESSURE: 130 MMHG | DIASTOLIC BLOOD PRESSURE: 80 MMHG | HEART RATE: 64 BPM | BODY MASS INDEX: 26.54 KG/M2 | WEIGHT: 127 LBS

## 2019-03-08 DIAGNOSIS — C61 PROSTATE CANCER (HCC): Primary | ICD-10-CM

## 2019-03-08 DIAGNOSIS — N40.2 PROSTATE NODULE: ICD-10-CM

## 2019-03-08 PROCEDURE — 99213 OFFICE O/P EST LOW 20 MIN: CPT | Performed by: UROLOGY

## 2019-03-08 PROCEDURE — 96372 THER/PROPH/DIAG INJ SC/IM: CPT | Performed by: UROLOGY

## 2019-03-08 PROCEDURE — 96402 CHEMO HORMON ANTINEOPL SQ/IM: CPT | Performed by: UROLOGY

## 2019-03-08 NOTE — PROGRESS NOTES
UROLOGY FOLLOWUP NOTE     CHIEF COMPLAINT   Dian Hull is a 68 y o  male with a complaint of   Chief Complaint   Patient presents with    Prostate Cancer       History of Present Illness:     68 y o  male with LUTS and an elevated PSA obtained by his PCP  Unclear if patient has had prior prostate cancer screening  He presents today with his wife who speaks [place]  The patient is primarily [address] speaking  Patient has relatively mild urinary symptoms  He had a PSA drawn as part of his routine screening  This was elevated at 14 4  He presented initially discussion  It is unclear whether the patient has a family history of prostate cancer  Patient given induction ADT/prolia and underwent fiducial marker placement prior to XRT  This completed 12/3/18  Patient has missed several appointments  We discussed the patient's wife that it is extremely important he continues regular follow-up given his high risk prostate cancer  I believe that some of the patient's hesitancy to return to the office visit is that he is having some significant hot flashes from the androgen deprivation therapy  Patient's PSA as of last check has improved  We do not have a recent value for review      Lab Results   Component Value Date    PSA 1 2 10/01/2018    PSA 14 4 (H) 04/16/2018     Past Medical History:     Past Medical History:   Diagnosis Date    Diabetes mellitus ([address] )     Elevated PSA     Hyperlipidemia     Prostate cancer ([facility] [address] )        PAST SURGICAL HISTORY:     Past Surgical History:   Procedure Laterality Date    NECK SURGERY      PROSTATE BIOPSY  07/18/2018       CURRENT MEDICATIONS:     Current Outpatient Medications   Medication Sig Dispense Refill    Blood Glucose Monitoring Suppl (Scooter Coello) w/Device KIT by Does not apply route 2 (two) times a day 1 kit 0    famotidine (PEPCID) 20 mg tablet TAKE 1 TABLET BY MOUTH DAILY 30 tablet 3    glucose blood (ONETOUCH VERIO) test strip Test two times daily 100 each 5    JANUMET  MG per tablet TAKE 1 TABLET BY MOUTH 2 (TWICE)  A DAY WITH MEALS 180 tablet 0    loratadine (CLARITIN) 10 mg tablet Take 1 tablet (10 mg total) by mouth daily 14 tablet 0    ONE TOUCH LANCETS MISC by Does not apply route 2 (two) times a day Test two times daily 100 each 5    simvastatin (ZOCOR) 20 mg tablet TAKE 1 TABLET (20 MG TOTAL) BY MOUTH DAILY AT BEDTIME 90 tablet 0    bisacodyl (FLEET) 10 MG/30ML ENEM Insert 30 mL (10 mg total) into the rectum once for 1 dose Day of biopsy 30 mL 0    tamsulosin (FLOMAX) 0 4 mg Take 1 capsule (0 4 mg total) by mouth daily with dinner (Patient not taking: Reported on 12/19/2018 ) 30 capsule 0    terbinafine (LamISIL) 1 % cream Apply topically 2 (two) times a day (Patient not taking: Reported on 1/4/2019 ) 42 g 0     Current Facility-Administered Medications   Medication Dose Route Frequency Provider Last Rate Last Dose    denosumab (PROLIA) subcutaneous injection 60 mg  60 mg Subcutaneous Once Ubaldo Stanton MD        leuprolide (LUPRON DEPOT 6 MONTH KIT) IM injection kit 45 mg  45 mg Intramuscular Once Ubaldo Stanton MD           ALLERGIES:   No Known Allergies    SOCIAL HISTORY:     Social History     Socioeconomic History    Marital status: /Civil Union     Spouse name: None    Number of children: None    Years of education: None    Highest education level: None   Occupational History    Occupation:    retired   Social Needs    Financial resource strain: None    Food insecurity:     Worry: None     Inability: None    Transportation needs:     Medical: None     Non-medical: None   Tobacco Use    Smoking status: Never Smoker    Smokeless tobacco: Never Used   Substance and Sexual Activity    Alcohol use: No    Drug use: No    Sexual activity: Not Currently   Lifestyle    Physical activity:     Days per week: None     Minutes per session: None    Stress: None   Relationships    Social connections:     Talks on phone: None     Gets together: None     Attends Orthodoxy service: None     Active member of club or organization: None     Attends meetings of clubs or organizations: None     Relationship status: None    Intimate partner violence:     Fear of current or ex partner: None     Emotionally abused: None     Physically abused: None     Forced sexual activity: None   Other Topics Concern    None   Social History Narrative    None       SOCIAL HISTORY:     Family History   Problem Relation Age of Onset    No Known Problems Mother     No Known Problems Father        REVIEW OF SYSTEMS:     Review of Systems   Constitutional: Negative  Respiratory: Negative  Cardiovascular: Negative  Gastrointestinal: Negative  Endocrine: Positive for heat intolerance (  Hot flashes)  Genitourinary: Negative  Musculoskeletal: Negative  Neurological: Negative  Psychiatric/Behavioral: Negative  PHYSICAL EXAM:     /80   Pulse 64   Wt 57 6 kg (127 lb)   BMI 26 54 kg/m²     General:  Healthy appearing male in no acute distress  They have a normal affect  There is not appear to be any gross neurologic defects or abnormalities  HEENT:  Normocephalic, atraumatic  Neck is supple without any palpable lymphadenopathy  Cardiovascular:  Patient has normal palpable distal radial pulses  There is no significant peripheral edema  No JVD is noted  Respiratory:  Patient has unlabored respirations  There is no audible wheeze or rhonchi  Abdomen:  Abdomen is without surgical scars  Abdomen is soft and nontender  There is no tympany  Inguinal and umbilical hernia are not appreciated  Musculoskeletal:  Patient does not have significant CVA tenderness in the  flank with palpation or percussion  They full range of motion in all 4 extremities  Strength in all 4 extremities appears congruent  Patient is able to ambulate without assistance or difficulty    Dermatologic:  Patient has no skin abnormalities or rashes  LABS:     CBC:   Lab Results   Component Value Date    WBC 5 30 10/01/2018    HGB 13 3 10/01/2018    HCT 42 3 10/01/2018    MCV 91 10/01/2018     10/01/2018       BMP:   Lab Results   Component Value Date    CALCIUM 9 7 2018    K 4 3 2018    CO2 27 2018     2018    BUN 11 2018    CREATININE 0 73 2018     Lab Results   Component Value Date    PSA 1 2 10/01/2018    PSA 14 4 (H) 2018       IMAGIN/9/18  BONE SCAN  WHOLE BODY     INDICATION:  Staging for treatment management, N40 2: Nodular prostate without lower urinary tract symptoms  C61: Malignant neoplasm of prostate     PREVIOUS FILM CORRELATION:    No prior pertinent studies for comparison      TECHNIQUE:   This study was performed following the intravenous administration of 25 8 mCi Tc-99m labeled MDP  Delayed, anterior and posterior whole body images were acquired, 2-3 hours after radiopharmaceutical administration      FINDINGS:     Probable bilateral shoulder degenerative change, right greater than left  Slightly greater activity in the right [facility] joint region may be correlated with radiographs  Additional degenerative change in the spine, with more prominent endplate degenerative   change in the lumbar spine  Degenerative change in the bilateral wrists and knees  Symmetric renal uptake  Probable dental disease involving the mandible  There is no scintigraphic evidence of osseous metastasis        IMPRESSION:     1  No scintigraphic evidence of osseous metastasis  2   Bilateral shoulder degenerative change  Slightly greater activity in the right AC joint region may be correlated with radiographs, if clinically warranted      18  CT PELVIS WITH IV CONTRAST     INDICATION:   N40 2: Nodular prostate without lower urinary tract symptoms  C61: Malignant neoplasm of prostate      COMPARISON:  None      TECHNIQUE: CT examination of the pelvis was performed  Axial, sagittal, and coronal 2D reformatted images were created from the source data and submitted for interpretation      Radiation dose length product (DLP) for this visit:  143 mGy-cm   This examination, like all CT scans performed in the [facility], was performed utilizing techniques to minimize radiation dose exposure, including the use of iterative   reconstruction and automated exposure control      IV Contrast:  100 mL of iohexol (OMNIPAQUE)  Enteric Contrast:  Enteric contrast was not administered       FINDINGS:     VISUALIZED KIDNEYS/URETERS:  No significant abnormality identified in the partially imaged right kidney and ureters      REPRODUCTIVE ORGANS:  Heterogeneous prostate gland with enhancing nodule in the right central zone measuring 1 3 cm      URINARY BLADDER:  Unremarkable      APPENDIX:  No findings to suggest appendicitis      VISUALIZED BOWEL:  Colonic diverticulosis  No bowel obstruction      ABDOMINOPELVIC CAVITY:  No ascites or free intraperitoneal air  No lymphadenopathy  VISUALIZED VESSELS:  Unremarkable for patient's age  ABDOMINOPELVIC WALL/INGUINAL REGIONS: Unremarkable      OSSEOUS STRUCTURES:  No acute fracture or destructive osseous lesion      IMPRESSION:     Heterogeneous prostate gland with 1 3 cm enhancing right central zone nodule  No evidence of metastatic disease in the pelvis      Colonic diverticulosis  PATHOLOGY:     Final Diagnosis   A  Prostate, right lateral base:  - Prostatic adenocarcinoma, Holden score 4 + 4, Prognostic Grade Group IV, discontinuously involving 80% of one of one core:   * periprostatic fat invasion: not identified  * lymph-vascular invasion:  not identified  * perineural invasion: focally present  - Additional pathologic findings: N/A     B   Prostate, right lateral mid:  - Prostatic adenocarcinoma, Holden score 4 + 3, Prognostic Grade Group III, discontinuously involving 100% of one of one core:   * periprostatic fat invasion: not identified  * lymph-vascular invasion:  not identified  * perineural invasion: not identified   - Additional pathologic findings: N/A     Note: Unstained slides from Block B1 are suggested if additional studies are indicated (at least 0 5 mm of tumor for Prolaris)     C  Prostate, right lateral apex:  - Prostatic adenocarcinoma, Barcelon[place] score 4 + 4, Prognostic Grade Group IV, discontinuously involving 100% of one of one core:   * periprostatic fat invasion: not identified  * lymph-vascular invasion:  not identified  * perineural invasion: focally present  - Additional pathologic findings: N/A     D  Prostate, right medial base:  - Prostatic adenocarcinoma, Barcelon[place] score 4 + 5, Prognostic Grade Group V, discontinuously involving 100% of one of one core:   * periprostatic fat invasion: not identified  * lymph-vascular invasion:  not identified  * perineural invasion: not identified   - Additional pathologic findings: N/A     E  Prostate, right medial mid:  - Prostatic adenocarcinoma, Barceloneta score 4 + 4, Prognostic Grade Group IV, discontinuously involving 70% of one of one core:   * periprostatic fat invasion: not identified  * lymph-vascular invasion:  not identified  * perineural invasion: not identified   - Additional pathologic findings: N/A     F  Prostate, right medial apex:  - Prostatic adenocarcinoma, Barceloneta score 4 + 4, Prognostic Grade Group IV, discontinuously involving 75% of one of one core:   * periprostatic fat invasion: not identified  * lymph-vascular invasion:  not identified  * perineural invasion: not identified   - Additional pathologic findings: N/A     G  Prostate, left medial base:  - Prostatic adenocarcinoma, Barceloneta score 4 + 5, Prognostic Grade Group V, discontinuously involving 12% of one of one core:   * periprostatic fat invasion: not identified  * lymph-vascular invasion:  not identified     * perineural invasion: not identified   - Additional pathologic findings: N/A     H  Prostate, left medial mid:  - Prostatic adenocarcinoma, Jennie score 4 + 4, Prognostic Grade Group IV, continuously involving 12% of one of one core:   * periprostatic fat invasion: not identified  * lymph-vascular invasion:  not identified  * perineural invasion: not identified   - Additional pathologic findings: N/A     I  Prostate, left medial apex:  - Benign prostate tissue      J  Prostate, left lateral base:  - Prostatic adenocarcinoma, [place] score 4 + 4, Prognostic Grade Group IV, continuously involving 6% of one of one core:   * periprostatic fat invasion: not identified  * lymph-vascular invasion:  not identified  * perineural invasion: not identified   - Additional pathologic findings: N/A     K  Prostate, left lateral mid:  - Prostatic adenocarcinoma, [place] score 4 + 4, Prognostic Grade Group IV, continuously involving 6% of one of one core:   * periprostatic fat invasion: not identified  * lymph-vascular invasion:  not identified  * perineural invasion: not identified   - Additional pathologic findings: N/A     L  Prostate, left lateral apex:  - Benign prostate tissue      M  Prostate, right lateral nodule:  - Prostatic adenocarcinoma, Jennie score 4 + 4, Prognostic Grade Group IV, discontinuously involving 100% of each of two cores:   * periprostatic fat invasion: not identified  * lymph-vascular invasion:  not identified  * perineural invasion: not identified   - Additional pathologic findings: N/A     PROCEDURE:     6 month depot of Lupron and Prolia administered today  ASSESSMENT:     68 y o  male with high volume, high risk Jennie 4+5=9 prostate cancer, completed XRT 12/3/18    PLAN:     Patient has completed his radiation therapy  I do not have any post radiation therapy PSA values to discuss with him today  I have ordered PSA testing for now and again in 6 months        The patient is having hot flashes related to androgen deprivation therapy  We discussed is a known side effect of the medication  Recommendation for his high risk Jennie 9 prostate cancer would be 3 years of continuous androgen deprivation therapy  I discussed this recommendation with the patient  He has agreed to proceed and so we have administered Lupron and Prolia today  I will see him in 6 months

## 2019-03-11 ENCOUNTER — APPOINTMENT (OUTPATIENT)
Dept: LAB | Facility: HOSPITAL | Age: 74
End: 2019-03-11
Attending: UROLOGY
Payer: COMMERCIAL

## 2019-03-11 DIAGNOSIS — C61 PROSTATE CANCER (HCC): ICD-10-CM

## 2019-03-11 LAB — PSA SERPL-MCNC: 0.2 NG/ML (ref 0–4)

## 2019-03-11 PROCEDURE — 84153 ASSAY OF PSA TOTAL: CPT

## 2019-04-02 ENCOUNTER — TELEPHONE (OUTPATIENT)
Dept: UROLOGY | Facility: AMBULATORY SURGERY CENTER | Age: 74
End: 2019-04-02

## 2019-05-20 DIAGNOSIS — E11.9 TYPE 2 DIABETES MELLITUS WITHOUT COMPLICATION, WITHOUT LONG-TERM CURRENT USE OF INSULIN (HCC): ICD-10-CM

## 2019-05-20 RX ORDER — SITAGLIPTIN AND METFORMIN HYDROCHLORIDE 500; 50 MG/1; MG/1
1 TABLET, FILM COATED ORAL 2 TIMES DAILY WITH MEALS
Qty: 180 TABLET | Refills: 0 | Status: SHIPPED | OUTPATIENT
Start: 2019-05-20 | End: 2019-08-29 | Stop reason: SDUPTHER

## 2019-05-21 RX ORDER — SIMVASTATIN 20 MG
20 TABLET ORAL
Qty: 90 TABLET | Refills: 0 | Status: SHIPPED | OUTPATIENT
Start: 2019-05-21 | End: 2019-08-29 | Stop reason: SDUPTHER

## 2019-06-03 ENCOUNTER — OFFICE VISIT (OUTPATIENT)
Dept: FAMILY MEDICINE CLINIC | Facility: CLINIC | Age: 74
End: 2019-06-03
Payer: COMMERCIAL

## 2019-06-03 VITALS
BODY MASS INDEX: 25.29 KG/M2 | HEART RATE: 88 BPM | RESPIRATION RATE: 18 BRPM | DIASTOLIC BLOOD PRESSURE: 78 MMHG | SYSTOLIC BLOOD PRESSURE: 120 MMHG | HEIGHT: 60 IN | TEMPERATURE: 98.6 F | WEIGHT: 128.8 LBS

## 2019-06-03 DIAGNOSIS — E78.2 MIXED HYPERLIPIDEMIA: ICD-10-CM

## 2019-06-03 DIAGNOSIS — E11.9 TYPE 2 DIABETES MELLITUS WITHOUT COMPLICATION, WITHOUT LONG-TERM CURRENT USE OF INSULIN (HCC): Primary | ICD-10-CM

## 2019-06-03 DIAGNOSIS — Z23 ENCOUNTER FOR IMMUNIZATION: ICD-10-CM

## 2019-06-03 DIAGNOSIS — E66.3 OVERWEIGHT (BMI 25.0-29.9): ICD-10-CM

## 2019-06-03 LAB — SL AMB POCT HEMOGLOBIN AIC: 7 (ref ?–6.5)

## 2019-06-03 PROCEDURE — 90732 PPSV23 VACC 2 YRS+ SUBQ/IM: CPT | Performed by: NURSE PRACTITIONER

## 2019-06-03 PROCEDURE — 1036F TOBACCO NON-USER: CPT | Performed by: NURSE PRACTITIONER

## 2019-06-03 PROCEDURE — G0009 ADMIN PNEUMOCOCCAL VACCINE: HCPCS | Performed by: NURSE PRACTITIONER

## 2019-06-03 PROCEDURE — 99214 OFFICE O/P EST MOD 30 MIN: CPT | Performed by: NURSE PRACTITIONER

## 2019-06-03 PROCEDURE — 3045F PR MOST RECENT HEMOGLOBIN A1C LEVEL 7.0-9.0%: CPT | Performed by: NURSE PRACTITIONER

## 2019-06-03 PROCEDURE — 83036 HEMOGLOBIN GLYCOSYLATED A1C: CPT | Performed by: NURSE PRACTITIONER

## 2019-06-03 PROCEDURE — 4040F PNEUMOC VAC/ADMIN/RCVD: CPT | Performed by: NURSE PRACTITIONER

## 2019-06-03 PROCEDURE — 1160F RVW MEDS BY RX/DR IN RCRD: CPT | Performed by: NURSE PRACTITIONER

## 2019-06-03 PROCEDURE — 3008F BODY MASS INDEX DOCD: CPT | Performed by: NURSE PRACTITIONER

## 2019-06-05 ENCOUNTER — APPOINTMENT (OUTPATIENT)
Dept: LAB | Facility: HOSPITAL | Age: 74
End: 2019-06-05
Payer: COMMERCIAL

## 2019-06-05 ENCOUNTER — TELEPHONE (OUTPATIENT)
Dept: FAMILY MEDICINE CLINIC | Facility: CLINIC | Age: 74
End: 2019-06-05

## 2019-06-05 LAB
ANION GAP SERPL CALCULATED.3IONS-SCNC: 10 MMOL/L (ref 4–13)
BUN SERPL-MCNC: 23 MG/DL (ref 5–25)
CALCIUM SERPL-MCNC: 9.6 MG/DL (ref 8.3–10.1)
CHLORIDE SERPL-SCNC: 105 MMOL/L (ref 100–108)
CHOLEST SERPL-MCNC: 196 MG/DL (ref 50–200)
CO2 SERPL-SCNC: 24 MMOL/L (ref 21–32)
CREAT SERPL-MCNC: 0.88 MG/DL (ref 0.6–1.3)
GFR SERPL CREATININE-BSD FRML MDRD: 86 ML/MIN/1.73SQ M
GLUCOSE P FAST SERPL-MCNC: 143 MG/DL (ref 65–99)
HDLC SERPL-MCNC: 32 MG/DL (ref 40–60)
LDLC SERPL CALC-MCNC: 122 MG/DL (ref 0–100)
NONHDLC SERPL-MCNC: 164 MG/DL
POTASSIUM SERPL-SCNC: 4.2 MMOL/L (ref 3.5–5.3)
SODIUM SERPL-SCNC: 139 MMOL/L (ref 136–145)
TRIGL SERPL-MCNC: 209 MG/DL

## 2019-06-05 PROCEDURE — 36415 COLL VENOUS BLD VENIPUNCTURE: CPT | Performed by: NURSE PRACTITIONER

## 2019-06-05 PROCEDURE — 80061 LIPID PANEL: CPT | Performed by: NURSE PRACTITIONER

## 2019-06-05 PROCEDURE — 80048 BASIC METABOLIC PNL TOTAL CA: CPT | Performed by: NURSE PRACTITIONER

## 2019-08-29 DIAGNOSIS — E11.9 TYPE 2 DIABETES MELLITUS WITHOUT COMPLICATION, WITHOUT LONG-TERM CURRENT USE OF INSULIN (HCC): ICD-10-CM

## 2019-08-29 RX ORDER — SITAGLIPTIN AND METFORMIN HYDROCHLORIDE 500; 50 MG/1; MG/1
1 TABLET, FILM COATED ORAL 2 TIMES DAILY WITH MEALS
Qty: 180 TABLET | Refills: 1 | Status: SHIPPED | OUTPATIENT
Start: 2019-08-29 | End: 2020-07-30 | Stop reason: SDUPTHER

## 2019-08-29 RX ORDER — SIMVASTATIN 20 MG
20 TABLET ORAL
Qty: 90 TABLET | Refills: 1 | Status: SHIPPED | OUTPATIENT
Start: 2019-08-29 | End: 2020-07-30 | Stop reason: SDUPTHER

## 2019-08-30 NOTE — TELEPHONE ENCOUNTER
I was reviewing patient chart   Patient appointment for January 3 2020 should be rescheduled for December 20th 2019 for subsequent awv and DM follow up

## 2020-02-11 DIAGNOSIS — E11.9 TYPE 2 DIABETES MELLITUS WITHOUT COMPLICATION, WITHOUT LONG-TERM CURRENT USE OF INSULIN (HCC): ICD-10-CM

## 2020-02-12 RX ORDER — SITAGLIPTIN AND METFORMIN HYDROCHLORIDE 500; 50 MG/1; MG/1
1 TABLET, FILM COATED ORAL 2 TIMES DAILY WITH MEALS
Qty: 180 TABLET | Refills: 0 | OUTPATIENT
Start: 2020-02-12 | End: 2020-05-12

## 2020-03-20 DIAGNOSIS — E11.9 TYPE 2 DIABETES MELLITUS WITHOUT COMPLICATION, WITHOUT LONG-TERM CURRENT USE OF INSULIN (HCC): ICD-10-CM

## 2020-03-26 RX ORDER — SIMVASTATIN 20 MG
20 TABLET ORAL
Qty: 30 TABLET | Refills: 0 | OUTPATIENT
Start: 2020-03-26

## 2020-03-26 RX ORDER — SITAGLIPTIN AND METFORMIN HYDROCHLORIDE 500; 50 MG/1; MG/1
1 TABLET, FILM COATED ORAL 2 TIMES DAILY WITH MEALS
Qty: 60 TABLET | Refills: 0 | OUTPATIENT
Start: 2020-03-26 | End: 2020-06-24

## 2020-05-22 ENCOUNTER — TELEPHONE (OUTPATIENT)
Dept: FAMILY MEDICINE CLINIC | Facility: CLINIC | Age: 75
End: 2020-05-22

## 2020-07-02 ENCOUNTER — APPOINTMENT (OUTPATIENT)
Dept: LAB | Facility: HOSPITAL | Age: 75
End: 2020-07-02
Payer: COMMERCIAL

## 2020-07-02 ENCOUNTER — TELEPHONE (OUTPATIENT)
Dept: OTHER | Facility: OTHER | Age: 75
End: 2020-07-02

## 2020-07-02 DIAGNOSIS — E11.9 TYPE 2 DIABETES MELLITUS WITHOUT COMPLICATION, WITHOUT LONG-TERM CURRENT USE OF INSULIN (HCC): ICD-10-CM

## 2020-07-02 LAB
ALBUMIN SERPL BCP-MCNC: 3.5 G/DL (ref 3.5–5)
ALP SERPL-CCNC: 104 U/L (ref 46–116)
ALT SERPL W P-5'-P-CCNC: 31 U/L (ref 12–78)
ANION GAP SERPL CALCULATED.3IONS-SCNC: 7 MMOL/L (ref 4–13)
AST SERPL W P-5'-P-CCNC: 26 U/L (ref 5–45)
BILIRUB SERPL-MCNC: 0.61 MG/DL (ref 0.2–1)
BUN SERPL-MCNC: 19 MG/DL (ref 5–25)
CALCIUM SERPL-MCNC: 9.2 MG/DL (ref 8.3–10.1)
CHLORIDE SERPL-SCNC: 102 MMOL/L (ref 100–108)
CHOLEST SERPL-MCNC: 202 MG/DL (ref 50–200)
CO2 SERPL-SCNC: 27 MMOL/L (ref 21–32)
CREAT SERPL-MCNC: 0.93 MG/DL (ref 0.6–1.3)
CREAT UR-MCNC: 219 MG/DL
EST. AVERAGE GLUCOSE BLD GHB EST-MCNC: 246 MG/DL
GFR SERPL CREATININE-BSD FRML MDRD: 81 ML/MIN/1.73SQ M
GLUCOSE P FAST SERPL-MCNC: 258 MG/DL (ref 65–99)
HBA1C MFR BLD: 10.2 %
HDLC SERPL-MCNC: 33 MG/DL
LDLC SERPL CALC-MCNC: 135 MG/DL (ref 0–100)
MICROALBUMIN UR-MCNC: 13 MG/L (ref 0–20)
MICROALBUMIN/CREAT 24H UR: 6 MG/G CREATININE (ref 0–30)
NONHDLC SERPL-MCNC: 169 MG/DL
POTASSIUM SERPL-SCNC: 4.1 MMOL/L (ref 3.5–5.3)
PROT SERPL-MCNC: 7.3 G/DL (ref 6.4–8.2)
SODIUM SERPL-SCNC: 136 MMOL/L (ref 136–145)
T4 FREE SERPL-MCNC: 0.88 NG/DL (ref 0.76–1.46)
TRIGL SERPL-MCNC: 172 MG/DL
TSH SERPL DL<=0.05 MIU/L-ACNC: 6.46 UIU/ML (ref 0.36–3.74)

## 2020-07-02 PROCEDURE — 82043 UR ALBUMIN QUANTITATIVE: CPT

## 2020-07-02 PROCEDURE — 83036 HEMOGLOBIN GLYCOSYLATED A1C: CPT

## 2020-07-02 PROCEDURE — 3046F HEMOGLOBIN A1C LEVEL >9.0%: CPT | Performed by: NURSE PRACTITIONER

## 2020-07-02 PROCEDURE — 80061 LIPID PANEL: CPT

## 2020-07-02 PROCEDURE — 36415 COLL VENOUS BLD VENIPUNCTURE: CPT

## 2020-07-02 PROCEDURE — 84443 ASSAY THYROID STIM HORMONE: CPT

## 2020-07-02 PROCEDURE — 3061F NEG MICROALBUMINURIA REV: CPT | Performed by: NURSE PRACTITIONER

## 2020-07-02 PROCEDURE — 84439 ASSAY OF FREE THYROXINE: CPT

## 2020-07-02 PROCEDURE — 80053 COMPREHEN METABOLIC PANEL: CPT

## 2020-07-02 PROCEDURE — 82570 ASSAY OF URINE CREATININE: CPT

## 2020-07-28 ENCOUNTER — TELEPHONE (OUTPATIENT)
Dept: UROLOGY | Facility: CLINIC | Age: 75
End: 2020-07-28

## 2020-07-28 NOTE — TELEPHONE ENCOUNTER
Patient managed by Dr Tonya Christine, seen in the [facility] [place] office  Patient with history of prostate cancer  Last seen 3/8/19  At that time patient was given Lupron 45 mg and Prolia  Patient S/P radiation, completed in December 2018  At time of last follow up patient was instructed on 6 month follow up, and continuous Androgen deprivation for 3 years  Patient previously scheduled for follow up on 9/20/19  Patient cancelled that appointment and failed to reschedule  Please contact patient to advise on importance of follow up and schedule appointment

## 2020-07-30 ENCOUNTER — OFFICE VISIT (OUTPATIENT)
Dept: FAMILY MEDICINE CLINIC | Facility: CLINIC | Age: 75
End: 2020-07-30
Payer: COMMERCIAL

## 2020-07-30 VITALS
BODY MASS INDEX: 23.16 KG/M2 | HEIGHT: 60 IN | DIASTOLIC BLOOD PRESSURE: 54 MMHG | WEIGHT: 118 LBS | HEART RATE: 76 BPM | SYSTOLIC BLOOD PRESSURE: 100 MMHG | TEMPERATURE: 98.9 F

## 2020-07-30 DIAGNOSIS — Z12.11 COLON CANCER SCREENING: ICD-10-CM

## 2020-07-30 DIAGNOSIS — E11.9 TYPE 2 DIABETES MELLITUS WITHOUT COMPLICATION, WITHOUT LONG-TERM CURRENT USE OF INSULIN (HCC): Primary | ICD-10-CM

## 2020-07-30 DIAGNOSIS — Z00.00 MEDICARE ANNUAL WELLNESS VISIT, SUBSEQUENT: ICD-10-CM

## 2020-07-30 DIAGNOSIS — E78.2 MIXED HYPERLIPIDEMIA: ICD-10-CM

## 2020-07-30 DIAGNOSIS — L24.89 IRRITANT CONTACT DERMATITIS DUE TO OTHER AGENTS: ICD-10-CM

## 2020-07-30 DIAGNOSIS — E03.8 SUBCLINICAL HYPOTHYROIDISM: ICD-10-CM

## 2020-07-30 DIAGNOSIS — C61 PROSTATE CANCER (HCC): ICD-10-CM

## 2020-07-30 PROCEDURE — G0439 PPPS, SUBSEQ VISIT: HCPCS | Performed by: NURSE PRACTITIONER

## 2020-07-30 PROCEDURE — 3046F HEMOGLOBIN A1C LEVEL >9.0%: CPT | Performed by: NURSE PRACTITIONER

## 2020-07-30 PROCEDURE — 1170F FXNL STATUS ASSESSED: CPT | Performed by: NURSE PRACTITIONER

## 2020-07-30 PROCEDURE — 1160F RVW MEDS BY RX/DR IN RCRD: CPT | Performed by: NURSE PRACTITIONER

## 2020-07-30 PROCEDURE — 1036F TOBACCO NON-USER: CPT | Performed by: NURSE PRACTITIONER

## 2020-07-30 PROCEDURE — 4040F PNEUMOC VAC/ADMIN/RCVD: CPT | Performed by: NURSE PRACTITIONER

## 2020-07-30 PROCEDURE — 99214 OFFICE O/P EST MOD 30 MIN: CPT | Performed by: NURSE PRACTITIONER

## 2020-07-30 PROCEDURE — 1125F AMNT PAIN NOTED PAIN PRSNT: CPT | Performed by: NURSE PRACTITIONER

## 2020-07-30 PROCEDURE — 3008F BODY MASS INDEX DOCD: CPT | Performed by: NURSE PRACTITIONER

## 2020-07-30 RX ORDER — TRIAMCINOLONE ACETONIDE 0.25 MG/G
OINTMENT TOPICAL 2 TIMES DAILY
Qty: 30 G | Refills: 3 | Status: SHIPPED | OUTPATIENT
Start: 2020-07-30 | End: 2021-03-18 | Stop reason: ALTCHOICE

## 2020-07-30 RX ORDER — SITAGLIPTIN AND METFORMIN HYDROCHLORIDE 500; 50 MG/1; MG/1
1 TABLET, FILM COATED ORAL 2 TIMES DAILY WITH MEALS
Qty: 180 TABLET | Refills: 1 | Status: SHIPPED | OUTPATIENT
Start: 2020-07-30 | End: 2021-03-18 | Stop reason: SDUPTHER

## 2020-07-30 RX ORDER — PHENOL 1.4 %
600 AEROSOL, SPRAY (ML) MUCOUS MEMBRANE 2 TIMES DAILY WITH MEALS
COMMUNITY
Start: 2020-07-30

## 2020-07-30 RX ORDER — SIMVASTATIN 40 MG
40 TABLET ORAL
Qty: 90 TABLET | Refills: 1 | Status: SHIPPED | OUTPATIENT
Start: 2020-07-30 | End: 2021-03-18 | Stop reason: SDUPTHER

## 2020-07-30 RX ORDER — LANCETS 33 GAUGE
EACH MISCELLANEOUS 2 TIMES DAILY
Qty: 100 EACH | Refills: 3 | Status: SHIPPED | OUTPATIENT
Start: 2020-07-30 | End: 2021-03-18 | Stop reason: SDUPTHER

## 2020-07-30 NOTE — ASSESSMENT & PLAN NOTE
Patient's cholesterol is currently not at goal he is on simvastatin 20 mg  I will increase this to 40 mg daily recheck lipid panel in 3 months

## 2020-07-30 NOTE — ASSESSMENT & PLAN NOTE
Lab Results   Component Value Date    HGBA1C 10 2 (H) 07/02/2020       Patient's A1c is significantly elevated today  There has been a time  Which she has not had his medications  Today I will reorder his medications he is currently on Janumet  2 times a day  Statin: Yes   ACE/ARB: no patient has low BP   Micro normal  Insulin: No   GLP-1: No   SGLT-2: No   Metformin: Yes   DDP4: Yes  Eye exam: No  Iris completed today  Podiatry: No

## 2020-07-30 NOTE — PROGRESS NOTES
Assessment and Plan:    Problem List Items Addressed This Visit        Endocrine    Type 2 diabetes mellitus without complication, without long-term current use of insulin ([facility] [address] ) - Primary       Lab Results   Component Value Date    HGBA1C 10 2 (H) 07/02/2020       Patient's A1c is significantly elevated today  There has been a time  Which she has not had his medications  Today I will reorder his medications he is currently on Janumet  2 times a day  Statin: Yes   ACE/ARB: no patient has low BP  Micro normal  Insulin: No   GLP-1: No   SGLT-2: No   Metformin: Yes   DDP4: Yes  Eye exam: No  Iris completed today  Podiatry: No            Relevant Medications    JANUMET  MG per tablet    simvastatin (ZOCOR) 40 mg tablet    Lancets Micro Thin 33G MISC    glucose blood (OneTouch Verio) test strip    calcium carbonate (OS-CARMENZA) 600 MG tablet    Other Relevant Orders    IRIS Diabetic eye exam    Lipid panel    Basic metabolic panel    Hemoglobin A1C    TSH, 3rd generation with Free T4 reflex    Subclinical hypothyroidism     Patient TSH 6 45  T4 normal  Will monitor at next lab draw  Relevant Orders    TSH, 3rd generation with Free T4 reflex       Musculoskeletal and Integument    Irritant contact dermatitis due to other agents     Rash consistent with eczema dermatitis  Will prescribe triamcinolone 2 times a day as needed         Relevant Medications    triamcinolone (KENALOG) 0 025 % ointment       Genitourinary    Prostate cancer [facility])     Patient needs to set up follow up with urology  It has been greater than a year since follow up  He has radiation treatment in 2018  It appears the urology group has also attempted to have them make an appointment               Other    Mixed hyperlipidemia     Patient's cholesterol is currently not at goal he is on simvastatin 20 mg  I will increase this to 40 mg daily recheck lipid panel in 3 months         Relevant Medications    simvastatin (ZOCOR) 40 mg tablet    Other Relevant Orders    Lipid panel    Medicare annual wellness visit, subsequent      Other Visit Diagnoses     Colon cancer screening        Relevant Orders    Cologuard                 Diagnoses and all orders for this visit:    Type 2 diabetes mellitus without complication, without long-term current use of insulin (HCC)  -     JANUMET  MG per tablet; Take 1 tablet by mouth 2 (two) times a day with meals  -     simvastatin (ZOCOR) 40 mg tablet; Take 1 tablet (40 mg total) by mouth daily at bedtime  -     IRIS Diabetic eye exam  -     Lancets Micro Thin 33G MISC; by Does not apply route 2 (two) times a day Test two times daily DX:E11 9  -     glucose blood (OneTouch Verio) test strip; Test two times daily DX: E11 9  -     Lipid panel; Future  -     Basic metabolic panel; Future  -     Hemoglobin A1C; Future  -     TSH, 3rd generation with Free T4 reflex; Future  -     calcium carbonate (OS-CARMENZA) 600 MG tablet; Take 1 tablet (600 mg total) by mouth 2 (two) times a day with meals    Colon cancer screening  -     Cologuard; Future    Irritant contact dermatitis due to other agents  -     triamcinolone (KENALOG) 0 025 % ointment; Apply topically 2 (two) times a day    Mixed hyperlipidemia  -     Lipid panel; Future    Prostate cancer ([facility] [address] )    Subclinical hypothyroidism  -     TSH, 3rd generation with Free T4 reflex; Future    Medicare annual wellness visit, subsequent              Subjective:      Patient ID: Theodor Crew is a 77 y o  male  CC:    Chief Complaint   Patient presents with    Follow-up     patient is here for a medication refill/to review blood work results  ak       HPI:    Patient presents with his wife for follow up  He reports that they recently bought a house and he is doing much more exercise than before  He reports a very good appetite  Since getting new home and doing more yard work patient has developed rash mostly on inner part of his elbows and on his left ankle  They were using OTC cortisone cream which has been helping somewhat  Patient wife states he has been without his medications for 3 weeks  She notes she has been trying to manage with diet as well  The following portions of the patient's history were reviewed and updated as appropriate: allergies, current medications, past family history, past medical history, past social history, past surgical history and problem list       Review of Systems   Constitutional: Negative for diaphoresis, fatigue and unexpected weight change  HENT: Negative for trouble swallowing  Eyes: Negative for visual disturbance  Respiratory: Negative for cough, chest tightness and shortness of breath  Cardiovascular: Negative for chest pain, palpitations and leg swelling  Gastrointestinal: Negative for constipation  Endocrine: Negative for polydipsia, polyphagia and polyuria  Genitourinary: Negative for difficulty urinating  Musculoskeletal: Negative for arthralgias and myalgias  Skin: Positive for rash  As noted in the HPI   Neurological: Negative for dizziness, light-headedness and headaches  Psychiatric/Behavioral: Negative for sleep disturbance  Data to review:   Work from 07/02/2020  TSH 6 45, T4 0 8  Sodium 136, potassium 4 1, creatinine 0 93, GFR 81, glucose 258, total protein 7 3, albumin 3 5  Hemoglobin A1c 10 2%    Total cholesterol 202, triglycerides 172, HDL 33,     Objective:    Vitals:    07/30/20 1137   BP: 100/54   Pulse: 76   Temp: 98 9 °F (37 2 °C)   Weight: 53 5 kg (118 lb)   Height: 5' (1 524 m)        Physical Exam   Constitutional: He is oriented to person, place, and time  Vital signs are normal  He appears well-developed and well-nourished  He does not have a sickly appearance  He does not appear ill  No distress  HENT:   Head: Normocephalic and atraumatic     Right Ear: External ear normal    Left Ear: External ear normal    Mouth/Throat: Uvula is midline, oropharynx is clear and moist and mucous membranes are normal    Eyes: Pupils are equal, round, and reactive to light  Conjunctivae are normal    Neck: No JVD present  Carotid bruit is not present  No thyromegaly present  Cardiovascular: Normal rate, regular rhythm, S1 normal, S2 normal and normal heart sounds  Pulses are no weak pulses  No murmur heard  Pulses:       Dorsalis pedis pulses are 2+ on the right side, and 2+ on the left side  Posterior tibial pulses are 2+ on the right side, and 2+ on the left side  No lower extremity edema bilaterally   Pulmonary/Chest: Effort normal and breath sounds normal    Abdominal: Soft  Normal appearance and bowel sounds are normal    Musculoskeletal:        Feet:    Feet:   Right Foot:   Skin Integrity: Positive for callus (Heels) and dry skin  Negative for ulcer, skin breakdown, erythema or warmth  Left Foot:   Skin Integrity: Positive for callus ( heels) and dry skin  Negative for ulcer, skin breakdown, erythema or warmth  Lymphadenopathy:     He has no cervical adenopathy  Neurological: He is alert and oriented to person, place, and time  Skin: Skin is warm, dry and intact  He is not diaphoretic  Psychiatric: He has a normal mood and affect  Thought content normal    Nursing note and vitals reviewed  Diabetic Foot Exam    Patient's shoes and socks removed  Right Foot/Ankle   Right Foot Inspection  Skin Exam: skin normal, skin intact, dry skin, callus (Heels) and callus (Heels) no warmth, no erythema, no maceration, no abnormal color, no pre-ulcer and no ulcer                          Toe Exam: ROM and strength within normal limits  Sensory   Vibration: intact  Proprioception: intact   Monofilament testing: diminished  Vascular  Capillary refills: < 3 seconds  The right DP pulse is 2+  The right PT pulse is 2+       Left Foot/Ankle  Left Foot Inspection  Skin Exam: skin normal, skin intact, dry skin and callus ( heels)no warmth, no erythema, no maceration, normal color, no pre-ulcer and no ulcer                         Toe Exam: ROM and strength within normal limits                   Sensory   Vibration: intact  Proprioception: intact  Monofilament: diminished  Vascular  Capillary refills: < 3 seconds  The left DP pulse is 2+  The left PT pulse is 2+  Assign Risk Category:  No deformity present; Loss of protective sensation;  No weak pulses       Risk: 1

## 2020-07-30 NOTE — ASSESSMENT & PLAN NOTE
Patient needs to set up follow up with urology  It has been greater than a year since follow up  He has radiation treatment in 2018  It appears the urology group has also attempted to have them make an appointment

## 2020-07-30 NOTE — PATIENT INSTRUCTIONS
10% - bad control"> 10% - bad control,Hemoglobin A1c (HbA1c) greater than 10% indicating poor diabetic control,Haemoglobin A1c greater than 10% indicating poor diabetic control">   Diabetes mellitus tipo 2 en adultos, cuidados ambulatorios   INFORMACIÓN GENERAL:   La diabetes mellitus tipo 2 en adultos  es elier enfermedad que afecta la forma en que el cuerpo utiliza la glucosa (azúcar)  La insulina ayuda a extraer el azúcar de la stefanie para que pueda usarse en la producción de energía  Generalmente, cuando el nivel de azúcar [place], el páncreas produce más insulina  La diabetes tipo 2 se desarrolla ya sea porque el cuerpo no puede producir suficiente insulina, o no la puede usar correctamente  Después de [place], forbes páncreas podría dejar de producir insulina  Síntomas comunes incluyen los siguientes:   · Más hambre o sed de la usual    · Necesidad frecuente de orinar     · Pérdida de peso sin tratar     · Visión borrosa  Busque cuidados inmediatos para los siguientes síntomas:   · Dolor abdominal severo o dolor que se propaga hacia forbes espalda  Es probable que usted también vomite  · Dificultad para permanecer despierto o concentrado    · Temblores o sudoración    · Visión borrosa o doble    · Aliento con olor a frutas o meeta    · Respiración profunda y dificultosa o rápida y superficial    · Ritmo cardíaco rápido y débil  El tratamiento para la diabetes mellitus tipo 2  incluye mantener forbes nivel de azúcar en el stefanie a un rango normal  Usted debe comer los alimentos correctos y ejercitarse con regularidad  Además es probable que necesite medicamentos si no puede controlar forbes nivel de azúcar en la stefanie con nutrición y ejercicio  Maneje la diabetes mellitus tipo 2:   · Revise forbes nivel de azúcar en la stefanie  A usted le enseñarán cómo revisar elier pequeña gota de Phuc Curry en un medidor de glucosa  Pregúntele a forbes proveedor de sterling cuándo y con cuánta frecuencia es necesario revisar leo el día   Donalynn Fortis pregúntele a forbes proveedor de sterling cuáles deberían ser sandeep niveles de azúcar en la stefanie cuando usted se los revisa  · Mantenga un registro de los carbohidratos (azúcares y almidones)  Forbes nivel de azúcar en la stefanie puede elevarse demasiado si usted come demasiados carbohidratos  Forbes dietista le ayudará a planear comidas y meriendas que tengan la cantidad correcta de carbohidratos  · Consuma alimentos bajos en grasas  Jackie Messing son el timothy sin piel y la leche descremada  · Consuma menos sodio (sal)  Algunos ejemplos de alimentos altos en sodio que hay que limitar son la salsa de soya, las dara tostadas y la sopa  No le agregue sal a la comida que usted cocina  Limite forbes uso de sal de arana  · Coma alimentos altos en fibra  Alimentos que son buena staci de fibra incluyen los vegetales, el pan integral y los frijoles  · Limite el alcohol  El alcohol afecta forbes nivel de azúcar en la stefanie y puede dificultar el [place] de forbes diabetes  Las mujeres deben limitar el consumo de alcohol a 1 bebida al día  Los hombres deben limitarlo a 2 debidas al día  Elier bebida de alcohol equivale a 12 onzas de cerveza, 5 onzas de vino o 1½ onzas de licor  · Ejercítese regularmente  El ejercicio puede ayudar a mantener estable forbes nivel de azúcar en la stefanie, al mismo tiempo que disminuye forbes riesgo de enfermedad cardíaca y le ayuda a perder peso  Ejercítese por al menos 30 minutos, 5 tim a la semana  Cindi Hashimoto de fortalecimiento muscular 2 días a la semana  Colabore con forbes proveedor de sterling para crear un plan de ejercicios  · Revise sandeep pies a diario  para laney si tienen heridas o llagas abiertas  Pregúntele a forbes proveedor de [employer] que usted puede hacer si tiene elier llaga abierta  · Deje de fumar  Si usted fuma, nunca es tarde para dejar de hacerlo  El fumar puede [employer] problemas que puedan ocurrir con la diabetes   Pregúntele a forbes proveedor de [employer] información para aprender a dejar de fumar si usted tiene dificultad para hacerlo  · Pregunte sobre lee peso:  Pregúntele a sandeep proveedores de sterling si usted necesita perder peso y cuánto debe perder  Pídales que le ayuden con un programa de perdida de [place]  Incluso perder unas 10 a 15 libras puede ayudarle a manejar lee nivel de azúcar en la stefanie  · Tenga a mano lee identificación de [employer]  Use un brazalete de alerta médica o lleve consigo elier tarjeta que diga que usted tiene diabetes  Pregúntele a lee proveedor de sterling dónde conseguir estos artículos  · Pregunte sobre vacunas  La diabetes lo pone a usted en riesgo de enfermedad seria si usted se resfría, tiene neumonía o hepatitis  Pregúntele a lee proveedor de sterling si usted debe ponerse las vacunas contra la gripe, neumonía o hepatitis B, y cuándo ponérselas  Programe elier eric con lee proveedor de [employer] se le haya indicado: Anote sandeep preguntas para que se acuerde de hacerlas leo sandeep visitas  ACUERDOS SOBRE LEE CUIDADO:   Usted tiene el derecho de participar en la planificación de lee cuidado  Aprenda todo lo que pueda sobre lee condición y nanette darle tratamiento  Discuta con sandeep médicos sandeep opciones de tratamiento para juntos decidir el cuidado que usted quiere recibir  Usted siempre tiene el derecho a rechazar lee tratamiento  Esta información es sólo para uso en educación  Lee intención no es darle un consejo médico sobre enfermedades o tratamientos  Colsulte con lee Burlene [place] farmacéutico antes de seguir cualquier régimen médico para saber si es seguro y efectivo para usted  © [address] is for End User's use only and may not be sold, redistributed or otherwise used for commercial purposes  All illustrations and images included in CareNotes® are the copyrighted property of A D A M , Inc  or Peter Albert        Cómo revisar el azúcar en la stefanie   CUIDADO AMBULATORIO:   Por qué necesita revisar forbes nivel de azúcar en la stefanie: Los [employer] de azúcar en la stefanie aumentan forbes riesgo de presentar un ataque al corazón, un derrame cerebral, anomalías en los ojos y [employer] riñones  Usted puede disminuir forbes riesgo al controlar sandeep niveles de azúcar en la stefanie  Bajos niveles de azúcar en la stefanie pueden llevar a serios problemas de sterling y deben ser atendidos de inmediato  Revise el azúcar en forbes stefanie para que le ayude a determinar cómo la comida, el ejercicio, el estrés y los medicamentos alteran sandeep [place]  Mantenga un registro de sandeep niveles de forbes azúcar en la stefanie  Se puede utilizar para ajustar forbes plan de alimentación, régimen de ejercicios, dosis de insulina, o medicamentos para la diabetes en christy que sea necesario  Munir revisar forbes nivel de azúcar en la stefanie:   · Revise forbes nivel de azúcar con un medidor de glucosa (glucómetro)  Yamilka dispositivo [place] elier pequeña gota de stefanie para medir el nivel de azúcar en la stefanie  Algunos glucómetros miden elier gota de stefanie que se letty de forbes dedo usando un dispositivo especial que tiene elier lanceta  Otros medidores también medirán elier gota de stefanie de forbse muslo, antebrazo o la donis de forbes mano  · Los niveles de azúcar en la stefanie cambian rápidamente después de las comidas, después de aplicar la [place], leo el ejercicio, y cuando se siente estresado o enfermo  Es preferible usar stefanie de forbes dedo para revisar sandeep niveles de azúcar en la stefanie leo estos momentos  Forbes médico le va a enseñar munir usar el medidor de glucosa para revisar forbes nivel de azúcar  Pida más información a forbes médico sobre cómo karishma muestras de stefanie de otras partes de forbes cuerpo aparte de forbes dedo  Cuándo y con qué frecuencia debe revisar forbes nivel de azúcar en la stefanie:  Pregúntele a forbes médico cuándo y con qué frecuencia usted debe revisar el nivel de azúcar en forbes stefanie   Si ted revisa forbes nivel de azúcar en la stefanie  antes de [place] comida , debe estar entre 80 y 130 mg/dL  Si usted revisa forbes nivel de azúcar en la stefanie 2 horas  después de elier comida , el valor debe estar por debajo de 180 mg/dL  Pregúntele a forbes médico si estas son buenas metas para usted  Los niveles de azúcar en la stefanie se deben revisar con más frecuencia si usted está enfermo, le cambian forbes medicación o usted modifica forbes rutina diaria  Revise forbes nivel de azúcar en la stefanie si nina que sandeep niveles se encuentran muy elevados (hiperglucemia) o demasiado bajos (hipoglucemia)  Mantenga un registro de sandeep niveles de azúcar en la stefanie:  Gonzalo Blonder forbes nivel de azúcar en la stefanie cada vez que se lo controle  Escriba la fecha, la hora del examen (incluyendo si fue antes o después de [place]) y [place]  Escriba la hora en que se administró la insulina o tomó las píldoras para la diabetes  Registre el tipo y cantidad de insulina o medicamento de diabetes que tomó  Escriba comentarios acerca de cualquier cosa que podría andrew hecho subir o bajar forbes nivel de azúcar en la stefanie  El nivel de azúcar en forbes stefanie puede verse afectado por el ejercicio, por comer más o menos de lo normal, o por el kulwinder Rome registro con usted a sandeep citas de seguimiento  Cómo cuidar sandeep medidores de glucosa y tiras reactivas:  Consulte con forbes médico cómo y con qué frecuencia debe controlar la precisión de forbes glucómetro  Usted podría necesitar lo siguiente:  · Guarde el equipo adecuadamente  [place] tiras reactivas alejadas del calor, el frío y la humedad   No saque las tiras reactivas de forbes contenedor hasta que esté listo para usarlas  Coloque jennifer ajustada la tapa del contenedor  No use tiras que se encuentren dañadas, mojadas ni dobladas  · Revise la fecha de caducidad  en el contenedor para asegurarse de que las tiras no hayan expirado  Las lecturas del azúcar en forbes stefanie podrían ser incorrectas si usted [place] tiras vencidas   Use únicamente el tipo de tiras para las pruebas de glucosa que funcionen con tijerina glucómetro  Use identificación de alerta médica:  Use un brazalete o collar de alerta médica o lleve consigo elier tarjeta que indique que tiene diabetes  Pregúntele a tijerina médico dónde conseguir estos artículos  Acuda a sandeep consultas de control con tijerina médico según le indicaron  Anote sandeep preguntas para que se acuerde de hacerlas leo sandeep visitas  © 2017 [address] Le[address] Information is for End User's use only and may not be sold, redistributed or otherwise used for commercial purposes  All illustrations and images included in CareNotes® are the copyrighted property of A D A M , Inc  or Peter Albert  Esta información es sólo para uso en educación  Tijerina intención no es darle un consejo médico sobre enfermedades o tratamientos  Colsulte con tijerina [place] farmacéutico antes de seguir cualquier régimen médico para saber si es seguro y efectivo para usted  Cuidado del pie para personas con diabetes   CUIDADO AMBULATORIO:   Lo que usted necesita saber acerca del cuidado del pie:   · El cuidado del pie ayuda a proteger tijerina pies y evitar úlceras o llagas en el pie  Los [employer] de azúcar en la stefanie a chandra plazo pueden dañar los vasos sanguíneos y los nervios en sandeep piernas y pies  Yamilka daño dificulta que sienta presión, dolor, temperatura y el tacto  Es posible que usted no sienta elier cortada o elier úlcera o que los zapatos están muy apretados  El cuidado del pie es necesario para evitar problemas graves, nanette elier infección o elier amputación  · La diabetes podría provocar que los dedos de sandeep pies se tuerzan o se encorven [place]  Estos cambios podrían afectar la manera en que usted camina y pueden conllevar al aumento de la presión en tijerina pie  La presión puede disminuir el flujo sanguíneo a sandeep pies  La falta del flujo sanguíneo aumenta tijerina riesgo de elier úlcera en [employer]   No ignore problemas pequeños, nanette la piel reseca o heridas pequeñas  Con el tiempo, estos puede representar elier amenaza para la dre si no se les da el cuidado apropiado  Pregúntele a forbes Kishore Obey vitaminas y minerales son adecuados para usted  · Edith pies se ponen entumecidos, débiles o difícil de   · Usted tiene pus drenando de elier llaga en forbes pie  · Usted tiene elier herida en forbes pie que se hace más geremias, más profunda o que no adrian  · Usted nota que tiene ampollas, cortadas, rasguños, callos o llagas en forbes pie  · Usted tiene fiebre y edith pies se ponen rojos, calientes e inflamados  · Las uñas de edith pies se vuelven gruesas, encorvadas o [id number]  · Le es difícil revisarse los pies porque forbes visión no está jennifer  · Usted tiene preguntas o inquietudes acerca de forbes condición o cuidado  Cómo cuidar de edith pies:   · Revísese los pies a diario  Observe todo el pie, incluyendo la planta del pie, entre y [employer] dedos  Revise si hay heridas y callos  Use un judie para verse la planta de los pies  La piel de los pies podría estar brillante, estirada o más obscura de lo normal  Edith pies también podrían estar fríos y pálidos  Pase edith catrachita por encima, por debajo, por los lados y [facility] dedos del pie para sentir la piel  El enrojecimiento, inflamación y calor son signos de problemas con el flujo sanguíneo que pueden conllevar a elier úlcera en el pie  No  trate de quitarse los callos usted mismo  · [employer] todos los días con agua tibia y [place]  No use [place], porque esto puede lesionarle el pie  Séquese los pies suavemente con elier toalla después de lavarlos  Seque entre y [employer] dedos  · Aplique elier loción o un humectante sobre edith pies secos  Pregunte a forbes médico cuáles lociones son las mejores que [employer]  No  aplique loción o humectante entre edith dedos  · [place] uñas de los pies correctamente  Lime o meek las uñas de los pies en línea recta   Use un cepillo suave para limpiar alrededor de las [place]  Si las [address] gruesas, es posible que necesite que un médico o especialista se las meek  · Proteja sandeep pies  No  camine descalzo ni use zapatos sin calcetines  Compruebe que no haya piedras u otros objetos dentro de sandeep zapatos que le podrían [employer]  Use calcetines de algodón para ayudar a [employer]  Use calcetines sin costura en los dedos o póngaselos con la costura hacia afuera  Cámbiese los calcetines diariamente  No use calcetines sucios ni húmedos  · Use zapatos que le calcen jennifer  Use zapatos que no rocen ninguna parte de sandeep pies  Forbes calzado debería ser de ½ a ¾ pulgada (1 a 2 centímetros) más grandes que sandeep pies  Forbes calzado también debería tener espacio adicional alrededor de la parte más ancha de sandeep pies  El calzado para caminar o atlético con cordones o correas que se ajustan son los mejores  Pida ayuda a forbes médico para elegir un par de zapatos que le calcen jennifer  Pregúntele si debe usar algún tipo de plantilla, soporte o vendaje en sandeep pies  · Asista a sandeep citas de seguimiento  Forbes médico hará elier revisión a sandeep pies al menos elier vez al [place]  Es posible que usted necesite hacerse un examen de pie más seguido si tiene los nervios dañados, deformidad en el pie o [place]  Él revisará si hay daño al nervio y qué tan jennifer usted puede sentir sandeep pies  Él le revisará forbes calzado para laney si le [employer]  Opal elier eric de seguimiento con forbes médico o especialista de los pies nanette le indiquen:  Usted va a necesitar que le revisen sandeep pies al menos elier vez al [place]  Es posible que usted necesite hacerse un examen de pie más seguido si tiene los nervios dañados, deformidad en el pie o [place]  Anote sandeep preguntas para que se acuerde de hacerlas leo sandeep visitas  © 2017 [address] Le[address] Information is for End User's use only and may not be sold, redistributed or otherwise used for commercial purposes   All illustrations and images included in CareNotes® are the copyrighted property of A D A M , Inc  or Peter Albert  Esta información es sólo para uso en educación  Forbes intención no es darle un consejo médico sobre enfermedades o tratamientos  Colsulte con forbes Catalina Shouts farmacéutico antes de seguir cualquier régimen médico para saber si es seguro y efectivo para usted  Hipoglucemia en elier persona con diabetes   CUIDADO AMBULATORIO:   La hipoglucemia  es elier afección grave que ocurre cuando el nivel de glucosa (azúcar) en la stefanie baja demasiado  El nivel de azúcar en la stefanie generalmente es demasiado alto en elier persona con diabetes, justice el azúcar en la stefanie también puede caer a un nivel excesivamente bajo  Es importante que usted siga forbes plan de manejo de la diabetes para mantener temi forbes nivel de azúcar en la stefanie  Los signos y síntomas más comunes incluyen los siguientes:   · Dolor de [place], hambre o nerviosismo     · Dificultad para pensar o altibajos del estado de ánimo     · Sudoración o latidos cardíacos antwon     · Dificultad para recordar, confusión o visión doble     · Debilidad o dificultad para caminar     · Entumecimiento y cosquilleo en sandeep dedos o alrededor de forbes boca     · Convulsiones o pérdida del conocimiento  Llame al 911 en christy de presentar lo siguiente:   · Usted tiene elier convulsión o se desmaya  · Usted siente que se va a desmayar  · Usted tiene dificultad para pensar con claridad  Busque atención médica de inmediato si:  · [place] nivel de glucosa en la stefanie es de menos de 50 mg/dL y no responde al tratamiento  Pregúntele a forbes Leamon Handsome vitaminas y minerales son adecuados para usted  · Usted ha tenido síntomas de un descenso del nivel de azúcar varias veces  · Usted tiene preguntas acerca de la cantidad de insulina o medicamento para la diabetes que está tomando  · Usted tiene preguntas o inquietudes acerca de forbes condición o cuidado    Manejo de la hipoglucemia:   · Revise forbes nivel de azúcar en la stefanie inmediatamente si usted tiene síntomas de hipoglucemia  Usualmente se considera hipoglucemia a un nivel de 70 mg/dL o menos  Pregunte a forbes médico cuál es un nivel de azúcar demasiado bajo para usted  · Si forbes nivel de azúcar en la stefanie está bajo, coma o tome 15 gramos de carbohidratos de acción rápida  Ejemplos de esta cantidad de carbohidratos de acción rápida son 4 onzas (1/2 taza) de jugo de fruta o 4 onzas de gaseosa regular  Otros ejemplos son 2 cucharadas de pasas de uva o entre 3 y 4 tabletas de glucosa  Revise forbes nivel de azúcar en la stefanie 15 minutos más tarde  Si el nivel es todavía bajo (menos de 100 mg/dL), ingiera otros 15 gramos de carbohidratos  Cuando el nivel vuelva a 100 mg/dL, coma un refrigerio o alimento que contenga carbohidratos  Eupora ayudará a evitar otra caída de forbes nivel de azúcar en la stefanie  Siempre siga cuidadosamente las instrucciones de forbes médico acerca de nanette tratar los niveles bajos de azúcar en la stefanie  · Siempre lleve consigo alguna staci de carbohidratos de acción rápida  Si usted presenta síntomas de hipoglucemia y no tiene un glucómetro, igual lleve consigo elier staci de carbohidratos de acción rápida  Evite carbohidratos de alimentos que son altos en grasa  El contendido de grasa podría hacer que forbes nivel de azúcar en stefanie tarde más en subir  Pregunte a forbes médico si usted debería llevar consigo un estuche de glucagón  El glucagón es un medicamento que se inyecta cuando usted desarrolla hipoglucemia grave y pierde el conocimiento  Revise la fecha de vencimiento todos los meses y reemplace el medicamento de forbes vencimiento  · Enseñe a otras personas cómo ayudarlo si usted tiene síntomas de hipoglucemia  Infórmeles acerca de los síntomas de la hipoglucemia  Pídales que le den elier staci de carbohidratos de acción rápida si usted no puede autoadministrársela   Pídales que le apliquen elier inyección de glucagón si tiene síntomas de hipoglucemia y usted pierde el conocimiento o si tiene elier convulsión  Pídales que llamen al 911   [place] es elier emergencia  Indíqueles que nunca lo delano que trague nada si se desmaya o si tiene elier convulsión  · Use accesorios de alerta médica  o lleve consigo elier tarjeta que indique que usted tiene diabetes  Pregunte en dónde puede conseguir estos artículos  Prevenga la hipoglucemia:   · [place] los medicamentos de diabetes nanette se le indique  Use sandeep medicamentos a la hora y en la cantidad exacta  Forbes médico podría cambiar sandeep metas esperadas de azúcar en la stefanie si usted padece de hipoglucemia frecuentemente  · Coma comidas y meriendas regularmente  Hable con forbes dietista o forbes médico sobre un plan de comidas que sea adecuado para usted  No se salte ninguna comida  · Revise forbes nivel de azúcar en la stefanie según indicaciones  Pregunte a forbes médico cuáles son los niveles de azúcar adecuados para usted antes y [address] comidas  Pregúntele cuándo y con qué frecuencia debe revisar forbes nivel de azúcar en la stefanie  Es posible que necesite controlarlos al menos 3 veces al día  Registre el resultado de forbes nivel de azúcar en la stefanie y lleve forbes registro con usted cuando tenga elier eric con forbes médico  Forbes médico podría usar el registro para hacer cambios en forbes medicación, forbes alimentación o sandeep horarios de ejercicios  · Revise forbes nivel de azúcar en la stefanie antes de hacer ejercicio  El ejercicio puede disminuir forbes nivel de azúcar en la stefanie  Si forbes nivel de azúcar en la stefanie es inferior a 100 mg /dL, consuma un bocadillo de carbohidratos  [place] son 4 a 6 galletas saladas, ½ plátano, 8 onzas (1 taza) de [employer] o del 1%, o 4 onzas de jugo (½ taza)  Si va a realizar actividad física por más de 1 hora, revise forbes nivel de azúcar en la stefanie cada 30 minutos   Forbes médico también podría recomendarle que revise forbes nivel de azúcar en la stefanie después del ejercicio  · Esté consciente de cómo el alcohol afecta tijerina nivel de azúcar en la stefanie  El consumo de alcohol puede provocar que sandeep niveles de azúcar en la stefanie bajen hasta por 12 horas después de andrew bebido  Pregúntele a tijerina médico si usted puede karishma alcohol  Si usted letty alcohol, siempre asegúrese de comer un bocadillo o elier comida a la vez  Las mujeres deberían limitar el consumo de alcohol a 1 bebida por día  Los hombres deberían limitar el consumo de alcohol a 2 tragos al día  Un trago equivale a 12 onzas de cerveza, 5 onzas de vino o 1 onza y ½ de licor  Acuda a sandeep consultas de control con tijerina médico según le indicaron  Si usted tiene hipoglucemia, es posible que necesite elier cambio en la dosis de tijerina insulina o medicamento oral para la diabetes  Anote sandeep preguntas para que se acuerde de hacerlas leo sandeep visitas  © 2017 [address]  Information is for End User's use only and may not be sold, redistributed or otherwise used for commercial purposes  All illustrations and images included in CareNotes® are the copyrighted property of A D A M , Inc  or Peter Albert  Esta información es sólo para uso en educación  Tijerina intención no es darle un consejo médico sobre enfermedades o tratamientos  Colsulte con tijerina Refugio Patch farmacéutico antes de seguir cualquier régimen médico para saber si es seguro y efectivo para usted  Planificación alimenticia con el método del plato   CUIDADO AMBULATORIO:   La planificación de las comidas con el método del plato  es un método sencillo que permite a las personas con diabetes planificar sandeep comidas  Yamilka método puede ayudar a comer la cantidad correcta de carbohidratos y [employer] niveles de azúcar en la stefanie bajo control  Los carbohidratos elevan naturalmente los niveles de azúcar en la stefanie  Tijerina nivel de azúcar en la stefanie puede subir demasiado alto si usted come muchos carbohidratos al [employer]   Luellen Rashmi se encuentran en los almidones (pan, cereal, verduras con almidón y frijoles), Stevphen David, yogur y [place]  Cómo [employer] del plato para planear los alimentos:   · Dibuje elier línea imaginaria en medio de un plato de comida de 9 pulgadas  En un lado, dibuje otra línea para dividir maritza sección a la mitad  El plato tendrá 3 secciones  · Llene la sección mas geremias del plato con verduras sin almidón  Estos incluyen al brócoli, espinacas, pepino, pimientos, coliflor y tomates  · Agregue un almidón a 1 sección pequeña del plato  Los almidones [employer], arroz, panes de giancarlo entero, tortillas, [place], dara y frijoles  · Agregue carne u otra staci de proteína a la otra sección pequeña de forbes plato  Por ejemplo, timothy o pavo sin piel, pescado, carne de res o de BOONOO BOONOO, queso bajo en grasa, tofu o huevos  · Agregue productos lácteos o fruta al lado de forbes plato si forbes plan alimenticio lo permite  Ejemplos de productos lácteos incluyen [employer] o del 1% o yogur bajo en grasa  Si usted no letty [place], es posible que pueda agregar otra porción de almidón en vez de eso  · Agregue elier bebida baja en calorías o sin calorías nanette agua o té o café sin azúcar         Tamaño de las porciones de los alimentos:   · Verduras sin almidón:      ¨ ½ de taza de verdura cocida o 1 taza de verdura cruda    ¨ ½ taza de jugo de verduras    · Almidones:      ¨ 1 onza de pan de giancarlo entero o 1 tortilla pequeña (6 pulgadas) de harina o maíz    ¨ 1 panqué (4 pulgadas) pequeño (de aproximadamente ¼ de pulgada de grosor)    ¨ ¾ de taza de cereal seco, sin endulzar, de grano entero y listo para comer, o ¼ de taza de granola baja en grasa    ¨ ½ taza de cereal o elly cocidos    ¨ ? de taza de arroz o pasta     ¨ ½ de taza de maíz, chícharos verdes, camote o puré de papa    ¨ ½ taza de frijoles y legumbres (garbanzo, frijol prado, tipo riñón, jacobo, partido, mariam) cocidos    · Carne y otras page de proteínas: ¨ De 3 a 4 onzas de cualquier carne magra, pescado o carne de ave    ¨ ½ taza de tofu o tempeh    ¨ 1 huevo geremias    ¨ 1½ onzas (alrededor de 2 cucharadas) de nueces o 2 cucharadas de crema de cacahuate    · Frutas:      ¨ 1 pedazo pequeño de fruta fresca     ¨ ½ taza de fruta enlatada o fresca, o jugo de fruta sin azúcar    ¨ ¼ de taza de fruta seca    · Leche y yogur:      ¨ 1 taza (8 onzas) de [place] sin grasa o del 1%    ¨ ¾ de taza (6 onzas) de yogur natural y sin grasa  Otras pautas que debe seguir:   · Limite el consumo de sal y azúcar  Seleccione y prepare alimentos y bebidas con menos sal y azúcares agregadas  Utilice la información nutricional en las etiquetas de los alimentos para ayudarle a hacer elier elección mas saludable  El valor del porcentaje diario escrito en las etiquetas de los alimentos, le informa si un alimento es bajo o alto en ciertos nutrientes  Un valor de 5% o menos en el porcentaje diario, significa que el alimento es bajo en el nutriente  Un valor de 20% o más en el porcentaje diario, significa que el alimento es alto en un nutriente  · Seleccione grasas saludables  Elija grasas saludables nanette la poliinsaturada y la monoinsaturada en lugar de las grasas malas para la [place]  Las grasas saludables se encuentran en los aceites vegetales tales nanette el frijol de soya, maíz, [place], [place]right y [place]  Las grasas que no son saludables son las grasas [address] grasas transgénicas y el colesterol  Las grasas no saludables se encuentran en la [facility], la [place], la margarina de tyrone y la grasa animal      · Consulte con forbes médico si usted puede karishma alcohol  y qué cantidad es granados para usted  Limite el consumo de alcohol  Si usted decide karishma alcohol, hágalo con alimentos  Cuando usted consume alcohol con el estómago vacío, el azúcar en la stefanie puede caer a un nivel bajo    © 2017 [address] Information is for End User's use only and may not be sold, redistributed or otherwise used for commercial purposes  All illustrations and images included in CareNotes® are the copyrighted property of A D A M , Inc  or Peter Albert  Esta información es sólo para uso en educación  Forbes intención no es darle un consejo médico sobre enfermedades o tratamientos  Colsulte con forbes Neelam Sean farmacéutico antes de seguir cualquier régimen médico para saber si es seguro y efectivo para usted  Medicare Preventive Visit Patient Instructions  Thank you for completing your Welcome to Medicare Visit or Medicare Annual Wellness Visit today  Your next wellness visit will be due in one year (7/30/2021)  The screening/preventive services that you may require over the next 5-10 years are detailed below  Some tests may not apply to you based off risk factors and/or age  Screening tests ordered at today's visit but not completed yet may show as past due  Also, please note that scanned in results may not display below  Preventive Screenings:  Service Recommendations Previous Testing/Comments   Colorectal Cancer Screening  · Colonoscopy    · Fecal Occult Blood Test (FOBT)/Fecal Immunochemical Test (FIT)  · Fecal DNA/Cologuard Test  · Flexible Sigmoidoscopy Age: 54-65 years old   Colonoscopy: every 10 years (May be performed more frequently if at higher risk)  OR  FOBT/FIT: every 1 year  OR  Cologuard: every 3 years  OR  Sigmoidoscopy: every 5 years  Screening may be recommended earlier than age 48 if at higher risk for colorectal cancer  Also, an individualized decision between you and your healthcare provider will decide whether screening between the ages of 74-80 would be appropriate   Colonoscopy: Not on file  FOBT/FIT: Not on file  Cologuard: Not on file  Sigmoidoscopy: Not on file         Prostate Cancer Screening Individualized decision between patient and health care provider in men between ages of 53-78   Medicare will cover every 12 months beginning on the day after your 50th birthday PSA: 0 2 ng/mL     History Prostate Cancer     Hepatitis C Screening Once for adults born between 1945 and 1965  More frequently in patients at high risk for Hepatitis C Hep C Antibody: 12/19/2018    Screening Current   Diabetes Screening 1-2 times per year if you're at risk for diabetes or have pre-diabetes Fasting glucose: 258 mg/dL   A1C: 10 2 %    Screening Not Indicated  History Diabetes   Cholesterol Screening Once every 5 years if you don't have a lipid disorder  May order more often based on risk factors  Lipid panel: 07/02/2020    Screening Not Indicated  History Lipid Disorder      Other Preventive Screenings Covered by Medicare:  1  Abdominal Aortic Aneurysm (AAA) Screening: covered once if your at risk  You're considered to be at risk if you have a family history of AAA or a male between the age of 73-68 who smoking at least 100 cigarettes in your lifetime  2  Lung Cancer Screening: covers low dose CT scan once per year if you meet all of the following conditions: (1) Age 50-69; (2) No signs or symptoms of lung cancer; (3) Current smoker or have quit smoking within the last 15 years; (4) You have a tobacco smoking history of at least 30 pack years (packs per day x number of years you smoked); (5) You get a written order from a healthcare provider  3  Glaucoma Screening: covered annually if you're considered high risk: (1) You have diabetes OR (2) Family history of glaucoma OR (3)  aged 48 and older OR (3)  American aged 72 and older  3  Osteoporosis Screening: covered every 2 years if you meet one of the following conditions: (1) Have a vertebral abnormality; (2) On glucocorticoid therapy for more than 3 months; (3) Have primary hyperparathyroidism; (4) On osteoporosis medications and need to assess response to drug therapy  5  HIV Screening: covered annually if you're between the age of 12-76   Also covered annually if you are younger than 13 and older than 72 with risk factors for HIV infection  For pregnant patients, it is covered up to 3 times per pregnancy  Immunizations:  Immunization Recommendations   Influenza Vaccine Annual influenza vaccination during flu season is recommended for all persons aged >= 6 months who do not have contraindications   Pneumococcal Vaccine (Prevnar and Pneumovax)  * Prevnar = PCV13  * Pneumovax = PPSV23 Adults 25-60 years old: 1-3 doses may be recommended based on certain risk factors  Adults 72 years old: Prevnar (PCV13) vaccine recommended followed by Pneumovax (PPSV23) vaccine  If already received PPSV23 since turning 65, then PCV13 recommended at least one year after PPSV23 dose  Hepatitis B Vaccine 3 dose series if at intermediate or high risk (ex: diabetes, end stage renal disease, liver disease)   Tetanus (Td) Vaccine - COST NOT COVERED BY MEDICARE PART B Following completion of primary series, a booster dose should be given every 10 years to maintain immunity against tetanus  Td may also be given as tetanus wound prophylaxis  Tdap Vaccine - COST NOT COVERED BY MEDICARE PART B Recommended at least once for all adults  For pregnant patients, recommended with each pregnancy  Shingles Vaccine (Shingrix) - COST NOT COVERED BY MEDICARE PART B  2 shot series recommended in those aged 48 and above     Health Maintenance Due:      Topic Date Due    CRC Screening: FOBTx3/FIT  07/21/1996    Hepatitis C Screening  Completed     Immunizations Due:      Topic Date Due    Pneumococcal Vaccine: 65+ Years (2 of 2 - PCV13) 06/03/2020     Advance Directives   What are advance directives? Advance directives are legal documents that state your wishes and plans for medical care  These plans are made ahead of time in case you lose your ability to make decisions for yourself  Advance directives can apply to any medical decision, such as the treatments you want, and if you want to donate organs     What are the types of advance directives? There are many types of advance directives, and each state has rules about how to use them  You may choose a combination of any of the following:  · Living will: This is a written record of the treatment you want  You can also choose which treatments you do not want, which to limit, and which to stop at a certain time  This includes surgery, medicine, IV fluid, and tube feedings  · Durable power of  for healthcare [place] [facility]): This is a written record that states who you want to make healthcare choices for you when you are unable to make them for yourself  This person, called a proxy, is usually a family member or a friend  You may choose more than 1 proxy  · Do not resuscitate (DNR) order:  A DNR order is used in case your heart stops beating or you stop breathing  It is a request not to have certain forms of treatment, such as CPR  A DNR order may be included in other types of advance directives  · Medical directive: This covers the care that you want if you are in a coma, near death, or unable to make decisions for yourself  You can list the treatments you want for each condition  Treatment may include pain medicine, surgery, blood transfusions, dialysis, IV or tube feedings, and a ventilator (breathing machine)  · Values history: This document has questions about your views, beliefs, and how you feel and think about life  This information can help others choose the care that you would choose  Why are advance directives important? An advance directive helps you control your care  Although spoken wishes may be used, it is better to have your wishes written down  Spoken wishes can be misunderstood, or not followed  Treatments may be given even if you do not want them  An advance directive may make it easier for your family to make difficult choices about your care        © Copyright [employer] 2018 Information is for End User's use only and may not be sold, redistributed or otherwise used for commercial purposes   All illustrations and images included in CareNotes® are the copyrighted property of A D A M , Inc  or [facility] Modesto Leos

## 2020-07-30 NOTE — PROGRESS NOTES
Assessment and Plan:     Problem List Items Addressed This Visit        Endocrine    Type 2 diabetes mellitus without complication, without long-term current use of insulin ([facility] [address] ) - Primary       Lab Results   Component Value Date    HGBA1C 10 2 (H) 07/02/2020       Patient's A1c is significantly elevated today  There has been a time  Which she has not had his medications  Today I will reorder his medications he is currently on Janumet  2 times a day  Statin: Yes   ACE/ARB: no patient has low BP  Micro normal  Insulin: No   GLP-1: No   SGLT-2: No   Metformin: Yes   DDP4: Yes  Eye exam: No  Iris completed today  Podiatry: No            Relevant Medications    JANUMET  MG per tablet    simvastatin (ZOCOR) 40 mg tablet    Lancets Micro Thin 33G MISC    glucose blood (OneTouch Verio) test strip    calcium carbonate (OS-CARMENZA) 600 MG tablet    Other Relevant Orders    IRIS Diabetic eye exam    Lipid panel    Basic metabolic panel    Hemoglobin A1C    TSH, 3rd generation with Free T4 reflex    Subclinical hypothyroidism     Patient TSH 6 45  T4 normal  Will monitor at next lab draw  Relevant Orders    TSH, 3rd generation with Free T4 reflex       Musculoskeletal and Integument    Irritant contact dermatitis due to other agents     Rash consistent with eczema dermatitis  Will prescribe triamcinolone 2 times a day as needed         Relevant Medications    triamcinolone (KENALOG) 0 025 % ointment       Genitourinary    Prostate cancer [facility])     Patient needs to set up follow up with urology  It has been greater than a year since follow up  He has radiation treatment in 2018  It appears the urology group has also attempted to have them make an appointment               Other    Mixed hyperlipidemia     Patient's cholesterol is currently not at goal he is on simvastatin 20 mg  I will increase this to 40 mg daily recheck lipid panel in 3 months         Relevant Medications    simvastatin (ZOCOR) 40 mg tablet    Other Relevant Orders    Lipid panel      Other Visit Diagnoses     Colon cancer screening        Relevant Orders    Cologuard    Medicare annual wellness visit, subsequent               Preventive health issues were discussed with patient, and age appropriate screening tests were ordered as noted in patient's After Visit Summary  Personalized health advice and appropriate referrals for health education or preventive services given if needed, as noted in patient's After Visit Summary       History of Present Illness:     Patient presents for Medicare Annual Wellness visit    Patient Care Team:  Keysha Dee as PCP - General (Family Medicine)  Conrad Ibarra DO as PCP - [address] [place] [address] (RTE)  Jan John MD (Radiation Oncology)  Richard Regan MD (Urology)     Problem List:     Patient Active Problem List   Diagnosis    Type 2 diabetes mellitus without complication, without long-term current use of insulin ([address] )    Cervical pain    Irritant contact dermatitis due to other agents    Ill-fitting dentures    Hearing difficulty of both ears    Prostate cancer ([address] )    Prostate nodule    Mixed hyperlipidemia    Subclinical hypothyroidism      Past Medical and Surgical History:     Past Medical History:   Diagnosis Date    Diabetes mellitus ([address] )     Elevated PSA     Hyperlipidemia     Prostate cancer [facility])      Past Surgical History:   Procedure Laterality Date    NECK SURGERY      PROSTATE BIOPSY  07/18/2018      Family History:     Family History   Problem Relation Age of Onset    No Known Problems Mother     No Known Problems Father       Social History:        Social History     Socioeconomic History    Marital status: /Civil Union     Spouse name: None    Number of children: None    Years of education: None    Highest education level: None   Occupational History    Occupation:    retired   Social Needs    Financial resource strain: None   [address] insecurity:     Worry: None     Inability: None    Transportation needs:     Medical: None     Non-medical: None   Tobacco Use    Smoking status: Never Smoker    Smokeless tobacco: Never Used   Substance and Sexual Activity    Alcohol use: No    Drug use: No    Sexual activity: Not Currently   Lifestyle    Physical activity:     Days per week: None     Minutes per session: None    Stress: None   Relationships    Social connections:     Talks on phone: None     Gets together: None     Attends Taoist service: None     Active member of club or organization: None     Attends meetings of clubs or organizations: None     Relationship status: None    Intimate partner violence:     Fear of current or ex partner: None     Emotionally abused: None     Physically abused: None     Forced sexual activity: None   Other Topics Concern    None   Social History Narrative    None      Medications and Allergies:     Current Outpatient Medications   Medication Sig Dispense Refill    Blood Glucose Monitoring Suppl (Yasmany Espinosa) w/Device KIT by Does not apply route 2 (two) times a day 1 kit 0    glucose blood (OneTouch Verio) test strip Test two times daily DX: E11 9 100 each 5    JANUMET  MG per tablet Take 1 tablet by mouth 2 (two) times a day with meals 180 tablet 1    loratadine (CLARITIN) 10 mg tablet Take 1 tablet (10 mg total) by mouth daily 14 tablet 0    simvastatin (ZOCOR) 40 mg tablet Take 1 tablet (40 mg total) by mouth daily at bedtime 90 tablet 1    bisacodyl (FLEET) 10 MG/30ML ENEM Insert 30 mL (10 mg total) into the rectum once for 1 dose Day of biopsy 30 mL 0    calcium carbonate (OS-CARMENZA) 600 MG tablet Take 1 tablet (600 mg total) by mouth 2 (two) times a day with meals      Lancets Micro Thin 33G MISC by Does not apply route 2 (two) times a day Test two times daily DX:E11 9 100 each 3    triamcinolone (KENALOG) 0 025 % ointment Apply topically 2 (two) times a day 30 g 3     No current facility-administered medications for this visit  Allergies   Allergen Reactions    Other      seasonal      Immunizations:     Immunization History   Administered Date(s) Administered    Pneumococcal Polysaccharide PPV23 06/03/2019      Health Maintenance:         Topic Date Due    CRC Screening: FOBTx3/FIT  07/21/1996    Hepatitis C Screening  Completed         Topic Date Due    Pneumococcal Vaccine: 65+ Years (2 of 2 - PCV13) 06/03/2020      Medicare Health Risk Assessment:     /54   Pulse 76   Temp 98 9 °F (37 2 °C)   Ht 5' (1 524 m)   Wt 53 5 kg (118 lb)   BMI 23 05 kg/m²      Earlene Fiore is here for his Subsequent Wellness visit  Health Risk Assessment:   Patient rates overall health as very good  Patient feels that their physical health rating is same  Eyesight was rated as same  Hearing was rated as same  Patient feels that their emotional and mental health rating is same  Pain experienced in the last 7 days has been none  Patient states that he has experienced no weight loss or gain in last 6 months  Depression Screening:   PHQ-2 Score: 0      Fall Risk Screening: In the past year, patient has experienced: no history of falling in past year      Home Safety:  Patient does not have trouble with stairs inside or outside of their home  Patient has working smoke alarms and has working carbon monoxide detector  Home safety hazards include: none  Nutrition:   Current diet is Low Cholesterol, No Added Salt and Regular  Medications:   Patient is not currently taking any over-the-counter supplements  Patient is able to manage medications  Activities of Daily Living (ADLs)/Instrumental Activities of Daily Living (IADLs):   Walk and transfer into and out of bed and chair?: Yes  Dress and groom yourself?: Yes    Bathe or shower yourself?: Yes    Feed yourself?  Yes  Do your laundry/housekeeping?: Yes  Manage your money, pay your bills and track your expenses?: No  Make your own meals?: Yes    Do your own shopping?: Yes    Previous Hospitalizations:   Any hospitalizations or ED visits within the last 12 months?: No      Advance Care Planning:   Living will: Yes    Durable POA for healthcare: No    Advanced directive: Yes      Comments: Patient did not bring a copy     Cognitive Screening:   Provider or family/friend/caregiver concerned regarding cognition?: No    PREVENTIVE SCREENINGS      Cardiovascular Screening:    General: Screening Not Indicated, History Lipid Disorder and Screening Current      Diabetes Screening:     General: Screening Not Indicated, History Diabetes and Screening Current      Colorectal Cancer Screening:     General: Risks and Benefits Discussed    Due for: Cologuard      Prostate Cancer Screening:    General: History Prostate Cancer and Screening Current      Osteoporosis Screening:    General: Screening Not Indicated      Abdominal Aortic Aneurysm (AAA) Screening:    Risk factors include: age between 73-67 yo        Lung Cancer Screening:     General: Screening Not Indicated      Hepatitis C Screening:    General: Screening Current    Other Counseling Topics:   Calcium and vitamin D intake         Sid Caraballo

## 2020-08-25 ENCOUNTER — TELEPHONE (OUTPATIENT)
Dept: FAMILY MEDICINE CLINIC | Facility: CLINIC | Age: 75
End: 2020-08-25

## 2020-08-25 NOTE — TELEPHONE ENCOUNTER
Pt wife called in because she will like a referral for pt to see someone in pulmonary   Wants lungs checked

## 2021-01-25 DIAGNOSIS — E11.9 TYPE 2 DIABETES MELLITUS WITHOUT COMPLICATION, WITHOUT LONG-TERM CURRENT USE OF INSULIN (HCC): ICD-10-CM

## 2021-01-25 RX ORDER — SIMVASTATIN 40 MG
40 TABLET ORAL
Refills: 0 | OUTPATIENT
Start: 2021-01-25

## 2021-01-25 RX ORDER — SITAGLIPTIN AND METFORMIN HYDROCHLORIDE 500; 50 MG/1; MG/1
1 TABLET, FILM COATED ORAL 2 TIMES DAILY WITH MEALS
Qty: 180 TABLET | Refills: 0 | OUTPATIENT
Start: 2021-01-25 | End: 2021-04-25

## 2021-03-12 ENCOUNTER — APPOINTMENT (OUTPATIENT)
Dept: LAB | Facility: CLINIC | Age: 76
End: 2021-03-12
Payer: COMMERCIAL

## 2021-03-12 DIAGNOSIS — E03.8 SUBCLINICAL HYPOTHYROIDISM: ICD-10-CM

## 2021-03-12 DIAGNOSIS — E11.9 TYPE 2 DIABETES MELLITUS WITHOUT COMPLICATION, WITHOUT LONG-TERM CURRENT USE OF INSULIN (HCC): ICD-10-CM

## 2021-03-12 DIAGNOSIS — E78.2 MIXED HYPERLIPIDEMIA: ICD-10-CM

## 2021-03-12 LAB
ANION GAP SERPL CALCULATED.3IONS-SCNC: 4 MMOL/L (ref 4–13)
BUN SERPL-MCNC: 16 MG/DL (ref 5–25)
CALCIUM SERPL-MCNC: 9.2 MG/DL (ref 8.3–10.1)
CHLORIDE SERPL-SCNC: 106 MMOL/L (ref 100–108)
CHOLEST SERPL-MCNC: 206 MG/DL (ref 50–200)
CO2 SERPL-SCNC: 29 MMOL/L (ref 21–32)
CREAT SERPL-MCNC: 0.73 MG/DL (ref 0.6–1.3)
EST. AVERAGE GLUCOSE BLD GHB EST-MCNC: 126 MG/DL
GFR SERPL CREATININE-BSD FRML MDRD: 91 ML/MIN/1.73SQ M
GLUCOSE P FAST SERPL-MCNC: 123 MG/DL (ref 65–99)
HBA1C MFR BLD: 6 %
HDLC SERPL-MCNC: 35 MG/DL
LDLC SERPL CALC-MCNC: 122 MG/DL (ref 0–100)
NONHDLC SERPL-MCNC: 171 MG/DL
POTASSIUM SERPL-SCNC: 3.8 MMOL/L (ref 3.5–5.3)
SODIUM SERPL-SCNC: 139 MMOL/L (ref 136–145)
T4 FREE SERPL-MCNC: 0.67 NG/DL (ref 0.76–1.46)
TRIGL SERPL-MCNC: 245 MG/DL
TSH SERPL DL<=0.05 MIU/L-ACNC: 6.25 UIU/ML (ref 0.36–3.74)

## 2021-03-12 PROCEDURE — 84439 ASSAY OF FREE THYROXINE: CPT

## 2021-03-12 PROCEDURE — 80061 LIPID PANEL: CPT

## 2021-03-12 PROCEDURE — 80048 BASIC METABOLIC PNL TOTAL CA: CPT

## 2021-03-12 PROCEDURE — 84443 ASSAY THYROID STIM HORMONE: CPT

## 2021-03-12 PROCEDURE — 36415 COLL VENOUS BLD VENIPUNCTURE: CPT

## 2021-03-12 PROCEDURE — 83036 HEMOGLOBIN GLYCOSYLATED A1C: CPT

## 2021-03-12 PROCEDURE — 3044F HG A1C LEVEL LT 7.0%: CPT | Performed by: NURSE PRACTITIONER

## 2021-03-18 ENCOUNTER — OFFICE VISIT (OUTPATIENT)
Dept: FAMILY MEDICINE CLINIC | Facility: CLINIC | Age: 76
End: 2021-03-18
Payer: COMMERCIAL

## 2021-03-18 VITALS
BODY MASS INDEX: 23.16 KG/M2 | HEART RATE: 60 BPM | SYSTOLIC BLOOD PRESSURE: 112 MMHG | WEIGHT: 118 LBS | DIASTOLIC BLOOD PRESSURE: 62 MMHG | HEIGHT: 60 IN

## 2021-03-18 DIAGNOSIS — E11.9 TYPE 2 DIABETES MELLITUS WITHOUT COMPLICATION, WITHOUT LONG-TERM CURRENT USE OF INSULIN (HCC): Primary | ICD-10-CM

## 2021-03-18 DIAGNOSIS — E03.9 PRIMARY HYPOTHYROIDISM: ICD-10-CM

## 2021-03-18 DIAGNOSIS — H91.93 HEARING DIFFICULTY OF BOTH EARS: ICD-10-CM

## 2021-03-18 DIAGNOSIS — E78.2 MIXED HYPERLIPIDEMIA: ICD-10-CM

## 2021-03-18 DIAGNOSIS — E11.42 DIABETIC PERIPHERAL NEUROPATHY ASSOCIATED WITH TYPE 2 DIABETES MELLITUS (HCC): ICD-10-CM

## 2021-03-18 DIAGNOSIS — N40.2 PROSTATE NODULE: ICD-10-CM

## 2021-03-18 DIAGNOSIS — C61 PROSTATE CANCER (HCC): ICD-10-CM

## 2021-03-18 DIAGNOSIS — I87.2 PERIPHERAL VASCULAR DISEASE WITH STASIS DERMATITIS: ICD-10-CM

## 2021-03-18 PROCEDURE — 1160F RVW MEDS BY RX/DR IN RCRD: CPT | Performed by: NURSE PRACTITIONER

## 2021-03-18 PROCEDURE — 3008F BODY MASS INDEX DOCD: CPT | Performed by: NURSE PRACTITIONER

## 2021-03-18 PROCEDURE — 92250 FUNDUS PHOTOGRAPHY W/I&R: CPT | Performed by: NURSE PRACTITIONER

## 2021-03-18 PROCEDURE — 1036F TOBACCO NON-USER: CPT | Performed by: NURSE PRACTITIONER

## 2021-03-18 PROCEDURE — 99214 OFFICE O/P EST MOD 30 MIN: CPT | Performed by: NURSE PRACTITIONER

## 2021-03-18 RX ORDER — LEVOTHYROXINE SODIUM 0.03 MG/1
25 TABLET ORAL DAILY
Qty: 90 TABLET | Refills: 0 | Status: SHIPPED | OUTPATIENT
Start: 2021-03-18 | End: 2021-05-19 | Stop reason: SDUPTHER

## 2021-03-18 RX ORDER — BLOOD SUGAR DIAGNOSTIC
STRIP MISCELLANEOUS
Qty: 100 EACH | Refills: 5 | Status: SHIPPED | OUTPATIENT
Start: 2021-03-18

## 2021-03-18 RX ORDER — SIMVASTATIN 80 MG
80 TABLET ORAL
Qty: 90 TABLET | Refills: 1 | Status: SHIPPED | OUTPATIENT
Start: 2021-03-18

## 2021-03-18 RX ORDER — SITAGLIPTIN AND METFORMIN HYDROCHLORIDE 500; 50 MG/1; MG/1
1 TABLET, FILM COATED ORAL 2 TIMES DAILY WITH MEALS
Qty: 180 TABLET | Refills: 1 | Status: SHIPPED | OUTPATIENT
Start: 2021-03-18 | End: 2021-06-16

## 2021-03-18 RX ORDER — LANCETS 33 GAUGE
EACH MISCELLANEOUS 2 TIMES DAILY
Qty: 100 EACH | Refills: 3 | Status: SHIPPED | OUTPATIENT
Start: 2021-03-18

## 2021-03-18 RX ORDER — BETAMETHASONE DIPROPIONATE 0.5 MG/G
CREAM TOPICAL 2 TIMES DAILY
Qty: 45 G | Refills: 3 | Status: SHIPPED | OUTPATIENT
Start: 2021-03-18

## 2021-03-18 NOTE — ASSESSMENT & PLAN NOTE
Patient has not seen urology since 2019  I will place another referral  Explained to wife it is important that he have follow up  Will also order PSA

## 2021-03-18 NOTE — ASSESSMENT & PLAN NOTE
Patient tsh is elevated with low t4  Patient was previously being monitored and t4 levels were always normal  Since there is now abnormal t4 it would be relevant to start replacement  Will start levothyroxine 25mcg  Recheck tsh in 6 weeks

## 2021-03-18 NOTE — ASSESSMENT & PLAN NOTE
Lab Results   Component Value Date    HGBA1C 6 0 (H) 03/12/2021       Patient with absent sensation to both feet  Has not seen podiatry   Will give referral

## 2021-03-18 NOTE — ASSESSMENT & PLAN NOTE
Patient cholesterol is poorly controlled  Currently on simvastatin 40mg  Will increase this to 80mg  Recheck in 4 months

## 2021-03-18 NOTE — ASSESSMENT & PLAN NOTE
Lab Results   Component Value Date    HGBA1C 6 0 (H) 03/12/2021       Patient blood sugar is doing very well much improved from 10 2 last time  He is on janumet 50/500 BID  No changes recommended

## 2021-03-18 NOTE — PROGRESS NOTES
Assessment and Plan:    Problem List Items Addressed This Visit        Endocrine    Type 2 diabetes mellitus without complication, without long-term current use of insulin ([facility] ) - Primary       Lab Results   Component Value Date    HGBA1C 6 0 (H) 03/12/2021       Patient blood sugar is doing very well much improved from 10 2 last time  He is on janumet 50/500 BID  No changes recommended  Relevant Medications    simvastatin (ZOCOR) 80 mg tablet    Janumet  MG per tablet    glucose blood (OneTouch Verio) test strip    Lancets Micro Thin 33G MISC    Other Relevant Orders    CBC and differential    Primary hypothyroidism     Patient tsh is elevated with low t4  Patient was previously being monitored and t4 levels were always normal  Since there is now abnormal t4 it would be relevant to start replacement  Will start levothyroxine 25mcg  Recheck tsh in 6 weeks  Relevant Medications    levothyroxine 25 mcg tablet    Other Relevant Orders    TSH, 3rd generation with Free T4 reflex    Diabetic peripheral neuropathy associated with type 2 diabetes mellitus ([facility] )       Lab Results   Component Value Date    HGBA1C 6 0 (H) 03/12/2021       Patient with absent sensation to both feet  Has not seen podiatry  Will give referral          Relevant Medications    Janumet  MG per tablet    Other Relevant Orders    VAS lower limb arterial duplex, complete bilateral    Ambulatory referral to Podiatry       Cardiovascular and Mediastinum    Peripheral vascular disease with stasis dermatitis     Discolored lower limbs with neuropathy  Assess for PAD with VAS arterial study  Relevant Medications    betamethasone dipropionate (DIPROSONE) 0 05 % cream    Other Relevant Orders    VAS lower limb arterial duplex, complete bilateral       Genitourinary    Prostate cancer [facility])     Patient has not seen urology since 2019  I will place another referral  Explained to wife it is important that he have follow up  Will also order PSA  Relevant Orders    Ambulatory referral to Urology    PSA Total, Diagnostic    Prostate nodule    Relevant Orders    Ambulatory referral to Urology    PSA Total, Diagnostic       Other    Hearing difficulty of both ears    Relevant Orders    Ambulatory referral to Audiology    Mixed hyperlipidemia     Patient cholesterol is poorly controlled  Currently on simvastatin 40mg  Will increase this to 80mg  Recheck in 4 months  Relevant Medications    simvastatin (ZOCOR) 80 mg tablet                 Diagnoses and all orders for this visit:    Type 2 diabetes mellitus without complication, without long-term current use of insulin (HCC)  -     simvastatin (ZOCOR) 80 mg tablet; Take 1 tablet (80 mg total) by mouth daily at bedtime  -     Janumet  MG per tablet; Take 1 tablet by mouth 2 (two) times a day with meals  -     glucose blood (OneTouch Verio) test strip; Test two times daily DX: E11 9  -     Lancets Micro Thin 33G MISC; Use 2 (two) times a day Test two times daily DX:E11 9  -     CBC and differential; Future    Hearing difficulty of both ears  -     Ambulatory referral to Audiology; Future    Mixed hyperlipidemia    Prostate cancer [facility])  -     Ambulatory referral to Urology; Future  -     PSA Total, Diagnostic; Future    Prostate nodule  -     Ambulatory referral to Urology; Future  -     PSA Total, Diagnostic; Future    Primary hypothyroidism  -     TSH, 3rd generation with Free T4 reflex; Future  -     levothyroxine 25 mcg tablet;  Take 1 tablet (25 mcg total) by mouth daily On empty stomach    Peripheral vascular disease with stasis dermatitis  -     VAS lower limb arterial duplex, complete bilateral; Future  -     betamethasone dipropionate (DIPROSONE) 0 05 % cream; Apply topically 2 (two) times a day    Diabetic peripheral neuropathy associated with type 2 diabetes mellitus (HCC)  -     VAS lower limb arterial duplex, complete bilateral; Future  -     Ambulatory referral to Podiatry; Future    Other orders  -     Cancel: IRIS Diabetic eye exam              Subjective:      Patient ID: Therese Putnam is a 77 y o  male  CC:    Chief Complaint   Patient presents with    Follow-up    Diabetes    Medication Refill       HPI:    Diabetes: patient is checking blood sugars at home they average 100-120  Patient wife has helped change his diet drastically  He has been compliant with is medications  currently on janumet 50/500 BID  Patient has not had recent eye exam      Subclinical thyroid: patient tsh has been around 6 with normal T4  He is currently not on medication but has recent blood work to review  Lipids: patient is on simvastatin 40mg  Has been taking regularly  Patient has blood work to review  The following portions of the patient's history were reviewed and updated as appropriate: allergies, current medications, past family history, past medical history, past social history, past surgical history and problem list       Review of Systems   Constitutional: Negative for diaphoresis, fatigue and unexpected weight change  HENT: Negative for trouble swallowing  Eyes: Negative for visual disturbance  Respiratory: Negative for cough, chest tightness and shortness of breath  Cardiovascular: Negative for chest pain, palpitations and leg swelling  Gastrointestinal: Negative for constipation  Endocrine: Negative for polydipsia, polyphagia and polyuria  Genitourinary: Negative for difficulty urinating  Musculoskeletal: Negative for arthralgias and myalgias  Neurological: Negative for dizziness, light-headedness and headaches  Psychiatric/Behavioral: Negative for sleep disturbance  Data to review:       Objective:    Vitals:    03/18/21 0937   BP: 112/62   BP Location: Left arm   Patient Position: Sitting   Pulse: 60   Weight: 53 5 kg (118 lb)   Height: 5' (1 524 m)        Physical Exam  Vitals signs and nursing note reviewed  Constitutional:       General: He is not in acute distress  Appearance: He is normal weight  He is not ill-appearing, toxic-appearing or diaphoretic  HENT:      Head: Normocephalic and atraumatic  Right Ear: Tympanic membrane normal       Left Ear: Tympanic membrane normal    Eyes:      Extraocular Movements: Extraocular movements intact  Conjunctiva/sclera: Conjunctivae normal    Neck:      Thyroid: No thyromegaly  Vascular: No carotid bruit or JVD  Cardiovascular:      Rate and Rhythm: Normal rate and regular rhythm  Pulses: no weak pulses          Carotid pulses are 2+ on the right side and 2+ on the left side  Dorsalis pedis pulses are 2+ on the right side and 2+ on the left side  Posterior tibial pulses are 2+ on the right side and 2+ on the left side  Heart sounds: Normal heart sounds, S1 normal and S2 normal  No murmur  Comments: Trophic changes to bilateral lower extremities   Pulmonary:      Effort: Pulmonary effort is normal  No respiratory distress  Breath sounds: Normal breath sounds and air entry  No wheezing  Abdominal:      General: Bowel sounds are normal  There is no distension  Palpations: Abdomen is soft  Musculoskeletal:      Right lower leg: No edema  Left lower leg: No edema  Feet:      Right foot:      Protective Sensation: 9 sites tested  0 sites sensed  Skin integrity: Callus and dry skin present  No ulcer, skin breakdown, erythema or warmth  Toenail Condition: Right toenails are abnormally thick  Fungal disease present  Left foot:      Protective Sensation: 9 sites tested  0 sites sensed  Skin integrity: Callus and dry skin present  No ulcer, skin breakdown, erythema or warmth  Toenail Condition: Left toenails are abnormally thick  Fungal disease present  Skin:     Coloration: Skin is not pale  Findings: No erythema  Neurological:      General: No focal deficit present        Mental Status: He is alert and oriented to person, place, and time  Psychiatric:         Mood and Affect: Mood normal          Behavior: Behavior normal          Thought Content: Thought content normal          Judgment: Judgment normal              Diabetic Foot Exam    Patient's shoes and socks removed  Right Foot/Ankle   Right Foot Inspection  Skin Exam: skin normal, skin intact, dry skin, callus, abnormal color and callus no warmth, no erythema, no maceration, no pre-ulcer and no ulcer                          Toe Exam: ROM and strength within normal limits  Sensory   Vibration: absent  Proprioception: intact   Monofilament testing: absent  Vascular  Capillary refills: < 3 seconds  The right DP pulse is 2+  The right PT pulse is 2+  Left Foot/Ankle  Left Foot Inspection  Skin Exam: skin normal, skin intact, dry skin, abnormal color and callusno warmth, no erythema, no maceration, no pre-ulcer and no ulcer                         Toe Exam: ROM and strength within normal limits                   Sensory   Vibration: absent  Proprioception: intact  Monofilament: absent  Vascular  Capillary refills: < 3 seconds  The left DP pulse is 2+  The left PT pulse is 2+  Assign Risk Category:  No deformity present; Loss of protective sensation;  No weak pulses       Risk: 3

## 2021-03-22 LAB
LEFT EYE DIABETIC RETINOPATHY: NORMAL
LEFT EYE IMAGE QUALITY: NORMAL
LEFT EYE MACULAR EDEMA: NORMAL
LEFT EYE OTHER RETINOPATHY: NORMAL
RIGHT EYE DIABETIC RETINOPATHY: NORMAL
RIGHT EYE IMAGE QUALITY: NORMAL
RIGHT EYE MACULAR EDEMA: NORMAL
RIGHT EYE OTHER RETINOPATHY: NORMAL
SEVERITY (EYE EXAM): NORMAL

## 2021-03-22 PROCEDURE — 2025F 7 FLD RTA PHOTO W/O RTNOPTHY: CPT | Performed by: NURSE PRACTITIONER

## 2021-04-02 LAB — MICROALBUMIN UR-MCNC: 1.3 MG/DL

## 2021-04-02 PROCEDURE — 3061F NEG MICROALBUMINURIA REV: CPT | Performed by: NURSE PRACTITIONER

## 2021-05-14 ENCOUNTER — RA CDI HCC (OUTPATIENT)
Dept: OTHER | Facility: HOSPITAL | Age: 76
End: 2021-05-14

## 2021-05-14 NOTE — PROGRESS NOTES
[facility]  coding opportunities             Chart reviewed, (number of) suggestions sent to provider: 1     Problem listed updated   Provider Accepted, (number of) suggestions accepted: 1                 [facility] 75  coding opportunities        5/17     Chart reviewed, (number of) suggestions sent to provider: 1    E11 51  Type 2 diabetes mellitus with diabetic peripheral angiopathy without gangrene - ICD10 combination code denoting the link between DM and PVD

## 2021-05-17 ENCOUNTER — OFFICE VISIT (OUTPATIENT)
Dept: FAMILY MEDICINE CLINIC | Facility: CLINIC | Age: 76
End: 2021-05-17
Payer: COMMERCIAL

## 2021-05-17 ENCOUNTER — HOSPITAL ENCOUNTER (OUTPATIENT)
Dept: CT IMAGING | Facility: HOSPITAL | Age: 76
Discharge: HOME | End: 2021-05-17
Payer: COMMERCIAL

## 2021-05-17 ENCOUNTER — APPOINTMENT (OUTPATIENT)
Dept: LAB | Facility: CLINIC | Age: 76
End: 2021-05-17
Payer: COMMERCIAL

## 2021-05-17 ENCOUNTER — TELEPHONE (OUTPATIENT)
Dept: FAMILY MEDICINE CLINIC | Facility: CLINIC | Age: 76
End: 2021-05-17

## 2021-05-17 VITALS
HEIGHT: 60 IN | TEMPERATURE: 97.2 F | SYSTOLIC BLOOD PRESSURE: 108 MMHG | DIASTOLIC BLOOD PRESSURE: 64 MMHG | HEART RATE: 68 BPM | WEIGHT: 118.2 LBS | BODY MASS INDEX: 23.2 KG/M2

## 2021-05-17 DIAGNOSIS — C61 PROSTATE CANCER (HCC): ICD-10-CM

## 2021-05-17 DIAGNOSIS — E03.9 PRIMARY HYPOTHYROIDISM: ICD-10-CM

## 2021-05-17 DIAGNOSIS — E11.9 TYPE 2 DIABETES MELLITUS WITHOUT COMPLICATION, WITHOUT LONG-TERM CURRENT USE OF INSULIN (HCC): ICD-10-CM

## 2021-05-17 DIAGNOSIS — R41.0 EPISODIC CONFUSION: Primary | ICD-10-CM

## 2021-05-17 DIAGNOSIS — R41.0 EPISODIC CONFUSION: ICD-10-CM

## 2021-05-17 DIAGNOSIS — N40.2 PROSTATE NODULE: ICD-10-CM

## 2021-05-17 PROBLEM — E11.51 TYPE 2 DIABETES MELLITUS WITH DIABETIC PERIPHERAL ANGIOPATHY WITHOUT GANGRENE (HCC): Status: ACTIVE | Noted: 2021-05-17

## 2021-05-17 LAB
BASOPHILS # BLD AUTO: 0.02 THOUSANDS/ΜL (ref 0–0.1)
BASOPHILS NFR BLD AUTO: 0 % (ref 0–1)
EOSINOPHIL # BLD AUTO: 0.33 THOUSAND/ΜL (ref 0–0.61)
EOSINOPHIL NFR BLD AUTO: 6 % (ref 0–6)
ERYTHROCYTE [DISTWIDTH] IN BLOOD BY AUTOMATED COUNT: 14.1 % (ref 11.6–15.1)
HCT VFR BLD AUTO: 37.9 % (ref 36.5–49.3)
HGB BLD-MCNC: 12.4 G/DL (ref 12–17)
IMM GRANULOCYTES # BLD AUTO: 0.01 THOUSAND/UL (ref 0–0.2)
IMM GRANULOCYTES NFR BLD AUTO: 0 % (ref 0–2)
LYMPHOCYTES # BLD AUTO: 1.88 THOUSANDS/ΜL (ref 0.6–4.47)
LYMPHOCYTES NFR BLD AUTO: 36 % (ref 14–44)
MCH RBC QN AUTO: 28.9 PG (ref 26.8–34.3)
MCHC RBC AUTO-ENTMCNC: 32.7 G/DL (ref 31.4–37.4)
MCV RBC AUTO: 88 FL (ref 82–98)
MONOCYTES # BLD AUTO: 0.46 THOUSAND/ΜL (ref 0.17–1.22)
MONOCYTES NFR BLD AUTO: 9 % (ref 4–12)
NEUTROPHILS # BLD AUTO: 2.47 THOUSANDS/ΜL (ref 1.85–7.62)
NEUTS SEG NFR BLD AUTO: 49 % (ref 43–75)
NRBC BLD AUTO-RTO: 0 /100 WBCS
PLATELET # BLD AUTO: 276 THOUSANDS/UL (ref 149–390)
PMV BLD AUTO: 10.1 FL (ref 8.9–12.7)
PSA SERPL-MCNC: <0.1 NG/ML (ref 0–4)
RBC # BLD AUTO: 4.29 MILLION/UL (ref 3.88–5.62)
SL AMB  POCT GLUCOSE, UA: NEGATIVE
SL AMB LEUKOCYTE ESTERASE,UA: NEGATIVE
SL AMB POCT BILIRUBIN,UA: NEGATIVE
SL AMB POCT BLOOD,UA: NEGATIVE
SL AMB POCT CLARITY,UA: CLEAR
SL AMB POCT COLOR,UA: YELLOW
SL AMB POCT KETONES,UA: NEGATIVE
SL AMB POCT NITRITE,UA: NEGATIVE
SL AMB POCT PH,UA: 6.5
SL AMB POCT SPECIFIC GRAVITY,UA: 1.02
SL AMB POCT URINE PROTEIN: NEGATIVE
SL AMB POCT UROBILINOGEN: 0.2
T4 FREE SERPL-MCNC: 0.75 NG/DL (ref 0.76–1.46)
TSH SERPL DL<=0.05 MIU/L-ACNC: 3.92 UIU/ML (ref 0.36–3.74)
WBC # BLD AUTO: 5.17 THOUSAND/UL (ref 4.31–10.16)

## 2021-05-17 PROCEDURE — 3061F NEG MICROALBUMINURIA REV: CPT | Performed by: NURSE PRACTITIONER

## 2021-05-17 PROCEDURE — 99214 OFFICE O/P EST MOD 30 MIN: CPT | Performed by: NURSE PRACTITIONER

## 2021-05-17 PROCEDURE — 1036F TOBACCO NON-USER: CPT | Performed by: NURSE PRACTITIONER

## 2021-05-17 PROCEDURE — G1004 CDSM NDSC: HCPCS

## 2021-05-17 PROCEDURE — 84443 ASSAY THYROID STIM HORMONE: CPT

## 2021-05-17 PROCEDURE — 3008F BODY MASS INDEX DOCD: CPT | Performed by: NURSE PRACTITIONER

## 2021-05-17 PROCEDURE — 84439 ASSAY OF FREE THYROXINE: CPT

## 2021-05-17 PROCEDURE — 36415 COLL VENOUS BLD VENIPUNCTURE: CPT

## 2021-05-17 PROCEDURE — 1160F RVW MEDS BY RX/DR IN RCRD: CPT | Performed by: NURSE PRACTITIONER

## 2021-05-17 PROCEDURE — 81002 URINALYSIS NONAUTO W/O SCOPE: CPT | Performed by: NURSE PRACTITIONER

## 2021-05-17 PROCEDURE — 84153 ASSAY OF PSA TOTAL: CPT

## 2021-05-17 PROCEDURE — 70450 CT HEAD/BRAIN W/O DYE: CPT

## 2021-05-17 PROCEDURE — 85025 COMPLETE CBC W/AUTO DIFF WBC: CPT

## 2021-05-17 NOTE — TELEPHONE ENCOUNTER
Patient CT did not show an acute reason for his confusion  It does show brain shrinkage which for his age is expected and a risk factor for dementia  Will await blood work to see if there is abnormalities there, otherwise patient was referred to geriatrics so this would be next step to set up a visit with them

## 2021-05-17 NOTE — PROGRESS NOTES
Assessment and Plan:    Problem List Items Addressed This Visit        Endocrine    Primary hypothyroidism     Patient tsh was abnormal  We are working on titrating thyroid medication however patient did not have blood work drawn in April as requested  I have asked the wife to have these completed today so we can continue to make adjustments  Other    Episodic confusion - Primary     Order for stat ct head ordered today  Patient urinalysis in office was normal so no UTI  Patient advised to have blood work completed TSH was previously elevated this can be contributing  Patient was unable to cooperate with [facility] testing  Wife reports he was taken out of school at the age of 12years old in [place]  Referral to geriatrics  Relevant Orders    Ambulatory referral to Geriatrics    CT head wo contrast (Completed)    POCT urine dip (Completed)                 Diagnoses and all orders for this visit:    Episodic confusion  -     Ambulatory referral to Geriatrics; Future  -     CT head wo contrast; Future  -     POCT urine dip    Primary hypothyroidism              Subjective:      Patient ID: Kendrick Johnson is a 77 y o  male  CC:    Chief Complaint   Patient presents with    Memory Loss     Pt's daughter thinks pt has had increased Memory loss, worsening over the last 3 weeks  Pt would like to discuss this and possibly getting a Home aide  HPI:    Confusion: patient wife reports that slowly over the last 2-3 weeks patient has been increasingly confused  She states she has been worried with trying to care for him so she sought services with [facility]  Wife states the home attendant recommend she get a referral for memory care  Patient did not have followup blood work completed as ordered  Wife reports there has not been any falls or injuries in the last month  She reports that he has not had any chest pain, trouble breathing or weakness  Patient states he feels good         The following portions of the patient's history were reviewed and updated as appropriate: allergies, current medications, past family history, past medical history, past social history, past surgical history and problem list       Review of Systems   Constitutional: Positive for activity change  Negative for appetite change, chills, diaphoresis, fatigue and fever  Respiratory: Negative for cough, chest tightness, shortness of breath and wheezing  Cardiovascular: Negative for chest pain, palpitations and leg swelling  Genitourinary: Negative for difficulty urinating, dysuria, hematuria, testicular pain and urgency  Skin: Negative for rash  Neurological: Negative for dizziness, tremors, seizures, syncope, facial asymmetry, speech difficulty, weakness, light-headedness, numbness and headaches  Psychiatric/Behavioral: Positive for confusion and decreased concentration  Negative for agitation, behavioral problems, dysphoric mood, hallucinations, self-injury, sleep disturbance and suicidal ideas  The patient is not nervous/anxious and is not hyperactive  Data to review:       Objective:    Vitals:    05/17/21 1400   BP: 108/64   BP Location: Left arm   Patient Position: Sitting   Pulse: 68   Temp: (!) 97 2 °F (36 2 °C)   TempSrc: Temporal   Weight: 53 6 kg (118 lb 3 2 oz)   Height: 5' (1 524 m)        Physical Exam  HENT:      Head: Normocephalic and atraumatic  Jaw: There is normal jaw occlusion  Right Ear: Tympanic membrane normal       Left Ear: Tympanic membrane normal       Mouth/Throat:      Lips: Pink  Mouth: Mucous membranes are moist       Tongue: Tongue does not deviate from midline  Eyes:      Extraocular Movements: Extraocular movements intact  Right eye: No nystagmus  Left eye: No nystagmus  Conjunctiva/sclera: Conjunctivae normal    Neck:      Musculoskeletal: Normal range of motion  Thyroid: No thyroid mass or thyromegaly        Vascular: No carotid bruit or JVD  Cardiovascular:      Rate and Rhythm: Normal rate and regular rhythm  No extrasystoles are present  Heart sounds: Normal heart sounds, S1 normal and S2 normal    Pulmonary:      Effort: Pulmonary effort is normal       Breath sounds: Normal breath sounds and air entry  No decreased air movement  No decreased breath sounds  Abdominal:      General: Abdomen is flat  Bowel sounds are normal       Palpations: Abdomen is soft  Tenderness: There is no abdominal tenderness  Musculoskeletal:      Right lower leg: No edema  Left lower leg: No edema  Lymphadenopathy:      Cervical: No cervical adenopathy  Neurological:      Mental Status: He is alert  He is confused  GCS: GCS eye subscore is 4  GCS verbal subscore is 5  GCS motor subscore is 6  Cranial Nerves: Cranial nerves are intact  Sensory: Sensation is intact  Motor: Weakness (equal bilateral lower and upper extremities mild 4/5) present  No tremor, atrophy, abnormal muscle tone, seizure activity or pronator drift  Gait: Gait abnormal (slowed but otherwise stable )  Psychiatric:         Attention and Perception: He is inattentive  Mood and Affect: Mood normal          Speech: Speech normal          Behavior: Behavior is uncooperative and withdrawn  Cognition and Memory: Cognition is impaired  Memory is impaired           Judgment: Judgment normal

## 2021-05-17 NOTE — ASSESSMENT & PLAN NOTE
Order for stat ct head ordered today  Patient urinalysis in office was normal so no UTI  Patient advised to have blood work completed TSH was previously elevated this can be contributing  Patient was unable to cooperate with [facility] testing  Wife reports he was taken out of school at the age of 12years old in [place]  Referral to geriatrics

## 2021-05-19 DIAGNOSIS — E03.9 PRIMARY HYPOTHYROIDISM: ICD-10-CM

## 2021-05-19 RX ORDER — LEVOTHYROXINE SODIUM 0.05 MG/1
50 TABLET ORAL DAILY
Qty: 30 TABLET | Refills: 1 | Status: SHIPPED | OUTPATIENT
Start: 2021-05-19 | End: 2021-07-14

## 2021-05-19 NOTE — ASSESSMENT & PLAN NOTE
Patient tsh was abnormal  We are working on titrating thyroid medication however patient did not have blood work drawn in April as requested  I have asked the wife to have these completed today so we can continue to make adjustments

## 2021-07-12 DIAGNOSIS — E03.9 PRIMARY HYPOTHYROIDISM: ICD-10-CM

## 2021-07-14 RX ORDER — LEVOTHYROXINE SODIUM 0.05 MG/1
50 TABLET ORAL DAILY
Qty: 30 TABLET | Refills: 3 | Status: SHIPPED | OUTPATIENT
Start: 2021-07-14 | End: 2021-11-01

## 2021-08-18 ENCOUNTER — APPOINTMENT (OUTPATIENT)
Dept: LAB | Facility: CLINIC | Age: 76
End: 2021-08-18
Payer: COMMERCIAL

## 2021-08-18 DIAGNOSIS — E78.5 DYSLIPIDEMIA: ICD-10-CM

## 2021-08-18 DIAGNOSIS — E06.3 CHRONIC LYMPHOCYTIC THYROIDITIS: ICD-10-CM

## 2021-08-18 DIAGNOSIS — E03.9 PRIMARY HYPOTHYROIDISM: ICD-10-CM

## 2021-08-18 DIAGNOSIS — E11.9 DIABETES MELLITUS WITHOUT COMPLICATION (HCC): Primary | ICD-10-CM

## 2021-08-18 LAB
25(OH)D3 SERPL-MCNC: 21.1 NG/ML (ref 30–100)
ALBUMIN SERPL BCP-MCNC: 3.4 G/DL (ref 3.5–5)
ALP SERPL-CCNC: 122 U/L (ref 46–116)
ALT SERPL W P-5'-P-CCNC: 25 U/L (ref 12–78)
ANION GAP SERPL CALCULATED.3IONS-SCNC: 4 MMOL/L (ref 4–13)
AST SERPL W P-5'-P-CCNC: 26 U/L (ref 5–45)
BASOPHILS # BLD AUTO: 0.02 THOUSANDS/ΜL (ref 0–0.1)
BASOPHILS NFR BLD AUTO: 0 % (ref 0–1)
BILIRUB SERPL-MCNC: 0.35 MG/DL (ref 0.2–1)
BUN SERPL-MCNC: 19 MG/DL (ref 5–25)
CALCIUM ALBUM COR SERPL-MCNC: 9.9 MG/DL (ref 8.3–10.1)
CALCIUM SERPL-MCNC: 9.4 MG/DL (ref 8.3–10.1)
CHLORIDE SERPL-SCNC: 107 MMOL/L (ref 100–108)
CHOLEST SERPL-MCNC: 144 MG/DL (ref 50–200)
CO2 SERPL-SCNC: 26 MMOL/L (ref 21–32)
CREAT SERPL-MCNC: 0.69 MG/DL (ref 0.6–1.3)
EOSINOPHIL # BLD AUTO: 0.23 THOUSAND/ΜL (ref 0–0.61)
EOSINOPHIL NFR BLD AUTO: 5 % (ref 0–6)
ERYTHROCYTE [DISTWIDTH] IN BLOOD BY AUTOMATED COUNT: 13.6 % (ref 11.6–15.1)
EST. AVERAGE GLUCOSE BLD GHB EST-MCNC: 151 MG/DL
GFR SERPL CREATININE-BSD FRML MDRD: 93 ML/MIN/1.73SQ M
GLUCOSE P FAST SERPL-MCNC: 150 MG/DL (ref 65–99)
HBA1C MFR BLD: 6.9 %
HCT VFR BLD AUTO: 36.1 % (ref 36.5–49.3)
HDLC SERPL-MCNC: 35 MG/DL
HGB BLD-MCNC: 12.2 G/DL (ref 12–17)
IMM GRANULOCYTES # BLD AUTO: 0.02 THOUSAND/UL (ref 0–0.2)
IMM GRANULOCYTES NFR BLD AUTO: 0 % (ref 0–2)
LDLC SERPL CALC-MCNC: 73 MG/DL (ref 0–100)
LYMPHOCYTES # BLD AUTO: 1.51 THOUSANDS/ΜL (ref 0.6–4.47)
LYMPHOCYTES NFR BLD AUTO: 29 % (ref 14–44)
MCH RBC QN AUTO: 30.4 PG (ref 26.8–34.3)
MCHC RBC AUTO-ENTMCNC: 33.8 G/DL (ref 31.4–37.4)
MCV RBC AUTO: 90 FL (ref 82–98)
MONOCYTES # BLD AUTO: 0.38 THOUSAND/ΜL (ref 0.17–1.22)
MONOCYTES NFR BLD AUTO: 7 % (ref 4–12)
NEUTROPHILS # BLD AUTO: 2.97 THOUSANDS/ΜL (ref 1.85–7.62)
NEUTS SEG NFR BLD AUTO: 59 % (ref 43–75)
NONHDLC SERPL-MCNC: 109 MG/DL
NRBC BLD AUTO-RTO: 0 /100 WBCS
PLATELET # BLD AUTO: 238 THOUSANDS/UL (ref 149–390)
PMV BLD AUTO: 10.4 FL (ref 8.9–12.7)
POTASSIUM SERPL-SCNC: 4.2 MMOL/L (ref 3.5–5.3)
PROT SERPL-MCNC: 7.5 G/DL (ref 6.4–8.2)
RBC # BLD AUTO: 4.01 MILLION/UL (ref 3.88–5.62)
SODIUM SERPL-SCNC: 137 MMOL/L (ref 136–145)
T4 FREE SERPL-MCNC: 0.9 NG/DL (ref 0.76–1.46)
TRIGL SERPL-MCNC: 181 MG/DL
TSH SERPL DL<=0.05 MIU/L-ACNC: 1.96 UIU/ML (ref 0.36–3.74)
WBC # BLD AUTO: 5.13 THOUSAND/UL (ref 4.31–10.16)

## 2021-08-18 PROCEDURE — 85025 COMPLETE CBC W/AUTO DIFF WBC: CPT

## 2021-08-18 PROCEDURE — 36415 COLL VENOUS BLD VENIPUNCTURE: CPT

## 2021-08-18 PROCEDURE — 83036 HEMOGLOBIN GLYCOSYLATED A1C: CPT

## 2021-08-18 PROCEDURE — 82306 VITAMIN D 25 HYDROXY: CPT

## 2021-08-18 PROCEDURE — 84443 ASSAY THYROID STIM HORMONE: CPT

## 2021-08-18 PROCEDURE — 84439 ASSAY OF FREE THYROXINE: CPT

## 2021-08-18 PROCEDURE — 80053 COMPREHEN METABOLIC PANEL: CPT

## 2021-08-18 PROCEDURE — 80061 LIPID PANEL: CPT

## 2021-09-24 ENCOUNTER — VBI (OUTPATIENT)
Dept: ADMINISTRATIVE | Facility: OTHER | Age: 76
End: 2021-09-24

## 2021-10-20 ENCOUNTER — OFFICE VISIT (OUTPATIENT)
Dept: FAMILY MEDICINE CLINIC | Facility: CLINIC | Age: 76
End: 2021-10-20
Payer: COMMERCIAL

## 2021-10-20 VITALS
RESPIRATION RATE: 16 BRPM | DIASTOLIC BLOOD PRESSURE: 58 MMHG | HEIGHT: 60 IN | BODY MASS INDEX: 23.95 KG/M2 | HEART RATE: 76 BPM | OXYGEN SATURATION: 98 % | SYSTOLIC BLOOD PRESSURE: 108 MMHG | WEIGHT: 122 LBS

## 2021-10-20 DIAGNOSIS — Z00.00 MEDICARE ANNUAL WELLNESS VISIT, SUBSEQUENT: ICD-10-CM

## 2021-10-20 DIAGNOSIS — E03.9 PRIMARY HYPOTHYROIDISM: ICD-10-CM

## 2021-10-20 DIAGNOSIS — C61 PROSTATE CANCER (HCC): ICD-10-CM

## 2021-10-20 DIAGNOSIS — E11.9 TYPE 2 DIABETES MELLITUS WITHOUT COMPLICATION, WITHOUT LONG-TERM CURRENT USE OF INSULIN (HCC): ICD-10-CM

## 2021-10-20 DIAGNOSIS — F03.90 DEMENTIA WITHOUT BEHAVIORAL DISTURBANCE, UNSPECIFIED DEMENTIA TYPE (HCC): Primary | ICD-10-CM

## 2021-10-20 DIAGNOSIS — Z12.11 SCREEN FOR COLON CANCER: ICD-10-CM

## 2021-10-20 DIAGNOSIS — E78.2 MIXED HYPERLIPIDEMIA: ICD-10-CM

## 2021-10-20 DIAGNOSIS — E11.51 TYPE 2 DIABETES MELLITUS WITH DIABETIC PERIPHERAL ANGIOPATHY WITHOUT GANGRENE, WITHOUT LONG-TERM CURRENT USE OF INSULIN (HCC): ICD-10-CM

## 2021-10-20 PROBLEM — L24.89 IRRITANT CONTACT DERMATITIS DUE TO OTHER AGENTS: Status: RESOLVED | Noted: 2018-04-16 | Resolved: 2021-10-20

## 2021-10-20 PROCEDURE — 3008F BODY MASS INDEX DOCD: CPT | Performed by: NURSE PRACTITIONER

## 2021-10-20 PROCEDURE — 3288F FALL RISK ASSESSMENT DOCD: CPT | Performed by: NURSE PRACTITIONER

## 2021-10-20 PROCEDURE — G0439 PPPS, SUBSEQ VISIT: HCPCS | Performed by: NURSE PRACTITIONER

## 2021-10-20 PROCEDURE — 3725F SCREEN DEPRESSION PERFORMED: CPT | Performed by: NURSE PRACTITIONER

## 2021-10-20 PROCEDURE — 1036F TOBACCO NON-USER: CPT | Performed by: NURSE PRACTITIONER

## 2021-10-20 PROCEDURE — 1125F AMNT PAIN NOTED PAIN PRSNT: CPT | Performed by: NURSE PRACTITIONER

## 2021-10-20 PROCEDURE — 1160F RVW MEDS BY RX/DR IN RCRD: CPT | Performed by: NURSE PRACTITIONER

## 2021-10-20 PROCEDURE — 99214 OFFICE O/P EST MOD 30 MIN: CPT | Performed by: NURSE PRACTITIONER

## 2021-10-20 PROCEDURE — 1101F PT FALLS ASSESS-DOCD LE1/YR: CPT | Performed by: NURSE PRACTITIONER

## 2021-10-20 PROCEDURE — 1170F FXNL STATUS ASSESSED: CPT | Performed by: NURSE PRACTITIONER

## 2021-10-20 RX ORDER — EZETIMIBE 10 MG/1
TABLET ORAL
COMMUNITY
Start: 2021-09-13

## 2021-10-20 RX ORDER — ROSUVASTATIN CALCIUM 40 MG/1
40 TABLET, COATED ORAL DAILY
COMMUNITY
Start: 2021-06-29 | End: 2022-06-29

## 2021-10-25 ENCOUNTER — PATIENT OUTREACH (OUTPATIENT)
Dept: FAMILY MEDICINE CLINIC | Facility: CLINIC | Age: 76
End: 2021-10-25

## 2021-11-10 ENCOUNTER — VBI (OUTPATIENT)
Dept: ADMINISTRATIVE | Facility: OTHER | Age: 76
End: 2021-11-10

## 2021-11-22 ENCOUNTER — TELEPHONE (OUTPATIENT)
Dept: FAMILY MEDICINE CLINIC | Facility: CLINIC | Age: 76
End: 2021-11-22

## 2021-12-10 ENCOUNTER — VBI (OUTPATIENT)
Dept: ADMINISTRATIVE | Facility: OTHER | Age: 76
End: 2021-12-10

## 2022-01-05 ENCOUNTER — TELEPHONE (OUTPATIENT)
Dept: FAMILY MEDICINE CLINIC | Facility: CLINIC | Age: 77
End: 2022-01-05

## 2022-01-05 NOTE — TELEPHONE ENCOUNTER
Patient wife called in and stated patient lost his one touch verio meter and she would to know if Alyxjeff Bebe would be willing to send a new meter to his pharmacy  Thank you!

## 2022-01-10 NOTE — TELEPHONE ENCOUNTER
If it was ordered within the last 3 years it will not be covered   They can purchase [facility] version which is inexpensive

## 2022-01-20 NOTE — TELEPHONE ENCOUNTER
Pt aware as per HN, Pt should purchase a new Glucometer @ [facility] due to insurance will probably not cover this if less than 1years old  Stop nifedipine and start diltiazem twice a day and I sent it to your pharmacy.   Come back to see me in 1 week with your blood pressure and heart rate log.

## 2022-01-26 ENCOUNTER — TELEPHONE (OUTPATIENT)
Dept: UROLOGY | Facility: CLINIC | Age: 77
End: 2022-01-26

## 2022-01-26 NOTE — TELEPHONE ENCOUNTER
Patient was seen last by me in 2019  Had prostate cancer  Failed to follow-up based on our recommendations at that time  Ultimately certified letter was sent  Patient rescheduled for prostate cancer follow-up with me today and again no showed for his visit  I would like to send the patient another certified letter

## 2022-02-07 ENCOUNTER — TELEPHONE (OUTPATIENT)
Dept: FAMILY MEDICINE CLINIC | Facility: CLINIC | Age: 77
End: 2022-02-07

## 2022-02-07 NOTE — TELEPHONE ENCOUNTER
Patient wife called in [place] [address] that gt was supposed to write a letter for her   The letter was for the housing authority so they can put her on the waitlist to receive a home without steps because the one that they live in now has steep steps  Please advise   Thank you

## 2022-02-08 ENCOUNTER — PATIENT OUTREACH (OUTPATIENT)
Dept: FAMILY MEDICINE CLINIC | Facility: CLINIC | Age: 77
End: 2022-02-08

## 2022-02-08 NOTE — PROGRESS NOTES
OP CM rcvd call back from pts wife  She states they are already on housing  Pts wife confirmed she never brought the form in for provider to sign  Explained that there is a form and it needs to be signed  Asked wife for  at housing and she states she does not have the number now but will get it and call me back

## 2022-02-08 NOTE — PROGRESS NOTES
OP CM rcvd message that wife was asking about housing paperwork  There is a note in the chart that wife was planning on bringing in the paperwork for the provider to sign  Called to wife and left a message  Also called to [employer] and spoke to a woman and she states pt is just on the waiting list and they do not do special accommodations for pts on the waiting list   Will discuss with pts wife when she returns call

## 2022-02-28 DIAGNOSIS — Z74.2 ASSISTANCE NEEDED AT HOME: Primary | ICD-10-CM

## 2022-03-01 ENCOUNTER — PATIENT OUTREACH (OUTPATIENT)
Dept: FAMILY MEDICINE CLINIC | Facility: CLINIC | Age: 77
End: 2022-03-01

## 2022-03-01 NOTE — PROGRESS NOTES
OP CM called to pts wife in regards to home health waiver application  Pt wears diapers at home  Pt walks indep and does not use any assistive devices  Pts wife bathes and dresses him  Referral completed and will be faxed tomorrow from [facility]

## 2022-03-02 ENCOUNTER — PATIENT OUTREACH (OUTPATIENT)
Dept: FAMILY MEDICINE CLINIC | Facility: CLINIC | Age: 77
End: 2022-03-02

## 2022-03-02 NOTE — PROGRESS NOTES
Home health waiver application and PCP form faxed and emailed to [facility] mcneill today  Call to pts wife to make aware  Wife advised to call this OP CM once she hears from waiver

## 2022-03-16 ENCOUNTER — PATIENT OUTREACH (OUTPATIENT)
Dept: FAMILY MEDICINE CLINIC | Facility: CLINIC | Age: 77
End: 2022-03-16

## 2022-03-17 ENCOUNTER — TELEPHONE (OUTPATIENT)
Dept: FAMILY MEDICINE CLINIC | Facility: CLINIC | Age: 77
End: 2022-03-17

## 2022-03-17 NOTE — TELEPHONE ENCOUNTER
Made patient spouse aware the doctor listed on patient insurance card would need to be changed before we can see him for his appointment, patient spouse called insurance and they told her they will send her a form to fill out and she would need to send it back in order for them to change his pcp, she will call back to rs patient appointment once she sends back the form to insurance

## 2022-03-23 ENCOUNTER — PATIENT OUTREACH (OUTPATIENT)
Dept: FAMILY MEDICINE CLINIC | Facility: CLINIC | Age: 77
End: 2022-03-23

## 2022-03-30 ENCOUNTER — PATIENT OUTREACH (OUTPATIENT)
Dept: FAMILY MEDICINE CLINIC | Facility: CLINIC | Age: 77
End: 2022-03-30

## 2022-03-30 NOTE — PROGRESS NOTES
OP CM called to pts wife to follow up on home health waiver  Will call again  UPDATE:    Call back from pts wife who states she has not heard back yet from [facility] J[facility] in regards to the home health waiver  Email sent to [employer] to check status of application  Pts wife also states she is working with housing to see if they can get a different apartment more handicap accessible  Pts wife will give her housing case mgt my contact info in case he needs additional information  Pts wife also states their 6year old daughter will need surgery in July  She does state that she has family and friends who can stay with pt while her and their dtr are in the hospital      OP CM will continue to follow

## 2022-03-30 NOTE — PROGRESS NOTES
OP CM rcvd email response back from the waiver: Thanks for the inquiry, I checked the application and they are approved for services and the final mailing was mailed out on 3/28  [facility]     Called to pts wife Margaret Avila anfd left message regarding this information

## 2022-04-06 ENCOUNTER — PATIENT OUTREACH (OUTPATIENT)
Dept: FAMILY MEDICINE CLINIC | Facility: CLINIC | Age: 77
End: 2022-04-06

## 2022-04-06 NOTE — PROGRESS NOTES
OP CM called to pts wife and LM to follow up on home health waiver  [place] [employer] states [place] was [place] but now waiting to see for how many hours

## 2022-04-20 ENCOUNTER — PATIENT OUTREACH (OUTPATIENT)
Dept: FAMILY MEDICINE CLINIC | Facility: CLINIC | Age: 77
End: 2022-04-20

## 2022-04-20 NOTE — PROGRESS NOTES
OP CM called to pts wife and she states that the people are coming to today in regards to pt getting home health waiver  Wife states she will keep OP CM posted

## 2022-04-27 ENCOUNTER — PATIENT OUTREACH (OUTPATIENT)
Dept: FAMILY MEDICINE CLINIC | Facility: CLINIC | Age: 77
End: 2022-04-27

## 2022-04-27 NOTE — PROGRESS NOTES
OP CM called to pts wife and she states that she did not hear from the waiver yet for pts home health aide  Pt was supposed to have his interview last Wed  Called on three way to waiver [phone] and spoke to [place] who states pt was already approved for [place] [employer] [place] services  So then called to [address] [place] [address] [phone] and spoke to Michael who states [address] [facility] [phone] is managing pts services  Pts  is Yolie SIEGEL [phone]  Called to [facility] and left a message

## 2022-04-27 NOTE — PROGRESS NOTES
OP CM called to pts wife and she states that she did not hear from the waiver yet for pts home health aide  Pt was supposed to have his interview last Wed  Called on three way to waiver [phone] and spoke to Jennifer Cope who states pt was already [place] for [place] [employer] [place] services  So then called to [address] [place] [address] [phone] and spoke to Michael who states [address] [facility],[address] [phone] is managing pts services  Pts  is Yolie SIEGEL [phone]  Called to [facility] and left a message

## 2022-05-24 ENCOUNTER — PATIENT OUTREACH (OUTPATIENT)
Dept: FAMILY MEDICINE CLINIC | Facility: CLINIC | Age: 77
End: 2022-05-24

## 2022-05-24 NOTE — PROGRESS NOTES
OP CM  rcvd a call from pts wife that pt has not rcvd his home health aide yet  Email sent to [address] [place] [address] to find out who to contact  Wife states she completed all pts paperwork  Will continue to follow

## 2022-05-25 ENCOUNTER — PATIENT OUTREACH (OUTPATIENT)
Dept: FAMILY MEDICINE CLINIC | Facility: CLINIC | Age: 77
End: 2022-05-25

## 2022-05-25 NOTE — PROGRESS NOTES
OP CM called to pts wife in regards to home health aide services  Rcvd email back from Sunita at [address] that pts wife needs to call [address] [place] [address] and [facility] to see who his assigned  is at [phone]  Will continue to follow

## 2022-06-15 ENCOUNTER — PATIENT OUTREACH (OUTPATIENT)
Dept: FAMILY MEDICINE CLINIC | Facility: CLINIC | Age: 77
End: 2022-06-15

## 2022-07-07 NOTE — TELEPHONE ENCOUNTER
Patient's wife called that she noticed a little bit of swelling and he complaints of pain on/off  I scheduled him with Dr Carli Andrews for 7/19 at 36 at [address], but this was his prostate cancer patient at one point  Please advice if he needs to be seen sooner because of his prior diagnosis  Patient's wife may be reached at [phone], [place]ian speaking only

## 2022-07-08 ENCOUNTER — TELEPHONE (OUTPATIENT)
Dept: OTHER | Facility: OTHER | Age: 77
End: 2022-07-08

## 2022-07-08 NOTE — TELEPHONE ENCOUNTER
Called and spoke with Chalo Herndon to assist with rescheduling of her 's appointment  Patient's new appointment 7/14/2022 at 0900 with Colton Guillen at [facility] office

## 2022-07-08 NOTE — TELEPHONE ENCOUNTER
Called and spoke with Nova Lazo to assist with rescheduling of her 's appointment  Patient's new appointment 7/14/2022 at 0900 with Michael Carranza at [facility] office

## 2022-07-08 NOTE — TELEPHONE ENCOUNTER
Called patient's wife, Carol Ann Harmon, using [place] interpretor [phone] and left a voicemail stating patient's appointment for 7/19/2022 needs to be rescheduled to [facility]  Please review Dr Virginia Mascorro message from 5 months ago, MD dismissed patient from his care secondary to no show appointments    Thanks

## 2022-07-14 ENCOUNTER — TELEPHONE (OUTPATIENT)
Dept: UROLOGY | Facility: CLINIC | Age: 77
End: 2022-07-14

## 2022-07-14 ENCOUNTER — LAB (OUTPATIENT)
Dept: LAB | Facility: MEDICAL CENTER | Age: 77
End: 2022-07-14
Payer: COMMERCIAL

## 2022-07-14 ENCOUNTER — OFFICE VISIT (OUTPATIENT)
Dept: UROLOGY | Facility: CLINIC | Age: 77
End: 2022-07-14
Payer: COMMERCIAL

## 2022-07-14 VITALS
SYSTOLIC BLOOD PRESSURE: 120 MMHG | WEIGHT: 102 LBS | HEIGHT: 60 IN | DIASTOLIC BLOOD PRESSURE: 70 MMHG | BODY MASS INDEX: 20.03 KG/M2

## 2022-07-14 DIAGNOSIS — C61 PROSTATE CANCER (HCC): ICD-10-CM

## 2022-07-14 DIAGNOSIS — N50.811 PAIN IN RIGHT TESTICLE: Primary | ICD-10-CM

## 2022-07-14 DIAGNOSIS — N50.811 PAIN IN RIGHT TESTICLE: ICD-10-CM

## 2022-07-14 LAB — PSA SERPL-MCNC: <0.1 NG/ML (ref 0–4)

## 2022-07-14 PROCEDURE — 99203 OFFICE O/P NEW LOW 30 MIN: CPT | Performed by: NURSE PRACTITIONER

## 2022-07-14 PROCEDURE — 84153 ASSAY OF PSA TOTAL: CPT

## 2022-07-14 PROCEDURE — 1160F RVW MEDS BY RX/DR IN RCRD: CPT | Performed by: NURSE PRACTITIONER

## 2022-07-14 NOTE — ASSESSMENT & PLAN NOTE
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

## 2022-07-14 NOTE — PROGRESS NOTES
Assessment and plan:     Prostate cancer ([facility] [address] )  · Biopsy 7/18/18 High Volume High Risk Jennie 4+5=9 adenocarcinoma ( two cores 4+5=9, eight cores 4+4=8, 1 core 4+3=7)  · Completed radiation 2018  · Has not followed up with Urology since 2019  · PSA < 0 1 (05/17/2021)  · PSA now  · Follow-up 1 year, sooner if there is any PSA elevation    Pain in right testicle  · Schedule ultrasound          AKSHAT Moya    History of Present Illness     Angelica De La Vega is a 77 y o  new patient who followed with Dr Timothy Arevalo in the past  He presents today with his wife who speaks [place]  The patient is primarily [place]-speaking and also has dementia  The patient is primarily [place] speaking  Patient has relatively mild urinary symptoms  He presents today for right groin and testicular pain x 1 week,that has since resolved  He denies any urinary symptoms  He is sometimes incontinent of urine and bowel and wears a diaper  He was last evaluated in 2019 and has not returned since that time  He has a history of prostate cancer  During routine screening he was noted to have an elevated PSA at 14 4  It is unclear whether the patient has a family history of prostate cancer      Patient given induction ADT/prolia and underwent fiducial marker placement prior to XRT  This completed 12/3/18  He had multiple missed appointments with Urology in the interim; again his last follow-up with Urology was in 2019 despite recurrent recommendations for yearly follow-up due to high risk prostate cancer  I again reviewed with the wife today the importance of following up and recommend he go for PSA testing today  Thankfully his PSA 1 year ago was undetectable      Laboratory     Lab Results   Component Value Date    BUN 19 08/18/2021    CREATININE 0 69 08/18/2021       No components found for: GFR    Lab Results   Component Value Date    CALCIUM 9 4 08/18/2021    K 4 2 08/18/2021    CO2 26 08/18/2021     08/18/2021       Lab Results   Component Value Date    WBC 5 13 08/18/2021    HGB 12 2 08/18/2021    HCT 36 1 (L) 08/18/2021    MCV 90 08/18/2021     08/18/2021       Lab Results   Component Value Date    PSA <0 1 05/17/2021    PSA 0 2 03/11/2019    PSA 1 2 10/01/2018       No results found for this or any previous visit (from the past 1 hour(s))  @RESULT(URINEMICROSCOPIC)@    @RESULT(URINECULTURE)@    Radiology       Review of Systems     Review of Systems   Constitutional: Negative for activity change, appetite change, chills, fatigue, fever and unexpected weight change  HENT: Negative for facial swelling  Eyes: Negative for discharge  Respiratory: Negative  Negative for cough and shortness of breath  Cardiovascular: Negative for chest pain and leg swelling  Gastrointestinal: Negative  Negative for abdominal distention, abdominal pain, constipation, diarrhea, nausea and vomiting  Endocrine: Negative  Genitourinary: Positive for testicular pain  Negative for decreased urine volume, difficulty urinating, dysuria, enuresis, flank pain, frequency, genital sores, hematuria and urgency  Right groin/testicular pain    Musculoskeletal: Negative for back pain and myalgias  Skin: Negative for pallor and rash  Allergic/Immunologic: Negative  Negative for immunocompromised state  Neurological: Negative for facial asymmetry and speech difficulty  Psychiatric/Behavioral: Positive for confusion  Negative for agitation  Allergies     Allergies   Allergen Reactions    Other      seasonal       Physical Exam     Physical Exam  Vitals reviewed  Constitutional:       General: He is not in acute distress  Appearance: Normal appearance  He is not ill-appearing, toxic-appearing or diaphoretic  Comments: Thin   HENT:      Head: Normocephalic and atraumatic  Eyes:      General: No scleral icterus  Cardiovascular:      Rate and Rhythm: Normal rate     Pulmonary:      Effort: Pulmonary effort is normal  No respiratory distress  Abdominal:      General: Abdomen is flat  There is no distension  Palpations: Abdomen is soft  Tenderness: There is no abdominal tenderness  There is no guarding or rebound  Hernia: There is no hernia in the left inguinal area or right inguinal area  Genitourinary:     Penis: Circumcised  No hypospadias, erythema, tenderness, discharge, swelling or lesions  Testes:         Right: Mass or tenderness not present  Left: Mass or tenderness not present  Epididymis:      Right: Not inflamed  No tenderness  Left: Not inflamed  No tenderness  Comments: Atrophic testicles  No hernias were appreciated however patient had difficulty following direction to bear down or turn his head in cough  Musculoskeletal:         General: No swelling  Cervical back: Normal range of motion  Lymphadenopathy:      Lower Body: No right inguinal adenopathy  No left inguinal adenopathy  Skin:     General: Skin is warm and dry  Coloration: Skin is not jaundiced or pale  Findings: No rash  Neurological:      General: No focal deficit present  Mental Status: He is alert and oriented to person, place, and time  Gait: Gait normal    Psychiatric:         Mood and Affect: Mood normal       Comments: Difficult to assess patient; he kept pulling his pants up when trying to examine him    Wife assisted with exam  He is also incontinent of stool in the office         Vital Signs     Vitals:    07/14/22 0855   BP: 120/70   Weight: 46 3 kg (102 lb)   Height: 5' (1 524 m)       Current Medications       Current Outpatient Medications:     betamethasone dipropionate (DIPROSONE) 0 05 % cream, Apply topically 2 (two) times a day, Disp: 45 g, Rfl: 3    Blood Glucose Monitoring Suppl (ONETOUCH VERIO) w/Device KIT, by Does not apply route 2 (two) times a day, Disp: 1 kit, Rfl: 0    calcium carbonate (OS-CARMENZA) 600 MG tablet, Take 1 tablet (600 mg total) by mouth 2 (two) times a day with meals, Disp: , Rfl:     ezetimibe (ZETIA) 10 mg tablet, , Disp: , Rfl:     glucose blood (OneTouch Verio) test strip, Test two times daily DX: E11 9, Disp: 100 each, Rfl: 5    Lancets Micro Thin 33G MISC, Use 2 (two) times a day Test two times daily DX:E11 9, Disp: 100 each, Rfl: 3    levothyroxine 50 mcg tablet, TAKE 1 TABLET (50 MCG TOTAL) BY MOUTH DAILY ON EMPTY STOMACH, Disp: 90 tablet, Rfl: 1    simvastatin (ZOCOR) 80 mg tablet, Take 1 tablet (80 mg total) by mouth daily at bedtime, Disp: 90 tablet, Rfl: 1    bisacodyl (FLEET) 10 MG/30ML ENEM, Insert 30 mL (10 mg total) into the rectum once for 1 dose Day of biopsy, Disp: 30 mL, Rfl: 0    Janumet  MG per tablet, Take 1 tablet by mouth 2 (two) times a day with meals, Disp: 180 tablet, Rfl: 1    rosuvastatin (CRESTOR) 40 MG tablet, Take 40 mg by mouth daily, Disp: , Rfl:     Active Problems     Patient Active Problem List   Diagnosis    Type 2 diabetes mellitus without complication, without long-term current use of insulin (HCC)    Cervical pain    Ill-fitting dentures    Hearing difficulty of both ears    Prostate cancer ([address] )    Prostate nodule    Mixed hyperlipidemia    Primary hypothyroidism    Medicare annual wellness visit, subsequent    Diabetic peripheral neuropathy associated with type 2 diabetes mellitus (HCC)    Peripheral vascular disease with stasis dermatitis    Episodic confusion    Type 2 diabetes mellitus with diabetic peripheral angiopathy without gangrene ([address] )    Dementia without behavioral disturbance (HCC)    Pain in right testicle       Past Medical History     Past Medical History:   Diagnosis Date    Diabetes mellitus ([address] )     Elevated PSA     Hyperlipidemia     Prostate cancer [facility])        Surgical History     Past Surgical History:   Procedure Laterality Date    NECK SURGERY      PROSTATE BIOPSY  07/18/2018       Family History     Family History   Problem Relation Age of Onset    No Known Problems Mother     No Known Problems Father        Social History     Social History     Social History     Tobacco Use   Smoking Status Never Smoker   Smokeless Tobacco Never Used       Past Surgical History:   Procedure Laterality Date    NECK SURGERY      PROSTATE BIOPSY  07/18/2018         The following portions of the patient's history were reviewed and updated as appropriate: allergies, current medications, past family history, past medical history, past social history, past surgical history and problem list    Please note :  Voice dictation software has been used to create this document  There may be inadvertent transcription errors      [id number] [address]

## 2022-07-14 NOTE — TELEPHONE ENCOUNTER
Called and spoke with patients spouse Macie Marquez and made her aware of patients undetectable PSA result  She verbalized understanding

## 2022-07-17 ENCOUNTER — HOSPITAL ENCOUNTER (OUTPATIENT)
Dept: ULTRASOUND IMAGING | Facility: HOSPITAL | Age: 77
Discharge: HOME | End: 2022-07-17
Payer: COMMERCIAL

## 2022-07-17 DIAGNOSIS — N50.811 PAIN IN RIGHT TESTICLE: ICD-10-CM

## 2022-07-17 PROCEDURE — 76870 US EXAM SCROTUM: CPT

## 2022-07-21 NOTE — RESULT ENCOUNTER NOTE
Please let patient know the ultrasound of his scrotum and testicles was normal   I recommend he use scrotal support, ice and elevation as well as anti-inflammatories for pain

## 2022-07-22 ENCOUNTER — TELEPHONE (OUTPATIENT)
Dept: UROLOGY | Facility: CLINIC | Age: 77
End: 2022-07-22

## 2022-07-22 NOTE — TELEPHONE ENCOUNTER
----- [facility] at 7/21/2022  4:25 PM EDT -----  Please let patient know the ultrasound of his scrotum and testicles was normal   I recommend he use scrotal support, ice and elevation as well as anti-inflammatories for pain

## 2022-07-22 NOTE — TELEPHONE ENCOUNTER
Called the patient and spoke with his wife to review US results  Informed Maycol Gauthier her 's US is normal   Maycol Gauthier repots her  is doing well

## 2022-08-02 ENCOUNTER — TELEPHONE (OUTPATIENT)
Dept: GERIATRICS | Age: 77
End: 2022-08-02

## 2022-08-02 NOTE — TELEPHONE ENCOUNTER
Shannon Later [address], [address] [place] [address] [address] [facility], [address]    [address] [facility],[address]  [place], [address] [place] [address] [place]    [phone]    Telephone Intake: Geriatric Assessment     Referral source: AKSHAT Denny    Caller who is scheduling/relationship to pt: spouse, Ofelia Russo phone number: [phone]    Reason for referral: Patient concerns , Family member concerns and Provider concerns regarding memory concerns  If there are behavioral concerns, is the pt prescribed medications to manage these? no   If so, how many? none   Has the patient ever had an inpatient psychiatric hospitalization? No,    If the patient is prescribed medications for behavior management or has a history of psychiatric hospitalization, please DO NOT schedule  Please route to provider for review first    What is the goal of the visit? initial assessment & diagnosis, medication management     Has the patient been seen by a Neurologist or Geriatrician? No  Has the patient ever been diagnosed with dementia? Yes, but not offically      Preferred language? [place]  Highest education level? No official schooling  Does the patient wear glasses? Yes   Does the patient use hearing aids? No     Is there a living will/healthcare POA in place/If so, who? No,     Does the pt/caregiver have access for a virtual visit (computer/smart phone with audio/video)? Yes     Caller was informed: Please make sure the pt is accompanied by someone who knows them well / caregiver / family member to participate in this appointment  Who will accompany the pt (name and relationship)? Kylee Sinclair  Phone number of person accompanying pt: [phone]  If a pt plans to attend alone, please route a message to the assigned provider for approval      Office packet mailed out to: [address] [place] [address] Tony Billy Murphy [id number]    Added to wait list for sooner appointments?  Yes     NOTE FOR : If the pt was recently hospitalized, please route intake to assigned provider for chart review

## 2022-08-25 ENCOUNTER — APPOINTMENT (OUTPATIENT)
Dept: LAB | Facility: CLINIC | Age: 77
End: 2022-08-25
Payer: COMMERCIAL

## 2022-08-25 ENCOUNTER — OFFICE VISIT (OUTPATIENT)
Dept: FAMILY MEDICINE CLINIC | Facility: CLINIC | Age: 77
End: 2022-08-25

## 2022-08-25 VITALS
TEMPERATURE: 98.6 F | HEART RATE: 72 BPM | OXYGEN SATURATION: 97 % | BODY MASS INDEX: 20.07 KG/M2 | WEIGHT: 102.2 LBS | DIASTOLIC BLOOD PRESSURE: 60 MMHG | SYSTOLIC BLOOD PRESSURE: 100 MMHG | RESPIRATION RATE: 18 BRPM | HEIGHT: 60 IN

## 2022-08-25 DIAGNOSIS — E11.51 TYPE 2 DIABETES MELLITUS WITH DIABETIC PERIPHERAL ANGIOPATHY WITHOUT GANGRENE, WITHOUT LONG-TERM CURRENT USE OF INSULIN (HCC): Primary | ICD-10-CM

## 2022-08-25 DIAGNOSIS — F03.90 DEMENTIA WITHOUT BEHAVIORAL DISTURBANCE, UNSPECIFIED DEMENTIA TYPE: ICD-10-CM

## 2022-08-25 DIAGNOSIS — E11.9 TYPE 2 DIABETES MELLITUS WITHOUT COMPLICATION, WITHOUT LONG-TERM CURRENT USE OF INSULIN (HCC): ICD-10-CM

## 2022-08-25 DIAGNOSIS — E78.2 MIXED HYPERLIPIDEMIA: ICD-10-CM

## 2022-08-25 DIAGNOSIS — E03.9 PRIMARY HYPOTHYROIDISM: ICD-10-CM

## 2022-08-25 DIAGNOSIS — R63.0 APPETITE LOSS: ICD-10-CM

## 2022-08-25 DIAGNOSIS — E11.51 TYPE 2 DIABETES MELLITUS WITH DIABETIC PERIPHERAL ANGIOPATHY WITHOUT GANGRENE, WITHOUT LONG-TERM CURRENT USE OF INSULIN (HCC): ICD-10-CM

## 2022-08-25 PROBLEM — E11.42 DIABETIC PERIPHERAL NEUROPATHY ASSOCIATED WITH TYPE 2 DIABETES MELLITUS (HCC): Status: RESOLVED | Noted: 2021-03-18 | Resolved: 2022-08-25

## 2022-08-25 PROBLEM — Z00.00 MEDICARE ANNUAL WELLNESS VISIT, SUBSEQUENT: Status: RESOLVED | Noted: 2020-07-30 | Resolved: 2022-08-25

## 2022-08-25 LAB
CHOLEST SERPL-MCNC: 215 MG/DL
HDLC SERPL-MCNC: 38 MG/DL
LDLC SERPL CALC-MCNC: 151 MG/DL (ref 0–100)
SL AMB POCT HEMOGLOBIN AIC: 6.4 (ref ?–6.5)
T4 FREE SERPL-MCNC: 0.67 NG/DL (ref 0.76–1.46)
TRIGL SERPL-MCNC: 132 MG/DL
TSH SERPL DL<=0.05 MIU/L-ACNC: 7.39 UIU/ML (ref 0.45–4.5)

## 2022-08-25 PROCEDURE — 84443 ASSAY THYROID STIM HORMONE: CPT

## 2022-08-25 PROCEDURE — 3725F SCREEN DEPRESSION PERFORMED: CPT | Performed by: FAMILY MEDICINE

## 2022-08-25 PROCEDURE — 83036 HEMOGLOBIN GLYCOSYLATED A1C: CPT | Performed by: FAMILY MEDICINE

## 2022-08-25 PROCEDURE — 36415 COLL VENOUS BLD VENIPUNCTURE: CPT

## 2022-08-25 PROCEDURE — 99203 OFFICE O/P NEW LOW 30 MIN: CPT | Performed by: FAMILY MEDICINE

## 2022-08-25 PROCEDURE — 3044F HG A1C LEVEL LT 7.0%: CPT | Performed by: FAMILY MEDICINE

## 2022-08-25 PROCEDURE — 1123F ACP DISCUSS/DSCN MKR DOCD: CPT | Performed by: FAMILY MEDICINE

## 2022-08-25 PROCEDURE — 3044F HG A1C LEVEL LT 7.0%: CPT | Performed by: NURSE PRACTITIONER

## 2022-08-25 PROCEDURE — 80061 LIPID PANEL: CPT

## 2022-08-25 PROCEDURE — 84439 ASSAY OF FREE THYROXINE: CPT

## 2022-08-25 RX ORDER — SITAGLIPTIN AND METFORMIN HYDROCHLORIDE 500; 50 MG/1; MG/1
1 TABLET, FILM COATED ORAL 2 TIMES DAILY WITH MEALS
Qty: 180 TABLET | Refills: 1 | Status: SHIPPED | OUTPATIENT
Start: 2022-08-25 | End: 2022-11-23

## 2022-08-25 RX ORDER — ROSUVASTATIN CALCIUM 40 MG/1
40 TABLET, COATED ORAL DAILY
Qty: 30 TABLET | Refills: 11 | Status: SHIPPED | OUTPATIENT
Start: 2022-08-25 | End: 2023-08-25

## 2022-08-25 RX ORDER — LEVOTHYROXINE SODIUM 0.05 MG/1
50 TABLET ORAL DAILY
Qty: 90 TABLET | Refills: 1 | Status: SHIPPED | OUTPATIENT
Start: 2022-08-25 | End: 2022-08-30

## 2022-08-25 NOTE — PROGRESS NOTES
[facility]   [address] , [address], [address]  Phone#  [phone]  Fax#  [phone]    ASSESSMENT and PLAN  Mixed hyperlipidemia  · Last Lipid Panel 8/18/2021: 8/18/2021: Cholesterol 144, TG's-181, HDL-35, LDL-73  · Home medications: Atorvastatin 40 mg  · Will continue home medications and recheck Lipid Panel    Type 2 diabetes mellitus without complication, without long-term current use of insulin (HCC)    Lab Results   Component Value Date    HGBA1C 6 4 08/25/2022   · Previously was following Dr Hosea Gilmore of [place] [address] Endocrinology  · Has not been on medication for 2 months and A1C today was 6 4   · Micro/alb ordered today  · Diabetic Foot exam deferred today  · Pneumococcal UTD  · IRIS exam next visit   · Will restart Janumet  BID  If a1c continues to improve at next visit will decrease medication    D[facility] without [facility] disturbance ([facility]  · Concerns for dementia by previous PCP  · Will refer to Geriatrics for formal memory assessment and evaluation    Primary hypothyroidism  · Last TSH 8/18/2021: WNL  · Will repeat TSH as patients wife states he has been significantly colder than normal  · Will continue Levothyroxine 50 mcg for now    Diagnoses and all orders for this visit:    Type 2 diabetes mellitus with diabetic peripheral angiopathy without gangrene, without long-term current use of insulin (HCC)  -     POCT hemoglobin A1c  -     Microalbumin / creatinine urine ratio  -     Lipid Panel with Direct LDL reflex; Future    Primary hypothyroidism  -     TSH, 3rd generation with Free T4 reflex; Future  -     levothyroxine 50 mcg tablet; Take 1 tablet (50 mcg total) by mouth daily On empty stomach    Type 2 diabetes mellitus without complication, without long-term current use of insulin (HCC)  -     Janumet  MG per tablet;  Take 1 tablet by mouth 2 (two) times a day with meals    Dementia without behavioral disturbance, unspecified dementia type ([facility] )  - Ambulatory Referral to Senior Care; Future    Mixed hyperlipidemia  -     rosuvastatin (CRESTOR) 40 MG tablet; Take 1 tablet (40 mg total) by mouth daily    Appetite loss  -     Nutritional Supplements (Glucerna Shake) LIQD; Take 1 Bottle by mouth 2 (two) times a day        HISTORY OF PRESENT ILLNESS  Audrey Mccord is a 69 y o  male with a significant PMHx of DMII, Hypothyroidism, Dementia, PVD, History of Prostate Cancer (followswith Urology) who presents to the clinic as a new patient and for management of their chronic medical conditions  Patient was previously seen by Dr Edel Haq however lost insurance 2 months ago and since has stopped all medications  Smoking/Alcohol/Illicit Drug Use: denies all     REVIEW OF SYSTEMS  Review of Systems   Constitutional: Negative for chills, fatigue, fever and unexpected weight change  HENT: Negative for congestion, rhinorrhea, sinus pressure and sore throat  Eyes: Negative for visual disturbance  Respiratory: Negative for cough, chest tightness, shortness of breath and wheezing  Cardiovascular: Negative for chest pain  Gastrointestinal: Negative for abdominal distention, abdominal pain, blood in stool, constipation, diarrhea, nausea and vomiting  Genitourinary: Negative for difficulty urinating, dysuria, frequency, hematuria and urgency  Musculoskeletal: Negative for back pain and neck pain  Skin: Negative for rash  Allergic/Immunologic: Negative  Neurological: Negative for dizziness, weakness, light-headedness, numbness and headaches  Psychiatric/Behavioral: Negative for behavioral problems       PAST MEDICAL HISTORY   Past Medical History:   Diagnosis Date    Alzheimer disease type 3 ([facility] )     [facility] mellitus [facility]     Diabetic peripheral neuropathy associated with type 2 diabetes mellitus ([facility] 3/18/2021    Disease of thyroid gland     Elevated PSA     Hyperlipidemia     Medicare annual wellness visit, subsequent 7/30/2020    Prostate cancer ([facility] )     Type 2 diabetes mellitus with diabetic peripheral angiopathy without gangrene ([facility] ) 5/17/2021    According to ICD10 guidelines, patient accept     Past Surgical History:   Procedure Laterality Date    NECK SURGERY      PROSTATE BIOPSY  07/18/2018     Social History     Socioeconomic History    Marital status: /Civil Union     Spouse name: Not on file    Number of children: Not on file    Years of education: Not on file    Highest education level: Not on file   Occupational History    Occupation:    retired   Tobacco Use    Smoking status: Never Smoker    Smokeless tobacco: Never Used   Substance and Sexual Activity    Alcohol use: No    Drug use: No    Sexual activity: Not Currently   Other Topics Concern    Not on file   Social History Narrative    Not on file     Social Determinants of Health     Financial Resource Strain: Low Risk     Difficulty of Paying Living Expenses: Not hard at all   Food Insecurity: No Food Insecurity    Worried About [address] in the Last Year: Never true    [address] [employer] in the Last Year: Never true   Transportation Needs: No Transportation Needs    Lack of Transportation (Medical): No    Lack of Transportation (Non-Medical):  No   Physical Activity: Not on file   Stress: Not on file   Social Connections: Not on file   Intimate Partner Violence: Not on file   Housing Stability: Not on file     Family History   Problem Relation Age of Onset    No Known Problems Mother     No Known Problems Father     Dementia Sister     Autism Daughter        MEDICATIONS    Current Outpatient Medications:     Janumet  MG per tablet, Take 1 tablet by mouth 2 (two) times a day with meals, Disp: 180 tablet, Rfl: 1    levothyroxine 50 mcg tablet, Take 1 tablet (50 mcg total) by mouth daily On empty stomach, Disp: 90 tablet, Rfl: 1    Nutritional Supplements (Glucerna Shake) LIQD, Take 1 Bottle by mouth 2 (two) times a day, Disp: 237 mL, Rfl: 20    rosuvastatin (CRESTOR) 40 MG tablet, Take 1 tablet (40 mg total) by mouth daily, Disp: 30 tablet, Rfl: 11    betamethasone dipropionate (DIPROSONE) 0 05 % cream, Apply topically 2 (two) times a day, Disp: 45 g, Rfl: 3    bisacodyl (FLEET) 10 MG/30ML ENEM, Insert 30 mL (10 mg total) into the rectum once for 1 dose Day of biopsy, Disp: 30 mL, Rfl: 0    Blood Glucose Monitoring Suppl (ONETOUCH VERIO) w/Device KIT, by Does not apply route 2 (two) times a day, Disp: 1 kit, Rfl: 0    calcium carbonate (OS-CARMENZA) 600 MG tablet, Take 1 tablet (600 mg total) by mouth 2 (two) times a day with meals, Disp: , Rfl:     ezetimibe (ZETIA) 10 mg tablet, , Disp: , Rfl:     glucose blood (OneTouch Verio) test strip, Test two times daily DX: E11 9, Disp: 100 each, Rfl: 5    Lancets Micro Thin 33G MISC, Use 2 (two) times a day Test two times daily DX:E11 9, Disp: 100 each, Rfl: 3    PHYSICAL EXAM  Vitals:    08/25/22 1029   BP: 100/60   BP Location: Right arm   Patient Position: Sitting   Cuff Size: Standard   Pulse: 72   Resp: 18   Temp: 98 6 °F (37 °C)   TempSrc: Temporal   SpO2: 97%   Weight: 46 4 kg (102 lb 3 2 oz)   Height: 4' 11" (1 499 m)     Wt Readings from Last 3 Encounters:   08/25/22 46 4 kg (102 lb 3 2 oz)   07/14/22 46 3 kg (102 lb)   10/20/21 55 3 kg (122 lb)    , Body mass index is 20 64 kg/m²  Physical Exam  Vitals and nursing note reviewed  Constitutional:       General: He is not in acute distress  Appearance: He is well-developed  He is not toxic-appearing  HENT:      Head: Normocephalic and atraumatic  Eyes:      Extraocular Movements: Extraocular movements intact  Pupils: Pupils are equal, round, and reactive to light  Cardiovascular:      Rate and Rhythm: Normal rate and regular rhythm  Heart sounds: Normal heart sounds  No murmur heard  Pulmonary:      Effort: Pulmonary effort is normal  No respiratory distress  Breath sounds: Normal breath sounds   No wheezing, rhonchi or rales  Abdominal:      General: Bowel sounds are normal  There is no distension  Palpations: Abdomen is soft  Tenderness: There is no abdominal tenderness  There is no guarding  Musculoskeletal:         General: Normal range of motion  Cervical back: Normal range of motion and neck supple  Skin:     General: Skin is warm and dry  Neurological:      Mental Status: He is alert and oriented to person, place, and time  Psychiatric:         Behavior: Behavior normal          LABS/IMAGING  Hemoglobin A1C   Date Value Ref Range Status   08/25/2022 6 4 6 5 Final   08/18/2021 6 9 (H) Normal 3 8-5 6%; PreDiabetic 5 7-6 4%; Diabetic >=6 5%; Glycemic control for adults with diabetes <7 0% % Final     HDL, Direct   Date Value Ref Range Status   08/18/2021 35 (L) >=40 mg/dL Final     Comment:     HDL Cholesterol:       Low     <41 mg/dL  Specimen collection should occur prior to Metamizole administration due to the potential for falsley depressed results  Triglycerides   Date Value Ref Range Status   08/18/2021 181 (H) <=150 mg/dL Final     Comment:     Triglyceride:     Normal          <150 mg/dl     Borderline High 150-199 mg/dl     High            200-499 mg/dl        Very High       >499 mg/dl    Specimen collection should occur prior to N-Acetylcysteine or Metamizole administration due to the potential for falsely depressed results        WBC   Date Value Ref Range Status   08/18/2021 5 13 4 31 - 10 16 Thousand/uL Final   05/17/2021 5 17 4 31 - 10 16 Thousand/uL Final   10/01/2018 5 30 4 31 - 10 16 Thousand/uL Final     Hemoglobin   Date Value Ref Range Status   08/18/2021 12 2 12 0 - 17 0 g/dL Final   05/17/2021 12 4 12 0 - 17 0 g/dL Final   10/01/2018 13 3 12 0 - 17 0 g/dL Final     Platelets   Date Value Ref Range Status   08/18/2021 238 149 - 390 Thousands/uL Final   05/17/2021 276 149 - 390 Thousands/uL Final   10/01/2018 275 149 - 390 Thousands/uL Final     Potassium   Date Value Ref Range Status   08/18/2021 4 2 3 5 - 5 3 mmol/L Final   03/12/2021 3 8 3 5 - 5 3 mmol/L Final   07/02/2020 4 1 3 5 - 5 3 mmol/L Final     Chloride   Date Value Ref Range Status   08/18/2021 107 100 - 108 mmol/L Final   03/12/2021 106 100 - 108 mmol/L Final   07/02/2020 102 100 - 108 mmol/L Final     CO2   Date Value Ref Range Status   08/18/2021 26 21 - 32 mmol/L Final   03/12/2021 29 21 - 32 mmol/L Final   07/02/2020 27 21 - 32 mmol/L Final     BUN   Date Value Ref Range Status   08/18/2021 19 5 - 25 mg/dL Final   03/12/2021 16 5 - 25 mg/dL Final   07/02/2020 19 5 - 25 mg/dL Final     Creatinine   Date Value Ref Range Status   08/18/2021 0 69 0 60 - 1 30 mg/dL Final     Comment:     Standardized to IDMS reference method   03/12/2021 0 73 0 60 - 1 30 mg/dL Final     Comment:     Standardized to IDMS reference method   07/02/2020 0 93 0 60 - 1 30 mg/dL Final     Comment:     Standardized to IDMS reference method     eGFR   Date Value Ref Range Status   08/18/2021 93 ml/min/1 73sq m Final   03/12/2021 91 ml/min/1 73sq m Final   07/02/2020 81 ml/min/1 73sq m Final     Calcium   Date Value Ref Range Status   08/18/2021 9 4 8 3 - 10 1 mg/dL Final   03/12/2021 9 2 8 3 - 10 1 mg/dL Final   07/02/2020 9 2 8 3 - 10 1 mg/dL Final     AST   Date Value Ref Range Status   08/18/2021 26 5 - 45 U/L Final     Comment:     Specimen collection should occur prior to Sulfasalazine administration due to the potential for falsely depressed results  07/02/2020 26 5 - 45 U/L Final     Comment:       Specimen collection should occur prior to Sulfasalazine administration due to the potential for falsely depressed results  04/16/2018 21 5 - 45 U/L Final     Comment:       Specimen collection should occur prior to Sulfasalazine administration due to the potential for falsely depressed results        ALT   Date Value Ref Range Status   08/18/2021 25 12 - 78 U/L Final     Comment:     Specimen collection should occur prior to Sulfasalazine and/or Sulfapyridine administration due to the potential for falsely depressed results  07/02/2020 31 12 - 78 U/L Final     Comment:       Specimen collection should occur prior to Sulfasalazine administration due to the potential for falsely depressed results  04/16/2018 23 12 - 78 U/L Final     Comment:       Specimen collection should occur prior to Sulfasalazine administration due to the potential for falsely depressed results  Alkaline Phosphatase   Date Value Ref Range Status   08/18/2021 122 (H) 46 - 116 U/L Final   07/02/2020 104 46 - 116 U/L Final   04/16/2018 121 (H) 46 - 116 U/L Final     No results found for: TSH    This note has been dictated using BOND software  It may contain errors, including improperly dictated words  Please contact physician directly for any questions       Mckenna Luevano MD   PGY-3

## 2022-08-30 DIAGNOSIS — E03.9 PRIMARY HYPOTHYROIDISM: Primary | ICD-10-CM

## 2022-08-30 RX ORDER — LEVOTHYROXINE SODIUM 0.07 MG/1
75 TABLET ORAL DAILY
Qty: 60 TABLET | Refills: 0 | Status: SHIPPED | OUTPATIENT
Start: 2022-08-30

## 2022-08-30 NOTE — RESULT ENCOUNTER NOTE
Discussed at length Harvinder's results with his wife Mari Gipson  Will increase his Levothyroxine to 75 mcg from 50 mcg  He was taking 50 every day without missing a dose prior to seeing him in the office  Cholesterol is elevated   Will continue Atorvastatin 40 mg

## 2022-10-06 ENCOUNTER — RA CDI HCC (OUTPATIENT)
Dept: OTHER | Facility: HOSPITAL | Age: 77
End: 2022-10-06

## 2022-10-06 ENCOUNTER — OFFICE VISIT (OUTPATIENT)
Dept: FAMILY MEDICINE CLINIC | Facility: CLINIC | Age: 77
End: 2022-10-06

## 2022-10-06 VITALS
RESPIRATION RATE: 16 BRPM | HEIGHT: 60 IN | TEMPERATURE: 96.6 F | HEART RATE: 60 BPM | OXYGEN SATURATION: 95 % | SYSTOLIC BLOOD PRESSURE: 98 MMHG | BODY MASS INDEX: 19.63 KG/M2 | DIASTOLIC BLOOD PRESSURE: 60 MMHG | WEIGHT: 100 LBS

## 2022-10-06 DIAGNOSIS — E03.9 PRIMARY HYPOTHYROIDISM: Primary | ICD-10-CM

## 2022-10-06 DIAGNOSIS — E11.9 TYPE 2 DIABETES MELLITUS WITHOUT COMPLICATION, WITHOUT LONG-TERM CURRENT USE OF INSULIN (HCC): ICD-10-CM

## 2022-10-06 DIAGNOSIS — F03.90 DEMENTIA WITHOUT BEHAVIORAL DISTURBANCE (HCC): ICD-10-CM

## 2022-10-06 DIAGNOSIS — F51.01 PRIMARY INSOMNIA: ICD-10-CM

## 2022-10-06 DIAGNOSIS — E78.2 MIXED HYPERLIPIDEMIA: ICD-10-CM

## 2022-10-06 DIAGNOSIS — Z00.00 HEALTHCARE MAINTENANCE: ICD-10-CM

## 2022-10-06 DIAGNOSIS — L85.3 DRY SKIN DERMATITIS: ICD-10-CM

## 2022-10-06 PROBLEM — M54.2 CERVICAL PAIN: Status: RESOLVED | Noted: 2018-04-16 | Resolved: 2022-10-06

## 2022-10-06 PROCEDURE — 99214 OFFICE O/P EST MOD 30 MIN: CPT | Performed by: FAMILY MEDICINE

## 2022-10-06 RX ORDER — LANOLIN ALCOHOL/MO/W.PET/CERES
3 CREAM (GRAM) TOPICAL
Qty: 90 TABLET | Refills: 0 | Status: SHIPPED | OUTPATIENT
Start: 2022-10-06

## 2022-10-06 NOTE — ASSESSMENT & PLAN NOTE
Lab Results   Component Value Date    HGBA1C 6 4 08/25/2022   · Previously following [facility] endocrine  · Microalbumin/creatinine ratio ordered and I performed  Advised to please obtain  · Diabetic foot exam,IRIS next visit  · Pneumococcal vaccination up-to-date  · Continue Janumet  mg b i d    · Will recheck A1c at next visit

## 2022-10-06 NOTE — ASSESSMENT & PLAN NOTE
· Last Lipid Panel8/2022: Cholesterol-215, TG's-132, HDL-38, LDL-151  · Home medications: Atorvastatin 40 mg  · Continue home medications

## 2022-10-06 NOTE — ASSESSMENT & PLAN NOTE
· deferred flu/pneumococcal vaccine  · Not interested in [place] vaccination  · Medicare annual wellness visit at next visit

## 2022-10-06 NOTE — PROGRESS NOTES
Name: Sari Gonsales      : 1945      MRN: [id number]  Encounter Provider: Army David MD  Encounter Date: 10/6/2022   Encounter department: [address] [place] [address] Ave     1  Primary hypothyroidism  Assessment & Plan:  · Improvement of symptoms since last visit specifically cold intolerance  · Last TSH 2022: 7 39  · Increase levothyroxine to 75 mcg after last blood draw secondary to hypothyroid level in a setting of compliant medication use   · Continue levothyroxine 75 mcg  · Will check TSH is 2 weeks    Orders:  -     TSH, 3rd generation with Free T4 reflex; Future    2  Dry skin dermatitis  Comments:  advised to use Aveeno moisturizer BID specifically after showering as the colder months enuse  Orders:  -     diphenhydrAMINE (BENADRYL) 2 % cream; Apply topically 3 (three) times a day as needed for itching  -     Ambulatory Referral to Dermatology; Future    3  Primary insomnia  -     melatonin 3 mg; Take 1 tablet (3 mg total) by mouth daily at bedtime    4  Healthcare maintenance  Assessment & Plan:  · deferred flu/pneumococcal vaccine  · Not interested in COVID vaccination  · Medicare annual wellness visit at next visit      5  Dementia without behavioral disturbance [facility])  Assessment & Plan:  · Concerns for dementia by previous PCP  · Referred to Geriatrics for formal memory assessment and evaluation-will try message and schedule appointment for patient      6  Type 2 diabetes mellitus without complication, without long-term current use of insulin [facility])  Assessment & Plan:    Lab Results   Component Value Date    HGBA1C 6 4 2022   · Previously following [place] endocrine  · Microalbumin/creatinine ratio ordered and I performed  Advised to please obtain  · Diabetic foot exam,IRIS next visit  · Pneumococcal vaccination up-to-date  · Continue Janumet  mg b i d  · Will recheck A1c at next visit      7   Mixed hyperlipidemia  Assessment & Plan:  · Last Lipid Panel8/2022: Cholesterol-215, TG's-132, HDL-38, LDL-151  · Home medications: Atorvastatin 40 mg  · Continue home medications        Depression Screening and Follow-up Plan: Patient was screened for depression during today's encounter  They screened negative with a PHQ-2 score of 0  Roxanna Castellano is a 69 y o  male with a significant PMHx of diabetes mellitus type 2, prostate cancer, hyperlipidemia, hypothyroidism  who presents to the clinic for  management of their chronic medical conditions  Patient's medical conditions are stable unless noted otherwise above  Per wife, patient has: Tolerance has improved after thyroid medication has improved  He is concerned about dry skin at today's visit  Patient has not had any recent hospitalizations, or medical emergencies since last visit  Patient has no further complaints other than what is mentioned in the ROS  Review of Systems   Constitutional: Negative for chills, fatigue, fever and unexpected weight change  HENT: Negative for congestion, rhinorrhea, sinus pressure and sore throat  Eyes: Negative for visual disturbance  Respiratory: Negative for cough, chest tightness, shortness of breath and wheezing  Cardiovascular: Negative for chest pain  Gastrointestinal: Negative for abdominal distention, abdominal pain, blood in stool, constipation, diarrhea, nausea and vomiting  Genitourinary: Negative for difficulty urinating, dysuria, frequency, hematuria and urgency  Musculoskeletal: Negative for back pain and neck pain  Skin: Negative for rash  Allergic/Immunologic: Negative  Neurological: Negative for dizziness, weakness, light-headedness, numbness and headaches  Psychiatric/Behavioral: Negative for behavioral problems         Current Outpatient Medications on File Prior to Visit   Medication Sig    levothyroxine (Synthroid) 75 mcg tablet Take 1 tablet (75 mcg total) by mouth daily    Blood Glucose Monitoring Suppl ([facility]) w/Device KIT by Does not apply route 2 (two) times a day    calcium carbonate (OS-CARMENZA) 600 MG tablet Take 1 tablet (600 mg total) by mouth 2 (two) times a day with meals    ezetimibe (ZETIA) 10 mg tablet     glucose blood (OneTouch Verio) test strip Test two times daily DX: E11 9    Janumet  MG per tablet Take 1 tablet by mouth 2 (two) times a day with meals    Lancets Micro Thin 33G MISC Use 2 (two) times a day Test two times daily DX:E11 9    Nutritional Supplements (Glucerna Shake) LIQD Take 1 Bottle by mouth 2 (two) times a day    rosuvastatin (CRESTOR) 40 MG tablet Take 1 tablet (40 mg total) by mouth daily    [DISCONTINUED] betamethasone dipropionate (DIPROSONE) 0 05 % cream Apply topically 2 (two) times a day    [DISCONTINUED] bisacodyl (FLEET) 10 MG/30ML ENEM Insert 30 mL (10 mg total) into the rectum once for 1 dose Day of biopsy       Objective     BP 98/60 (BP Location: Right arm, Patient Position: Sitting, Cuff Size: Standard)   Pulse 60   Temp (!) 96 6 °F (35 9 °C) (Temporal)   Resp 16   Ht 4' 11" (1 499 m)   Wt 45 4 kg (100 lb)   SpO2 95%   BMI 20 20 kg/m²     Physical Exam  Vitals and nursing note reviewed  Constitutional:       General: He is not in acute distress  Appearance: He is well-developed  He is not toxic-appearing  HENT:      Head: Normocephalic and atraumatic  Eyes:      Extraocular Movements: Extraocular movements intact  Pupils: Pupils are equal, round, and reactive to light  Cardiovascular:      Rate and Rhythm: Normal rate and regular rhythm  Heart sounds: Normal heart sounds  No murmur heard  Pulmonary:      Effort: Pulmonary effort is normal  No respiratory distress  Breath sounds: Normal breath sounds  No wheezing, rhonchi or rales  Abdominal:      General: Bowel sounds are normal  There is no distension  Palpations: Abdomen is soft  Tenderness: There is no abdominal tenderness   There is no guarding  Musculoskeletal:         General: Normal range of motion  Cervical back: Normal range of motion and neck supple  Skin:     General: Skin is dry  Findings: Rash present  Neurological:      Mental Status: He is alert and oriented to person, place, and time     Psychiatric:         Behavior: Behavior normal        Mily Glass MD

## 2022-10-06 NOTE — ASSESSMENT & PLAN NOTE
· Improvement of symptoms since last visit specifically cold intolerance  · Last TSH 8/2022: 7 39  · Increase levothyroxine to 75 mcg after last blood draw secondary to hypothyroid level in a setting of compliant medication use   · Continue levothyroxine 75 mcg  · Will check TSH is 2 weeks

## 2022-10-06 NOTE — ASSESSMENT & PLAN NOTE
· Concerns for dementia by previous PCP  · Referred to Geriatrics for formal memory assessment and evaluation-will try message and schedule appointment for patient

## 2022-10-06 NOTE — PROGRESS NOTES
[facility] [address]  coding opportunities       Chart reviewed, no opportunity found: CHART REVIEWED, NO OPPORTUNITY FOUND        Patients Insurance     Medicare Insurance: [employer]

## 2022-10-19 ENCOUNTER — TELEPHONE (OUTPATIENT)
Dept: FAMILY MEDICINE CLINIC | Facility: CLINIC | Age: 77
End: 2022-10-19

## 2022-10-19 NOTE — TELEPHONE ENCOUNTER
Pt wife called the nurse line wanting to know if possible for pt to get recheck again  I called pt wife to clarify message, per wife on 7/21/22 pt had an US of his scrotum due to pain/swelling on the area, swelling has come back per pt wife is on the right side near the scrotum, per wife pt is having frequent urination, pt has a hx of prostate cancer and because of it she is concerned about it  Pt wife also asking in regards of a recent request she had previously made for a wheelchair, shower chair, wipes, and diapers      Pt spouse requesting an appt for 10/24/22 pt schd for an OVS

## 2022-11-01 PROBLEM — N40.2 PROSTATE NODULE: Status: RESOLVED | Noted: 2018-06-12 | Resolved: 2022-01-01

## 2022-11-01 PROBLEM — I87.2 PERIPHERAL VASCULAR DISEASE WITH STASIS DERMATITIS: Status: RESOLVED | Noted: 2021-03-18 | Resolved: 2022-11-01

## 2022-11-01 PROBLEM — E03.9 PRIMARY HYPOTHYROIDISM: Chronic | Status: ACTIVE | Noted: 2020-07-30

## 2022-11-01 PROBLEM — E11.9 TYPE 2 DIABETES MELLITUS WITHOUT COMPLICATION, WITHOUT LONG-TERM CURRENT USE OF INSULIN (HCC): Chronic | Status: ACTIVE | Noted: 2018-04-16

## 2022-11-01 PROBLEM — C61 PROSTATE CANCER (HCC): Chronic | Status: ACTIVE | Noted: 2018-04-17

## 2022-11-01 PROBLEM — Z00.00 HEALTHCARE MAINTENANCE: Status: RESOLVED | Noted: 2022-10-06 | Resolved: 2022-11-01

## 2022-11-01 PROBLEM — E11.9 TYPE 2 DIABETES MELLITUS WITHOUT COMPLICATION, WITHOUT LONG-TERM CURRENT USE OF INSULIN (HCC): Status: RESOLVED | Noted: 2018-04-16 | Resolved: 2022-11-01

## 2022-11-01 PROBLEM — E78.2 MIXED HYPERLIPIDEMIA: Chronic | Status: ACTIVE | Noted: 2018-12-19

## 2022-11-01 PROBLEM — N50.811 PAIN IN RIGHT TESTICLE: Status: RESOLVED | Noted: 2022-01-01 | Resolved: 2022-01-01

## 2022-11-01 PROBLEM — R41.0 EPISODIC CONFUSION: Status: RESOLVED | Noted: 2021-05-17 | Resolved: 2022-11-01

## 2022-11-01 PROBLEM — F03.90 DEMENTIA WITHOUT BEHAVIORAL DISTURBANCE (HCC): Chronic | Status: ACTIVE | Noted: 2021-10-20

## 2022-11-02 ENCOUNTER — TELEPHONE (OUTPATIENT)
Dept: GERIATRICS | Age: 77
End: 2022-11-02

## 2022-11-02 NOTE — TELEPHONE ENCOUNTER
Patient no showed for a consult  appointment (11/2/22)  I called spouse, Darryl Key and she advised she thought the appointment was for [employer]  Annetta Hashimoto advised she needs someone to speak Telugu  I called Annetta Hashimoto back with a  to reschedule appointment  Annetta Hashimoto advised patient has an appointment with a neurologist in January  At this time patient's spouse, Annetta Hashimoto does not want to reschedule  If she needs our services she will call back     I cancelled virtual  Appointment (11/30/22)

## 2022-12-19 ENCOUNTER — TELEPHONE (OUTPATIENT)
Dept: FAMILY MEDICINE CLINIC | Facility: CLINIC | Age: 77
End: 2022-12-19

## 2022-12-19 DIAGNOSIS — E11.9 TYPE 2 DIABETES MELLITUS WITHOUT COMPLICATION, WITHOUT LONG-TERM CURRENT USE OF INSULIN (HCC): ICD-10-CM

## 2022-12-19 RX ORDER — BLOOD SUGAR DIAGNOSTIC
STRIP MISCELLANEOUS
Qty: 100 EACH | Refills: 5 | Status: SHIPPED | OUTPATIENT
Start: 2022-12-19

## 2022-12-19 RX ORDER — BLOOD-GLUCOSE METER
EACH MISCELLANEOUS 2 TIMES DAILY
Qty: 1 KIT | Refills: 0 | Status: SHIPPED | OUTPATIENT
Start: 2022-12-19

## 2022-12-19 RX ORDER — LANCETS 33 GAUGE
EACH MISCELLANEOUS 2 TIMES DAILY
Qty: 100 EACH | Refills: 3 | Status: SHIPPED | OUTPATIENT
Start: 2022-12-19

## 2022-12-30 ENCOUNTER — HOSPITAL ENCOUNTER (INPATIENT)
Facility: HOSPITAL | Age: 77
LOS: 2 days | Discharge: HOME HEALTH | End: 2023-01-01
Attending: EMERGENCY MEDICINE | Admitting: FAMILY MEDICINE

## 2022-12-30 DIAGNOSIS — E11.9 TYPE 2 DIABETES MELLITUS WITHOUT COMPLICATION, WITHOUT LONG-TERM CURRENT USE OF INSULIN (HCC): Chronic | ICD-10-CM

## 2022-12-30 DIAGNOSIS — R64 CACHEXIA (HCC): ICD-10-CM

## 2022-12-30 DIAGNOSIS — F03.90 DEMENTIA WITHOUT BEHAVIORAL DISTURBANCE (HCC): Chronic | ICD-10-CM

## 2022-12-30 DIAGNOSIS — R73.9 HYPERGLYCEMIA: Primary | ICD-10-CM

## 2022-12-30 DIAGNOSIS — N39.0 UTI (URINARY TRACT INFECTION): ICD-10-CM

## 2022-12-30 DIAGNOSIS — N17.9 AKI (ACUTE KIDNEY INJURY) (HCC): ICD-10-CM

## 2022-12-30 PROBLEM — R77.8 ELEVATED TROPONIN: Status: ACTIVE | Noted: 2022-01-01

## 2022-12-30 PROBLEM — E87.0 HYPERNATREMIA: Status: ACTIVE | Noted: 2022-12-30

## 2022-12-30 LAB
ANION GAP SERPL CALCULATED.3IONS-SCNC: 11 MMOL/L (ref 5–14)
ANION GAP SERPL CALCULATED.3IONS-SCNC: 6 MMOL/L (ref 5–14)
ANION GAP SERPL CALCULATED.3IONS-SCNC: 9 MMOL/L (ref 5–14)
ATRIAL RATE: 108 BPM
BASOPHILS # BLD AUTO: 0.03 THOUSANDS/ÂΜL (ref 0–0.1)
BASOPHILS NFR BLD AUTO: 0 % (ref 0–1)
BUN SERPL-MCNC: 65 MG/DL (ref 5–25)
BUN SERPL-MCNC: 69 MG/DL (ref 5–25)
BUN SERPL-MCNC: 75 MG/DL (ref 5–25)
CALCIUM SERPL-MCNC: 8.8 MG/DL (ref 8.4–10.2)
CALCIUM SERPL-MCNC: 9.4 MG/DL (ref 8.4–10.2)
CALCIUM SERPL-MCNC: 9.8 MG/DL (ref 8.4–10.2)
CARDIAC TROPONIN I PNL SERPL HS: 21 NG/L (ref 8–18)
CARDIAC TROPONIN I PNL SERPL HS: 23 NG/L (ref 8–18)
CHLORIDE SERPL-SCNC: 123 MMOL/L (ref 96–108)
CHLORIDE SERPL-SCNC: 128 MMOL/L (ref 96–108)
CHLORIDE SERPL-SCNC: 130 MMOL/L (ref 96–108)
CO2 SERPL-SCNC: 24 MMOL/L (ref 21–32)
CO2 SERPL-SCNC: 26 MMOL/L (ref 21–32)
CO2 SERPL-SCNC: 26 MMOL/L (ref 21–32)
CREAT SERPL-MCNC: 1.86 MG/DL (ref 0.7–1.5)
CREAT SERPL-MCNC: 2.26 MG/DL (ref 0.7–1.5)
CREAT SERPL-MCNC: 2.42 MG/DL (ref 0.7–1.5)
EOSINOPHIL # BLD AUTO: 0.05 THOUSAND/ÂΜL (ref 0–0.61)
EOSINOPHIL NFR BLD AUTO: 1 % (ref 0–6)
ERYTHROCYTE [DISTWIDTH] IN BLOOD BY AUTOMATED COUNT: 15.5 % (ref 11.6–15.1)
GFR SERPL CREATININE-BSD FRML MDRD: 25 ML/MIN/1.73SQ M
GFR SERPL CREATININE-BSD FRML MDRD: 27 ML/MIN/1.73SQ M
GFR SERPL CREATININE-BSD FRML MDRD: 34 ML/MIN/1.73SQ M
GLUCOSE SERPL-MCNC: 149 MG/DL (ref 65–140)
GLUCOSE SERPL-MCNC: 155 MG/DL (ref 70–99)
GLUCOSE SERPL-MCNC: 189 MG/DL (ref 70–99)
GLUCOSE SERPL-MCNC: 299 MG/DL (ref 65–140)
GLUCOSE SERPL-MCNC: 621 MG/DL (ref 70–99)
GLUCOSE SERPL-MCNC: >500 MG/DL (ref 65–140)
HCT VFR BLD AUTO: 47 % (ref 36.5–49.3)
HGB BLD-MCNC: 14 G/DL (ref 12–17)
IMM GRANULOCYTES # BLD AUTO: 0.08 THOUSAND/UL (ref 0–0.2)
IMM GRANULOCYTES NFR BLD AUTO: 1 % (ref 0–2)
LYMPHOCYTES # BLD AUTO: 0.72 THOUSANDS/ÂΜL (ref 0.6–4.47)
LYMPHOCYTES NFR BLD AUTO: 7 % (ref 14–44)
MCH RBC QN AUTO: 28.6 PG (ref 26.8–34.3)
MCHC RBC AUTO-ENTMCNC: 29.8 G/DL (ref 31.4–37.4)
MCV RBC AUTO: 96 FL (ref 82–98)
MONOCYTES # BLD AUTO: 0.34 THOUSAND/ÂΜL (ref 0.17–1.22)
MONOCYTES NFR BLD AUTO: 3 % (ref 4–12)
NEUTROPHILS # BLD AUTO: 9.84 THOUSANDS/ÂΜL (ref 1.85–7.62)
NEUTS SEG NFR BLD AUTO: 88 % (ref 43–75)
NRBC BLD AUTO-RTO: 0 /100 WBCS
P AXIS: 65 DEGREES
PLATELET # BLD AUTO: 209 THOUSANDS/UL (ref 149–390)
PMV BLD AUTO: 11.4 FL (ref 8.9–12.7)
POTASSIUM SERPL-SCNC: 3.3 MMOL/L (ref 3.5–5.3)
POTASSIUM SERPL-SCNC: 3.5 MMOL/L (ref 3.5–5.3)
POTASSIUM SERPL-SCNC: 4.9 MMOL/L (ref 3.5–5.3)
PR INTERVAL: 130 MS
QRS AXIS: 18 DEGREES
QRSD INTERVAL: 84 MS
QT INTERVAL: 338 MS
QTC INTERVAL: 452 MS
RBC # BLD AUTO: 4.89 MILLION/UL (ref 3.88–5.62)
SODIUM SERPL-SCNC: 158 MMOL/L (ref 135–147)
SODIUM SERPL-SCNC: 160 MMOL/L (ref 135–147)
SODIUM SERPL-SCNC: 165 MMOL/L (ref 135–147)
T WAVE AXIS: 75 DEGREES
VENTRICULAR RATE: 108 BPM
WBC # BLD AUTO: 11.06 THOUSAND/UL (ref 4.31–10.16)

## 2022-12-30 RX ORDER — POTASSIUM CHLORIDE 14.9 MG/ML
20 INJECTION INTRAVENOUS
Status: COMPLETED | OUTPATIENT
Start: 2022-12-30 | End: 2022-12-31

## 2022-12-30 RX ORDER — SODIUM CHLORIDE 450 MG/100ML
1000 INJECTION, SOLUTION INTRAVENOUS CONTINUOUS
Status: DISCONTINUED | OUTPATIENT
Start: 2022-12-30 | End: 2022-12-30

## 2022-12-30 RX ORDER — SODIUM CHLORIDE 450 MG/100ML
1000 INJECTION, SOLUTION INTRAVENOUS CONTINUOUS
Status: DISCONTINUED | OUTPATIENT
Start: 2022-12-30 | End: 2022-12-31

## 2022-12-30 RX ORDER — ATORVASTATIN CALCIUM 40 MG/1
40 TABLET, FILM COATED ORAL
Status: DISCONTINUED | OUTPATIENT
Start: 2022-12-30 | End: 2023-01-01 | Stop reason: HOSPADM

## 2022-12-30 RX ORDER — INSULIN LISPRO 100 [IU]/ML
1-5 INJECTION, SOLUTION INTRAVENOUS; SUBCUTANEOUS
Status: DISCONTINUED | OUTPATIENT
Start: 2022-12-31 | End: 2023-01-01 | Stop reason: HOSPADM

## 2022-12-30 RX ORDER — SODIUM CHLORIDE 9 MG/ML
75 INJECTION, SOLUTION INTRAVENOUS CONTINUOUS
Status: DISCONTINUED | OUTPATIENT
Start: 2022-12-30 | End: 2022-12-30

## 2022-12-30 RX ORDER — LEVOTHYROXINE SODIUM 0.07 MG/1
75 TABLET ORAL
Status: DISCONTINUED | OUTPATIENT
Start: 2022-12-31 | End: 2023-01-01 | Stop reason: HOSPADM

## 2022-12-30 RX ORDER — SODIUM CHLORIDE 450 MG/100ML
1000 INJECTION, SOLUTION INTRAVENOUS ONCE
Status: COMPLETED | OUTPATIENT
Start: 2022-12-30 | End: 2022-12-30

## 2022-12-30 RX ORDER — SODIUM CHLORIDE 450 MG/100ML
75 INJECTION, SOLUTION INTRAVENOUS CONTINUOUS
Status: DISCONTINUED | OUTPATIENT
Start: 2022-12-30 | End: 2022-12-30

## 2022-12-30 RX ORDER — INSULIN LISPRO 100 [IU]/ML
10 INJECTION, SOLUTION INTRAVENOUS; SUBCUTANEOUS ONCE
Status: COMPLETED | OUTPATIENT
Start: 2022-12-30 | End: 2022-12-30

## 2022-12-30 RX ORDER — HEPARIN SODIUM 5000 [USP'U]/ML
5000 INJECTION, SOLUTION INTRAVENOUS; SUBCUTANEOUS EVERY 8 HOURS SCHEDULED
Status: DISCONTINUED | OUTPATIENT
Start: 2022-12-30 | End: 2023-01-01 | Stop reason: HOSPADM

## 2022-12-30 RX ORDER — ENOXAPARIN SODIUM 100 MG/ML
40 INJECTION SUBCUTANEOUS DAILY
Status: DISCONTINUED | OUTPATIENT
Start: 2022-12-31 | End: 2022-12-30

## 2022-12-30 RX ADMIN — HEPARIN SODIUM 5000 UNITS: 5000 INJECTION INTRAVENOUS; SUBCUTANEOUS at 22:11

## 2022-12-30 RX ADMIN — SODIUM CHLORIDE 1000 ML: 0.45 INJECTION, SOLUTION INTRAVENOUS at 20:18

## 2022-12-30 RX ADMIN — SODIUM CHLORIDE 1000 ML/HR: 0.45 INJECTION, SOLUTION INTRAVENOUS at 22:50

## 2022-12-30 RX ADMIN — SODIUM CHLORIDE 75 ML/HR: 0.9 INJECTION, SOLUTION INTRAVENOUS at 17:33

## 2022-12-30 RX ADMIN — SODIUM CHLORIDE 1000 ML: 0.9 INJECTION, SOLUTION INTRAVENOUS at 16:14

## 2022-12-30 RX ADMIN — SODIUM CHLORIDE 75 ML/HR: 0.45 INJECTION, SOLUTION INTRAVENOUS at 18:17

## 2022-12-30 RX ADMIN — INSULIN LISPRO 10 UNITS: 100 INJECTION, SOLUTION INTRAVENOUS; SUBCUTANEOUS at 16:14

## 2022-12-30 RX ADMIN — ATORVASTATIN CALCIUM 40 MG: 40 TABLET, FILM COATED ORAL at 22:11

## 2022-12-30 NOTE — H&P
History and Physical - Marlene 6    Patient Information: Gera Galvan 69 y o  male MRN: [id number]  Unit/Bed#: 7T [facility] 709-01 Encounter: [id number]  Admitting Physician: Esmer Ash MD  PCP: Martine Danielson MD  Date of Admission:  12/30/22    Assessment and Plan    * Hypernatremia  Assessment & Plan  ED: cNa=166  Baseline:134-137  Likely 2/2 dehydration from diuresis due to hyperglycemia      - 1/2 NS bolus x3  - BMP q2hrs after each bolus to trend Na  - See A/P ANGELICA       Type 2 diabetes mellitus without complication, without long-term current use of insulin [facility])  Assessment & Plan  Lab Results   Component Value Date    HGBA1C 6 4 08/25/2022       Recent Labs     12/30/22  1451   POCGLU >500*       Blood Sugar Average: Last 72 hrs:     HGBA1C 6 4 08/25/2022   • Previously following [facility] endocrine  • Pneumococcal vaccination up-to-date  • Home meds: Janumet  mg b i d       - mIVF 0 45 NS @ 128ml/hr given hyperglycemic diuresis  - Encourage PO hydration  - Hold home meds  - SSI  - Check bs now and then ACHS   - AM CMP      Acute kidney injury ([facility] [address] )  Assessment & Plan  Pt presented to the ED w/weakness and hyperglycemia reported by spouse  Glucose on chem- 621  Received a bolus of 1000 ml NS in ED  Creat: 2 42; baseline: 0 7- 0 9  cNa- 166 likely 2/2 dehydration in the setting of hyperglycemic diuresis   Nephrology recommendations appreciated      -  mIVF 1/2NS gtt @ 128ml/hr; see A/P for T2DM  - Avoid nephrotoxic medications  - Nephrology consult  - AM CMP to monitor electrolytes and trend creatinine  Elevated troponin  Assessment & Plan  Hx of DM / HLD  Denies CP, SOB  12/30/22 Initial Trop- 21 EKG- No acute ST-T wave changes  Sinus tachycardia   Otherwise normal EKG    - 24hr Telemetry  - Random troponin to r/o ACS  - Monitor    Mixed hyperlipidemia  Assessment & Plan  • Last Lipid Panel 8/2022: Juliana Hale, HDL-38, LDL-151  • Home medications: Atorvastatin 40 mg      - Continue home medications    Dementia without behavioral disturbance (HCC)  Assessment & Plan  Hx of dementia       Plan  - Maintain normal sleep/wake cycle  - Maintain normal urine and bowel functions with urinary retention protocol and bowel regimen  - Avoid all BEER's criteria drugs as much as possible  - Maintain adequate hydration   - Maintain daily orientation to environment and encourage family at bedside   - Avoid physical and, if possible, chemical restraints  - Fu w/pcp outpt      Primary hypothyroidism  Assessment & Plan    • Home med: Levothyroxine 75 mcg  • Last TSH 8/2022: 7 39    - Continue Levothyroxine 75 mcg      VTE Prophylaxis: Heparin  Code Status: Level 1 - Full Code  Anticipated Length of Stay:  Patient will be admitted on an Inpatient basis with an anticipated length of stay of  2 midnights  Justification for Hospital Stay: Hyperglycemia, hypernatremia and ANGELICA  Total Time for Visit, including Counseling / Coordination of Care: 60 mins  Greater than 50% of this total time spent on direct patient counseling and coordination of care  Patient Information Sharing: With the consent of Bhavani Olivas , their loved ones (Spouse) were notified today by inpatient team of the patient’s condition and current plan  All questions answered  Chief Complaint:     Chief Complaint   Patient presents with   • Hyperglycemia - no symptoms     Patient arrives with family who reports patients blood sugar was high today  Family reports patient has not been on insulin  History of Present Illness:    Bhavani Olivas is a 69 y o  male with a past medical history of dementia, type 2 diabetes mellitus, hypothyroidism, and hyperlipidemia who presents to the ED today with spouse who stated that patient has been hyperglycemic for the past 2 days ranging from the high 200s to now 500  Spouse stated that patient was unable to walk and so she became concerned    Patient was unable to provide history due to impaired cognition  In the ED, a bolus of 0 9 % NS 1000 ml and 10 units lispro were given  Vitals significant for tachycardia but otherwise wnl  Labs revealed hypernatremia, hyperchloremia, hyperglycemia, elevated BUN/creatinine, leukocytosis and Trop @ 21  EKG as indicated below  Patient was admitted to the family medicine service for the management of hypernatremia, hyperglycemia, and ANGELICA  Review of Systems:  Review of Systems   Unable to perform ROS: Dementia       Past Medical and Surgical History:   Past Medical History:   Diagnosis Date   • Alzheimer disease type 3 ([facility] [address] )    • Cervical pain 4/16/2018   • Diabetes mellitus ([facility] )    • Diabetic peripheral neuropathy associated with type 2 diabetes mellitus ([facility] ) 3/18/2021   • Disease of thyroid gland    • Elevated PSA    • Hyperlipidemia    • Medicare annual wellness visit, subsequent 7/30/2020   • Prostate cancer ([facility] )    • Type 2 diabetes mellitus with diabetic peripheral angiopathy without gangrene ([place] [address] ) 5/17/2021    According to ICD10 guidelines, patient accept     Past Surgical History:   Procedure Laterality Date   • NECK SURGERY     • PROSTATE BIOPSY  07/18/2018     Meds/Allergies: Allergies: Allergies   Allergen Reactions   • Other      seasonal     Prior to Admission Medications   Prescriptions Last Dose Informant Patient Reported? Taking?    Blood Glucose Monitoring Suppl (OneTouch Verio) w/Device KIT   No No   Sig: Use 2 (two) times a day   Janumet  MG per tablet   No No   Sig: Take 1 tablet by mouth 2 (two) times a day with meals   Lancets Micro Thin 33G MISC   No No   Sig: Use 2 (two) times a day Test two times daily DX:E11 9   Nutritional Supplements (Glucerna Shake) LIQD   No No   Sig: Take 1 Bottle by mouth 2 (two) times a day   calcium carbonate (OS-CARMENZA) 600 MG tablet   No No   Sig: Take 1 tablet (600 mg total) by mouth 2 (two) times a day with meals   diphenhydrAMINE (BENADRYL) 2 % cream   No No   Sig: Apply topically 3 (three) times a day as needed for itching   ezetimibe (ZETIA) 10 mg tablet   Yes No   glucose blood (OneTouch Verio) test strip   No No   Sig: Test two times daily DX: E11 9   levothyroxine (Synthroid) 75 mcg tablet   No No   Sig: Take 1 tablet (75 mcg total) by mouth daily   melatonin 3 mg   No No   Sig: Take 1 tablet (3 mg total) by mouth daily at bedtime   rosuvastatin (CRESTOR) 40 MG tablet   No No   Sig: Take 1 tablet (40 mg total) by mouth daily      Facility-Administered Medications: None     Social History:     Social History     Socioeconomic History   • Marital status: /Civil Union     Spouse name: Not on file   • Number of children: Not on file   • Years of education: Not on file   • Highest education level: Not on file   Occupational History   • Occupation:    retired   Tobacco Use   • Smoking status: Never   • Smokeless tobacco: Never   Substance and Sexual Activity   • Alcohol use: No   • Drug use: No   • Sexual activity: Not Currently   Other Topics Concern   • Not on file   Social History Narrative   • Not on file     Social Determinants of Health     Financial Resource Strain: Low Risk    • Difficulty of Paying Living Expenses: Not hard at all   Food Insecurity: No Food Insecurity   • Worried About Running Out of Food in the Last Year: Never true   • Ran Out of Food in the Last Year: Never true   Transportation Needs: No Transportation Needs   • Lack of Transportation (Medical): No   • Lack of Transportation (Non-Medical):  No   Physical Activity: Not on file   Stress: Not on file   Social Connections: Not on file   Intimate Partner Violence: Not on file   Housing Stability: Not on file     Patient Pre-hospital Living Situation: Unknown  Patient Pre-hospital Level of Mobility: Unknown  Patient Pre-hospital Diet Restrictions: Unknown    Family History:  Family History   Problem Relation Age of Onset   • No Known Problems Mother    • No Known Problems Father • Dementia Sister    • Autism Daughter        Physical Exam:   Vitals:   Blood Pressure: 106/71 (12/30/22 2350)  Pulse: 75 (12/30/22 2350)  Temperature: (!) 97 °F (36 1 °C) (12/30/22 2350)  Temp Source: Temporal (12/30/22 2350)  Respirations: 18 (12/30/22 2350)  SpO2: 97 % (12/30/22 2350)    Physical Exam  Vitals reviewed  Constitutional:       General: He is not in acute distress  Appearance: He is normal weight  He is not ill-appearing or toxic-appearing  HENT:      Head: Normocephalic  Right Ear: External ear normal       Left Ear: External ear normal       Nose: Nose normal       Mouth/Throat:      Mouth: Mucous membranes are dry  Pharynx: No oropharyngeal exudate  Eyes:      Comments: Eyes closed through exam   Neck:      Vascular: No carotid bruit  Cardiovascular:      Rate and Rhythm: Normal rate and regular rhythm  Pulses: Normal pulses  Heart sounds: Normal heart sounds  No murmur heard  Pulmonary:      Effort: Pulmonary effort is normal  No respiratory distress  Breath sounds: Normal breath sounds  No wheezing  Abdominal:      Palpations: Abdomen is soft  Tenderness: There is abdominal tenderness  Musculoskeletal:      Cervical back: Neck supple  Right lower leg: No edema  Left lower leg: No edema  Skin:     General: Skin is warm and dry  Capillary Refill: Capillary refill takes 2 to 3 seconds  Neurological:      Mental Status: Mental status is at baseline  Comments: Cognitive impairment-Dementia   Psychiatric:      Comments: Impaired judgement         Lab Results: I have personally reviewed pertinent reports      Results from last 7 days   Lab Units 12/30/22  1616   WBC Thousand/uL 11 06*   HEMOGLOBIN g/dL 14 0   HEMATOCRIT % 47 0   PLATELETS Thousands/uL 209   NEUTROS PCT % 88*   LYMPHS PCT % 7*   MONOS PCT % 3*   EOS PCT % 1     Results from last 7 days   Lab Units 12/30/22  2155 12/30/22  1914 12/30/22  1616   POTASSIUM mmol/L 3 3* 3 5 4 9   CHLORIDE mmol/L 128* 130* 123*   CO2 mmol/L 26 26 24   BUN mg/dL 65* 69* 75*   CREATININE mg/dL 1 86* 2 26* 2 42*   CALCIUM mg/dL 8 8 9 4 9 8   EGFR ml/min/1 73sq m 34 27 25                                Invalid input(s): URIBILINOGEN          Imaging: No results found  EKG, Pathology, and Other Studies Reviewed on Admission:   EKG  Result Date: 12/31/22  Impression:  Sinus tachycardia  No acute ST-T wave changes  Otherwise normal ECG      Entire H&P was discussed with Dr Amor Olson who agreed to what is noted above    Yunior Lane MD  12/31/22  12:06 AM

## 2022-12-30 NOTE — TREATMENT PLAN
Nephrology consulted for ANGELICA and hypernatremia  As patient's blood sugar 2 hr ago > 600, the patient would highly benefit from glycemic control which is the  for hypernatremia from dehydration/osmotic diuresis  I recommend 1/2 NS for now  Must repeat BMP now to determine sNa and glucose levels  If glucose remains > 500, would begin insulin gtt for glycemic control  Ensure patient has access to water and is drinking water  A more rapid correction of hypernatremia is acceptable in the setting of hyperglycemia  Suspect ANGELICA is prerenal in nature from osmotic diuresis  F/u repeat BMP s/p 1 L NS bolus

## 2022-12-30 NOTE — ED PROVIDER NOTES
History  Chief Complaint   Patient presents with   • Hyperglycemia - no symptoms     Patient arrives with family who reports patients blood sugar was high today  Family reports patient has not been on insulin  Patient is a 67-year-old male with a history of diabetes who presents with an elevated blood sugar  Over 500 in triage  Wife states had a home health nurse but had to let her go because she was feeding him too many salty and sweet things  Now trying to get a hold of sugars  Last office visit 3 months ago  No change in meds  Never been on insulin  Compliant with current schedule of meds  Had plantain soup earlier, only ate a little  No other complaints  No cp, sob, n/v, f/s/c  Prior to Admission Medications   Prescriptions Last Dose Informant Patient Reported? Taking?    Blood Glucose Monitoring Suppl (OneTouch Verio) w/Device KIT   No No   Sig: Use 2 (two) times a day   Janumet  MG per tablet   No No   Sig: Take 1 tablet by mouth 2 (two) times a day with meals   Lancets Micro Thin 33G MISC   No No   Sig: Use 2 (two) times a day Test two times daily DX:E11 9   Nutritional Supplements (Glucerna Shake) LIQD   No No   Sig: Take 1 Bottle by mouth 2 (two) times a day   calcium carbonate (OS-CARMENZA) 600 MG tablet   No No   Sig: Take 1 tablet (600 mg total) by mouth 2 (two) times a day with meals   diphenhydrAMINE (BENADRYL) 2 % cream   No No   Sig: Apply topically 3 (three) times a day as needed for itching   ezetimibe (ZETIA) 10 mg tablet   Yes No   glucose blood (OneTouch Verio) test strip   No No   Sig: Test two times daily DX: E11 9   levothyroxine (Synthroid) 75 mcg tablet   No No   Sig: Take 1 tablet (75 mcg total) by mouth daily   melatonin 3 mg   No No   Sig: Take 1 tablet (3 mg total) by mouth daily at bedtime   rosuvastatin (CRESTOR) 40 MG tablet   No No   Sig: Take 1 tablet (40 mg total) by mouth daily      Facility-Administered Medications: None       Past Medical History: Diagnosis Date   • Alzheimer disease type 3 ([place] [address] )    • Cervical pain 4/16/2018   • Diabetes mellitus ([place] [address] )    • Diabetic peripheral neuropathy associated with type 2 diabetes mellitus ([place] [address] ) 3/18/2021   • Disease of thyroid gland    • Elevated PSA    • Hyperlipidemia    • Medicare annual wellness visit, subsequent 7/30/2020   • Prostate cancer ([place] [address] )    • Type 2 diabetes mellitus with diabetic peripheral angiopathy without gangrene ([place] [address] ) 5/17/2021    According to ICD10 guidelines, patient accept       Past Surgical History:   Procedure Laterality Date   • NECK SURGERY     • PROSTATE BIOPSY  07/18/2018       Family History   Problem Relation Age of Onset   • No Known Problems Mother    • No Known Problems Father    • Dementia Sister    • Autism Daughter      I have reviewed and agree with the history as documented  E-Cigarette/Vaping     E-Cigarette/Vaping Substances     Social History     Tobacco Use   • Smoking status: Never   • Smokeless tobacco: Never   Substance Use Topics   • Alcohol use: No   • Drug use: No       Review of Systems   Constitutional: Negative  HENT: Negative  Eyes: Negative  Respiratory: Negative  Cardiovascular: Negative  Gastrointestinal: Negative  Endocrine: Negative  Genitourinary: Negative  Musculoskeletal: Negative  Skin: Negative  Allergic/Immunologic: Negative  Neurological: Negative  Hematological: Negative  Psychiatric/Behavioral: Negative  All other systems reviewed and are negative  Physical Exam  Physical Exam  Vitals and nursing note reviewed  Constitutional:       Appearance: Normal appearance  He is normal weight  HENT:      Head: Normocephalic and atraumatic  Nose: Nose normal       Mouth/Throat:      Mouth: Mucous membranes are moist       Pharynx: Oropharynx is clear  Cardiovascular:      Rate and Rhythm: Normal rate and regular rhythm  Pulses: Normal pulses  Heart sounds: Normal heart sounds     Pulmonary: Effort: Pulmonary effort is normal       Breath sounds: Normal breath sounds  Abdominal:      General: Bowel sounds are normal       Palpations: Abdomen is soft  Musculoskeletal:         General: Normal range of motion  Cervical back: Normal range of motion and neck supple  Skin:     General: Skin is warm and dry  Capillary Refill: Capillary refill takes less than 2 seconds  Neurological:      General: No focal deficit present  Mental Status: He is alert and oriented to person, place, and time     Psychiatric:         Mood and Affect: Mood normal          Behavior: Behavior normal          Vital Signs  ED Triage Vitals [12/30/22 1455]   Temperature Pulse Respirations Blood Pressure SpO2   97 9 °F (36 6 °C) (!) 110 16 110/71 97 %      Temp Source Heart Rate Source Patient Position - Orthostatic VS BP Location FiO2 (%)   Tympanic Monitor Sitting Left arm --      Pain Score       --           Vitals:    12/30/22 1455   BP: 110/71   Pulse: (!) 110   Patient Position - Orthostatic VS: Sitting         Visual Acuity      ED Medications  Medications   sodium chloride 0 9 % bolus 1,000 mL (has no administration in time range)   insulin lispro (HumaLOG) 100 units/mL subcutaneous injection 10 Units (has no administration in time range)       Diagnostic Studies  Results Reviewed     Procedure Component Value Units Date/Time    High Sensitivity Troponin I Random [[id number]]     Lab Status: No result Specimen: Blood     Basic metabolic panel [[id number]]     Lab Status: No result Specimen: Blood     CBC and differential [[id number]]     Lab Status: No result Specimen: Blood     Fingerstick Glucose (POCT) [[id number]]  (Abnormal) Collected: 12/30/22 1451    Lab Status: Final result Updated: 12/30/22 1453     POC Glucose >500 mg/dl                  No orders to display              Procedures  ECG 12 Lead Documentation Only    Date/Time: 12/30/2022 3:55 PM  Performed by: Joie English MD  Authorized by: Claudia Watkins MD     Indications / Diagnosis:  Hyperglycemia  ECG reviewed by me, the ED Provider: yes    Patient location:  ED  Rate:     ECG rate:  108    ECG rate assessment: normal    Rhythm:     Rhythm: sinus tachycardia    Ectopy:     Ectopy: none    QRS:     QRS axis:  Normal    QRS intervals:  Normal  Conduction:     Conduction: normal    ST segments:     ST segments:  Normal  T waves:     T waves: normal    Other findings:     Other findings: LVH               ED Course                                             MDM    Disposition  Final diagnoses:   None     ED Disposition     None      Follow-up Information    None         Patient's Medications   Discharge Prescriptions    No medications on file       No discharge procedures on file      PDMP Review     None          ED Provider  Electronically Signed by           Claudia Watkins MD  12/30/22 9295

## 2022-12-30 NOTE — ASSESSMENT & PLAN NOTE
Lab Results   Component Value Date    HGBA1C 9 5 (H) 12/31/2022     Recent Labs     12/30/22  1451 12/30/22  1841 12/30/22  2153   POCGLU >500* 299* 149*     • Home meds: Janumet  mg b i d  In trying to source possible cause of hyperglycemic presentation, will obtain UA, CXR to rule out possible infection   WBC mildly elevated on admission -  11 06    PLAN:    - Encourage frequent PO hydration  - Resume home med upon discharge  - Fu with CM to establish home nursing visit to monitor blood sugars   - Fu w/pcp op

## 2022-12-31 ENCOUNTER — APPOINTMENT (INPATIENT)
Dept: RADIOLOGY | Facility: HOSPITAL | Age: 77
End: 2022-12-31

## 2022-12-31 PROBLEM — Z00.00 HEALTHCARE MAINTENANCE: Status: RESOLVED | Noted: 2022-01-01 | Resolved: 2022-01-01

## 2022-12-31 LAB
ALBUMIN SERPL BCP-MCNC: 2.9 G/DL (ref 3.5–5)
ALP SERPL-CCNC: 109 U/L (ref 43–122)
ALT SERPL W P-5'-P-CCNC: 45 U/L
ANION GAP SERPL CALCULATED.3IONS-SCNC: 2 MMOL/L (ref 5–14)
ANION GAP SERPL CALCULATED.3IONS-SCNC: 2 MMOL/L (ref 5–14)
ANION GAP SERPL CALCULATED.3IONS-SCNC: 5 MMOL/L (ref 5–14)
AST SERPL W P-5'-P-CCNC: 36 U/L (ref 17–59)
BACTERIA UR QL AUTO: ABNORMAL /HPF
BILIRUB SERPL-MCNC: 0.77 MG/DL (ref 0.2–1)
BILIRUB UR QL STRIP: NEGATIVE
BUN SERPL-MCNC: 39 MG/DL (ref 5–25)
BUN SERPL-MCNC: 43 MG/DL (ref 5–25)
BUN SERPL-MCNC: 55 MG/DL (ref 5–25)
CALCIUM ALBUM COR SERPL-MCNC: 9.5 MG/DL (ref 8.3–10.1)
CALCIUM SERPL-MCNC: 8.5 MG/DL (ref 8.4–10.2)
CALCIUM SERPL-MCNC: 8.6 MG/DL (ref 8.4–10.2)
CALCIUM SERPL-MCNC: 8.6 MG/DL (ref 8.4–10.2)
CHLORIDE SERPL-SCNC: 121 MMOL/L (ref 96–108)
CHLORIDE SERPL-SCNC: 122 MMOL/L (ref 96–108)
CHLORIDE SERPL-SCNC: 125 MMOL/L (ref 96–108)
CLARITY UR: ABNORMAL
CO2 SERPL-SCNC: 26 MMOL/L (ref 21–32)
CO2 SERPL-SCNC: 26 MMOL/L (ref 21–32)
CO2 SERPL-SCNC: 27 MMOL/L (ref 21–32)
COLOR UR: ABNORMAL
CREAT SERPL-MCNC: 0.94 MG/DL (ref 0.7–1.5)
CREAT SERPL-MCNC: 1.14 MG/DL (ref 0.7–1.5)
CREAT SERPL-MCNC: 1.51 MG/DL (ref 0.7–1.5)
ERYTHROCYTE [DISTWIDTH] IN BLOOD BY AUTOMATED COUNT: 15.3 % (ref 11.6–15.1)
EST. AVERAGE GLUCOSE BLD GHB EST-MCNC: 226 MG/DL
GFR SERPL CREATININE-BSD FRML MDRD: 44 ML/MIN/1.73SQ M
GFR SERPL CREATININE-BSD FRML MDRD: 62 ML/MIN/1.73SQ M
GFR SERPL CREATININE-BSD FRML MDRD: 78 ML/MIN/1.73SQ M
GLUCOSE SERPL-MCNC: 150 MG/DL (ref 65–140)
GLUCOSE SERPL-MCNC: 166 MG/DL (ref 70–99)
GLUCOSE SERPL-MCNC: 170 MG/DL (ref 65–140)
GLUCOSE SERPL-MCNC: 184 MG/DL (ref 70–99)
GLUCOSE SERPL-MCNC: 208 MG/DL (ref 70–99)
GLUCOSE SERPL-MCNC: 237 MG/DL (ref 65–140)
GLUCOSE SERPL-MCNC: 246 MG/DL (ref 65–140)
GLUCOSE UR STRIP-MCNC: NEGATIVE MG/DL
HBA1C MFR BLD: 9.5 %
HCT VFR BLD AUTO: 38.3 % (ref 36.5–49.3)
HGB BLD-MCNC: 11.6 G/DL (ref 12–17)
HGB UR QL STRIP.AUTO: 10
HYALINE CASTS #/AREA URNS LPF: ABNORMAL /LPF
KETONES UR STRIP-MCNC: NEGATIVE MG/DL
LEUKOCYTE ESTERASE UR QL STRIP: NEGATIVE
MAGNESIUM SERPL-MCNC: 2.3 MG/DL (ref 1.6–2.3)
MCH RBC QN AUTO: 29.3 PG (ref 26.8–34.3)
MCHC RBC AUTO-ENTMCNC: 30.3 G/DL (ref 31.4–37.4)
MCV RBC AUTO: 97 FL (ref 82–98)
MUCOUS THREADS UR QL AUTO: ABNORMAL
NITRITE UR QL STRIP: POSITIVE
NON-SQ EPI CELLS URNS QL MICRO: ABNORMAL /HPF
PH UR STRIP.AUTO: 5 [PH]
PHOSPHATE SERPL-MCNC: 2.2 MG/DL (ref 2.5–4.8)
PLATELET # BLD AUTO: 143 THOUSANDS/UL (ref 149–390)
PMV BLD AUTO: 11.3 FL (ref 8.9–12.7)
POTASSIUM SERPL-SCNC: 3.9 MMOL/L (ref 3.5–5.3)
POTASSIUM SERPL-SCNC: 4 MMOL/L (ref 3.5–5.3)
POTASSIUM SERPL-SCNC: 4.2 MMOL/L (ref 3.5–5.3)
PROT SERPL-MCNC: 6.3 G/DL (ref 6.4–8.4)
PROT UR STRIP-MCNC: NEGATIVE MG/DL
RBC # BLD AUTO: 3.96 MILLION/UL (ref 3.88–5.62)
RBC #/AREA URNS AUTO: ABNORMAL /HPF
SODIUM SERPL-SCNC: 149 MMOL/L (ref 135–147)
SODIUM SERPL-SCNC: 151 MMOL/L (ref 135–147)
SODIUM SERPL-SCNC: 156 MMOL/L (ref 135–147)
SP GR UR STRIP.AUTO: 1.01 (ref 1–1.04)
UROBILINOGEN UA: NEGATIVE MG/DL
WBC # BLD AUTO: 8 THOUSAND/UL (ref 4.31–10.16)
WBC #/AREA URNS AUTO: ABNORMAL /HPF

## 2022-12-31 RX ORDER — LANOLIN ALCOHOL/MO/W.PET/CERES
3 CREAM (GRAM) TOPICAL
Status: DISCONTINUED | OUTPATIENT
Start: 2022-12-31 | End: 2023-01-01 | Stop reason: HOSPADM

## 2022-12-31 RX ORDER — LANOLIN ALCOHOL/MO/W.PET/CERES
3 CREAM (GRAM) TOPICAL
Status: DISCONTINUED | OUTPATIENT
Start: 2022-12-31 | End: 2022-12-31

## 2022-12-31 RX ORDER — SODIUM CHLORIDE 450 MG/100ML
125 INJECTION, SOLUTION INTRAVENOUS CONTINUOUS
Status: DISCONTINUED | OUTPATIENT
Start: 2022-12-31 | End: 2022-12-31

## 2022-12-31 RX ORDER — OLANZAPINE 5 MG/1
5 TABLET ORAL
Status: DISCONTINUED | OUTPATIENT
Start: 2022-12-31 | End: 2023-01-01 | Stop reason: HOSPADM

## 2022-12-31 RX ORDER — DIPHENHYDRAMINE HYDROCHLORIDE, ZINC ACETATE 2; .1 G/100G; G/100G
CREAM TOPICAL 3 TIMES DAILY PRN
Status: DISCONTINUED | OUTPATIENT
Start: 2023-01-01 | End: 2023-01-01 | Stop reason: HOSPADM

## 2022-12-31 RX ADMIN — LEVOTHYROXINE SODIUM 75 MCG: 75 TABLET ORAL at 07:12

## 2022-12-31 RX ADMIN — POTASSIUM CHLORIDE 20 MEQ: 14.9 INJECTION, SOLUTION INTRAVENOUS at 00:51

## 2022-12-31 RX ADMIN — INSULIN LISPRO 2 UNITS: 100 INJECTION, SOLUTION INTRAVENOUS; SUBCUTANEOUS at 15:54

## 2022-12-31 RX ADMIN — HEPARIN SODIUM 5000 UNITS: 5000 INJECTION INTRAVENOUS; SUBCUTANEOUS at 15:00

## 2022-12-31 RX ADMIN — POTASSIUM CHLORIDE 20 MEQ: 14.9 INJECTION, SOLUTION INTRAVENOUS at 02:43

## 2022-12-31 RX ADMIN — SODIUM CHLORIDE 1000 ML/HR: 0.45 INJECTION, SOLUTION INTRAVENOUS at 02:09

## 2022-12-31 RX ADMIN — SODIUM CHLORIDE 125 ML/HR: 0.45 INJECTION, SOLUTION INTRAVENOUS at 03:20

## 2022-12-31 RX ADMIN — ATORVASTATIN CALCIUM 40 MG: 40 TABLET, FILM COATED ORAL at 15:47

## 2022-12-31 RX ADMIN — OLANZAPINE 5 MG: 5 TABLET, FILM COATED ORAL at 20:42

## 2022-12-31 RX ADMIN — Medication 3 MG: at 20:42

## 2022-12-31 RX ADMIN — INSULIN LISPRO 2 UNITS: 100 INJECTION, SOLUTION INTRAVENOUS; SUBCUTANEOUS at 12:06

## 2022-12-31 NOTE — DISCHARGE SUMMARY
Discharge Summary - Marlene Escalante    Patient Information: Dian Hull 69 y o  male MRN: [id number]  Unit/Bed#: [facility] Encounter: [id number]    Discharging Physician / Practitioner: Von Mendoza MD  PCP: Timothy Keith MD  Admission Date: 12/30/2022  Admission Orders (From admission, onward)     Ordered        12/30/22 1701  INPATIENT ADMISSION  Once                      Discharge Date: 1/1/2023    Reason for Admission: Acute kidney injury, hypernatremia, and hyperglycemia management  Discharge Diagnoses:     Principal Problem (Resolved): Hypernatremia  Active Problems:    Type 2 diabetes mellitus without complication, without long-term current use of insulin ([facility])    Mixed hyperlipidemia    Primary hypothyroidism    Dementia without behavioral disturbance ([facility] [address] )  Resolved Problems:    Acute kidney injury ([facility] )    Elevated troponin        * Hypernatremia-resolved as of 1/1/2023  Assessment & Plan  Sodium   Date Value Ref Range Status   12/31/2022 151 (H) 135 - 147 mmol/L Final   12/31/2022 156 (H) 135 - 147 mmol/L Final   12/30/2022 160 (H) 135 - 147 mmol/L Final     Baseline Na -134-137   Likely 2/2 dehydration from diuresis due to hyperglycemia  Trending down after fluid infusion    Nephro recommendations appreciated      - Per Nephro D/c 1/2 NS bolus  - BMP at noon  - See A/P ANGELICA       Type 2 diabetes mellitus without complication, without long-term current use of insulin [facility])  Assessment & Plan  Lab Results   Component Value Date    HGBA1C 9 5 (H) 12/31/2022     Recent Labs     12/30/22  1451 12/30/22  1841 12/30/22  2153   POCGLU >500* 299* 149*     • Home meds: Janumet  mg b i d  In trying to source possible cause of hyperglycemic presentation, will obtain UA, CXR to rule out possible infection   WBC mildly elevated on admission -  11 06    PLAN:    - Encourage frequent PO hydration  - Resume home med upon discharge  - Fu with CM to establish home nursing visit to monitor blood sugars   - Fu w/pcp op    Acute kidney injury (HCC)-resolved as of 1/1/2023  Assessment & Plan  Creatinine   Date Value Ref Range Status   12/31/2022 1 14 0 70 - 1 50 mg/dL Final     Comment:     Standardized to IDMS reference method   12/31/2022 1 51 (H) 0 70 - 1 50 mg/dL Final     Comment:     Standardized to IDMS reference method   12/30/2022 1 86 (H) 0 70 - 1 50 mg/dL Final     Comment:     Standardized to IDMS reference method   12/30/2022 2 26 (H) 0 70 - 1 50 mg/dL Final     Comment:     Standardized to IDMS reference method     S/p 1/2 NS bolus x3 and  1L NS bolus  Baseline Cr: 0 7 - 0 9    Nephrology recommendations appreciated  Per Nephro;  Consider 1/4 NS in order to further correct sNa but avoid hyperglycemia    PLAN:  - D/ruy mIVF   - Avoid nephrotoxic medications  - BMP at noon  - Once ANGELICA is resolved (Cr back to baseline) will restart Janumet for DM       Mixed hyperlipidemia  Assessment & Plan  • Last Lipid Panel 8/2022: Richie Muniz, TG's-132, HDL-38, LDL-151  • Home medications: Atorvastatin 40 mg      - Continue home medications    Elevated troponin-resolved as of 1/1/2023  Assessment & Plan  Hx of DM / HLD    - 24hr Telemetry    Dementia without behavioral disturbance ([facility] [address] )  Assessment & Plan  Hx of dementia  Mostly non-verbal presentation in room   AAOx0        Plan  - Maintain adequate hydration   - Fall precautions  - Discuss goals of care with spouse   - Fu w/pcp outpt        Primary hypothyroidism  Assessment & Plan    • Home med: Levothyroxine 75 mcg  • Last TSH 8/2022: 7 39    - Continue Levothyroxine 75 mcg       Consultations During Hospital Stay:  · Nephrology  · Case management    Procedures Performed:   · None    Significant Findings / Test Results:   · Acute Kidney injury  · Hypernatremia  · Hypophosphatemia  · Hyperglycemia    Incidental Findings:   · None     Test Results Pending at Discharge (will require follow up):   · No     Outpatient Tests Requested:  · None    Outpatient follow-up Requested:  • PCP  • Home nursing visits    Complications:  None    Hospital Course:     Rylee Fraga is a 69 y o  male patient with a pmhx of dementia, T2DM, hypothyroidism, and hyperlipidemia who originally presented to the hospital on 12/30/2022 due to hyperglycemic episodes 2 days prior, ranging from high 200s-500  Spouse stated that patient was unable to walk and so she became concerned  Patient was unable to provide history due to impaired cognition      In the ED, a bolus of 0 9 % NS 1000 ml and 10 units lispro were given  Vitals significant for tachycardia but otherwise wnl  Labs revealed hypernatremia, hyperchloremia, hyperglycemia, elevated BUN/creatinine, mild leukocytosis and Trop @ 21  EKG showed sinus tachycardia but otherwise normal      Patient was admitted to the family medicine service for the management of hypernatremia, hyperglycemia, and ANGELICA  During his hospital stay, he was rehydrated with 1/2NS boluses x3 after which his sodium and creatinine started to downtrend  Nephrology was consulted  ANGELICA resolved prior to discharge  BS also concurrently improved with SSI and IV hydration  His labs revealed hypokalemia and hypophosphatemia as ANGELICA resolved  Repleted  His Hgb A1c was found to be elevated at 9 5 from his baseline of 6 4-7  Chest x-ray and UA were done to identify probable causes of hyperglycemia  No cardiopulmonary disease; diffusely gas distended bowel loops in the upper abdomen; probable ileus  UA unremarkable  Patient does have severe dementia and lives with his wife who is requesting monthly nursing visits to assist with blood sugar control  Case management was consulted  Close PCP and home nursing follow-up outpatient is therefore warranted           Condition at Discharge: good     Discharge Day Visit / Exam:     Vitals: Blood Pressure: 101/59 (01/01/23 0732)  Pulse: 86 (01/01/23 0732)  Temperature: 97 9 °F (36 6 °C) (01/01/23 0732)  Temp Source: Temporal (01/01/23 0732)  Respirations: 18 (01/01/23 0732)  SpO2: 95 % (01/01/23 0732)  Exam:   Physical Exam  Vitals reviewed  Constitutional:       General: He is not in acute distress  Appearance: He is not ill-appearing, toxic-appearing or diaphoretic  HENT:      Head: Normocephalic  Mouth/Throat:      Mouth: Mucous membranes are moist    Cardiovascular:      Rate and Rhythm: Normal rate and regular rhythm  Pulses: Normal pulses  Heart sounds: Normal heart sounds  No murmur heard  Pulmonary:      Effort: Pulmonary effort is normal  No respiratory distress  Breath sounds: Normal breath sounds  No wheezing or rales  Chest:      Chest wall: No tenderness  Abdominal:      Palpations: Abdomen is soft  Musculoskeletal:      Cervical back: Neck supple  Right lower leg: No edema  Left lower leg: No edema  Skin:     General: Skin is warm and dry  Capillary Refill: Capillary refill takes less than 2 seconds  Neurological:      Mental Status: Mental status is at baseline  Discussion with Family: Yes  Patient Information Sharing: Patient's spouse reports having the power of   Patient has severe dementia and therefore patient's condition and current plan discussed with spouse  All questions answered  Discharge instructions/Information to patient and family:   See after visit summary for information provided to patient and family  Discharge Medications:  Current Outpatient Medications   Medication Instructions   • Blood Glucose Monitoring Suppl (OneTouch Verio) w/Device KIT Does not apply, 2 times daily   • calcium carbonate (OS-CARMENZA) 600 mg, Oral, 2 times daily with meals   • diphenhydrAMINE (BENADRYL) 2 % cream Topical, 3 times daily PRN   • ezetimibe (ZETIA) 10 mg tablet No dose, route, or frequency recorded     • glucose blood (OneTouch Verio) test strip Test two times daily DX: E11 9   • Janumet  MG per tablet 1 tablet, Oral, 2 times daily with meals   • Lancets Micro Thin 33G MISC Does not apply, 2 times daily, Test two times daily DX:E11 9   • levothyroxine (SYNTHROID) 75 mcg, Oral, Daily   • melatonin 3 mg, Oral, Daily at bedtime   • Nutritional Supplements (Glucerna Shake) LIQD 1 Bottle, Oral, 2 times daily   • rosuvastatin (CRESTOR) 40 mg, Oral, Daily         Provisions for Follow-Up Care:  See after visit summary for information related to follow-up care and any pertinent home health orders  Disposition:     Home    For Discharges to [facility] SNF:   · Not Applicable to this Patient - Not Applicable to this Patient    Planned Readmission: No     Discharge Statement:  I spent 60 minutes discharging the patient  This time was spent on the day of discharge  I had direct contact with the patient on the day of discharge  Greater than 50% of the total time was spent examining patient, answering all patient questions, arranging and discussing plan of care with patient as well as directly providing post-discharge instructions  Additional time then spent on discharge activities      ** Please Note: This note has been constructed using a voice recognition system **    Wilima Agarwal MD  01/01/23  11:46 AM

## 2022-12-31 NOTE — ASSESSMENT & PLAN NOTE
Creatinine   Date Value Ref Range Status   12/31/2022 1 14 0 70 - 1 50 mg/dL Final     Comment:     Standardized to IDMS reference method   12/31/2022 1 51 (H) 0 70 - 1 50 mg/dL Final     Comment:     Standardized to IDMS reference method   12/30/2022 1 86 (H) 0 70 - 1 50 mg/dL Final     Comment:     Standardized to IDMS reference method   12/30/2022 2 26 (H) 0 70 - 1 50 mg/dL Final     Comment:     Standardized to IDMS reference method     S/p 1/2 NS bolus x3 and  1L NS bolus    Baseline Cr: 0 7 - 0 9    Nephrology recommendations appreciated  Per Nephro;  Consider 1/4 NS in order to further correct sNa but avoid hyperglycemia    PLAN:  - D/ruy mIVF   - Avoid nephrotoxic medications  - BMP at noon  - Once ANGELICA is resolved (Cr back to baseline) will restart Janumet for DM

## 2022-12-31 NOTE — PROGRESS NOTES
Progress Note    Min Cookie 69 y o  male MRN: [id number]  Unit/Bed#: [facility] 715-01 Encounter: [id number]  Admitting Physician: Kate Giles MD  PCP: Rachael Barksdale MD  Date of Admission:  12/30/2022  3:37 PM    Assessment and Plan    * Hypernatremia  Assessment & Plan  Sodium   Date Value Ref Range Status   12/31/2022 151 (H) 135 - 147 mmol/L Final   12/31/2022 156 (H) 135 - 147 mmol/L Final   12/30/2022 160 (H) 135 - 147 mmol/L Final     Baseline Na -134-137   Likely 2/2 dehydration from diuresis due to hyperglycemia  Trending down after fluid infusion    Nephro recommendations appreciated      - Per Nephro D/c 1/2 NS bolus  - BMP at noon  - See A/P ANGELICA       Elevated troponin  Assessment & Plan  Hx of DM / HLD    - 24hr Telemetry    Acute kidney injury [facility])  Assessment & Plan  Creatinine   Date Value Ref Range Status   12/31/2022 1 14 0 70 - 1 50 mg/dL Final     Comment:     Standardized to IDMS reference method   12/31/2022 1 51 (H) 0 70 - 1 50 mg/dL Final     Comment:     Standardized to IDMS reference method   12/30/2022 1 86 (H) 0 70 - 1 50 mg/dL Final     Comment:     Standardized to IDMS reference method   12/30/2022 2 26 (H) 0 70 - 1 50 mg/dL Final     Comment:     Standardized to IDMS reference method     S/p 1/2 NS bolus x3 and  1L NS bolus  Baseline Cr: 0 7 - 0 9    Nephrology recommendations appreciated  Per Nephro;  Consider 1/4 NS in order to further correct sNa but avoid hyperglycemia    PLAN:  - D/ruy mIVF   - Avoid nephrotoxic medications  - BMP at noon  - Once ANGELICA is resolved (Cr back to baseline) will restart Janumet for DM       Dementia without behavioral disturbance ([facility] [address] )  Assessment & Plan  Hx of dementia  Mostly non-verbal presentation in room  AAOx0        Plan  - Maintain normal sleep/wake cycle  - Maintain normal urine and bowel functions with urinary retention protocol and bowel regimen  - Avoid all BEER's criteria drugs as much as possible     - Maintain adequate hydration   - Maintain daily orientation to environment and encourage family at bedside   - Mittens(restraints) in place   - Zyprexa 5mg qhs  - Fall precautions  - Discuss goals of care with spouse   - Fu w/pcp outpt        Primary hypothyroidism  Assessment & Plan    • Home med: Levothyroxine 75 mcg  • Last TSH 8/2022: 7 39    - Continue Levothyroxine 75 mcg    Mixed hyperlipidemia  Assessment & Plan  • Last Lipid Panel 8/2022: Cholesterol-215, TG's-132, HDL-38, LDL-151  • Home medications: Atorvastatin 40 mg      - Continue home medications    Type 2 diabetes mellitus without complication, without long-term current use of insulin [facility])  Assessment & Plan  Lab Results   Component Value Date    HGBA1C 9 5 (H) 12/31/2022     Recent Labs     12/30/22  1451 12/30/22  1841 12/30/22  2153   POCGLU >500* 299* 149*     • Home meds: Janumet  mg b i d  In trying to source possible cause of hyperglycemic presentation, will obtain UA, CXR to rule out possible infection  WBC mildly elevated on admission -  11 06    PLAN:  - D/ruy mIVF based on Nephro recommendations   - PO hydration  - Hold home meds until Cr back to baseline (~0 7)  - SSI + ACHS   - AM CMP      VTE Pharmacologic Prophylaxis:   Pharmacologic: Heparin  Mechanical VTE Prophylaxis in Place: No    Patient Centered Rounds: I have performed bedside rounds with nursing staff today  Discussions with Specialists or Other Care Team Provider: Yes  Nephrology    Education and Discussions with Family / Patient: Yes  Patient Information Sharing: With the consent of Rylee Fraga , their loved ones (Spouse) were notified today by inpatient team of the patient’s condition and current plan  Time Spent for Care: 30 minutes  More than 50% of total time spent on counseling and coordination of care as described above      Current Length of Stay: 1 day(s)    Current Patient Status: Inpatient   Certification Statement: The patient will continue to require additional inpatient hospital stay due to Hypernatremia and delirium    Discharge Plan: Tentative on patient's orientation    Code Status: Level 1 - Full Code      Subjective:   Patient is observed at bedside with the rest of the team but he is mostly non-verbal  Most questions only received a shake of the head or a quiet yes/no  Patient is Tuvaluan speaking and  was used for encounter  Nursing staff noted that patient was pulling out his IV lines and telemetry wires overnight  Objective:     Vitals:   Temp (24hrs), Av 5 °F (36 4 °C), Min:97 °F (36 1 °C), Max:97 9 °F (36 6 °C)    Temp:  [97 °F (36 1 °C)-97 9 °F (36 6 °C)] 97 7 °F (36 5 °C)  HR:  [] 74  Resp:  [16-18] 18  BP: ()/(65-71) 90/66  SpO2:  [97 %-98 %] 98 %  There is no height or weight on file to calculate BMI  Input and Output Summary (last 24 hours): Intake/Output Summary (Last 24 hours) at 2022 1327  Last data filed at 2022 1300  Gross per 24 hour   Intake 3280 83 ml   Output --   Net 3280 83 ml       Physical Exam:     Physical Exam  Vitals reviewed  Constitutional:       General: He is awake  He is not in acute distress  Appearance: He is cachectic  He is not diaphoretic  HENT:      Head: Normocephalic and atraumatic  Nose: Nose normal    Eyes:      Extraocular Movements: Extraocular movements intact  Conjunctiva/sclera: Conjunctivae normal    Cardiovascular:      Rate and Rhythm: Normal rate and regular rhythm  Heart sounds: Normal heart sounds  No murmur heard  Pulmonary:      Effort: Pulmonary effort is normal  No respiratory distress  Breath sounds: Normal breath sounds  Abdominal:      General: Bowel sounds are normal  There is no distension  Palpations: Abdomen is soft  Tenderness: There is no abdominal tenderness  Musculoskeletal:      Cervical back: Normal range of motion and neck supple  Skin:     General: Skin is warm and dry  Findings: No erythema  Neurological:      Mental Status: He is disoriented  Additional Data:     Labs:    Results from last 7 days   Lab Units 12/31/22  0802 12/30/22  1616   WBC Thousand/uL 8 00 11 06*   HEMOGLOBIN g/dL 11 6* 14 0   HEMATOCRIT % 38 3 47 0   PLATELETS Thousands/uL 143* 209   NEUTROS PCT %  --  88*   LYMPHS PCT %  --  7*   MONOS PCT %  --  3*   EOS PCT %  --  1     Results from last 7 days   Lab Units 12/31/22  1203 12/31/22  0802   POTASSIUM mmol/L 4 0 4 2   CHLORIDE mmol/L 121* 122*   CO2 mmol/L 26 27   BUN mg/dL 39* 43*   CREATININE mg/dL 0 94 1 14   CALCIUM mg/dL 8 5 8 6   ALK PHOS U/L  --  109   ALT U/L  --  45   AST U/L  --  36         Results from last 7 days   Lab Units 12/31/22  1117 12/31/22  0602 12/30/22  2153 12/30/22  1841 12/30/22  1451   POC GLUCOSE mg/dl 237* 150* 149* 299* >500*     Results from last 7 days   Lab Units 12/31/22  1100   HEMOGLOBIN A1C % 9 5*         * I Have Reviewed All Lab Data Listed Above  * Additional Pertinent Lab Tests Reviewed: [facility] Review[facility]        Recent Cultures (last 7 days):           Last 24 Hours Medication List:   Current Facility-Administered Medications   Medication Dose Route Frequency Provider Last Rate   • atorvastatin  40 mg Oral Daily With Maty Vo MD     • [START ON 1/1/2023] diphenhydrAMINE-zinc acetate   Topical TID PRN Juanita Bourne MD     • heparin (porcine)  5,000 Units Subcutaneous Q8H [address] Juanita Bourne MD     • insulin lispro  1-5 Units Subcutaneous TID AC Chetan Hernandez DO     • levothyroxine  75 mcg Oral Early Morning Juanita Bourne MD     • melatonin  3 mg Oral HS Gurmeet Lozoya MD     • OLANZapine  5 mg Oral HS Gurmeet Lozoya MD          ** Please Note: Dictation voice to text software may have been used in the creation of this document   **    Gurmeet Lozoya MD  12/31/22  1:27 PM

## 2022-12-31 NOTE — UTILIZATION REVIEW
Initial Clinical Review    Admission: Date/Time/Statement:   Admission Orders (From admission, onward)     Ordered        12/30/22 1701  INPATIENT ADMISSION  Once                      Orders Placed This Encounter   Procedures   • INPATIENT ADMISSION     Standing Status:   Standing     Number of Occurrences:   1     Order Specific Question:   Level of Care     Answer:   Med Surg [16]     Order Specific Question:   Estimated length of stay     Answer:   More than 2 Midnights     Order Specific Question:   Certification     Answer:   I certify that inpatient services are medically necessary for this patient for a duration of greater than two midnights  See H&P and MD Progress Notes for additional information about the patient's course of treatment  ED Arrival Information     Expected   -    Arrival   12/30/2022 14:37    Acuity   Urgent            Means of arrival   Walk-In    Escorted by   [address]    Admission type   Emergency            Arrival complaint   high blood sugar level           Chief Complaint   Patient presents with   • Hyperglycemia - no symptoms     Patient arrives with family who reports patients blood sugar was high today  Family reports patient has not been on insulin  Initial Presentation: 69 y o  male to the ED form home with complaints of elevated glucose at home  Admitted to inpatient for hypernatremia, remington, elevated troponin  H/O dementia, DMII, hypothyroid, HLD  Unable to walk  Arrives with tachycardia, glucose 621  Sodium on arrival 166  Likely due to dehydration from diuresis due to hyperglycemia  BMP every 2 hours  Give 1/2 NS bolus x 3  in the ED  Nephrology consult  Nephrology consult:  Suspect remington prerenal in nature from osmotic diuresis  Date: 12/31   Day 2: As per his wife, he has been compliant with medications at home  Sodium improving  Recheck BMP at noon  DC 1/2 NS as per nephrology   Goals of care discussed with spouse and she is able to continue to care for him at home  Nephrology consult:  Monitor off iv fluids  Glucose and sodium improved  Encourage eating and drinking  Awake, nonverbal at baseline  Does not open mouth on command     ED Triage Vitals   Temperature Pulse Respirations Blood Pressure SpO2   12/30/22 1455 12/30/22 1455 12/30/22 1455 12/30/22 1455 12/30/22 1455   97 9 °F (36 6 °C) (!) 110 16 110/71 97 %      Temp Source Heart Rate Source Patient Position - Orthostatic VS BP Location FiO2 (%)   12/30/22 1455 12/30/22 1455 12/30/22 1455 12/30/22 1455 --   Tympanic Monitor Sitting Left arm       Pain Score       12/31/22 0200       No Pain          Wt Readings from Last 1 Encounters:   10/06/22 45 4 kg (100 lb)     Additional Vital Signs:   /Time Temp Pulse Resp BP MAP (mmHg) SpO2 O2 Device Patient Position - Orthostatic VS   12/31/22 0719 97 7 °F (36 5 °C) 74 18 90/66 -- 98 % None (Room air) Lying   12/30/22 2350 97 °F (36 1 °C) Abnormal  75 18 106/71 79 97 % None (Room air) Lying   12/30/22 2216 -- 89 16 101/65 -- 98 % None (Room air) Lying   12/30/22 1708 -- 99 18 112/70 -- 97 % None (Room air) Lying   12/30/22 1455 97 9 °F (36 6 °C) 110 Abnormal  16 110/71 -- 97 % None (Room air) Sitting       Pertinent Labs/Diagnostic Test Results:     Results from last 7 days   Lab Units 12/31/22  0802 12/30/22  1616   WBC Thousand/uL 8 00 11 06*   HEMOGLOBIN g/dL 11 6* 14 0   HEMATOCRIT % 38 3 47 0   PLATELETS Thousands/uL 143* 209   NEUTROS ABS Thousands/µL  --  9 84*         Results from last 7 days   Lab Units 12/31/22  0802 12/31/22  0206 12/30/22  2155 12/30/22  1914 12/30/22  1616   SODIUM mmol/L 151* 156* 160* 165* 158*   POTASSIUM mmol/L 4 2 3 9 3 3* 3 5 4 9   CHLORIDE mmol/L 122* 125* 128* 130* 123*   CO2 mmol/L 27 26 26 26 24   ANION GAP mmol/L 2* 5 6 9 11   BUN mg/dL 43* 55* 65* 69* 75*   CREATININE mg/dL 1 14 1 51* 1 86* 2 26* 2 42*   EGFR ml/min/1 73sq m 62 44 34 27 25   CALCIUM mg/dL 8 6 8 6 8 8 9 4 9 8   MAGNESIUM mg/dL 2 3  --   --   --   --    PHOSPHORUS mg/dL 2 2*  --   --   --   --      Results from last 7 days   Lab Units 12/31/22  0802   AST U/L 36   ALT U/L 45   ALK PHOS U/L 109   TOTAL PROTEIN g/dL 6 3*   ALBUMIN g/dL 2 9*   TOTAL BILIRUBIN mg/dL 0 77     Results from last 7 days   Lab Units 12/30/22  2153 12/30/22  1841 12/30/22  1451   POC GLUCOSE mg/dl 149* 299* >500*     Results from last 7 days   Lab Units 12/31/22  0802 12/31/22  0206 12/30/22  2155 12/30/22  1914 12/30/22  1616   GLUCOSE RANDOM mg/dL 166* 184* 155* 189* 621*           ED Treatment:   Medication Administration from 12/30/2022 1437 to 12/30/2022 2314       Date/Time Order Dose Route Action     12/30/2022 1614 EST sodium chloride 0 9 % bolus 1,000 mL 1,000 mL Intravenous New Bag     12/30/2022 1614 EST insulin lispro (HumaLOG) 100 units/mL subcutaneous injection 10 Units 10 Units Subcutaneous Given     12/30/2022 1733 EST sodium chloride 0 9 % infusion 75 mL/hr Intravenous New Bag     12/30/2022 1817 EST sodium chloride infusion 0 45 % 75 mL/hr Intravenous New Bag     12/30/2022 2211 EST atorvastatin (LIPITOR) tablet 40 mg 40 mg Oral Given     12/30/2022 2018 EST sodium chloride infusion 0 45 % 1,000 mL Intravenous New Bag     12/30/2022 2211 EST heparin (porcine) subcutaneous injection 5,000 Units 5,000 Units Subcutaneous Given     12/30/2022 2250 EST sodium chloride infusion 0 45 % 1,000 mL/hr Intravenous New Bag        Past Medical History:   Diagnosis Date   • [facility]    • Cervical pain 4/16/2018   • Diabetes mellitus ([facility] )    • Diabetic peripheral neuropathy associated with type 2 diabetes mellitus ([facility] ) 3/18/2021   • Disease of thyroid gland    • Elevated PSA    • Hyperlipidemia    • Medicare annual wellness visit, subsequent 7/30/2020   • Prostate cancer ([facility] )    • Type 2 diabetes mellitus with diabetic peripheral angiopathy without gangrene ([address] ) 5/17/2021    According to ICD10 guidelines, patient accept     Admitting Diagnosis: Hyperglycemia [R73 9]  ANGELICA (acute kidney injury) ([facility] ) [N17 9]  Age/Sex: 69 y o  male  Admission Orders:  UA with reflex to micro/culture  SCDs  Monitor tele    Scheduled Medications:  atorvastatin, 40 mg, Oral, Daily With Dinner  heparin (porcine), 5,000 Units, Subcutaneous, Q8H [address]  insulin lispro, 1-5 Units, Subcutaneous, TID AC  levothyroxine, 75 mcg, Oral, Early Morning  melatonin, 3 mg, Oral, HS  OLANZapine, 5 mg, Oral, HS      Continuous IV Infusions:     PRN Meds:  [START ON 1/1/2023] diphenhydrAMINE-zinc acetate, , Topical, TID PRN        IP CONSULT TO NEPHROLOGY    Network Utilization Review Department  ATTENTION: Please call with any questions or concerns to [phone] and carefully listen to the prompts so that you are directed to the right person  All voicemails are confidential   Ty Limb all requests for admission clinical reviews, approved or denied determinations and any other requests to dedicated fax number below belonging to the campus where the patient is receiving treatment   List of dedicated fax numbers for the Facilities:  [address] DENIALS (Administrative/Medical Necessity) [phone]   [address] (Maternity/NICU/Pediatrics) [phone]   919 Kendy Aldridge [phone]   [facility]  [phone]   [address] [place] [address] Tiarra Flanagan 28 [phone]   1557 [facility] Sarika Zheng [place] [address] 815 [facility] [phone]

## 2022-12-31 NOTE — ASSESSMENT & PLAN NOTE
• Last Lipid Panel 8/2022: Cholesterol-215, TG's-132, HDL-38, LDL-151  • Home medications: Atorvastatin 40 mg      - Continue home medications

## 2022-12-31 NOTE — UTILIZATION REVIEW
NOTIFICATION OF INPATIENT ADMISSION   AUTHORIZATION REQUEST   SERVICING FACILITY:   [address] [place] [address]  [address] , [facility], [address] [place] [address]  Tax ID: [id number]  NPI: [id number] ATTENDING PROVIDER:  Attending Name and NPI#: Rosanna Mackay Md [[id number]]  Address: [address] , [facility], [address] [place] [address]  Phone: [phone]     ADMISSION INFORMATION:  Place of Service: Inpatient [address] [facility]  [address]  Place of Service Code: 21  Inpatient Admission Date/Time: 12/30/22  5:01 PM  Discharge Date/Time: No discharge date for patient encounter  Admitting Diagnosis Code/Description:  Hyperglycemia [R73 9]  ANGELICA (acute kidney injury) ([facility] ) [N17 9]     UTILIZATION REVIEW CONTACT:  Luis Manuel Michaels Utilization   Network Utilization Review Department  Phone: [phone]  Fax [phone]  Email: [email or web address]  Contact for approvals/pending authorizations, clinical reviews, and discharge  PHYSICIAN ADVISORY SERVICES:  Medical Necessity Denial & [employer]dyr[employer]kd-Ymx[employer] Review  Phone: [phone]  Fax: [phone]  Email: [email or web address]

## 2022-12-31 NOTE — ASSESSMENT & PLAN NOTE
Hx of dementia  Mostly non-verbal presentation in room   AAOx0        Plan  - Maintain adequate hydration   - Fall precautions  - Discuss goals of care with spouse   - Fu w/pcp outpt

## 2022-12-31 NOTE — CONSULTS
Consultation - Nephrology   Paulette Linda 69 y o  male MRN: [id number]  Unit/Bed#: 7T [facility] 715-01 Encounter: [id number]    ASSESSMENT and PLAN:  1  ANGELICA, present on admission, likely d/t dehydration from osmotic diuresis as well as lack of water intake from dementia  -b/l sCr < 1(0 69 in Aug  2021)  -admit sCr 2 42 with glucose 621   -as glucose has improved, and with IVF, sCr improving, now 1 5  -monitor off IVF for now   -may need to consider 1/4 NS in order to further correct sNa but avoid hyperglycemia   -goals of care discussion is warranted  -will hold off on further IVF for now as sNa overcorrected as corrected sNa on admission approximately 171    2  Hypernatremia, severe - sNa 158, corrected to 171 on admission  -as this is all likely acute in setting of hyperglycemia, more rapid correction acceptable but would avoid further IVF for now   -encourage patient to eat and drink although swallowing ability seems to be a concern  -f/u am labs    3  DM2 - management per primary team, needs glycemic control    HISTORY OF PRESENT ILLNESS:  Requesting Physician: Zoila Mcgee MD  Reason for Consult: ANGELICA, hypernatremia    Paulette Linda is a 69y o  year old male who was admitted to [address] [place] [address] after presenting with hyperglycemia  A renal consultation is requested today for assistance in the management of ANGELICA and hypernatremia  I am unable to elicit HPI or review of systems from patient as patient with dementia and currently with food in his mouth that he will not swallow  Per record review, the patient presented with hyperglycemia, blood sugar was elevated at home up to 500  Nephrology consulted for acute kidney injury and hyponatremia  Patient received normal saline bolus in the ER  Patient then received half-normal saline overnight      PAST MEDICAL HISTORY:  Past Medical History:   Diagnosis Date   • Alzheimer disease type 3 ([facility] )    • Cervical pain 4/16/2018   • Diabetes mellitus ([facility] )    • Diabetic peripheral neuropathy associated with type 2 diabetes mellitus ([facility] ) 3/18/2021   • Disease of thyroid gland    • Elevated PSA    • Hyperlipidemia    • Medicare annual wellness visit, subsequent 7/30/2020   • Prostate cancer ([facility] )    • Type 2 diabetes mellitus with diabetic peripheral angiopathy without gangrene ([facility] ) 5/17/2021    According to ICD10 guidelines, patient accept       PAST SURGICAL HISTORY:  Past Surgical History:   Procedure Laterality Date   • NECK SURGERY     • PROSTATE BIOPSY  07/18/2018       ALLERGIES:  Allergies   Allergen Reactions   • Other      seasonal       SOCIAL HISTORY:  Social History     Substance and Sexual Activity   Alcohol Use Never     Social History     Substance and Sexual Activity   Drug Use Never     Social History     Tobacco Use   Smoking Status Never   Smokeless Tobacco Never       FAMILY HISTORY:  Family History   Problem Relation Age of Onset   • No Known Problems Mother    • No Known Problems Father    • Dementia Sister    • Autism Daughter        MEDICATIONS:    Current Facility-Administered Medications:   •  atorvastatin (LIPITOR) tablet 40 mg, 40 mg, Oral, Daily With Agustín Mendoza MD, 40 mg at 12/30/22 2211  •  [START ON 1/1/2023] diphenhydrAMINE-zinc acetate (BENADRYL) 2-0 1 % cream, , Topical, TID PRN, Josseline Kenyon MD  •  Glucerna Shake LIQD 1 Bottle, 1 Bottle, Oral, BID, Josseline Kenyon MD  •  heparin (porcine) subcutaneous injection 5,000 Units, 5,000 Units, Subcutaneous, Q8H [address], Josseline Kenyon MD, 5,000 Units at 12/30/22 2211  •  insulin lispro (HumaLOG) 100 units/mL subcutaneous injection 1-5 Units, 1-5 Units, Subcutaneous, TID AC **AND** Fingerstick Glucose (POCT), , , TID AC, Thomas Hernandez,   •  levothyroxine tablet 75 mcg, 75 mcg, Oral, Early Morning, Josseline Kenyon MD  •  melatonin tablet 3 mg, 3 mg, Oral, HS, Josseline Kenyon MD    REVIEW OF SYSTEMS:  More than 10 systems were reviewed  No other pertinent positive findings other than those mentioned in HPI  PHYSICAL EXAM:  Current Weight:    First Weight:    Vitals:    12/30/22 1708 12/30/22 2216 12/30/22 2350 12/31/22 0719   BP: 112/70 101/65 106/71 90/66   BP Location: Left arm Left arm Left arm Left arm   Pulse: 99 89 75 74   Resp: 18 16 18 18   Temp:   (!) 97 °F (36 1 °C) 97 7 °F (36 5 °C)   TempSrc:   Temporal Temporal   SpO2: 97% 98% 97% 98%       Intake/Output Summary (Last 24 hours) at 12/31/2022 0724  Last data filed at 12/31/2022 0554  Gross per 24 hour   Intake 3220 83 ml   Output --   Net 3220 83 ml     Physical Exam  Vitals reviewed  Constitutional:       General: He is not in acute distress  Appearance: He is well-developed  He is not diaphoretic  Comments: cachectic   HENT:      Head: Normocephalic and atraumatic  Nose: Nose normal       Mouth/Throat:      Comments: Does not open mouth  Eyes:      General: No scleral icterus  Right eye: No discharge  Left eye: No discharge  Neck:      Thyroid: No thyromegaly  Cardiovascular:      Rate and Rhythm: Normal rate and regular rhythm  Heart sounds: Normal heart sounds  Pulmonary:      Effort: Pulmonary effort is normal       Breath sounds: Normal breath sounds  No wheezing or rales  Abdominal:      General: Bowel sounds are normal  There is no distension  Palpations: Abdomen is soft  Tenderness: There is no abdominal tenderness  Musculoskeletal:         General: No swelling  Normal range of motion  Cervical back: Neck supple  Lymphadenopathy:      Cervical: No cervical adenopathy  Skin:     General: Skin is warm and dry  Findings: No rash  Neurological:      Mental Status: He is alert        Comments: Awake, nonverbal   Psychiatric:      Comments: +dementia         Invasive Devices:      Lab Results:   Results from last 7 days   Lab Units 12/31/22  0206 12/30/22  2155 12/30/22  1914 12/30/22  1616   WBC Thousand/uL  --   --   --  11 06*   HEMOGLOBIN g/dL  --   --   --  14 0   HEMATOCRIT %  --   --   --  47 0   PLATELETS Thousands/uL  --   --   --  209   POTASSIUM mmol/L 3 9 3 3* 3 5 4 9   CHLORIDE mmol/L 125* 128* 130* 123*   CO2 mmol/L 26 26 26 24   BUN mg/dL 55* 65* 69* 75*   CREATININE mg/dL 1 51* 1 86* 2 26* 2 42*   CALCIUM mg/dL 8 6 8 8 9 4 9 8

## 2022-12-31 NOTE — PLAN OF CARE
Problem: Potential for Falls  Goal: Patient will remain free of falls  Description: INTERVENTIONS:  - Educate patient/family on patient safety including physical limitations  - Instruct patient to call for assistance with activity   - Consult OT/PT to assist with strengthening/mobility   - Keep Call bell within reach  - Keep bed low and locked with side rails adjusted as appropriate  - Keep care items and personal belongings within reach  - Initiate and maintain comfort rounds  - Make Fall Risk Sign visible to staff  - Offer Toileting every 2 Hours, in advance of need  - Initiate/Maintain bed alarm  - Apply yellow socks and bracelet for high fall risk patients  - Consider moving patient to room near nurses station  Outcome: Progressing     Problem: SAFETY,RESTRAINT: NV/NON-SELF DESTRUCTIVE BEHAVIOR  Goal: Remains free of harm/injury (restraint for non violent/non self-detsructive behavior)  Description: INTERVENTIONS:  - Instruct patient/family regarding restraint use   - Assess and monitor physiologic and psychological status   - Provide interventions and comfort measures to meet assessed patient needs   - Identify and implement measures to help patient regain control  - Assess readiness for release of restraint   Outcome: Progressing  Goal: Returns to optimal restraint-free functioning  Description: INTERVENTIONS:  - Assess the patient's behavior and symptoms that indicate continued need for restraint  - Identify and implement measures to help patient regain control  - Assess readiness for release of restraint   Outcome: Progressing     Problem: MOBILITY - ADULT  Goal: Maintain or return to baseline ADL function  Description: INTERVENTIONS:  -  Assess patient's ability to carry out ADLs; assess patient's baseline for ADL function and identify physical deficits which impact ability to perform ADLs (bathing, care of mouth/teeth, toileting, grooming, dressing, etc )  - Assess/evaluate cause of self-care deficits - Assess range of motion  - Assess patient's mobility; develop plan if impaired  - Assess patient's need for assistive devices and provide as appropriate  - Encourage maximum independence but intervene and supervise when necessary  - Involve family in performance of ADLs  - Assess for home care needs following discharge   - Consider OT consult to assist with ADL evaluation and planning for discharge  - Provide patient education as appropriate  Outcome: Progressing  Goal: Maintains/Returns to pre admission functional level  Description: INTERVENTIONS:  - Perform BMAT or MOVE assessment daily    - Set and communicate daily mobility goal to care team and patient/family/caregiver  - Collaborate with rehabilitation services on mobility goals if consulted  - Perform Range of Motion 3 times a day  - Reposition patient every 2 hours    - Dangle patient 3 times a day  - Out of bed to chair 2 times a day   - Out of bed for meals 2 times a day  - Out of bed for toileting  - Record patient progress and toleration of activity level   Outcome: Progressing     Problem: Prexisting or High Potential for Compromised Skin Integrity  Goal: Skin integrity is maintained or improved  Description: INTERVENTIONS:  - Identify patients at risk for skin breakdown  - Assess and monitor skin integrity  - Assess and monitor nutrition and hydration status  - Monitor labs   - Assess for incontinence   - Turn and reposition patient  - Assist with mobility/ambulation  - Relieve pressure over bony prominences  - Avoid friction and shearing  - Provide appropriate hygiene as needed including keeping skin clean and dry  - Evaluate need for skin moisturizer/barrier cream  - Collaborate with interdisciplinary team   - Patient/family teaching  - Consider wound care consult   Outcome: Progressing     Problem: PAIN - ADULT  Goal: Verbalizes/displays adequate comfort level or baseline comfort level  Description: Interventions:  - Encourage patient to monitor pain and request assistance  - Assess pain using appropriate pain scale  - Administer analgesics based on type and severity of pain and evaluate response  - Implement non-pharmacological measures as appropriate and evaluate response  - Consider cultural and social influences on pain and pain management  - Notify physician/advanced practitioner if interventions unsuccessful or patient reports new pain  Outcome: Progressing     Problem: INFECTION - ADULT  Goal: Absence or prevention of progression during hospitalization  Description: INTERVENTIONS:  - Assess and monitor for signs and symptoms of infection  - Monitor lab/diagnostic results  - Monitor all insertion sites, i e  indwelling lines, tubes, and drains  - Administer medications as ordered  - Instruct and encourage patient and family to use good hand hygiene technique  - Identify and instruct in appropriate isolation precautions for identified infection/condition  Outcome: Progressing  Goal: Absence of fever/infection during neutropenic period  Description: INTERVENTIONS:  - Monitor WBC    Outcome: Progressing     Problem: SAFETY ADULT  Goal: Patient will remain free of falls  Description: INTERVENTIONS:  - Educate patient/family on patient safety including physical limitations  - Instruct patient to call for assistance with activity   - Consult OT/PT to assist with strengthening/mobility   - Keep Call bell within reach  - Keep bed low and locked with side rails adjusted as appropriate  - Keep care items and personal belongings within reach  - Initiate and maintain comfort rounds  - Make Fall Risk Sign visible to staff  - Offer Toileting every 2 Hours, in advance of need  - Initiate/Maintain bed alarm  - Apply yellow socks and bracelet for high fall risk patients  - Consider moving patient to room near nurses station  Outcome: Progressing  Goal: Maintain or return to baseline ADL function  Description: INTERVENTIONS:  -  Assess patient's ability to carry out ADLs; assess patient's baseline for ADL function and identify physical deficits which impact ability to perform ADLs (bathing, care of mouth/teeth, toileting, grooming, dressing, etc )  - Assess/evaluate cause of self-care deficits   - Assess range of motion  - Assess patient's mobility; develop plan if impaired  - Assess patient's need for assistive devices and provide as appropriate  - Encourage maximum independence but intervene and supervise when necessary  - Involve family in performance of ADLs  - Assess for home care needs following discharge   - Consider OT consult to assist with ADL evaluation and planning for discharge  - Provide patient education as appropriate  Outcome: Progressing  Goal: Maintains/Returns to pre admission functional level  Description: INTERVENTIONS:  - Perform BMAT or MOVE assessment daily    - Set and communicate daily mobility goal to care team and patient/family/caregiver  - Collaborate with rehabilitation services on mobility goals if consulted  - Perform Range of Motion 2 times a day  - Reposition patient every 2 hours    - Dangle patient 2 times a day  - Out of bed to chair 2 times a day   - Out of bed for meals 2 times a day  - Out of bed for toileting  - Record patient progress and toleration of activity level   Outcome: Progressing     Problem: DISCHARGE PLANNING  Goal: Discharge to home or other facility with appropriate resources  Description: INTERVENTIONS:  - Identify barriers to discharge w/patient and caregiver  - Arrange for needed discharge resources and transportation as appropriate  - Identify discharge learning needs (meds, wound care, etc )  - Arrange for interpretive services to assist at discharge as needed  - Refer to Case Management Department for coordinating discharge planning if the patient needs post-hospital services based on physician/advanced practitioner order or complex needs related to functional status, cognitive ability, or social support system  Outcome: Progressing     Problem: Knowledge Deficit  Goal: Patient/family/caregiver demonstrates understanding of disease process, treatment plan, medications, and discharge instructions  Description: Complete learning assessment and assess knowledge base    Interventions:  - Provide teaching at level of understanding  - Provide teaching via preferred learning methods  Outcome: Progressing     Problem: METABOLIC, FLUID AND ELECTROLYTES - ADULT  Goal: Electrolytes maintained within normal limits  Description: INTERVENTIONS:  - Monitor labs and assess patient for signs and symptoms of electrolyte imbalances  - Administer electrolyte replacement as ordered  - Monitor response to electrolyte replacements, including repeat lab results as appropriate  - Instruct patient on fluid and nutrition as appropriate  Outcome: Progressing  Goal: Fluid balance maintained  Description: INTERVENTIONS:  - Monitor labs   - Monitor I/O and WT  - Instruct patient on fluid and nutrition as appropriate  - Assess for signs & symptoms of volume excess or deficit  Outcome: Progressing  Goal: Glucose maintained within target range  Description: INTERVENTIONS:  - Monitor Blood Glucose as ordered  - Assess for signs and symptoms of hyperglycemia and hypoglycemia  - Administer ordered medications to maintain glucose within target range  - Assess nutritional intake and initiate nutrition service referral as needed  Outcome: Progressing     Problem: MUSCULOSKELETAL - ADULT  Goal: Maintain or return mobility to safest level of function  Description: INTERVENTIONS:  - Assess patient's ability to carry out ADLs; assess patient's baseline for ADL function and identify physical deficits which impact ability to perform ADLs (bathing, care of mouth/teeth, toileting, grooming, dressing, etc )  - Assess/evaluate cause of self-care deficits   - Assess range of motion  - Assess patient's mobility  - Assess patient's need for assistive devices and provide as appropriate  - Encourage maximum independence but intervene and supervise when necessary  - Involve family in performance of ADLs  - Assess for home care needs following discharge   - Consider OT consult to assist with ADL evaluation and planning for discharge  - Provide patient education as appropriate  Outcome: Progressing  Goal: Maintain proper alignment of affected body part  Description: INTERVENTIONS:  - Support, maintain and protect limb and body alignment  - Provide patient/ family with appropriate education  Outcome: Progressing     Problem: Nutrition/Hydration-ADULT  Goal: Nutrient/Hydration intake appropriate for improving, restoring or maintaining nutritional needs  Description: Monitor and assess patient's nutrition/hydration status for malnutrition  Collaborate with interdisciplinary team and initiate plan and interventions as ordered  Monitor patient's weight and dietary intake as ordered or per policy  Utilize nutrition screening tool and intervene as necessary  Determine patient's food preferences and provide high-protein, high-caloric foods as appropriate       INTERVENTIONS:  - Monitor oral intake, urinary output, labs, and treatment plans  - Assess nutrition and hydration status and recommend course of action  - Evaluate amount of meals eaten  - Assist patient with eating if necessary   - Allow adequate time for meals  - Recommend/ encourage appropriate diets, oral nutritional supplements, and vitamin/mineral supplements  - Order, calculate, and assess calorie counts as needed  - Recommend, monitor, and adjust tube feedings and TPN/PPN based on assessed needs  - Assess need for intravenous fluids  - Provide specific nutrition/hydration education as appropriate  - Include patient/family/caregiver in decisions related to nutrition  Outcome: Progressing

## 2022-12-31 NOTE — ASSESSMENT & PLAN NOTE
Sodium   Date Value Ref Range Status   12/31/2022 151 (H) 135 - 147 mmol/L Final   12/31/2022 156 (H) 135 - 147 mmol/L Final   12/30/2022 160 (H) 135 - 147 mmol/L Final     Baseline Na -134-137   Likely 2/2 dehydration from diuresis due to hyperglycemia   Trending down after fluid infusion    Nephro recommendations appreciated      - Per Nephro D/c 1/2 NS bolus  - BMP at noon  - See A/P ANGELICA

## 2022-12-31 NOTE — QUICK NOTE
Conversation with patient's spouse  Interview through [employer]   Called patient's wife to get an idea of preceding events before current admission  Wife states that patient has been compliant with medications, including Janumet, and there has been no recent steroid use that could cause an increase in blood glucose  Patient's spouse associates his recent hyperglycemic episode to a hired home attendant that was not giving patient proper diet at home  Home attendant has since been removed from patient's care    Spouse further confirms patient's dementia and states his baseline is similar to his current presentation of being impaired cognitively  Upon discussing goals of care, spouse states she has power of  and she is able to take care of her  at home as she has been doing for multiple years  She states his blood glucose have been controlled for the longest and this episode can be ascribed to inappropriate care by previous home attendant  She has also requested for a visiting nurse to patient's home monthly to assist with blood sugar control

## 2022-12-31 NOTE — QUICK NOTE
Renal function back to baseline and sNa much improved without further IVF  Acute hypernatremia due to acute hyperglycemia has resolved  Nephrology will sign off  Encourage this patient with dementia to drink fluids  High risk of readmission if he does not have his swallowing addressed  Call/text with questions

## 2023-01-01 ENCOUNTER — HOSPICE ADMISSION (OUTPATIENT)
Dept: HOME HOSPICE | Facility: HOSPICE | Age: 78
End: 2023-01-01

## 2023-01-01 VITALS
RESPIRATION RATE: 18 BRPM | TEMPERATURE: 97.9 F | SYSTOLIC BLOOD PRESSURE: 101 MMHG | OXYGEN SATURATION: 95 % | HEART RATE: 86 BPM | DIASTOLIC BLOOD PRESSURE: 59 MMHG

## 2023-01-01 DIAGNOSIS — R64 CACHEXIA (HCC): Primary | ICD-10-CM

## 2023-01-01 PROBLEM — N17.9 ACUTE KIDNEY INJURY (HCC): Status: RESOLVED | Noted: 2022-01-01 | Resolved: 2023-01-01

## 2023-01-01 PROBLEM — R77.8 ELEVATED TROPONIN: Status: RESOLVED | Noted: 2022-01-01 | Resolved: 2023-01-01

## 2023-01-01 PROBLEM — E87.0 HYPERNATREMIA: Status: RESOLVED | Noted: 2022-12-30 | Resolved: 2023-01-01

## 2023-01-01 LAB
ALBUMIN SERPL BCP-MCNC: 2.7 G/DL (ref 3.5–5)
ALP SERPL-CCNC: 107 U/L (ref 43–122)
ALT SERPL W P-5'-P-CCNC: 47 U/L
ANION GAP SERPL CALCULATED.3IONS-SCNC: 2 MMOL/L (ref 5–14)
AST SERPL W P-5'-P-CCNC: 54 U/L (ref 17–59)
BASOPHILS # BLD AUTO: 0.01 THOUSANDS/ÂΜL (ref 0–0.1)
BASOPHILS NFR BLD AUTO: 0 % (ref 0–1)
BILIRUB SERPL-MCNC: 0.93 MG/DL (ref 0.2–1)
BUN SERPL-MCNC: 28 MG/DL (ref 5–25)
CALCIUM ALBUM COR SERPL-MCNC: 9.5 MG/DL (ref 8.3–10.1)
CALCIUM SERPL-MCNC: 8.5 MG/DL (ref 8.4–10.2)
CHLORIDE SERPL-SCNC: 115 MMOL/L (ref 96–108)
CO2 SERPL-SCNC: 25 MMOL/L (ref 21–32)
CREAT SERPL-MCNC: 0.69 MG/DL (ref 0.7–1.5)
EOSINOPHIL # BLD AUTO: 0.16 THOUSAND/ÂΜL (ref 0–0.61)
EOSINOPHIL NFR BLD AUTO: 3 % (ref 0–6)
ERYTHROCYTE [DISTWIDTH] IN BLOOD BY AUTOMATED COUNT: 15 % (ref 11.6–15.1)
GFR SERPL CREATININE-BSD FRML MDRD: 92 ML/MIN/1.73SQ M
GLUCOSE SERPL-MCNC: 226 MG/DL (ref 70–99)
GLUCOSE SERPL-MCNC: 265 MG/DL (ref 65–140)
GLUCOSE SERPL-MCNC: 338 MG/DL (ref 65–140)
HCT VFR BLD AUTO: 35.8 % (ref 36.5–49.3)
HGB BLD-MCNC: 11.6 G/DL (ref 12–17)
IMM GRANULOCYTES # BLD AUTO: 0.02 THOUSAND/UL (ref 0–0.2)
IMM GRANULOCYTES NFR BLD AUTO: 0 % (ref 0–2)
LYMPHOCYTES # BLD AUTO: 0.87 THOUSANDS/ÂΜL (ref 0.6–4.47)
LYMPHOCYTES NFR BLD AUTO: 15 % (ref 14–44)
MCH RBC QN AUTO: 29.8 PG (ref 26.8–34.3)
MCHC RBC AUTO-ENTMCNC: 32.4 G/DL (ref 31.4–37.4)
MCV RBC AUTO: 92 FL (ref 82–98)
MONOCYTES # BLD AUTO: 0.3 THOUSAND/ÂΜL (ref 0.17–1.22)
MONOCYTES NFR BLD AUTO: 5 % (ref 4–12)
NEUTROPHILS # BLD AUTO: 4.5 THOUSANDS/ÂΜL (ref 1.85–7.62)
NEUTS SEG NFR BLD AUTO: 77 % (ref 43–75)
NRBC BLD AUTO-RTO: 0 /100 WBCS
PHOSPHATE SERPL-MCNC: 1.9 MG/DL (ref 2.5–4.8)
PHOSPHATE SERPL-MCNC: 3 MG/DL (ref 2.5–4.8)
PLATELET # BLD AUTO: 133 THOUSANDS/UL (ref 149–390)
PMV BLD AUTO: 12.5 FL (ref 8.9–12.7)
POTASSIUM SERPL-SCNC: 4 MMOL/L (ref 3.5–5.3)
PROT SERPL-MCNC: 5.8 G/DL (ref 6.4–8.4)
RBC # BLD AUTO: 3.89 MILLION/UL (ref 3.88–5.62)
SODIUM SERPL-SCNC: 142 MMOL/L (ref 135–147)
WBC # BLD AUTO: 5.86 THOUSAND/UL (ref 4.31–10.16)

## 2023-01-01 RX ORDER — CEFTRIAXONE 1 G/50ML
1000 INJECTION, SOLUTION INTRAVENOUS ONCE
Status: COMPLETED | OUTPATIENT
Start: 2023-01-01 | End: 2023-01-01

## 2023-01-01 RX ADMIN — INSULIN LISPRO 3 UNITS: 100 INJECTION, SOLUTION INTRAVENOUS; SUBCUTANEOUS at 11:46

## 2023-01-01 RX ADMIN — POTASSIUM PHOSPHATE, MONOBASIC AND POTASSIUM PHOSPHATE, DIBASIC 9 MMOL: 224; 236 INJECTION, SOLUTION, CONCENTRATE INTRAVENOUS at 09:21

## 2023-01-01 RX ADMIN — HEPARIN SODIUM 5000 UNITS: 5000 INJECTION INTRAVENOUS; SUBCUTANEOUS at 07:03

## 2023-01-01 RX ADMIN — INSULIN LISPRO 2 UNITS: 100 INJECTION, SOLUTION INTRAVENOUS; SUBCUTANEOUS at 07:03

## 2023-01-01 RX ADMIN — LEVOTHYROXINE SODIUM 75 MCG: 75 TABLET ORAL at 07:03

## 2023-01-01 RX ADMIN — CEFTRIAXONE 1000 MG: 1 INJECTION, SOLUTION INTRAVENOUS at 11:41

## 2023-01-01 NOTE — PLAN OF CARE
Problem: Potential for Falls  Goal: Patient will remain free of falls  Description: INTERVENTIONS:  - Educate patient/family on patient safety including physical limitations  - Instruct patient to call for assistance with activity   - Consult OT/PT to assist with strengthening/mobility   - Keep Call bell within reach  - Keep bed low and locked with side rails adjusted as appropriate  - Keep care items and personal belongings within reach  - Initiate and maintain comfort rounds  - Make Fall Risk Sign visible to staff  - Apply yellow socks and bracelet for high fall risk patients  - Consider moving patient to room near nurses station  Outcome: Progressing     Problem: SAFETY,RESTRAINT: NV/NON-SELF DESTRUCTIVE BEHAVIOR  Goal: Remains free of harm/injury (restraint for non violent/non self-detsructive behavior)  Description: INTERVENTIONS:  - Instruct patient/family regarding restraint use   - Assess and monitor physiologic and psychological status   - Provide interventions and comfort measures to meet assessed patient needs   - Identify and implement measures to help patient regain control  - Assess readiness for release of restraint   Outcome: Progressing  Goal: Returns to optimal restraint-free functioning  Description: INTERVENTIONS:  - Assess the patient's behavior and symptoms that indicate continued need for restraint  - Identify and implement measures to help patient regain control  - Assess readiness for release of restraint   Outcome: Progressing     Problem: MOBILITY - ADULT  Goal: Maintain or return to baseline ADL function  Description: INTERVENTIONS:  -  Assess patient's ability to carry out ADLs; assess patient's baseline for ADL function and identify physical deficits which impact ability to perform ADLs (bathing, care of mouth/teeth, toileting, grooming, dressing, etc )  - Assess/evaluate cause of self-care deficits   - Assess range of motion  - Assess patient's mobility; develop plan if impaired  - Assess patient's need for assistive devices and provide as appropriate  - Encourage maximum independence but intervene and supervise when necessary  - Involve family in performance of ADLs  - Assess for home care needs following discharge   - Consider OT consult to assist with ADL evaluation and planning for discharge  - Provide patient education as appropriate  Outcome: Progressing  Goal: Maintains/Returns to pre admission functional level  Description: INTERVENTIONS:  - Perform BMAT or MOVE assessment daily    - Set and communicate daily mobility goal to care team and patient/family/caregiver     - Collaborate with rehabilitation services on mobility goals if consulted  - Stand patient 3 times a day  - Ambulate patient 3 times a day  - Out of bed to chair 2 times a day   - Out of bed for meals 3 times a day  - Out of bed for toileting  - Record patient progress and toleration of activity level   Outcome: Progressing     Problem: Prexisting or High Potential for Compromised Skin Integrity  Goal: Skin integrity is maintained or improved  Description: INTERVENTIONS:  - Identify patients at risk for skin breakdown  - Assess and monitor skin integrity  - Assess and monitor nutrition and hydration status  - Monitor labs   - Assess for incontinence   - Turn and reposition patient  - Assist with mobility/ambulation  - Relieve pressure over bony prominences  - Avoid friction and shearing  - Provide appropriate hygiene as needed including keeping skin clean and dry  - Evaluate need for skin moisturizer/barrier cream  - Collaborate with interdisciplinary team   - Patient/family teaching  - Consider wound care consult   Outcome: Progressing     Problem: PAIN - ADULT  Goal: Verbalizes/displays adequate comfort level or baseline comfort level  Description: Interventions:  - Encourage patient to monitor pain and request assistance  - Assess pain using appropriate pain scale  - Administer analgesics based on type and severity of pain and evaluate response  - Implement non-pharmacological measures as appropriate and evaluate response  - Consider cultural and social influences on pain and pain management  - Notify physician/advanced practitioner if interventions unsuccessful or patient reports new pain  Outcome: Progressing     Problem: INFECTION - ADULT  Goal: Absence or prevention of progression during hospitalization  Description: INTERVENTIONS:  - Assess and monitor for signs and symptoms of infection  - Monitor lab/diagnostic results  - Monitor all insertion sites, i e  indwelling lines, tubes, and drains  - Administer medications as ordered  - Instruct and encourage patient and family to use good hand hygiene technique  - Identify and instruct in appropriate isolation precautions for identified infection/condition  Outcome: Progressing  Goal: Absence of fever/infection during neutropenic period  Description: INTERVENTIONS:  - Monitor WBC    Outcome: Progressing     Problem: SAFETY ADULT  Goal: Patient will remain free of falls  Description: INTERVENTIONS:  - Educate patient/family on patient safety including physical limitations  - Instruct patient to call for assistance with activity   - Consult OT/PT to assist with strengthening/mobility   - Keep Call bell within reach  - Keep bed low and locked with side rails adjusted as appropriate  - Keep care items and personal belongings within reach  - Initiate and maintain comfort rounds  - Make Fall Risk Sign visible to staff  - Apply yellow socks and bracelet for high fall risk patients  - Consider moving patient to room near nurses station  Outcome: Progressing  Goal: Maintain or return to baseline ADL function  Description: INTERVENTIONS:  -  Assess patient's ability to carry out ADLs; assess patient's baseline for ADL function and identify physical deficits which impact ability to perform ADLs (bathing, care of mouth/teeth, toileting, grooming, dressing, etc )  - Assess/evaluate cause of self-care deficits   - Assess range of motion  - Assess patient's mobility; develop plan if impaired  - Assess patient's need for assistive devices and provide as appropriate  - Encourage maximum independence but intervene and supervise when necessary  - Involve family in performance of ADLs  - Assess for home care needs following discharge   - Consider OT consult to assist with ADL evaluation and planning for discharge  - Provide patient education as appropriate  Outcome: Progressing  Goal: Maintains/Returns to pre admission functional level  Description: INTERVENTIONS:  - Perform BMAT or MOVE assessment daily    - Set and communicate daily mobility goal to care team and patient/family/caregiver     - Collaborate with rehabilitation services on mobility goals if consulted  - Stand patient 3 times a day  - Ambulate patient 3 times a day  - Out of bed to chair 2 times a day   - Out of bed for meals 3 times a day  - Out of bed for toileting  - Record patient progress and toleration of activity level   Outcome: Progressing     Problem: DISCHARGE PLANNING  Goal: Discharge to home or other facility with appropriate resources  Description: INTERVENTIONS:  - Identify barriers to discharge w/patient and caregiver  - Arrange for needed discharge resources and transportation as appropriate  - Identify discharge learning needs (meds, wound care, etc )  - Arrange for interpretive services to assist at discharge as needed  - Refer to Case Management Department for coordinating discharge planning if the patient needs post-hospital services based on physician/advanced practitioner order or complex needs related to functional status, cognitive ability, or social support system  Outcome: Progressing     Problem: Knowledge Deficit  Goal: Patient/family/caregiver demonstrates understanding of disease process, treatment plan, medications, and discharge instructions  Description: Complete learning assessment and assess knowledge base   Interventions:  - Provide teaching at level of understanding  - Provide teaching via preferred learning methods  Outcome: Progressing     Problem: METABOLIC, FLUID AND ELECTROLYTES - ADULT  Goal: Electrolytes maintained within normal limits  Description: INTERVENTIONS:  - Monitor labs and assess patient for signs and symptoms of electrolyte imbalances  - Administer electrolyte replacement as ordered  - Monitor response to electrolyte replacements, including repeat lab results as appropriate  - Instruct patient on fluid and nutrition as appropriate  Outcome: Progressing  Goal: Fluid balance maintained  Description: INTERVENTIONS:  - Monitor labs   - Monitor I/O and WT  - Instruct patient on fluid and nutrition as appropriate  - Assess for signs & symptoms of volume excess or deficit  Outcome: Progressing  Goal: Glucose maintained within target range  Description: INTERVENTIONS:  - Monitor Blood Glucose as ordered  - Assess for signs and symptoms of hyperglycemia and hypoglycemia  - Administer ordered medications to maintain glucose within target range  - Assess nutritional intake and initiate nutrition service referral as needed  Outcome: Progressing     Problem: MUSCULOSKELETAL - ADULT  Goal: Maintain or return mobility to safest level of function  Description: INTERVENTIONS:  - Assess patient's ability to carry out ADLs; assess patient's baseline for ADL function and identify physical deficits which impact ability to perform ADLs (bathing, care of mouth/teeth, toileting, grooming, dressing, etc )  - Assess/evaluate cause of self-care deficits   - Assess range of motion  - Assess patient's mobility  - Assess patient's need for assistive devices and provide as appropriate  - Encourage maximum independence but intervene and supervise when necessary  - Involve family in performance of ADLs  - Assess for home care needs following discharge   - Consider OT consult to assist with ADL evaluation and planning for discharge  - Provide patient education as appropriate  Outcome: Progressing  Goal: Maintain proper alignment of affected body part  Description: INTERVENTIONS:  - Support, maintain and protect limb and body alignment  - Provide patient/ family with appropriate education  Outcome: Progressing

## 2023-01-01 NOTE — NURSING NOTE
All belongings returned to pt  IV removed  Pt in no distress and has no complaints at this time  Education and paperwork provided to pt in Slovak translation with verbal acknowledgment of understanding  Pt escorted to lobby in wheelchair with family and RN

## 2023-01-01 NOTE — CASE MANAGEMENT
Case Management Discharge Planning Note    Patient name Paulette Linda  Location 7T /7T -39 MRN [id number]  : 1945 Date 2023       Current Admission Date: 2022  Current Admission Diagnosis:Type 2 diabetes mellitus without complication, without long-term current use of insulin [facility])   Patient Active Problem List    Diagnosis Date Noted   • Dementia without behavioral disturbance ([facility] ) 10/20/2021   • Primary hypothyroidism 2020   • Mixed hyperlipidemia 2018   • Prostate cancer ([email or web address] ) 2018   • Type 2 diabetes mellitus without complication, without long-term current use of insulin ([place] [address] ) 2018   • Ill-fitting dentures 2018   • Hearing difficulty of both ears 2018      LOS (days): 2  Geometric Mean LOS (GMLOS) (days): 3 10  Days to GMLOS:1 2     OBJECTIVE:  Risk of Unplanned Readmission Score: 19 1         Current admission status: Inpatient   Preferred Pharmacy:   [address] [place] [id number]  Phone: [phone] Fax: [phone]    Primary Care Provider: Henrik Xavier MD    Primary Insurance: [facility]  Secondary Insurance: [address]:      [address] [place] [address]         Is the patient interested in [facility] at discharge?: Yes  [address] requested[de-identified] [address], Medical Social Work, Nursing, Occupational Therapy, Physical [address] [place] [address] Ave Name[de-identified] Other  [address] Provider[de-identified] PCP  Home Health Services Needed[de-identified] Diabetes Management, Evaluate Functional Status and Safety, Gait/ADL Training, Strengthening/Theraputic Exercises to Improve Function  Homebound Criteria Met[de-identified] Requires the Assistance of Another Person for Safe Ambulation or to Leave the Home, Uses an Assist Device (i e  cane, walker, etc)  Supporting Clincal Findings[de-identified] Limited Endurance, Fatigues Easliy in [employer] Distances    CM Sent referrals for Home Health PT/OT/SN/HHA/MSW via [address]  Pt will discharge home today  CM will follow up with Pts spouse regarding the [facility] benefit and the accepting VNA, once there is a determination

## 2023-01-03 NOTE — UTILIZATION REVIEW
NOTIFICATION OF ADMISSION DISCHARGE   This is a Notification of Discharge from [address]  Please be advised that this patient has been discharge from our facility  Below you will find the admission and discharge date and time including the patient’s disposition  UTILIZATION REVIEW CONTACT:  Rico Handley MA  Utilization   Network Utilization Review Department  Phone: [phone] x carefully listen to the prompts  All voicemails are confidential   Email: [email or web address]     ADMISSION INFORMATION  PRESENTATION DATE: 12/30/2022  3:37 PM  OBERVATION ADMISSION DATE:   INPATIENT ADMISSION DATE: 12/30/22  5:01 PM   DISCHARGE DATE: 1/1/2023  2:48 PM   DISPOSITION:Home with [address] INFORMATION:  Send all requests for admission clinical reviews, approved or denied determinations and any other requests to dedicated fax number below belonging to the campus where the patient is receiving treatment   List of dedicated fax numbers:  [address] DENIALS (Administrative/Medical Necessity) [phone]   [address] (Maternity/NICU/Pediatrics) [phone]   [facility] [phone]   [place] [address] [phone]   [address] [phone]   220 [facility] [phone]   90 [facility] [phone]   [address] [place] [address] 119 [phone]   [facility]  [phone]   [facility] [facility] [phone]   412 [facility] 850 [address] [facility] [phone]

## 2023-01-03 NOTE — ASSESSMENT & PLAN NOTE
Lab Results   Component Value Date    HGBA1C 6 4 08/25/2022   · Previously was following Dr Caroline Martínez of [facility] Endocrinology  · Has not been on medication for 2 months and A1C today was 6 4   · Micro/alb ordered today  · Diabetic Foot exam deferred today  · Pneumococcal UTD  · IRIS exam next visit   · Will restart Janumet  BID   If a1c continues to improve at next visit will decrease medication
· Concerns for dementia by previous PCP  · Will refer to Geriatrics for formal memory assessment and evaluation
· Last Lipid Panel 8/18/2021: 8/18/2021: Cholesterol 144, TG's-181, HDL-35, LDL-73  · Home medications: Atorvastatin 40 mg  · Will continue home medications and recheck Lipid Panel
· Last TSH 8/18/2021:  WNL  · Will repeat TSH as patients wife states he has been significantly colder than normal  · Will continue Levothyroxine 50 mcg for now
 Symptoms

## 2023-01-04 ENCOUNTER — TELEPHONE (OUTPATIENT)
Dept: FAMILY MEDICINE CLINIC | Facility: CLINIC | Age: 78
End: 2023-01-04

## 2023-01-04 NOTE — TELEPHONE ENCOUNTER
Herlinda Brown physical therapy call to let you that pt needs a script for standard wheelchair with removable leg rest to be fax to [address] [place] [address] at [phone] together with pt insurance info and demographics

## 2023-01-08 PROBLEM — R26.2 AMBULATORY DYSFUNCTION: Status: ACTIVE | Noted: 2023-01-01

## 2023-01-09 ENCOUNTER — TELEMEDICINE (OUTPATIENT)
Dept: FAMILY MEDICINE CLINIC | Facility: CLINIC | Age: 78
End: 2023-01-09

## 2023-01-09 ENCOUNTER — TELEPHONE (OUTPATIENT)
Dept: FAMILY MEDICINE CLINIC | Facility: CLINIC | Age: 78
End: 2023-01-09

## 2023-01-09 DIAGNOSIS — F03.90 DEMENTIA WITHOUT BEHAVIORAL DISTURBANCE (HCC): Chronic | ICD-10-CM

## 2023-01-09 DIAGNOSIS — E03.9 PRIMARY HYPOTHYROIDISM: Chronic | ICD-10-CM

## 2023-01-09 DIAGNOSIS — E11.9 TYPE 2 DIABETES MELLITUS WITHOUT COMPLICATION, WITHOUT LONG-TERM CURRENT USE OF INSULIN (HCC): Chronic | ICD-10-CM

## 2023-01-09 DIAGNOSIS — E78.2 MIXED HYPERLIPIDEMIA: Chronic | ICD-10-CM

## 2023-01-09 DIAGNOSIS — R26.2 AMBULATORY DYSFUNCTION: Primary | ICD-10-CM

## 2023-01-09 DIAGNOSIS — Z00.00 HEALTHCARE MAINTENANCE: ICD-10-CM

## 2023-01-09 DIAGNOSIS — M79.606 PAIN OF LOWER EXTREMITY, UNSPECIFIED LATERALITY: ICD-10-CM

## 2023-01-09 DIAGNOSIS — K92.1 HEMATOCHEZIA: ICD-10-CM

## 2023-01-09 RX ORDER — SITAGLIPTIN AND METFORMIN HYDROCHLORIDE 500; 50 MG/1; MG/1
1 TABLET, FILM COATED ORAL 2 TIMES DAILY WITH MEALS
Qty: 180 TABLET | Refills: 1 | Status: ON HOLD | OUTPATIENT
Start: 2023-01-09 | End: 2023-04-09

## 2023-01-09 RX ORDER — LACTULOSE 10 G/15ML
10 SOLUTION ORAL 2 TIMES DAILY PRN
Qty: 240 ML | Refills: 0 | Status: ON HOLD | OUTPATIENT
Start: 2023-01-09

## 2023-01-09 NOTE — PROGRESS NOTES
Transition of Care  Follow-up After Hospitalization    Ramona Martins 69 y o  male   Date:  1/9/2023    TCM Call     None      TCM Call     None      This would not be billed as a transition of care visit however the template will still be in use secondary to recent discharge from [facility] records were reviewed  Medications upon discharge reviewed/updated  Discharge Disposition: Stable  Follow up visits with other specialists: PCP, geriatrics    Assessment and Plan:  Type 2 diabetes mellitus without complication, without long-term current use of insulin ([address] )    Lab Results   Component Value Date    HGBA1C 9 5 (H) 12/31/2022   · Previously following [facility] endocrine  · Most recent A1c was taken in the hospital which showed 9 5 previous A1c was 6 4  · Consider A1c elevated secondary to infection  Also per wife, home health aide was cooking meals that were high in sugar and fat and believes that this was contributing to his sugars as he never had this high of A1c before    · Diabetic foot exam,IRIS next visit in person  · Pneumococcal vaccination 20 UTD  · Continue Janumet  mg b i d   · Dietary modifications discussed in great detail  · Will recheck A1c at next visit    Primary hypothyroidism  · Improvement of symptoms since last visit specifically cold intolerance  · Last TSH 8/2022: 7 39  · Levothyroxine was increased to 75 mcg after last blood draw secondary to hypothyroid level in a setting of compliant medication use   · Continue levothyroxine 75 mcg  · Recheck TSH    Dementia without behavioral disturbance (HCC)  · Concerns for dementia by previous PCP  · Referred to Geriatrics for formal memory assessment and evaluation-will try message and schedule appointment for patient  · Nephrology was concerned about swallowing secondary to significant hyponatremia and ability to adequately orally hydrate however wife states that patient is eating and drinking just fine and is doing more so often   · Had an appointment with geriatrics tomorrow but had to cancel secondary to leg pain  Wife states that she will call and reschedule today for evaluation      Ambulatory dysfunction  · Secondary to suspected dementia  · Home health working with patient with physical therapy, Occupational Therapy  · Wheelchair with removable leg rest was ordered via parachute    Mixed hyperlipidemia  · Last Lipid Panel 8/2022: Cholesterol-215, TG's-132, HDL-38, LDL-151  · ASCVD risk 35 2%  · Home medications: Atorvastatin 40 mg  · Continue home medications    Healthcare maintenance  · deferred flu/pneumococcal vaccine  · Not interested in [place] vaccination  · Medicare annual wellness visit at next visit    Pain of lower extremity  · Unspecified laterality  Pain in posterior aspect of leg since discharge from the hospital per wife  Physical therapy is working patient at home so unknown if it is secondary to recent use  Wife believes it may be a cramp  · Being that I cannot evaluate the patient virtually  We will have patient come in for the office visit tomorrow to be seen and evaluated  · If there is any concerns, need to rule out DVT  · In the meantime will order BMP, magnesium  · Strict ER precautions given for any worsening leg pain, shortness of breath, chest pain, fevers, chills, abdominal pain    Hematochezia  · X 1 day  Noticed bright red blood in bowel movements  Wife believes that it may be due to constipation  · CBC 1/1/2023: 11 6  · Unknown colonoscopy status  · Will refer to GI  In the meantime we will order Cologuard testing    Unsure if patient will be able to drink solution prior to colonoscopy so we will start with Cologuard until he could see GI  · Will reorder CBC  · We will order lactulose to help with constipation  · ER precautions given    Diagnoses and all orders for this visit:    Ambulatory dysfunction    Type 2 diabetes mellitus without complication, without long-term current use of insulin ([facility] )  -     Janumet  MG per tablet; Take 1 tablet by mouth 2 (two) times a day with meals    Dementia without behavioral disturbance (HCC)    Primary hypothyroidism  -     TSH, 3rd generation with Free T4 reflex; Future    Mixed hyperlipidemia    Healthcare maintenance    Hematochezia  -     CBC and differential; Future  -     Ambulatory Referral to Gastroenterology; Future  -     Cologuard  -     lactulose (CHRONULAC) 10 g/15 mL solution; Take 15 mL (10 g total) by mouth 2 (two) times a day as needed (constipation) Take one in morning and if no bowel movement take one more at lunch    Pain of lower extremity, unspecified laterality  -     Magnesium; Future  -     Basic metabolic panel; Future        HPI:  This is a very pleasant 60-year-old male with significant past medical history of hypothyroidism, insomnia, diabetes mellitus type 2, dementia without pain disturbance, hyperlipidemia was hospitalized in the dates of 12/30 to 1/1/2023 after initially presenting with hyperglycemic episodes ranging from a 2-5 100s  In the emergency department he received a normal saline bolus along with 10 units of lispro after significant hyperglycemia  Labs did reveal hyponatremia with a troponin of 21 and was admitted to the service for management of hyponatremia hyperglycemia and acute kidney injury  During his stay he was rehydrated with normal saline boluses  Nephrology was consulted  ANGELICA resolved labs improved  A1c was found to be elevated 9 5 which is elevated from his baseline of 6 4-7  He is discharged with home health  Physical therapy has been to patient's house to assist his wife with activities of daily living  Per wife, patient has been having blood in his stool upon bowel movements  She also notices constipation  She denies any fevers, chills, abdominal pain  She states that he is eating and drinking well  He has been taking all of his medications without difficulty    He denies any difficulty swallowing  He has been using Glucerna shakes to help with nutrition  She also complains of unspecified leg pain which has started after being out of the hospital   She states it looks more like a cramp and he has had this issue before in the past   Denies any chest pain, shortness of breath, increased work of breathing  ROS: Review of Systems   Constitutional: Negative for chills, fatigue, fever and unexpected weight change  HENT: Negative for congestion, rhinorrhea, sinus pressure and sore throat  Eyes: Negative for visual disturbance  Respiratory: Negative for cough, chest tightness, shortness of breath and wheezing  Cardiovascular: Negative for chest pain  Gastrointestinal: Positive for blood in stool and constipation  Negative for abdominal distention, abdominal pain, diarrhea, nausea and vomiting  Genitourinary: Negative for difficulty urinating, dysuria, frequency, hematuria and urgency  Musculoskeletal: Positive for arthralgias and myalgias  Negative for back pain and neck pain  Skin: Negative for rash  Allergic/Immunologic: Negative  Neurological: Negative for dizziness, weakness, light-headedness, numbness and headaches  Psychiatric/Behavioral: Negative for behavioral problems       Past Medical History:   Diagnosis Date   • Alzheimer disease type 3 ([facility] )    • Cervical pain 4/16/2018   • Diabetes mellitus ([facility] )    • Diabetic peripheral neuropathy associated with type 2 diabetes mellitus ([facility] ) 3/18/2021   • Disease of thyroid gland    • Elevated PSA    • Hyperlipidemia    • Medicare annual wellness visit, subsequent 7/30/2020   • Prostate cancer ([facility] )    • Type 2 diabetes mellitus with diabetic peripheral angiopathy without gangrene ([facility] ) 5/17/2021    According to ICD10 guidelines, patient accept       Past Surgical History:   Procedure Laterality Date   • NECK SURGERY     • PROSTATE BIOPSY  07/18/2018       Social History     Socioeconomic History   • Marital status: /Civil [employer]     Spouse name: Not on file   • Number of children: Not on file   • Years of education: Not on file   • Highest education level: Not on file   Occupational History   • Occupation:    retired   Tobacco Use   • Smoking status: Never   • Smokeless tobacco: Never   Vaping Use   • Vaping Use: Never used   Substance and Sexual Activity   • Alcohol use: Never   • Drug use: Never   • Sexual activity: Not Currently   Other Topics Concern   • Not on file   Social History Narrative   • Not on file     Social Determinants of Health     Financial Resource Strain: Low Risk    • Difficulty of Paying Living Expenses: Not hard at all   Food Insecurity: No Food Insecurity   • Worried About [employer] in the Last Year: Never true   • Ran Out of Food in the Last Year: Never true   Transportation Needs: No Transportation Needs   • Lack of Transportation (Medical): No   • Lack of Transportation (Non-Medical):  No   Physical Activity: Not on file   Stress: Not on file   Social Connections: Not on file   Intimate Partner Violence: Not on file   Housing Stability: Not on file       Family History   Problem Relation Age of Onset   • No Known Problems Mother    • No Known Problems Father    • Dementia Sister    • Autism Daughter      Allergies   Allergen Reactions   • Other      seasonal         Current Outpatient Medications:   •  Janumet  MG per tablet, Take 1 tablet by mouth 2 (two) times a day with meals, Disp: 180 tablet, Rfl: 1  •  lactulose (CHRONULAC) 10 g/15 mL solution, Take 15 mL (10 g total) by mouth 2 (two) times a day as needed (constipation) Take one in morning and if no bowel movement take one more at lunch, Disp: 240 mL, Rfl: 0  •  Blood Glucose Monitoring Suppl (OneTouch Verio) w/Device KIT, Use 2 (two) times a day, Disp: 1 kit, Rfl: 0  •  calcium carbonate (OS-CARMENZA) 600 MG tablet, Take 1 tablet (600 mg total) by mouth 2 (two) times a day with meals, Disp: , Rfl:   •  diphenhydrAMINE (BENADRYL) 2 % cream, Apply topically 3 (three) times a day as needed for itching, Disp: 30 g, Rfl: 0  •  glucose blood (OneTouch Verio) test strip, Test two times daily DX: E11 9, Disp: 100 each, Rfl: 5  •  Lancets Micro Thin 33G MISC, Use 2 (two) times a day Test two times daily DX:E11 9, Disp: 100 each, Rfl: 3  •  levothyroxine (Synthroid) 75 mcg tablet, Take 1 tablet (75 mcg total) by mouth daily, Disp: 60 tablet, Rfl: 0  •  melatonin 3 mg, Take 1 tablet (3 mg total) by mouth daily at bedtime, Disp: 90 tablet, Rfl: 0  •  Nutritional Supplements (Glucerna Shake) LIQD, Take 1 Bottle by mouth 2 (two) times a day, Disp: 237 mL, Rfl: 20  •  rosuvastatin (CRESTOR) 40 MG tablet, Take 1 tablet (40 mg total) by mouth daily, Disp: 30 tablet, Rfl: 11      Physical Exam:  There were no vitals taken for this visit      Labs:  Lab Results   Component Value Date    WBC 5 86 01/01/2023    HGB 11 6 (L) 01/01/2023    HCT 35 8 (L) 01/01/2023    MCV 92 01/01/2023     (L) 01/01/2023     Lab Results   Component Value Date    K 4 0 01/01/2023     (H) 01/01/2023    CO2 25 01/01/2023    BUN 28 (H) 01/01/2023    CREATININE 0 69 (L) 01/01/2023    GLUF 150 (H) 08/18/2021    CALCIUM 8 5 01/01/2023    CORRECTEDCA 9 5 01/01/2023    AST 54 01/01/2023    ALT 47 01/01/2023    ALKPHOS 107 01/01/2023    EGFR 92 01/01/2023

## 2023-01-09 NOTE — ASSESSMENT & PLAN NOTE
· Last Lipid Panel 8/2022: Cholesterol-215, TG's-132, HDL-38, LDL-151  · ASCVD risk 35 2%  · Home medications: Atorvastatin 40 mg  · Continue home medications

## 2023-01-09 NOTE — ASSESSMENT & PLAN NOTE
Lab Results   Component Value Date    HGBA1C 9 5 (H) 12/31/2022   · Previously following [facility] endocrine  · Most recent A1c was taken in the hospital which showed 9 5 previous A1c was 6 4  · Consider A1c elevated secondary to infection  Also per wife, home health aide was cooking meals that were high in sugar and fat and believes that this was contributing to his sugars as he never had this high of A1c before    · Diabetic foot exam,IRIS next visit in person  · Pneumococcal vaccination 20 UTD  · Continue Janumet  mg b i d   · Dietary modifications discussed in great detail  · Will recheck A1c at next visit

## 2023-01-09 NOTE — ASSESSMENT & PLAN NOTE
· Improvement of symptoms since last visit specifically cold intolerance  · Last TSH 8/2022: 7 39  · Levothyroxine was increased to 75 mcg after last blood draw secondary to hypothyroid level in a setting of compliant medication use   · Continue levothyroxine 75 mcg  · Recheck TSH

## 2023-01-09 NOTE — ASSESSMENT & PLAN NOTE
· Unspecified laterality  Pain in posterior aspect of leg since discharge from the hospital per wife  Physical therapy is working patient at home so unknown if it is secondary to recent use  Wife believes it may be a cramp  · Being that I cannot evaluate the patient virtually  We will have patient come in for the office visit tomorrow to be seen and evaluated  · If there is any concerns, need to rule out DVT    · In the meantime will order BMP, magnesium  · Strict ER precautions given for any worsening leg pain, shortness of breath, chest pain, fevers, chills, abdominal pain

## 2023-01-09 NOTE — TELEPHONE ENCOUNTER
Fareed Dillard,    An order for wheelchair with removable leg rests was placed this morning via parachute and cosigned by Dr Jensen Santiago  They will deliver the wheelchair directly to his house    This should be covered by his insurance and if there is any issues we can then fax over prescription to [place]     Thanks so much

## 2023-01-09 NOTE — ASSESSMENT & PLAN NOTE
· Secondary to suspected dementia  · Home health working with patient with physical therapy, Occupational Therapy  · Wheelchair with removable leg rest was ordered via parachute

## 2023-01-09 NOTE — ASSESSMENT & PLAN NOTE
· X 1 day  Noticed bright red blood in bowel movements  Wife believes that it may be due to constipation  · CBC 1/1/2023: 11 6  · Unknown colonoscopy status  · Will refer to GI  In the meantime we will order Cologuard testing    Unsure if patient will be able to drink solution prior to colonoscopy so we will start with Cologuard until he could see GI  · Will reorder CBC  · We will order lactulose to help with constipation  · ER precautions given

## 2023-01-09 NOTE — ASSESSMENT & PLAN NOTE
· Concerns for dementia by previous PCP  · Referred to Geriatrics for formal memory assessment and evaluation-will try message and schedule appointment for patient  · Nephrology was concerned about swallowing secondary to significant hyponatremia and ability to adequately orally hydrate however wife states that patient is eating and drinking just fine and is doing more so often  · Had an appointment with geriatrics tomorrow but had to cancel secondary to leg pain    Wife states that she will call and reschedule today for evaluation

## 2023-01-10 ENCOUNTER — TELEPHONE (OUTPATIENT)
Dept: LAB | Facility: HOSPITAL | Age: 78
End: 2023-01-10

## 2023-01-11 DIAGNOSIS — F03.90 DEMENTIA WITHOUT BEHAVIORAL DISTURBANCE (HCC): Primary | Chronic | ICD-10-CM

## 2023-01-12 ENCOUNTER — OFFICE VISIT (OUTPATIENT)
Dept: FAMILY MEDICINE CLINIC | Facility: CLINIC | Age: 78
End: 2023-01-12

## 2023-01-12 ENCOUNTER — TELEPHONE (OUTPATIENT)
Dept: FAMILY MEDICINE CLINIC | Facility: CLINIC | Age: 78
End: 2023-01-12

## 2023-01-12 ENCOUNTER — APPOINTMENT (OUTPATIENT)
Dept: LAB | Facility: CLINIC | Age: 78
End: 2023-01-12

## 2023-01-12 VITALS
BODY MASS INDEX: 20.2 KG/M2 | OXYGEN SATURATION: 98 % | HEART RATE: 78 BPM | DIASTOLIC BLOOD PRESSURE: 72 MMHG | SYSTOLIC BLOOD PRESSURE: 114 MMHG | HEIGHT: 60 IN | RESPIRATION RATE: 18 BRPM | TEMPERATURE: 98.6 F

## 2023-01-12 DIAGNOSIS — M79.604 PAIN OF RIGHT LOWER EXTREMITY: ICD-10-CM

## 2023-01-12 DIAGNOSIS — F03.90 DEMENTIA WITHOUT BEHAVIORAL DISTURBANCE (HCC): Chronic | ICD-10-CM

## 2023-01-12 DIAGNOSIS — R26.2 AMBULATORY DYSFUNCTION: ICD-10-CM

## 2023-01-12 DIAGNOSIS — K92.1 HEMATOCHEZIA: ICD-10-CM

## 2023-01-12 DIAGNOSIS — L98.421 SKIN ULCER OF SACRUM, LIMITED TO BREAKDOWN OF SKIN (HCC): ICD-10-CM

## 2023-01-12 DIAGNOSIS — M79.606 PAIN OF LOWER EXTREMITY, UNSPECIFIED LATERALITY: ICD-10-CM

## 2023-01-12 DIAGNOSIS — R63.0 APPETITE LOSS: ICD-10-CM

## 2023-01-12 DIAGNOSIS — L98.429 SKIN ULCER OF SACRUM, UNSPECIFIED ULCER STAGE (HCC): Primary | ICD-10-CM

## 2023-01-12 DIAGNOSIS — E03.9 PRIMARY HYPOTHYROIDISM: Chronic | ICD-10-CM

## 2023-01-12 LAB
ANION GAP SERPL CALCULATED.3IONS-SCNC: 6 MMOL/L (ref 4–13)
BASOPHILS # BLD AUTO: 0.02 THOUSANDS/ÂΜL (ref 0–0.1)
BASOPHILS NFR BLD AUTO: 0 % (ref 0–1)
BUN SERPL-MCNC: 48 MG/DL (ref 5–25)
CALCIUM SERPL-MCNC: 9.6 MG/DL (ref 8.3–10.1)
CHLORIDE SERPL-SCNC: 115 MMOL/L (ref 96–108)
CO2 SERPL-SCNC: 28 MMOL/L (ref 21–32)
CREAT SERPL-MCNC: 0.99 MG/DL (ref 0.6–1.3)
EOSINOPHIL # BLD AUTO: 0 THOUSAND/ÂΜL (ref 0–0.61)
EOSINOPHIL NFR BLD AUTO: 0 % (ref 0–6)
ERYTHROCYTE [DISTWIDTH] IN BLOOD BY AUTOMATED COUNT: 15.9 % (ref 11.6–15.1)
GFR SERPL CREATININE-BSD FRML MDRD: 73 ML/MIN/1.73SQ M
GLUCOSE P FAST SERPL-MCNC: 285 MG/DL (ref 65–99)
HCT VFR BLD AUTO: 39.2 % (ref 36.5–49.3)
HGB BLD-MCNC: 12.2 G/DL (ref 12–17)
IMM GRANULOCYTES # BLD AUTO: 0.03 THOUSAND/UL (ref 0–0.2)
IMM GRANULOCYTES NFR BLD AUTO: 0 % (ref 0–2)
LYMPHOCYTES # BLD AUTO: 0.59 THOUSANDS/ÂΜL (ref 0.6–4.47)
LYMPHOCYTES NFR BLD AUTO: 8 % (ref 14–44)
MAGNESIUM SERPL-MCNC: 2.7 MG/DL (ref 1.6–2.6)
MCH RBC QN AUTO: 29 PG (ref 26.8–34.3)
MCHC RBC AUTO-ENTMCNC: 31.1 G/DL (ref 31.4–37.4)
MCV RBC AUTO: 93 FL (ref 82–98)
MONOCYTES # BLD AUTO: 0.36 THOUSAND/ÂΜL (ref 0.17–1.22)
MONOCYTES NFR BLD AUTO: 5 % (ref 4–12)
NEUTROPHILS # BLD AUTO: 6.45 THOUSANDS/ÂΜL (ref 1.85–7.62)
NEUTS SEG NFR BLD AUTO: 87 % (ref 43–75)
NRBC BLD AUTO-RTO: 0 /100 WBCS
PLATELET # BLD AUTO: 486 THOUSANDS/UL (ref 149–390)
PMV BLD AUTO: 10.5 FL (ref 8.9–12.7)
POTASSIUM SERPL-SCNC: 4.1 MMOL/L (ref 3.5–5.3)
RBC # BLD AUTO: 4.2 MILLION/UL (ref 3.88–5.62)
SODIUM SERPL-SCNC: 149 MMOL/L (ref 135–147)
TSH SERPL DL<=0.05 MIU/L-ACNC: 3.41 UIU/ML (ref 0.45–4.5)
WBC # BLD AUTO: 7.45 THOUSAND/UL (ref 4.31–10.16)

## 2023-01-12 NOTE — ASSESSMENT & PLAN NOTE
· Unknown length of time superficial sacral ulcer  · Currently being managed by wife and sister as she is a nurse with frequent repositioning and dressing changes  · Recently hospitalized and now in a wheelchair secondary to ambulatory dysfunction  · Will order a Hospital Bed   · [address] [facility]  nurse prn

## 2023-01-12 NOTE — PROGRESS NOTES
[facility]   [address] [facility] [address]  Phone#  [phone]  Fax#  [phone]    ASSESSMENT and PLAN  Skin ulcer of sacrum, limited to breakdown of skin (HCC)  · Unknown length of time superficial sacral ulcer  · Currently being managed by wife and sister as she is a nurse with frequent repositioning and dressing changes  · Recently hospitalized and now in a wheelchair secondary to ambulatory dysfunction  · Will order a Hospital Bed   · Wound Care nurse prn    Pain of lower extremity  · Right quadricep with notable muscle wasting  Likely secondary to recent work with physical therapy  · Magnesium level still yet to be obtained along with BMP  · Continue Home PT  · May utilize Tylenol/Ibuprofen prn pain    Ambulatory dysfunction  · Secondary to suspected dementia  · Home health working with patient with physical therapy, Occupational Therapy  · Recently obtained wheelchair for which patient is utilizing now    Dementia without behavioral disturbance ([place] [address] )  · Concerns for dementia by previous PCP  · Referred to Neurology formal memory assessment and evaluation-will try message and schedule appointment for patient  · Nephrology was concerned about swallowing secondary to significant hyponatremia and ability to adequately orally hydrate  · Continue to reorient, redirect, and reassure patient  · Encourage engagement and activity  · Have reached out regarding candidacy about Home Visit Program        Diagnoses and all orders for this visit:    Skin ulcer of sacrum, unspecified ulcer stage ([facility] )  -     Referral to [facility];  Future    Appetite loss  -     Nutritional Supplements (Glucerna Shake) LIQD; Take 1 Bottle by mouth 2 (two) times a day    Skin ulcer of sacrum, limited to breakdown of skin (HCC)    Pain of right lower extremity    Ambulatory dysfunction    Dementia without behavioral disturbance ([facility] )    Other orders  -     [employer] Bed Package        HISTORY OF PRESENT ILLNESS  James Maria is a 69 y o  male with a significant PMHx of hypothyroidism, insomnia, diabetes mellitus type 2, dementia without pain disturbance, hyperlipidemia who presents to the clinic today for follow up right leg pain  No recent falls, currently in a wheelchair  Has yet to see Geriatrics or Neurology  Wife has noticed a sacral ulcer which she is currently managing  REVIEW OF SYSTEMS  Review of Systems   Constitutional: Negative for chills, fatigue, fever and unexpected weight change  HENT: Negative for congestion, rhinorrhea, sinus pressure and sore throat  Eyes: Negative for visual disturbance  Respiratory: Negative for cough, chest tightness, shortness of breath and wheezing  Cardiovascular: Negative for chest pain  Gastrointestinal: Negative for abdominal distention, abdominal pain, blood in stool, constipation, diarrhea, nausea and vomiting  Genitourinary: Negative for difficulty urinating, dysuria, frequency, hematuria and urgency  Musculoskeletal: Negative for back pain and neck pain  Skin: Negative for rash  Allergic/Immunologic: Negative  Neurological: Negative for dizziness, weakness, light-headedness, numbness and headaches  Psychiatric/Behavioral: Negative for behavioral problems         PAST MEDICAL HISTORY   Past Medical History:   Diagnosis Date   • Alzheimer disease type 3 ([address] )    • Cervical pain 4/16/2018   • Diabetes mellitus ([address] )    • Diabetic peripheral neuropathy associated with type 2 diabetes mellitus ([address] ) 3/18/2021   • Disease of thyroid gland    • Elevated PSA    • Hyperlipidemia    • Medicare annual wellness visit, subsequent 7/30/2020   • Prostate cancer ([address] )    • Type 2 diabetes mellitus with diabetic peripheral angiopathy without gangrene ([address] ) 5/17/2021    According to ICD10 guidelines, patient accept     Past Surgical History:   Procedure Laterality Date   • NECK SURGERY     • PROSTATE BIOPSY  07/18/2018 Social History     Socioeconomic History   • Marital status: /Civil Union     Spouse name: Not on file   • Number of children: Not on file   • Years of education: Not on file   • Highest education level: Not on file   Occupational History   • Occupation:    retired   Tobacco Use   • Smoking status: Never   • Smokeless tobacco: Never   Vaping Use   • Vaping Use: Never used   Substance and Sexual Activity   • Alcohol use: Never   • Drug use: Never   • Sexual activity: Not Currently   Other Topics Concern   • Not on file   Social History Narrative   • Not on file     Social Determinants of Health     Financial Resource Strain: Low Risk    • Difficulty of Paying Living Expenses: Not hard at all   Food Insecurity: No Food Insecurity   • Worried About [address] in the Last Year: Never true   • Ran Out of Food in the Last Year: Never true   Transportation Needs: No Transportation Needs   • Lack of Transportation (Medical): No   • Lack of Transportation (Non-Medical):  No   Physical Activity: Not on file   Stress: Not on file   Social Connections: Not on file   Intimate Partner Violence: Not on file   Housing Stability: Not on file     Family History   Problem Relation Age of Onset   • No Known Problems Mother    • No Known Problems Father    • Dementia Sister    • Autism Daughter        MEDICATIONS    Current Outpatient Medications:   •  Nutritional Supplements (Glucerna Shake) LIQD, Take 1 Bottle by mouth 2 (two) times a day, Disp: 237 mL, Rfl: 20  •  Blood Glucose Monitoring Suppl (OneTouch Verio) w/Device KIT, Use 2 (two) times a day, Disp: 1 kit, Rfl: 0  •  calcium carbonate (OS-CARMENZA) 600 MG tablet, Take 1 tablet (600 mg total) by mouth 2 (two) times a day with meals, Disp: , Rfl:   •  diphenhydrAMINE (BENADRYL) 2 % cream, Apply topically 3 (three) times a day as needed for itching, Disp: 30 g, Rfl: 0  •  glucose blood (OneTouch Verio) test strip, Test two times daily DX: E11 9, Disp: 100 each, Rfl: 5  •  Janumet  MG per tablet, Take 1 tablet by mouth 2 (two) times a day with meals, Disp: 180 tablet, Rfl: 1  •  lactulose (CHRONULAC) 10 g/15 mL solution, Take 15 mL (10 g total) by mouth 2 (two) times a day as needed (constipation) Take one in morning and if no bowel movement take one more at lunch, Disp: 240 mL, Rfl: 0  •  Lancets Micro Thin 33G MISC, Use 2 (two) times a day Test two times daily DX:E11 9, Disp: 100 each, Rfl: 3  •  levothyroxine (Synthroid) 75 mcg tablet, Take 1 tablet (75 mcg total) by mouth daily, Disp: 60 tablet, Rfl: 0  •  melatonin 3 mg, Take 1 tablet (3 mg total) by mouth daily at bedtime, Disp: 90 tablet, Rfl: 0  •  rosuvastatin (CRESTOR) 40 MG tablet, Take 1 tablet (40 mg total) by mouth daily, Disp: 30 tablet, Rfl: 11    PHYSICAL EXAM  Vitals:    01/12/23 1129   BP: 114/72   BP Location: Left arm   Patient Position: Sitting   Cuff Size: Adult   Pulse: 78   Resp: 18   Temp: 98 6 °F (37 °C)   TempSrc: Temporal   SpO2: 98%   Height: 4' 11" (1 499 m)     Wt Readings from Last 3 Encounters:   10/06/22 45 4 kg (100 lb)   08/25/22 46 4 kg (102 lb 3 2 oz)   07/14/22 46 3 kg (102 lb)    , Body mass index is 20 2 kg/m²  Physical Exam  Vitals and nursing note reviewed  Constitutional:       General: He is not in acute distress  Appearance: He is well-developed  He is not toxic-appearing  HENT:      Head: Normocephalic and atraumatic  Eyes:      Extraocular Movements: Extraocular movements intact  Pupils: Pupils are equal, round, and reactive to light  Cardiovascular:      Rate and Rhythm: Normal rate and regular rhythm  Heart sounds: Normal heart sounds  No murmur heard  Pulmonary:      Effort: Pulmonary effort is normal  No respiratory distress  Breath sounds: Normal breath sounds  No wheezing, rhonchi or rales  Abdominal:      General: Bowel sounds are normal  There is no distension  Palpations: Abdomen is soft  Tenderness:  There is no abdominal tenderness  There is no guarding  Musculoskeletal:         General: Normal range of motion  Cervical back: Normal range of motion and neck supple  Skin:     General: Skin is warm and dry  Findings: Abrasion, lesion and wound present  No abscess  Neurological:      Mental Status: He is alert and oriented to person, place, and time  Psychiatric:         Behavior: Behavior normal                  LABS/IMAGING  Hemoglobin A1C   Date Value Ref Range Status   12/31/2022 9 5 (H) Normal 3 8-5 6%; PreDiabetic 5 7-6 4%; Diabetic >=6 5%; Glycemic control for adults with diabetes <7 0% % Final     HDL, Direct   Date Value Ref Range Status   08/25/2022 38 (L) >=40 mg/dL Final     Comment:     Specimen collection should occur prior to Metamizole administration due to the potential for falsley depressed results  Triglycerides   Date Value Ref Range Status   08/25/2022 132 See Comment mg/dL Final     Comment:     Triglyceride:     0-9Y            <75mg/dL     10Y-17Y         <90 mg/dL       >=18Y     Normal          <150 mg/dL     Borderline High 150-199 mg/dL     High            200-499 mg/dL        Very High       >499 mg/dL    Specimen collection should occur prior to N-Acetylcysteine or Metamizole administration due to the potential for falsely depressed results        WBC   Date Value Ref Range Status   01/12/2023 7 45 4 31 - 10 16 Thousand/uL Final   01/01/2023 5 86 4 31 - 10 16 Thousand/uL Final   12/31/2022 8 00 4 31 - 10 16 Thousand/uL Final     Hemoglobin   Date Value Ref Range Status   01/12/2023 12 2 12 0 - 17 0 g/dL Final   01/01/2023 11 6 (L) 12 0 - 17 0 g/dL Final   12/31/2022 11 6 (L) 12 0 - 17 0 g/dL Final     Platelets   Date Value Ref Range Status   01/12/2023 486 (H) 149 - 390 Thousands/uL Final   01/01/2023 133 (L) 149 - 390 Thousands/uL Final   12/31/2022 143 (L) 149 - 390 Thousands/uL Final     Potassium   Date Value Ref Range Status   01/12/2023 4 1 3 5 - 5 3 mmol/L Final   01/01/2023 4 0 3 5 - 5 3 mmol/L Final   12/31/2022 4 0 3 5 - 5 3 mmol/L Final     Chloride   Date Value Ref Range Status   01/12/2023 115 (H) 96 - 108 mmol/L Final   01/01/2023 115 (H) 96 - 108 mmol/L Final   12/31/2022 121 (H) 96 - 108 mmol/L Final     CO2   Date Value Ref Range Status   01/12/2023 28 21 - 32 mmol/L Final   01/01/2023 25 21 - 32 mmol/L Final   12/31/2022 26 21 - 32 mmol/L Final     BUN   Date Value Ref Range Status   01/12/2023 48 (H) 5 - 25 mg/dL Final   01/01/2023 28 (H) 5 - 25 mg/dL Final   12/31/2022 39 (H) 5 - 25 mg/dL Final     Creatinine   Date Value Ref Range Status   01/12/2023 0 99 0 60 - 1 30 mg/dL Final     Comment:     Standardized to IDMS reference method   01/01/2023 0 69 (L) 0 70 - 1 50 mg/dL Final     Comment:     Standardized to IDMS reference method   12/31/2022 0 94 0 70 - 1 50 mg/dL Final     Comment:     Standardized to IDMS reference method     eGFR   Date Value Ref Range Status   01/12/2023 73 ml/min/1 73sq m Final   01/01/2023 92 ml/min/1 73sq m Final   12/31/2022 78 ml/min/1 73sq m Final     Calcium   Date Value Ref Range Status   01/12/2023 9 6 8 3 - 10 1 mg/dL Final   01/01/2023 8 5 8 4 - 10 2 mg/dL Final   12/31/2022 8 5 8 4 - 10 2 mg/dL Final     AST   Date Value Ref Range Status   01/01/2023 54 17 - 59 U/L Final     Comment:     Specimen collection should occur prior to Sulfasalazine administration due to the potential for falsely depressed results  12/31/2022 36 17 - 59 U/L Final     Comment:     Specimen collection should occur prior to Sulfasalazine administration due to the potential for falsely depressed results  08/18/2021 26 5 - 45 U/L Final     Comment:     Specimen collection should occur prior to Sulfasalazine administration due to the potential for falsely depressed results        ALT   Date Value Ref Range Status   01/01/2023 47 <50 U/L Final     Comment:     Specimen collection should occur prior to Sulfasalazine administration due to the potential for falsely depressed results  12/31/2022 45 <50 U/L Final     Comment:     Specimen collection should occur prior to Sulfasalazine administration due to the potential for falsely depressed results  08/18/2021 25 12 - 78 U/L Final     Comment:     Specimen collection should occur prior to Sulfasalazine and/or Sulfapyridine administration due to the potential for falsely depressed results  Alkaline Phosphatase   Date Value Ref Range Status   01/01/2023 107 43 - 122 U/L Final   12/31/2022 109 43 - 122 U/L Final   08/18/2021 122 (H) 46 - 116 U/L Final     Magnesium   Date Value Ref Range Status   01/12/2023 2 7 (H) 1 6 - 2 6 mg/dL Final   12/31/2022 2 3 1 6 - 2 3 mg/dL Final     No results found for: TSH    This note has been dictated using iFlexMe software  It may contain errors, including improperly dictated words  Please contact physician directly for any questions       Deisi Summers MD   PGY-3

## 2023-01-13 ENCOUNTER — TELEPHONE (OUTPATIENT)
Dept: FAMILY MEDICINE CLINIC | Facility: CLINIC | Age: 78
End: 2023-01-13

## 2023-01-13 LAB
DME PARACHUTE DELIVERY DATE REQUESTED: NORMAL
DME PARACHUTE ITEM DESCRIPTION: NORMAL
DME PARACHUTE ORDER STATUS: NORMAL
DME PARACHUTE SUPPLIER NAME: NORMAL
DME PARACHUTE SUPPLIER PHONE: NORMAL

## 2023-01-13 NOTE — ASSESSMENT & PLAN NOTE
· Concerns for dementia by previous PCP  · Referred to Neurology formal memory assessment and evaluation-will try message and schedule appointment for patient  · Nephrology was concerned about swallowing secondary to significant hyponatremia and ability to adequately orally hydrate  · Continue to reorient, redirect, and reassure patient  · Encourage engagement and activity  · Have reached out regarding candidacy about Home Visit Program

## 2023-01-13 NOTE — ASSESSMENT & PLAN NOTE
· Right quadricep with notable muscle wasting   Likely secondary to recent work with physical therapy  · Magnesium level still yet to be obtained along with BMP  · Continue Home PT  · May utilize Tylenol/Ibuprofen prn pain

## 2023-01-13 NOTE — TELEPHONE ENCOUNTER
MESSAGE FROM THE NURSE LINE:    Hi, this is Radha Owusu from [employer]  We received a prescription for a [facility]  YOB: 1945  It's for a semi electric hospital bed  I'm calling to advise that we do not carry alternating pressure pads, so not sure if you want to go  I think [facility] get the whole bed and the pad then from them if you can let us know  Phone number here, [phone]  Thank you   Bye

## 2023-01-13 NOTE — ASSESSMENT & PLAN NOTE
· Secondary to suspected dementia  · Home health working with patient with physical therapy, Occupational Therapy  · Recently obtained wheelchair for which patient is utilizing now

## 2023-01-16 ENCOUNTER — TELEPHONE (OUTPATIENT)
Dept: FAMILY MEDICINE CLINIC | Facility: CLINIC | Age: 78
End: 2023-01-16

## 2023-01-16 NOTE — TELEPHONE ENCOUNTER
Jeovanny Ocampo is asking if you would like to proceed with the hospital bed with out the alternating pressure pads  But they believe [facility]) carry's them  So if you would like to cancel this order and re-order again with [facility]  Lmk how I can help

## 2023-01-17 DIAGNOSIS — E03.9 PRIMARY HYPOTHYROIDISM: ICD-10-CM

## 2023-01-17 RX ORDER — LEVOTHYROXINE SODIUM 0.07 MG/1
75 TABLET ORAL DAILY
Qty: 60 TABLET | Refills: 0 | Status: ON HOLD | OUTPATIENT
Start: 2023-01-17

## 2023-01-20 NOTE — TELEPHONE ENCOUNTER
I called line care and informed them, they will continue to work on the order with out the pressure pads    Thank you

## 2023-01-23 ENCOUNTER — APPOINTMENT (INPATIENT)
Dept: NON INVASIVE DIAGNOSTICS | Facility: HOSPITAL | Age: 78
End: 2023-01-23

## 2023-01-23 ENCOUNTER — APPOINTMENT (INPATIENT)
Dept: CT IMAGING | Facility: HOSPITAL | Age: 78
End: 2023-01-23

## 2023-01-23 ENCOUNTER — APPOINTMENT (EMERGENCY)
Dept: CT IMAGING | Facility: HOSPITAL | Age: 78
End: 2023-01-23

## 2023-01-23 ENCOUNTER — APPOINTMENT (EMERGENCY)
Dept: RADIOLOGY | Facility: HOSPITAL | Age: 78
End: 2023-01-23

## 2023-01-23 ENCOUNTER — APPOINTMENT (INPATIENT)
Dept: RADIOLOGY | Facility: HOSPITAL | Age: 78
End: 2023-01-23

## 2023-01-23 ENCOUNTER — HOSPITAL ENCOUNTER (INPATIENT)
Facility: HOSPITAL | Age: 78
LOS: 4 days | End: 2023-01-27
Attending: EMERGENCY MEDICINE | Admitting: INTERNAL MEDICINE

## 2023-01-23 DIAGNOSIS — R57.9 SHOCK (HCC): ICD-10-CM

## 2023-01-23 DIAGNOSIS — G93.40 ACUTE ENCEPHALOPATHY: ICD-10-CM

## 2023-01-23 DIAGNOSIS — T39.1X1A TYLENOL TOXICITY: ICD-10-CM

## 2023-01-23 DIAGNOSIS — L98.421 SKIN ULCER OF SACRUM, LIMITED TO BREAKDOWN OF SKIN (HCC): ICD-10-CM

## 2023-01-23 DIAGNOSIS — E87.20 METABOLIC ACIDOSIS: ICD-10-CM

## 2023-01-23 DIAGNOSIS — E87.5 HYPERKALEMIA: Primary | ICD-10-CM

## 2023-01-23 DIAGNOSIS — D61.818 PANCYTOPENIA (HCC): ICD-10-CM

## 2023-01-23 DIAGNOSIS — E87.0 HYPERNATREMIA: ICD-10-CM

## 2023-01-23 DIAGNOSIS — N17.9 AKI (ACUTE KIDNEY INJURY) (HCC): ICD-10-CM

## 2023-01-23 PROBLEM — E11.10 DKA (DIABETIC KETOACIDOSIS) (HCC): Status: ACTIVE | Noted: 2023-01-23

## 2023-01-23 LAB
2HR DELTA HS TROPONIN: -3 NG/L
4HR DELTA HS TROPONIN: -12 NG/L
ALBUMIN SERPL BCP-MCNC: 3.2 G/DL (ref 3.5–5)
ALP SERPL-CCNC: 105 U/L (ref 34–104)
ALT SERPL W P-5'-P-CCNC: 24 U/L (ref 7–52)
ANION GAP SERPL CALCULATED.3IONS-SCNC: 27 MMOL/L (ref 4–13)
ANION GAP SERPL CALCULATED.3IONS-SCNC: 28 MMOL/L (ref 4–13)
ANION GAP SERPL CALCULATED.3IONS-SCNC: 29 MMOL/L (ref 4–13)
ANION GAP SERPL CALCULATED.3IONS-SCNC: 30 MMOL/L (ref 4–13)
ANION GAP SERPL CALCULATED.3IONS-SCNC: 34 MMOL/L (ref 4–13)
ANION GAP SERPL CALCULATED.3IONS-SCNC: >30 MMOL/L (ref 4–13)
ANION GAP SERPL CALCULATED.3IONS-SCNC: >30 MMOL/L (ref 4–13)
AORTIC ROOT: 2.2 CM
AORTIC VALVE MEAN VELOCITY: 8.4 M/S
APAP SERPL-MCNC: 10 UG/ML (ref 10–20)
APAP SERPL-MCNC: 14 UG/ML (ref 10–20)
APTT PPP: 31 SECONDS (ref 23–37)
AST SERPL W P-5'-P-CCNC: 26 U/L (ref 13–39)
ATRIAL RATE: 112 BPM
ATRIAL RATE: 83 BPM
ATRIAL RATE: 90 BPM
AV MEAN GRADIENT: 3 MMHG
AV PEAK GRADIENT: 5 MMHG
BACTERIA UR QL AUTO: ABNORMAL /HPF
BASE EX.OXY STD BLDV CALC-SCNC: 56.6 % (ref 60–80)
BASE EX.OXY STD BLDV CALC-SCNC: 94.9 % (ref 60–80)
BASE EXCESS BLDV CALC-SCNC: -22.8 MMOL/L
BASE EXCESS BLDV CALC-SCNC: -23.7 MMOL/L
BASOPHILS # BLD AUTO: 0.01 THOUSANDS/ÂΜL (ref 0–0.1)
BASOPHILS NFR BLD AUTO: 0 % (ref 0–1)
BETA-HYDROXYBUTYRATE: 4.9 MMOL/L
BILIRUB SERPL-MCNC: 0.64 MG/DL (ref 0.2–1)
BILIRUB UR QL STRIP: NEGATIVE
BNP SERPL-MCNC: 111 PG/ML (ref 0–100)
BUN SERPL-MCNC: 132 MG/DL (ref 5–25)
BUN SERPL-MCNC: 150 MG/DL (ref 5–25)
BUN SERPL-MCNC: 155 MG/DL (ref 5–25)
BUN SERPL-MCNC: 155 MG/DL (ref 5–25)
BUN SERPL-MCNC: 168 MG/DL (ref 5–25)
BUN SERPL-MCNC: 182 MG/DL (ref 5–25)
BUN SERPL-MCNC: 200 MG/DL (ref 5–25)
CA-I BLD-SCNC: 1.01 MMOL/L (ref 1.12–1.32)
CA-I BLD-SCNC: 1.03 MMOL/L (ref 1.12–1.32)
CA-I BLD-SCNC: 1.14 MMOL/L (ref 1.12–1.32)
CALCIUM ALBUM COR SERPL-MCNC: 10.1 MG/DL (ref 8.3–10.1)
CALCIUM SERPL-MCNC: 7.2 MG/DL (ref 8.4–10.2)
CALCIUM SERPL-MCNC: 7.6 MG/DL (ref 8.4–10.2)
CALCIUM SERPL-MCNC: 7.6 MG/DL (ref 8.4–10.2)
CALCIUM SERPL-MCNC: 7.7 MG/DL (ref 8.4–10.2)
CALCIUM SERPL-MCNC: 8.2 MG/DL (ref 8.4–10.2)
CALCIUM SERPL-MCNC: 8.3 MG/DL (ref 8.4–10.2)
CALCIUM SERPL-MCNC: 9.5 MG/DL (ref 8.4–10.2)
CARDIAC TROPONIN I PNL SERPL HS: 100 NG/L
CARDIAC TROPONIN I PNL SERPL HS: 103 NG/L
CARDIAC TROPONIN I PNL SERPL HS: 91 NG/L
CHLORIDE SERPL-SCNC: 117 MMOL/L (ref 96–108)
CHLORIDE SERPL-SCNC: 119 MMOL/L (ref 96–108)
CHLORIDE SERPL-SCNC: 121 MMOL/L (ref 96–108)
CHLORIDE SERPL-SCNC: 121 MMOL/L (ref 96–108)
CHLORIDE SERPL-SCNC: 122 MMOL/L (ref 96–108)
CHLORIDE SERPL-SCNC: 124 MMOL/L (ref 96–108)
CHLORIDE SERPL-SCNC: 125 MMOL/L (ref 96–108)
CLARITY UR: ABNORMAL
CO2 SERPL-SCNC: 6 MMOL/L (ref 21–32)
CO2 SERPL-SCNC: 6 MMOL/L (ref 21–32)
CO2 SERPL-SCNC: 7 MMOL/L (ref 21–32)
CO2 SERPL-SCNC: 7 MMOL/L (ref 21–32)
CO2 SERPL-SCNC: 8 MMOL/L (ref 21–32)
CO2 SERPL-SCNC: <6 MMOL/L (ref 21–32)
CO2 SERPL-SCNC: <6 MMOL/L (ref 21–32)
COLOR UR: YELLOW
CREAT SERPL-MCNC: 5.2 MG/DL (ref 0.6–1.3)
CREAT SERPL-MCNC: 6.27 MG/DL (ref 0.6–1.3)
CREAT SERPL-MCNC: 6.38 MG/DL (ref 0.6–1.3)
CREAT SERPL-MCNC: 6.78 MG/DL (ref 0.6–1.3)
CREAT SERPL-MCNC: 7.53 MG/DL (ref 0.6–1.3)
CREAT SERPL-MCNC: 8.03 MG/DL (ref 0.6–1.3)
CREAT SERPL-MCNC: 8.27 MG/DL (ref 0.6–1.3)
DOP CALC AO PEAK VEL: 1.13 M/S
DOP CALC AO VTI: 23.32 CM
E WAVE DECELERATION TIME: 149 MS
E/A RATIO: 2
EOSINOPHIL # BLD AUTO: 0 THOUSAND/ÂΜL (ref 0–0.61)
EOSINOPHIL NFR BLD AUTO: 0 % (ref 0–6)
ERYTHROCYTE [DISTWIDTH] IN BLOOD BY AUTOMATED COUNT: 17.6 % (ref 11.6–15.1)
ETHANOL SERPL-MCNC: <10 MG/DL
FLUAV RNA RESP QL NAA+PROBE: NEGATIVE
FLUBV RNA RESP QL NAA+PROBE: NEGATIVE
FRACTIONAL SHORTENING: 60 (ref 28–44)
GFR SERPL CREATININE-BSD FRML MDRD: 5 ML/MIN/1.73SQ M
GFR SERPL CREATININE-BSD FRML MDRD: 5 ML/MIN/1.73SQ M
GFR SERPL CREATININE-BSD FRML MDRD: 6 ML/MIN/1.73SQ M
GFR SERPL CREATININE-BSD FRML MDRD: 7 ML/MIN/1.73SQ M
GFR SERPL CREATININE-BSD FRML MDRD: 9 ML/MIN/1.73SQ M
GLUCOSE SERPL-MCNC: 204 MG/DL (ref 65–140)
GLUCOSE SERPL-MCNC: 208 MG/DL (ref 65–140)
GLUCOSE SERPL-MCNC: 219 MG/DL (ref 65–140)
GLUCOSE SERPL-MCNC: 233 MG/DL (ref 65–140)
GLUCOSE SERPL-MCNC: 236 MG/DL (ref 65–140)
GLUCOSE SERPL-MCNC: 237 MG/DL (ref 65–140)
GLUCOSE SERPL-MCNC: 237 MG/DL (ref 65–140)
GLUCOSE SERPL-MCNC: 249 MG/DL (ref 65–140)
GLUCOSE SERPL-MCNC: 307 MG/DL (ref 65–140)
GLUCOSE SERPL-MCNC: 320 MG/DL (ref 65–140)
GLUCOSE SERPL-MCNC: 329 MG/DL (ref 65–140)
GLUCOSE SERPL-MCNC: 416 MG/DL (ref 65–140)
GLUCOSE SERPL-MCNC: 433 MG/DL (ref 65–140)
GLUCOSE SERPL-MCNC: 480 MG/DL (ref 65–140)
GLUCOSE SERPL-MCNC: 583 MG/DL (ref 65–140)
GLUCOSE SERPL-MCNC: 652 MG/DL (ref 65–140)
GLUCOSE SERPL-MCNC: >500 MG/DL (ref 65–140)
GLUCOSE UR STRIP-MCNC: ABNORMAL MG/DL
HCO3 BLDV-SCNC: 5.2 MMOL/L (ref 24–30)
HCO3 BLDV-SCNC: 5.8 MMOL/L (ref 24–30)
HCT VFR BLD AUTO: 43.1 % (ref 36.5–49.3)
HGB BLD-MCNC: 12.4 G/DL (ref 12–17)
HGB UR QL STRIP.AUTO: ABNORMAL
IMM GRANULOCYTES # BLD AUTO: 0.06 THOUSAND/UL (ref 0–0.2)
IMM GRANULOCYTES NFR BLD AUTO: 1 % (ref 0–2)
INR PPP: 1.57 (ref 0.84–1.19)
INTERVENTRICULAR SEPTUM IN DIASTOLE (PARASTERNAL SHORT AXIS VIEW): 0.7 CM
INTERVENTRICULAR SEPTUM: 0.7 CM (ref 0.6–1.1)
KETONES UR STRIP-MCNC: NEGATIVE MG/DL
LACTATE SERPL-SCNC: 10 MMOL/L (ref 0.5–2)
LACTATE SERPL-SCNC: 10 MMOL/L (ref 0.5–2)
LACTATE SERPL-SCNC: 11.1 MMOL/L (ref 0.5–2)
LACTATE SERPL-SCNC: 11.6 MMOL/L (ref 0.5–2)
LACTATE SERPL-SCNC: 11.6 MMOL/L (ref 0.5–2)
LACTATE SERPL-SCNC: 11.7 MMOL/L (ref 0.5–2)
LACTATE SERPL-SCNC: 13 MMOL/L (ref 0.5–2)
LEFT ATRIUM SIZE: 3.2 CM
LEFT INTERNAL DIMENSION IN SYSTOLE: 1.4 CM (ref 2.1–4)
LEFT VENTRICULAR INTERNAL DIMENSION IN DIASTOLE: 3.5 CM (ref 3.5–6)
LEFT VENTRICULAR POSTERIOR WALL IN END DIASTOLE: 0.8 CM
LEFT VENTRICULAR STROKE VOLUME: 47 ML
LEUKOCYTE ESTERASE UR QL STRIP: ABNORMAL
LVSV (TEICH): 47 ML
LYMPHOCYTES # BLD AUTO: 0.25 THOUSANDS/ÂΜL (ref 0.6–4.47)
LYMPHOCYTES NFR BLD AUTO: 3 % (ref 14–44)
MAGNESIUM SERPL-MCNC: 2.5 MG/DL (ref 1.9–2.7)
MAGNESIUM SERPL-MCNC: 2.8 MG/DL (ref 1.9–2.7)
MAGNESIUM SERPL-MCNC: 2.9 MG/DL (ref 1.9–2.7)
MAGNESIUM SERPL-MCNC: 3.3 MG/DL (ref 1.9–2.7)
MCH RBC QN AUTO: 29.4 PG (ref 26.8–34.3)
MCHC RBC AUTO-ENTMCNC: 28.8 G/DL (ref 31.4–37.4)
MCV RBC AUTO: 102 FL (ref 82–98)
MONOCYTES # BLD AUTO: 0.26 THOUSAND/ÂΜL (ref 0.17–1.22)
MONOCYTES NFR BLD AUTO: 3 % (ref 4–12)
MV E'TISSUE VEL-LAT: 10 CM/S
MV E'TISSUE VEL-SEP: 11 CM/S
MV PEAK A VEL: 0.5 M/S
MV PEAK E VEL: 100 CM/S
MV STENOSIS PRESSURE HALF TIME: 43 MS
MV VALVE AREA P 1/2 METHOD: 5.1
NEUTROPHILS # BLD AUTO: 8.46 THOUSANDS/ÂΜL (ref 1.85–7.62)
NEUTS SEG NFR BLD AUTO: 93 % (ref 43–75)
NITRITE UR QL STRIP: NEGATIVE
NON-SQ EPI CELLS URNS QL MICRO: ABNORMAL /HPF
NRBC BLD AUTO-RTO: 0 /100 WBCS
O2 CT BLDV-SCNC: 15.5 ML/DL
O2 CT BLDV-SCNC: 9 ML/DL
P AXIS: 62 DEGREES
P AXIS: 79 DEGREES
PCO2 BLDV: 17.7 MM HG (ref 42–50)
PCO2 BLDV: 23.4 MM HG (ref 42–50)
PH BLDV: 7.01 [PH] (ref 7.3–7.4)
PH BLDV: 7.09 [PH] (ref 7.3–7.4)
PH UR STRIP.AUTO: 5 [PH]
PHOSPHATE SERPL-MCNC: 4.6 MG/DL (ref 2.3–4.1)
PHOSPHATE SERPL-MCNC: 6.1 MG/DL (ref 2.3–4.1)
PHOSPHATE SERPL-MCNC: 6.5 MG/DL (ref 2.3–4.1)
PHOSPHATE SERPL-MCNC: 7 MG/DL (ref 2.3–4.1)
PHOSPHATE SERPL-MCNC: 8.5 MG/DL (ref 2.3–4.1)
PLATELET # BLD AUTO: 223 THOUSANDS/UL (ref 149–390)
PMV BLD AUTO: 12.2 FL (ref 8.9–12.7)
PO2 BLDV: 113.5 MM HG (ref 35–45)
PO2 BLDV: 41.7 MM HG (ref 35–45)
POTASSIUM SERPL-SCNC: 4.2 MMOL/L (ref 3.5–5.3)
POTASSIUM SERPL-SCNC: 4.9 MMOL/L (ref 3.5–5.3)
POTASSIUM SERPL-SCNC: 5 MMOL/L (ref 3.5–5.3)
POTASSIUM SERPL-SCNC: 5.8 MMOL/L (ref 3.5–5.3)
POTASSIUM SERPL-SCNC: 6.6 MMOL/L (ref 3.5–5.3)
POTASSIUM SERPL-SCNC: 7.3 MMOL/L (ref 3.5–5.3)
POTASSIUM SERPL-SCNC: 8 MMOL/L (ref 3.5–5.3)
PR INTERVAL: 126 MS
PR INTERVAL: 128 MS
PR INTERVAL: 132 MS
PROCALCITONIN SERPL-MCNC: 12.76 NG/ML
PROT SERPL-MCNC: 7.2 G/DL (ref 6.4–8.4)
PROT UR STRIP-MCNC: ABNORMAL MG/DL
PROTHROMBIN TIME: 18.7 SECONDS (ref 11.6–14.5)
QRS AXIS: 38 DEGREES
QRS AXIS: 68 DEGREES
QRS AXIS: 71 DEGREES
QRSD INTERVAL: 84 MS
QRSD INTERVAL: 88 MS
QRSD INTERVAL: 92 MS
QT INTERVAL: 344 MS
QT INTERVAL: 368 MS
QT INTERVAL: 525 MS
QTC INTERVAL: 450 MS
QTC INTERVAL: 469 MS
QTC INTERVAL: 617 MS
RBC # BLD AUTO: 4.22 MILLION/UL (ref 3.88–5.62)
RBC #/AREA URNS AUTO: ABNORMAL /HPF
RSV RNA RESP QL NAA+PROBE: NEGATIVE
SALICYLATES SERPL-MCNC: <5 MG/DL (ref 3–20)
SARS-COV-2 RNA RESP QL NAA+PROBE: NEGATIVE
SL CV LV EF: 60
SL CV PED ECHO LEFT VENTRICLE DIASTOLIC VOLUME (MOD BIPLANE) 2D: 52 ML
SL CV PED ECHO LEFT VENTRICLE SYSTOLIC VOLUME (MOD BIPLANE) 2D: 5 ML
SODIUM SERPL-SCNC: 153 MMOL/L (ref 135–147)
SODIUM SERPL-SCNC: 157 MMOL/L (ref 135–147)
SODIUM SERPL-SCNC: 157 MMOL/L (ref 135–147)
SODIUM SERPL-SCNC: 158 MMOL/L (ref 135–147)
SODIUM SERPL-SCNC: 159 MMOL/L (ref 135–147)
SODIUM SERPL-SCNC: 159 MMOL/L (ref 135–147)
SODIUM SERPL-SCNC: 160 MMOL/L (ref 135–147)
SP GR UR STRIP.AUTO: >=1.03 (ref 1–1.03)
T WAVE AXIS: 102 DEGREES
T WAVE AXIS: 37 DEGREES
T WAVE AXIS: 55 DEGREES
T4 FREE SERPL-MCNC: 0.99 NG/DL (ref 0.76–1.46)
TR MAX PG: 23 MMHG
TR PEAK VELOCITY: 2.4 M/S
TRICUSPID ANNULAR PLANE SYSTOLIC EXCURSION: 1.7 CM
TRICUSPID VALVE PEAK REGURGITATION VELOCITY: 2.38 M/S
TSH SERPL DL<=0.05 MIU/L-ACNC: 6.56 UIU/ML (ref 0.45–4.5)
UROBILINOGEN UR QL STRIP.AUTO: 0.2 E.U./DL
VENTRICULAR RATE: 112 BPM
VENTRICULAR RATE: 83 BPM
VENTRICULAR RATE: 90 BPM
WBC # BLD AUTO: 9.04 THOUSAND/UL (ref 4.31–10.16)
WBC #/AREA URNS AUTO: ABNORMAL /HPF

## 2023-01-23 PROCEDURE — 02HV33Z INSERTION OF INFUSION DEVICE INTO SUPERIOR VENA CAVA, PERCUTANEOUS APPROACH: ICD-10-PCS | Performed by: INTERNAL MEDICINE

## 2023-01-23 PROCEDURE — 5A1D70Z PERFORMANCE OF URINARY FILTRATION, INTERMITTENT, LESS THAN 6 HOURS PER DAY: ICD-10-PCS | Performed by: INTERNAL MEDICINE

## 2023-01-23 RX ORDER — SODIUM CHLORIDE, SODIUM GLUCONATE, SODIUM ACETATE, POTASSIUM CHLORIDE, MAGNESIUM CHLORIDE, SODIUM PHOSPHATE, DIBASIC, AND POTASSIUM PHOSPHATE .53; .5; .37; .037; .03; .012; .00082 G/100ML; G/100ML; G/100ML; G/100ML; G/100ML; G/100ML; G/100ML
1000 INJECTION, SOLUTION INTRAVENOUS ONCE
Status: COMPLETED | OUTPATIENT
Start: 2023-01-23 | End: 2023-01-23

## 2023-01-23 RX ORDER — SODIUM CHLORIDE, SODIUM GLUCONATE, SODIUM ACETATE, POTASSIUM CHLORIDE, MAGNESIUM CHLORIDE, SODIUM PHOSPHATE, DIBASIC, AND POTASSIUM PHOSPHATE .53; .5; .37; .037; .03; .012; .00082 G/100ML; G/100ML; G/100ML; G/100ML; G/100ML; G/100ML; G/100ML
500 INJECTION, SOLUTION INTRAVENOUS CONTINUOUS
Status: DISCONTINUED | OUTPATIENT
Start: 2023-01-23 | End: 2023-01-23

## 2023-01-23 RX ORDER — CEFEPIME HYDROCHLORIDE 1 G/1
2000 INJECTION, POWDER, FOR SOLUTION INTRAMUSCULAR; INTRAVENOUS EVERY 24 HOURS
Status: DISCONTINUED | OUTPATIENT
Start: 2023-01-23 | End: 2023-01-23

## 2023-01-23 RX ORDER — CALCIUM GLUCONATE 20 MG/ML
2 INJECTION, SOLUTION INTRAVENOUS ONCE
Status: COMPLETED | OUTPATIENT
Start: 2023-01-23 | End: 2023-01-23

## 2023-01-23 RX ORDER — VANCOMYCIN HYDROCHLORIDE 1 G/200ML
25 INJECTION, SOLUTION INTRAVENOUS ONCE
Status: COMPLETED | OUTPATIENT
Start: 2023-01-23 | End: 2023-01-23

## 2023-01-23 RX ORDER — SODIUM CHLORIDE, SODIUM GLUCONATE, SODIUM ACETATE, POTASSIUM CHLORIDE, MAGNESIUM CHLORIDE, SODIUM PHOSPHATE, DIBASIC, AND POTASSIUM PHOSPHATE .53; .5; .37; .037; .03; .012; .00082 G/100ML; G/100ML; G/100ML; G/100ML; G/100ML; G/100ML; G/100ML
1000 INJECTION, SOLUTION INTRAVENOUS ONCE
Status: COMPLETED | OUTPATIENT
Start: 2023-01-23 | End: 2023-01-24

## 2023-01-23 RX ORDER — LEVOTHYROXINE SODIUM 0.07 MG/1
75 TABLET ORAL
Status: DISCONTINUED | OUTPATIENT
Start: 2023-01-23 | End: 2023-01-26

## 2023-01-23 RX ORDER — CEFEPIME HYDROCHLORIDE 1 G/1
1000 INJECTION, POWDER, FOR SOLUTION INTRAMUSCULAR; INTRAVENOUS EVERY 24 HOURS
Status: DISCONTINUED | OUTPATIENT
Start: 2023-01-24 | End: 2023-01-23

## 2023-01-23 RX ORDER — ALBUTEROL SULFATE 2.5 MG/3ML
10 SOLUTION RESPIRATORY (INHALATION) ONCE
Status: COMPLETED | OUTPATIENT
Start: 2023-01-23 | End: 2023-01-23

## 2023-01-23 RX ORDER — ALBUMIN (HUMAN) 12.5 G/50ML
50 SOLUTION INTRAVENOUS ONCE
Status: COMPLETED | OUTPATIENT
Start: 2023-01-23 | End: 2023-01-23

## 2023-01-23 RX ORDER — VANCOMYCIN HYDROCHLORIDE 500 MG/100ML
500 INJECTION, SOLUTION INTRAVENOUS EVERY 24 HOURS
Status: DISCONTINUED | OUTPATIENT
Start: 2023-01-24 | End: 2023-01-24

## 2023-01-23 RX ORDER — DEXTROSE AND SODIUM CHLORIDE 5; .45 G/100ML; G/100ML
250 INJECTION, SOLUTION INTRAVENOUS CONTINUOUS
Status: DISCONTINUED | OUTPATIENT
Start: 2023-01-23 | End: 2023-01-24

## 2023-01-23 RX ORDER — VANCOMYCIN HYDROCHLORIDE 500 MG/100ML
500 INJECTION, SOLUTION INTRAVENOUS DAILY PRN
Status: DISCONTINUED | OUTPATIENT
Start: 2023-01-23 | End: 2023-01-23

## 2023-01-23 RX ORDER — LIDOCAINE HCL 20 MG/ML
1 SYRINGE (ML) INTRAVENOUS ONCE
Status: COMPLETED | OUTPATIENT
Start: 2023-01-23 | End: 2023-01-23

## 2023-01-23 RX ORDER — CALCIUM GLUCONATE 20 MG/ML
1 INJECTION, SOLUTION INTRAVENOUS ONCE
Status: COMPLETED | OUTPATIENT
Start: 2023-01-23 | End: 2023-01-23

## 2023-01-23 RX ORDER — SODIUM CHLORIDE, SODIUM GLUCONATE, SODIUM ACETATE, POTASSIUM CHLORIDE, MAGNESIUM CHLORIDE, SODIUM PHOSPHATE, DIBASIC, AND POTASSIUM PHOSPHATE .53; .5; .37; .037; .03; .012; .00082 G/100ML; G/100ML; G/100ML; G/100ML; G/100ML; G/100ML; G/100ML
250 INJECTION, SOLUTION INTRAVENOUS CONTINUOUS
Status: DISCONTINUED | OUTPATIENT
Start: 2023-01-23 | End: 2023-01-23

## 2023-01-23 RX ORDER — SODIUM CHLORIDE 450 MG/100ML
250 INJECTION, SOLUTION INTRAVENOUS CONTINUOUS
Status: DISCONTINUED | OUTPATIENT
Start: 2023-01-23 | End: 2023-01-25

## 2023-01-23 RX ADMIN — VASOPRESSIN 0.04 UNITS/MIN: 20 INJECTION INTRAVENOUS at 23:24

## 2023-01-23 RX ADMIN — SODIUM CHLORIDE 14.4 UNITS/HR: 9 INJECTION, SOLUTION INTRAVENOUS at 22:53

## 2023-01-23 RX ADMIN — DEXTROSE AND SODIUM CHLORIDE 250 ML/HR: 5; .45 INJECTION, SOLUTION INTRAVENOUS at 18:34

## 2023-01-23 RX ADMIN — ALBUMIN HUMAN 50 G: 0.25 SOLUTION INTRAVENOUS at 14:17

## 2023-01-23 RX ADMIN — SODIUM CHLORIDE 250 ML/HR: 0.45 INJECTION, SOLUTION INTRAVENOUS at 18:32

## 2023-01-23 RX ADMIN — ALBUTEROL SULFATE 10 MG: 2.5 SOLUTION RESPIRATORY (INHALATION) at 12:13

## 2023-01-23 RX ADMIN — CALCIUM GLUCONATE 2 G: 20 INJECTION, SOLUTION INTRAVENOUS at 22:27

## 2023-01-23 RX ADMIN — SODIUM CHLORIDE, SODIUM GLUCONATE, SODIUM ACETATE, POTASSIUM CHLORIDE, MAGNESIUM CHLORIDE, SODIUM PHOSPHATE, DIBASIC, AND POTASSIUM PHOSPHATE 1000 ML: .53; .5; .37; .037; .03; .012; .00082 INJECTION, SOLUTION INTRAVENOUS at 15:00

## 2023-01-23 RX ADMIN — HYDROCORTISONE SODIUM SUCCINATE 100 MG: 100 INJECTION, POWDER, FOR SOLUTION INTRAMUSCULAR; INTRAVENOUS at 14:18

## 2023-01-23 RX ADMIN — CEFEPIME HYDROCHLORIDE 2000 MG: 2 INJECTION, POWDER, FOR SOLUTION INTRAVENOUS at 12:07

## 2023-01-23 RX ADMIN — NOREPINEPHRINE BITARTRATE 12 MCG/MIN: 1 INJECTION, SOLUTION, CONCENTRATE INTRAVENOUS at 22:53

## 2023-01-23 RX ADMIN — INSULIN HUMAN 7 UNITS: 100 INJECTION, SOLUTION PARENTERAL at 11:45

## 2023-01-23 RX ADMIN — CALCIUM GLUCONATE 1 G: 20 INJECTION, SOLUTION INTRAVENOUS at 11:41

## 2023-01-23 RX ADMIN — SODIUM CHLORIDE, SODIUM GLUCONATE, SODIUM ACETATE, POTASSIUM CHLORIDE, MAGNESIUM CHLORIDE, SODIUM PHOSPHATE, DIBASIC, AND POTASSIUM PHOSPHATE 1000 ML: .53; .5; .37; .037; .03; .012; .00082 INJECTION, SOLUTION INTRAVENOUS at 12:40

## 2023-01-23 RX ADMIN — Medication 20000 ML: at 21:30

## 2023-01-23 RX ADMIN — SODIUM CHLORIDE 1000 ML: 0.9 INJECTION, SOLUTION INTRAVENOUS at 10:50

## 2023-01-23 RX ADMIN — VANCOMYCIN HYDROCHLORIDE 1000 MG: 1 INJECTION, SOLUTION INTRAVENOUS at 13:23

## 2023-01-23 RX ADMIN — NOREPINEPHRINE BITARTRATE 6 MCG/MIN: 1 INJECTION, SOLUTION, CONCENTRATE INTRAVENOUS at 15:41

## 2023-01-23 RX ADMIN — SODIUM CHLORIDE 1000 ML: 0.9 INJECTION, SOLUTION INTRAVENOUS at 11:01

## 2023-01-23 RX ADMIN — SODIUM CHLORIDE, SODIUM GLUCONATE, SODIUM ACETATE, POTASSIUM CHLORIDE, MAGNESIUM CHLORIDE, SODIUM PHOSPHATE, DIBASIC, AND POTASSIUM PHOSPHATE 500 ML/HR: .53; .5; .37; .037; .03; .012; .00082 INJECTION, SOLUTION INTRAVENOUS at 15:57

## 2023-01-23 RX ADMIN — SODIUM CHLORIDE 3.6 UNITS/HR: 9 INJECTION, SOLUTION INTRAVENOUS at 12:30

## 2023-01-23 NOTE — ASSESSMENT & PLAN NOTE
Lab Results   Component Value Date    HGBA1C 9 5 (H) 12/31/2022       Recent Labs     01/23/23  1045 01/23/23  1145 01/23/23  1227 01/23/23  1327   POCGLU >500* >500* >500* 433*       Blood Sugar Average: Last 72 hrs:  (P) 433   · Blood glucose and betahydroxybuterate were elevated on admission  · We will treat with volume resuscitation and IV insulin for now

## 2023-01-23 NOTE — ACP (ADVANCE CARE PLANNING)
Critical Care Advanced Care Planning Note  Bia Lamb 69 y o  male MRN: [id number]  Unit/Bed#: ICU 14 Encounter: [id number]    Bia Lamb is a 69 y o  male requiring critical care evaluation and advanced care planning  The patient has chronic comorbidities, including but not limited to DM, dementia, chronic deconditioning with severe protein calorie malnutrition, which is now further complicated by the following acute conditions: Shock, severe metabolic acidosis  Due to the severity of the patient's acute condition and/or the extent of chronic conditions, additional conversations pertaining to advanced care planning were required  Today's discussion, which was held in a face-to-face meeting, included Patient's wife and caregiver, and it was established that all stake holders understood the rationale for the advanced care planning  The patient was unable to participate in the discussion due to dementia and acute illness  Summary of Discussion:  Discussed patient's overall condition with his wife and with his caregiver who provided some translation  Patient's wife speaks some [place] but does not understand some advanced medical terminology  They declined the aid of a formal translation service when offered  Patient is in shock believed to be septic and/or hypovolemic in nature  He is in renal failure with a lactic acidosis also concerning for metformin lactic acidosis given his volume depletion  He has severe electrolyte derangements most concerning for hypernatremia and hyperkalemia in the setting of severe hyperglycemia  He is being volume resuscitated and is now on insulin infusion, but is starting to also require vasopressors  Imaging of his abdomen and pelvis is pending  See separate H&P  note for additional medical details       I discussed these conditions with his wife who states that given his overall condition she does not believe he would wish for mechanical ventilation or cardiac resuscitation should his heart stop beating  Short of this, she would like us to continue with all other lifesaving measures including central line and arterial line placement if necessary as well as hemodialysis if needed         CODE STATUS: DNR/DNI - Level 3       SIGNATURE: Cesar De Leon DO  DATE: January 23, 2023  TIME: 3:29 PM

## 2023-01-23 NOTE — H&P
111  [facility] 1945, 69 y o  male MRN: [id number]  Unit/Bed#: ICU 14 Encounter: [id number]  Primary Care Provider: Leora Up MD   Date and time admitted to hospital: 1/23/2023 86:22 AM    * Metabolic acidosis  Assessment & Plan  · The patient presented to the ED with altered mental status  His labs revealed a significant metabolic acidosis, likely multifactorial related to DKA, severe volume depletion, elevated lactic acid level, ANGELICA  · We will continue with aggressive fluid resuscitation for now  · We will treat the patient per the DKA protocol   · We will trend his lactic acid level- it was elevated beyond what would be expected from volume depletion and DKA- CTAP was ordered  · He was on janumet as an outpatient, could this be metformin toxicity?   · We will check an ECHO to r/o a cardiac component to his shock and elevated lactic acid level  · We will trend his [facility]- he may require a bicarbonate infusion    ANGELICA (acute kidney injury) ([facility] )  Assessment & Plan  · His creatinine was markedly elevated on admission ( baseline 0 92)  · We will trend his renal indices and urine output closely    DKA (diabetic ketoacidosis) ([facility] )  Assessment & Plan  Lab Results   Component Value Date    HGBA1C 9 5 (H) 12/31/2022       Recent Labs     01/23/23  1045 01/23/23  1145 01/23/23  1227 01/23/23  1327   POCGLU >500* >500* >500* 433*       Blood Sugar Average: Last 72 hrs:  (P) 433   · Blood glucose and betahydroxybuterate were elevated on admission  · We will treat with volume resuscitation and IV insulin for now    Hypernatremia  Assessment & Plan  · Likely related to volume depletion  · We will attempt to slowly correct this    Acute encephalopathy  Assessment & Plan  · Likely toxic metabolic in the setting of his electrolyte abnormalities on his chronic dementia  · We will monitor his neuro status closely    Type 2 diabetes mellitus without complication, without long-term current use of insulin [facility])  Assessment & Plan  Lab Results   Component Value Date    HGBA1C 9 5 (H) 12/31/2022       Recent Labs     01/23/23  1045 01/23/23  1145 01/23/23  1227 01/23/23  1327   POCGLU >500* >500* >500* 433*       Blood Sugar Average: Last 72 hrs:  (P) 433   · We will treat his DKA per the protocol  · We will hold his outpatient dillan    Hyperkalemia  Assessment & Plan  · Likely multifactorial related to his volume depletion and ANGELICA  · We will trend this for now    Skin ulcer of sacrum, limited to breakdown of skin (HCC)  Assessment & Plan  · A stage 4 ulcer is noted on the sacrum  · Local wound care for now  · CTAP to further eval for bony infection/underlying abscess  · Pressure offload    Dementia without behavioral disturbance (HCC)  Assessment & Plan  · We will monitor his mental status closely  · Hope to avoid sedatives, anxiolytics      -------------------------------------------------------------------------------------------------------------  Chief Complaint: altered mental status    History of Present Illness   HX and PE limited by: mental status  Sinan Love is a 69 y o  male who presents with a report of altered mental status  All information is obtained from reviewing the chart  It appears as if the patient is cared for at home with a history of diabetes, dementia and a sacral wound  The patient was admitted to [place] [facility] on 12/30 with a similar presentation of hyperglycemia, hypernatremia and generalized weakness  He has had home PT and possible home nursing care although those records are not available  On admission, the patient was noted to have a blood glucose > 600, a bicarb of 6, a lactic acid of 10 and a creatinine of 8 27    History obtained from chart review  -------------------------------------------------------------------------------------------------------------  Dispo:  Admit to Critical Care     Code Status: Prior  --------------------------------------------------------------------------------------------------------------  Review of Systems    Review of systems was unable to be performed secondary to mental status    Physical Exam    --------------------------------------------------------------------------------------------------------------  Vitals:   Vitals:    01/23/23 1317 01/23/23 1327 01/23/23 1332 01/23/23 1400   BP: (!) 87/52 (!) 81/50 (!) 83/52 (!) 76/46   BP Location:       Pulse: 88 96 92 88   Resp: 20 (!) 37 (!) 34 21   Temp:       TempSrc:       SpO2: 100% (!) 78% 96% 100%   Weight:         Temp  Min: 97 7 °F (36 5 °C)  Max: 97 7 °F (36 5 °C)        Body mass index is 16 03 kg/m²  Laboratory and Diagnostics:  Results from last 7 days   Lab Units 01/23/23  1055   WBC Thousand/uL 9 04   HEMOGLOBIN g/dL 12 4   HEMATOCRIT % 43 1   PLATELETS Thousands/uL 223   NEUTROS PCT % 93*   MONOS PCT % 3*     Results from last 7 days   Lab Units 01/23/23  1137 01/23/23  1055   SODIUM mmol/L 160* 159*   POTASSIUM mmol/L 7 3* 8 0*   CHLORIDE mmol/L 124* 117*   CO2 mmol/L 6* 8*   ANION GAP mmol/L 30* 34*   BUN mg/dL 182* 200*   CREATININE mg/dL 8 03* 8 27*   CALCIUM mg/dL 8 3* 9 5   GLUCOSE RANDOM mg/dL 583* 652*   ALT U/L  --  24   AST U/L  --  26   ALK PHOS U/L  --  105*   ALBUMIN g/dL  --  3 2*   TOTAL BILIRUBIN mg/dL  --  0 64     Results from last 7 days   Lab Units 01/23/23  1055   MAGNESIUM mg/dL 3 3*      Results from last 7 days   Lab Units 01/23/23  1055   INR  1 57*   PTT seconds 31          Results from last 7 days   Lab Units 01/23/23  1422 01/23/23  1302 01/23/23  1055   LACTIC ACID mmol/L 11 1* 10 0* 10 0*     ABG:    VBG:  Results from last 7 days   Lab Units 01/23/23  1422   PH ASHLEY  7 015*   PCO2 ASHLEY mm Hg 23 4*   PO2 ASHLEY mm Hg 41 7   HCO3 ASHLEY mmol/L 5 8*   BASE EXC ASHLEY mmol/L -23 7           Micro:        EKG:   Imaging: I have personally reviewed pertinent reports     and I have personally reviewed pertinent films in PACS      Historical Information   Past Medical History:   Diagnosis Date   • Alzheimer disease type 3 ([facility] )    • Cervical pain 4/16/2018   • Diabetes mellitus ([facility] )    • Diabetic peripheral neuropathy associated with type 2 diabetes mellitus ([facility] ) 3/18/2021   • Disease of thyroid gland    • Elevated PSA    • Hyperlipidemia    • Medicare annual wellness visit, subsequent 7/30/2020   • Prostate cancer ([place] [address] )    • Type 2 diabetes mellitus with diabetic peripheral angiopathy without gangrene ([place] [address] ) 5/17/2021    According to ICD10 guidelines, patient accept     Past Surgical History:   Procedure Laterality Date   • NECK SURGERY     • PROSTATE BIOPSY  07/18/2018     Social History   Social History     Substance and Sexual Activity   Alcohol Use Never     Social History     Substance and Sexual Activity   Drug Use Never     Social History     Tobacco Use   Smoking Status Never   Smokeless Tobacco Never     Exercise History:   Family History:   Family History   Problem Relation Age of Onset   • No Known Problems Mother    • No Known Problems Father    • Dementia Sister    • Autism Daughter      Family history unknown and I have reviewed this patient's family history and commented on sigificant items within the HPI      Medications:  Current Facility-Administered Medications   Medication Dose Route Frequency   • dextrose 5 % and sodium chloride 0 45 % infusion  250 mL/hr Intravenous Continuous   • hydrocortisone (Solu-CORTEF) injection 100 mg  100 mg Intravenous Q8H [facility]   • insulin regular (HumuLIN R,NovoLIN R) 1 Units/mL in sodium chloride 0 9 % 100 mL infusion  0 1-30 Units/hr Intravenous Continuous   • levothyroxine tablet 75 mcg  75 mcg Oral Daily Before Breakfast   • multi-electrolyte (ISOLYTE-S PH 7 4 equivalent) IV solution  500 mL/hr Intravenous Continuous    Followed by   • multi-electrolyte (ISOLYTE-S PH 7 4 equivalent) IV solution  250 mL/hr Intravenous Continuous   • multi-electrolyte (ISOLYTE-S PH 7 4) bolus 1,000 mL  1,000 mL Intravenous Once   • NOREPINEPHRINE 4 MG  ML NSS (CMPD ORDER) infusion  1-30 mcg/min Intravenous Titrated     Home medications:  Prior to Admission Medications   Prescriptions Last Dose Informant Patient Reported? Taking? Blood Glucose Monitoring Suppl (OneTouch Verio) w/Device KIT   No No   Sig: Use 2 (two) times a day   Janumet  MG per tablet   No No   Sig: Take 1 tablet by mouth 2 (two) times a day with meals   Lancets Micro Thin 33G MISC   No No   Sig: Use 2 (two) times a day Test two times daily DX:E11 9   Nutritional Supplements (Glucerna Shake) LIQD   No No   Sig: Take 1 Bottle by mouth 2 (two) times a day   calcium carbonate (OS-CARMENZA) 600 MG tablet   No No   Sig: Take 1 tablet (600 mg total) by mouth 2 (two) times a day with meals   diphenhydrAMINE (BENADRYL) 2 % cream   No No   Sig: Apply topically 3 (three) times a day as needed for itching   glucose blood (OneTouch Verio) test strip   No No   Sig: Test two times daily DX: E11 9   lactulose (CHRONULAC) 10 g/15 mL solution   No No   Sig: Take 15 mL (10 g total) by mouth 2 (two) times a day as needed (constipation) Take one in morning and if no bowel movement take one more at lunch   levothyroxine (Synthroid) 75 mcg tablet   No No   Sig: Take 1 tablet (75 mcg total) by mouth daily   melatonin 3 mg   No No   Sig: Take 1 tablet (3 mg total) by mouth daily at bedtime   rosuvastatin (CRESTOR) 40 MG tablet   No No   Sig: Take 1 tablet (40 mg total) by mouth daily      Facility-Administered Medications: None     Allergies:   Allergies   Allergen Reactions   • Other      seasonal       ------------------------------------------------------------------------------------------------------------  Advance Directive and Living Will:      Power of :    POLST:    ------------------------------------------------------------------------------------------------------------  Anticipated Length of Stay is > 2 midnights    Care Time Delivered:   Upon my evaluation, this patient had a high probability of imminent or life-threatening deterioration due to metabolic acidosis , which required my direct attention, intervention, and personal management  I have personally provided 40 minutes (1400 to 1440) of critical care time, exclusive of procedures, teaching, family meetings, and any prior time recorded by providers other than myself  AKSHAT Nova        Portions of the record may have been created with voice recognition software  Occasional wrong word or "sound a like" substitutions may have occurred due to the inherent limitations of voice recognition software    Read the chart carefully and recognize, using context, where substitutions have occurred

## 2023-01-23 NOTE — SEPSIS NOTE
Sepsis Note   Vicki Jones 69 y o  male MRN: [id number]  Unit/Bed#: ICU 14 Encounter: [id number]       qSOFA     Row Name 01/23/23 1400 01/23/23 1332 01/23/23 1327 01/23/23 1317 01/23/23 1315    Altered mental status GCS < 15 -- -- -- -- --    Respiratory Rate > / =22 0 1 1 0 1    Systolic BP < / =085 1 1 1 1 1    Q Sofa Score 2 2 2 1 2    Row Name 01/23/23 1303 01/23/23 1245 01/23/23 1216 01/23/23 1143 01/23/23 1115    Altered mental status GCS < 15 -- -- -- 1 --    Respiratory Rate > / =22 1 1 1 -- 1    Systolic BP < / =935 1 1 1 -- 1    Q Sofa Score 2 2 2 3 2    Row Name 01/23/23 1049 01/23/23 1045             Altered mental status GCS < 15 -- 1       Respiratory Rate > / =22 1 --       Systolic BP < / =403 1 --       Q Sofa Score 2 --                  Initial Sepsis Screening     Row Name 01/23/23 1110                Is the patient's history suggestive of a new or worsening infection? --        Suspected source of infection suspect infection, source unknown  -GJ        Are two or more of the following signs & symptoms of infection both present and new to the patient? Yes (Proceed)  -GJ        Indicate SIRS criteria Tachycardia > 90 bpm;Tachypnea > 20 resp per min  -GJ        If the answer is yes to both questions, suspicion of sepsis is present --        If severe sepsis is present AND tissue hypoperfusion perists in the hour after fluid resuscitation or lactate > 4, the patient meets criteria for SEPTIC SHOCK --        Are any of the following organ dysfunction criteria present within 6 hours of suspected infection and SIRS criteria that are NOT considered to be chronic conditions?  Yes  -GJ        Organ dysfunction Lactate >/equal 4 0 mmol/L (MEETS CRITERIA FOR SEPTIC SHOCK)  -GJ        Date of presentation of severe sepsis 01/23/23  -GJ        Time of presentation of severe sepsis 1055  -GJ        Tissue hypoperfusion persists in the hour after crystalloid fluid administration, evidenced, by either: -- Was hypotension present within one hour of the conclusion of crystalloid fluid administration? Yes  -GJ        Date of presentation of septic shock 01/23/23  -        Time of presentation of septic shock 1155  -[address]  (r) = Recorded By, (t) = Taken By, (c) = Cosigned By    [facility] [address] Name Provider Type    [address] [place] [address]  Resident                  Default Flowsheet Data (last 720 hours)     Sepsis Reassess     Row Name 01/23/23 1516                   Repeat Volume Status and Tissue Perfusion Assessment Performed    Repeat Volume Status and Tissue Perfusion Assessment Performed Yes  -GJ           Volume Status and Tissue Perfusion Post Fluid Resuscitation * Must Document All *    Vital Signs Reviewed (HR, RR, BP, T) Yes  -GJ        Shock Index Reviewed Yes  -GJ        Arterial Oxygen Saturation Reviewed (POx, SaO2 or SpO2) Yes (comment %)  -GJ        Cardio Normal S1/S2; Regular rate and rhythm; No murmor; No rub or gallop  -GJ        Pulmonary Normal effort  -GJ        Capillary Refill Brisk  -GJ        Peripheral Pulses Radial  -GJ        Peripheral Pulse +2  -GJ        Skin Warm;Dry  -GJ        Urine output assessed Adequate  -GJ           *OR*   Intensive Monitoring- Must Document One of the Following Four *:    Vital Signs Reviewed Yes  -GJ        * Central Venous Pressure (CVP or RAP) --        * Central Venous Oxygen (SVO2, ScvO2 or Oxygen saturation via central catheter) --        * Bedside Cardiovascular US in IVC diameter and % collapse --        * Passive Leg Raise OR Crystalloid Challenge --              User Key  (r) = Recorded By, (t) = Taken By, (c) = Cosigned By    Initials Name Provider Type    [address] [place] [address]  Resident

## 2023-01-23 NOTE — ED PROVIDER NOTES
History  Chief Complaint   Patient presents with   • Altered Mental Status     Patient is a 81-year-old male with a significant past medical history of diabetes, hypothyroidism, presenting for evaluation of altered mental status and hypotension as per EMS  History gathered by EMS due to acute distress  As per report, the patient was noted to be more tired this morning when he was examined by a home nurse  She attempted to obtain his blood pressure but was having difficulty  Because of this EMS was called  For them, he was noted to have a blood pressure of 96L systolic and he was initiated on fluids en route with minimal improvement  He was tachypneic and was placed on nonrebreather mask  History otherwise limited  Prior to Admission Medications   Prescriptions Last Dose Informant Patient Reported? Taking?    Blood Glucose Monitoring Suppl (OneTouch Verio) w/Device KIT   No No   Sig: Use 2 (two) times a day   Janumet  MG per tablet   No No   Sig: Take 1 tablet by mouth 2 (two) times a day with meals   Lancets Micro Thin 33G MISC   No No   Sig: Use 2 (two) times a day Test two times daily DX:E11 9   Nutritional Supplements (Glucerna Shake) LIQD   No No   Sig: Take 1 Bottle by mouth 2 (two) times a day   calcium carbonate (OS-CARMENZA) 600 MG tablet   No No   Sig: Take 1 tablet (600 mg total) by mouth 2 (two) times a day with meals   diphenhydrAMINE (BENADRYL) 2 % cream   No No   Sig: Apply topically 3 (three) times a day as needed for itching   glucose blood (OneTouch Verio) test strip   No No   Sig: Test two times daily DX: E11 9   lactulose (CHRONULAC) 10 g/15 mL solution   No No   Sig: Take 15 mL (10 g total) by mouth 2 (two) times a day as needed (constipation) Take one in morning and if no bowel movement take one more at lunch   levothyroxine (Synthroid) 75 mcg tablet   No No   Sig: Take 1 tablet (75 mcg total) by mouth daily   melatonin 3 mg   No No   Sig: Take 1 tablet (3 mg total) by mouth daily at bedtime   rosuvastatin (CRESTOR) 40 MG tablet   No No   Sig: Take 1 tablet (40 mg total) by mouth daily      Facility-Administered Medications: None       Past Medical History:   Diagnosis Date   • Alzheimer disease type 3 ([place] [address] )    • Cervical pain 4/16/2018   • Diabetes mellitus ([facility] )    • Diabetic peripheral neuropathy associated with type 2 diabetes mellitus ([place] [address] ) 3/18/2021   • Disease of thyroid gland    • Elevated PSA    • Hyperlipidemia    • Medicare annual wellness visit, subsequent 7/30/2020   • Prostate cancer ([place] [address] )    • Type 2 diabetes mellitus with diabetic peripheral angiopathy without gangrene ([place] [address] ) 5/17/2021    According to ICD10 guidelines, patient accept       Past Surgical History:   Procedure Laterality Date   • NECK SURGERY     • PROSTATE BIOPSY  07/18/2018       Family History   Problem Relation Age of Onset   • No Known Problems Mother    • No Known Problems Father    • Dementia Sister    • Autism Daughter      I have reviewed and agree with the history as documented      E-Cigarette/Vaping   • E-Cigarette Use Never User      E-Cigarette/Vaping Substances     Social History     Tobacco Use   • Smoking status: Never   • Smokeless tobacco: Never   Vaping Use   • Vaping Use: Never used   Substance Use Topics   • Alcohol use: Never   • Drug use: Never        Review of Systems   Unable to perform ROS: Unstable vital signs       Physical Exam  ED Triage Vitals   Temperature Pulse Respirations Blood Pressure SpO2   01/23/23 1115 01/23/23 1049 01/23/23 1049 01/23/23 1049 01/23/23 1049   97 7 °F (36 5 °C) 104 (!) 33 (!) 81/47 98 %      Temp Source Heart Rate Source Patient Position - Orthostatic VS BP Location FiO2 (%)   01/23/23 1115 01/23/23 1115 01/23/23 1115 01/23/23 1115 --   Rectal Monitor Lying Left arm       Pain Score       --                    Orthostatic Vital Signs  Vitals:    01/23/23 1800 01/23/23 1900 01/23/23 1925 01/23/23 2000   BP: 91/54      Pulse: 78 90 80 80   Patient Position - Orthostatic VS:           Physical Exam  Constitutional:       Appearance: He is ill-appearing  Comments: Cachectic   HENT:      Head: Normocephalic and atraumatic  Right Ear: External ear normal       Left Ear: External ear normal       Nose: Nose normal       Mouth/Throat:      Mouth: Mucous membranes are dry  Eyes:      General: No scleral icterus  Right eye: No discharge  Left eye: No discharge  Extraocular Movements: Extraocular movements intact  Conjunctiva/sclera: Conjunctivae normal       Pupils: Pupils are equal, round, and reactive to light  Cardiovascular:      Rate and Rhythm: Normal rate and regular rhythm  Heart sounds: Normal heart sounds  No murmur heard  No friction rub  No gallop  Pulmonary:      Effort: Tachypnea present  No respiratory distress  Breath sounds: Decreased breath sounds present  Comments: NRB mask in place  Abdominal:      General: Abdomen is flat  Palpations: Abdomen is soft  Tenderness: There is no abdominal tenderness  There is no guarding or rebound  Musculoskeletal:      Cervical back: Normal range of motion  Right lower leg: No edema  Left lower leg: No edema  Skin:     General: Skin is dry  Capillary Refill: Capillary refill takes 2 to 3 seconds  Comments: Sacral wound, please see media   Neurological:      Comments: Patient opens eyes spontaneously and seems to turn head towards voice  He is not following commands but does seem to be moving all of his extremities purposefully  Exam otherwise limited               ED Medications  Medications   insulin regular (HumuLIN R,NovoLIN R) 1 Units/mL in sodium chloride 0 9 % 100 mL infusion (7 2 Units/hr Intravenous Rate/Dose Change 1/23/23 1559)   levothyroxine tablet 75 mcg (75 mcg Oral Not Given 1/23/23 1418)   NOREPINEPHRINE 4 MG  ML NSS (CMPD ORDER) infusion (6 mcg/min Intravenous New Bag 1/23/23 1541)   dextrose 5 % and sodium chloride 0 45 % infusion (250 mL/hr Intravenous New Bag 1/23/23 1834)   sodium chloride infusion 0 45 % (0 mL/hr Intravenous Stopped 1/23/23 1834)   NxStage K 2/Ca 3 dialysis solution (RFP-400) 20,000 mL (has no administration in time range)   cefepime (MAXIPIME) 2,000 mg in dextrose 5 % 50 mL IVPB (has no administration in time range)   vancomycin (VANCOCIN) IVPB (premix in dextrose) 500 mg 100 mL (has no administration in time range)   lidocaine (cardiac) (FOR EMS ONLY) 20 mg/mL injection 100 mg (0 mg Does not apply Given to EMS 1/23/23 1100)   sodium chloride 0 9 % bolus 1,000 mL (0 mL Intravenous Stopped 1/23/23 1201)   sodium chloride 0 9 % bolus 1,000 mL (0 mL Intravenous Stopped 1/23/23 1153)   vancomycin (VANCOCIN) IVPB (premix in dextrose) 1,000 mg 200 mL (0 mg Intravenous Stopped 1/23/23 1449)   insulin regular (HumuLIN R,NovoLIN R) injection 7 Units (7 Units Intravenous Given 1/23/23 1145)   calcium gluconate 1 g in sodium chloride 0 9% 50 mL (premix) (0 g Intravenous Stopped 1/23/23 1211)   cefepime (MAXIPIME) 2 g/50 mL dextrose IVPB (0 mg Intravenous Stopped 1/23/23 1237)   albuterol inhalation solution 10 mg (10 mg Nebulization Given 1/23/23 1213)   multi-electrolyte (ISOLYTE-S PH 7 4) bolus 1,000 mL (0 mL Intravenous Stopped 1/23/23 1355)   albumin human (FLEXBUMIN) 25 % injection 50 g (0 g Intravenous Stopped 1/23/23 1449)   multi-electrolyte (ISOLYTE-S PH 7 4) bolus 1,000 mL (1,000 mL Intravenous New Bag 1/23/23 1500)       Diagnostic Studies  Results Reviewed     Procedure Component Value Units Date/Time    Blood culture [[id number]] Collected: 01/23/23 1206    Lab Status: Preliminary result Specimen: Blood from Arm, Left Updated: 01/23/23 1701     Blood Culture Received in Microbiology Lab  Culture in Progress      Blood culture [[id number]] Collected: 01/23/23 1143    Lab Status: Preliminary result Specimen: Blood from Arm, Left Updated: 01/23/23 1701     Blood Culture Received in Microbiology Lab  Culture in Progress  Basic metabolic panel [[id number]]  (Abnormal) Collected: 01/23/23 1419    Lab Status: Final result Specimen: Blood Updated: 01/23/23 1608     Sodium 159 mmol/L      Potassium 6 6 mmol/L      Chloride 125 mmol/L      CO2 6 mmol/L      ANION GAP 28 mmol/L       mg/dL      Creatinine 7 53 mg/dL      Glucose 480 mg/dL      Calcium 8 2 mg/dL      eGFR 6 ml/min/1 73sq m     Narrative:      [place] guidelines for Chronic Kidney Disease (CKD):   •  Stage 1 with normal or high GFR (GFR > 90 mL/min/1 73 square meters)  •  Stage 2 Mild CKD (GFR = 60-89 mL/min/1 73 square meters)  •  Stage 3A Moderate CKD (GFR = 45-59 mL/min/1 73 square meters)  •  Stage 3B Moderate CKD (GFR = 30-44 mL/min/1 73 square meters)  •  Stage 4 Severe CKD (GFR = 15-29 mL/min/1 73 square meters)  •  Stage 5 End Stage CKD (GFR <15 mL/min/1 73 square meters)  Note: GFR calculation is accurate only with a steady state creatinine    Magnesium [[id number]]  (Abnormal) Collected: 01/23/23 1419    Lab Status: Final result Specimen: Blood Updated: 01/23/23 1558     Magnesium 2 9 mg/dL     Phosphorus [[id number]]  (Abnormal) Collected: 01/23/23 1419    Lab Status: Final result Specimen: Blood Updated: 01/23/23 1558     Phosphorus 8 5 mg/dL     Lactic acid, plasma [[id number]]  (Abnormal) Collected: 01/23/23 1422    Lab Status: Final result Specimen: Blood from Arm, Right Updated: 01/23/23 1512     LACTIC ACID 11 1 mmol/L     Narrative:      Result may be elevated if tourniquet was used during collection      T4, free [id number]  (Normal) Collected: 01/23/23 1055    Lab Status: Final result Specimen: Blood from Arm, Right Updated: 01/23/23 1500     Free T4 0 99 ng/dL     Calcium, ionized [[id number]]  (Normal) Collected: 01/23/23 1422    Lab Status: Final result Specimen: Blood from Arm, Right Updated: 01/23/23 1441     Calcium, Ionized 1 14 mmol/L     Blood gas, venous [[id number]] (Abnormal) Collected: 01/23/23 1422    Lab Status: Final result Specimen: Blood from Arm, Right Updated: 01/23/23 1432     pH, Guy 7 015     pCO2, Guy 23 4 mm Hg      pO2, Guy 41 7 mm Hg      HCO3, Guy 5 8 mmol/L      Base Excess, Guy -23 7 mmol/L      O2 Content, Guy 9 0 ml/dL      O2 HGB, VENOUS 56 6 %     HS Troponin I 4hr [[id number]] Collected: 01/23/23 1420    Lab Status: No result Specimen: Blood     Cortisol [[id number]] Collected: 01/23/23 1137    Lab Status: In process Specimen: Blood from Arm, Right Updated: 01/23/23 1408    Lactic acid 2 Hours [[id number]]  (Abnormal) Collected: 01/23/23 1302    Lab Status: Final result Specimen: Blood from Arm, Right Updated: 01/23/23 1351     LACTIC ACID 10 0 mmol/L     Narrative:      Result may be elevated if tourniquet was used during collection      HS Troponin I 2hr [[id number]]  (Abnormal) Collected: 01/23/23 1302    Lab Status: Final result Specimen: Blood from Arm, Right Updated: 01/23/23 1347     hs TnI 2hr 100 ng/L      Delta 2hr hsTnI -3 ng/L     Fingerstick Glucose (POCT) [[id number]]  (Abnormal) Collected: 01/23/23 1327    Lab Status: Final result Updated: 01/23/23 1328     POC Glucose 433 mg/dl     Basic metabolic panel [[id number]]  (Abnormal) Collected: 01/23/23 1137    Lab Status: Final result Specimen: Blood from Arm, Right Updated: 01/23/23 1314     Sodium 160 mmol/L      Potassium 7 3 mmol/L      Chloride 124 mmol/L      CO2 6 mmol/L      ANION GAP 30 mmol/L       mg/dL      Creatinine 8 03 mg/dL      Glucose 583 mg/dL      Calcium 8 3 mg/dL      eGFR 5 ml/min/1 73sq m     Narrative:      [place] guidelines for Chronic Kidney Disease (CKD):   ·  Stage 1 with normal or high GFR (GFR > 90 mL/min/1 73 square meters)  ·  Stage 2 Mild CKD (GFR = 60-89 mL/min/1 73 square meters)  ·  Stage 3A Moderate CKD (GFR = 45-59 mL/min/1 73 square meters)  ·  Stage 3B Moderate CKD (GFR = 30-44 mL/min/1 73 square meters)  ·  Stage 4 Severe CKD (GFR = 15-29 mL/min/1 73 square meters)  ·  Stage 5 End Stage CKD (GFR <15 mL/min/1 73 square meters)  Note: GFR calculation is accurate only with a steady state creatinine    B-Type Natriuretic Peptide(BNP) [[id number]]  (Abnormal) Collected: 01/23/23 1055    Lab Status: Final result Specimen: Blood Updated: 01/23/23 1253      pg/mL     Fingerstick Glucose (POCT) [[id number]]  (Abnormal) Collected: 01/23/23 1227    Lab Status: Final result Updated: 01/23/23 1233     POC Glucose >500 mg/dl     Ethanol [[id number]]  (Normal) Collected: 01/23/23 1137    Lab Status: Final result Specimen: Blood from Arm, Right Updated: 01/23/23 1217     Ethanol Lvl <46 mg/dL     Salicylate level [[id number]]  (Normal) Collected: 01/23/23 1137    Lab Status: Final result Specimen: Blood from Arm, Right Updated: 54/53/45 7900     Salicylate Lvl <5 mg/dL     Acetaminophen level-If concentration is detectable, please discuss with medical  on call  [[id number]]  (Normal) Collected: 01/23/23 1137    Lab Status: Final result Specimen: Blood from Arm, Right Updated: 01/23/23 1211     Acetaminophen Level 14 ug/mL     FLU/RSV/COVID - if FLU/RSV clinically relevant [[id number]]  (Normal) Collected: 01/23/23 1122    Lab Status: Final result Specimen: Nares from Nose Updated: 01/23/23 1208     SARS-CoV-2 Negative     INFLUENZA A PCR Negative     INFLUENZA B PCR Negative     RSV PCR Negative    Narrative:      FOR PEDIATRIC PATIENTS - copy/paste COVID Guidelines URL to browser: [email or web address]    SARS-CoV-2 assay is a Nucleic Acid Amplification assay intended for the  qualitative detection of nucleic acid from SARS-CoV-2 in nasopharyngeal  swabs  Results are for the presumptive identification of SARS-CoV-2 RNA      Positive results are indicative of infection with SARS-CoV-2, the virus  causing COVID-19, but do not rule out bacterial infection or co-infection  with other viruses  Laboratories within the [place] and its  territories are required to report all positive results to the appropriate  public health authorities  Negative results do not preclude SARS-CoV-2  infection and should not be used as the sole basis for treatment or other  patient management decisions  Negative results must be combined with  clinical observations, patient history, and epidemiological information  This test has not been FDA cleared or approved  This test has been authorized by FDA under an Emergency Use Authorization  (EUA)  This test is only authorized for the duration of time the  declaration that circumstances exist justifying the authorization of the  emergency use of an in vitro diagnostic tests for detection of SARS-CoV-2  virus and/or diagnosis of COVID-19 infection under section 564(b)(1) of  the Act, 21 U  [place]  276BPM-2(I)(7), unless the authorization is terminated  or revoked sooner  The test has been validated but independent review by FDA  and CLIA is pending  Test performed using [employer] Gene[employer]pert: This RT-PCR assay targets N2,  a region unique to SARS-CoV-2  A conserved region in the E-gene was chosen  for pan-Sarbecovirus detection which includes SARS-CoV-2  According to CMS-2020-01-R, this platform meets the definition of high-throughput technology  TSH, 3rd generation with Free T4 reflex [[id number]]  (Abnormal) Collected: 01/23/23 1055    Lab Status: Final result Specimen: Blood from Arm, Right Updated: 01/23/23 1208     TSH 3RD GENERATON 6 561 uIU/mL     Narrative:      Patients undergoing fluorescein dye angiography may retain small amounts of fluorescein in the body for 48-72 hours post procedure  Samples containing fluorescein can produce falsely depressed TSH values  If the patient had this procedure,a specimen should be resubmitted post fluorescein clearance        Fingerstick Glucose (POCT) [[id number]]  (Abnormal) Collected: 01/23/23 1145    Lab Status: Final result Updated: 01/23/23 1157     POC Glucose >500 mg/dl     Beta Hydroxybutyrate [[id number]]  (Abnormal) Collected: 01/23/23 1137    Lab Status: Final result Specimen: Blood from Arm, Right Updated: 01/23/23 1156     BETA-HYDROXYBUTYRATE 4 9 mmol/L     Blood gas, venous [[id number]]  (Abnormal) Collected: 01/23/23 1137    Lab Status: Final result Specimen: Blood from Arm, Right Updated: 01/23/23 1154     pH, Guy 7 086     pCO2, Guy 17 7 mm Hg      pO2, Guy 113 5 mm Hg      HCO3, Guy 5 2 mmol/L      Base Excess, Guy -22 8 mmol/L      O2 Content, Guy 15 5 ml/dL      O2 HGB, VENOUS 94 9 %     Lactic acid, plasma [[id number]]  (Abnormal) Collected: 01/23/23 1055    Lab Status: Final result Specimen: Blood from Arm, Right Updated: 01/23/23 1147     LACTIC ACID 10 0 mmol/L     Narrative:      Result may be elevated if tourniquet was used during collection      Comprehensive metabolic panel [[id number]]  (Abnormal) Collected: 01/23/23 1055    Lab Status: Final result Specimen: Blood from Arm, Right Updated: 01/23/23 1140     Sodium 159 mmol/L      Potassium 8 0 mmol/L      Chloride 117 mmol/L      CO2 8 mmol/L      ANION GAP 34 mmol/L       mg/dL      Creatinine 8 27 mg/dL      Glucose 652 mg/dL      Calcium 9 5 mg/dL      Corrected Calcium 10 1 mg/dL      AST 26 U/L      ALT 24 U/L      Alkaline Phosphatase 105 U/L      Total Protein 7 2 g/dL      Albumin 3 2 g/dL      Total Bilirubin 0 64 mg/dL      eGFR 5 ml/min/1 73sq m     Narrative:      National Kidney Disease Foundation guidelines for Chronic Kidney Disease (CKD):   •  Stage 1 with normal or high GFR (GFR > 90 mL/min/1 73 square meters)  •  Stage 2 Mild CKD (GFR = 60-89 mL/min/1 73 square meters)  •  Stage 3A Moderate CKD (GFR = 45-59 mL/min/1 73 square meters)  •  Stage 3B Moderate CKD (GFR = 30-44 mL/min/1 73 square meters)  •  Stage 4 Severe CKD (GFR = 15-29 mL/min/1 73 square meters)  •  Stage 5 End Stage CKD (GFR <15 mL/min/1 73 square meters)  Note: GFR calculation is accurate only with a steady state creatinine    HS Troponin 0hr (reflex protocol) [[id number]]  (Abnormal) Collected: 01/23/23 1055    Lab Status: Final result Specimen: Blood from Arm, Right Updated: 01/23/23 1135     hs TnI 0hr 103 ng/L     Protime-INR [[id number]]  (Abnormal) Collected: 01/23/23 1055    Lab Status: Final result Specimen: Blood from Arm, Right Updated: 01/23/23 1133     Protime 18 7 seconds      INR 1 57    APTT [[id number]]  (Normal) Collected: 01/23/23 1055    Lab Status: Final result Specimen: Blood from Arm, Right Updated: 01/23/23 1133     PTT 31 seconds     Magnesium [[id number]]  (Abnormal) Collected: 01/23/23 1055    Lab Status: Final result Specimen: Blood from Arm, Right Updated: 01/23/23 1127     Magnesium 3 3 mg/dL     CBC and differential [[id number]]  (Abnormal) Collected: 01/23/23 1055    Lab Status: Final result Specimen: Blood from Arm, Right Updated: 01/23/23 1112     WBC 9 04 Thousand/uL      RBC 4 22 Million/uL      Hemoglobin 12 4 g/dL      Hematocrit 43 1 %       fL      MCH 29 4 pg      MCHC 28 8 g/dL      RDW 17 6 %      MPV 12 2 fL      Platelets 925 Thousands/uL      nRBC 0 /100 WBCs      Neutrophils Relative 93 %      Immat GRANS % 1 %      Lymphocytes Relative 3 %      Monocytes Relative 3 %      Eosinophils Relative 0 %      Basophils Relative 0 %      Neutrophils Absolute 8 46 Thousands/µL      Immature Grans Absolute 0 06 Thousand/uL      Lymphocytes Absolute 0 25 Thousands/µL      Monocytes Absolute 0 26 Thousand/µL      Eosinophils Absolute 0 00 Thousand/µL      Basophils Absolute 0 01 Thousands/µL     Narrative: This is an appended report  These results have been appended to a previously verified report      UA w Reflex to Microscopic w Reflex to Culture [[id number]]     Lab Status: No result Specimen: Urine, Indwelling Pizano Catheter     Fingerstick Glucose (POCT) [[id number]]  (Abnormal) Collected: 01/23/23 1045    Lab Status: Final result Updated: 01/23/23 1051     POC Glucose >500 mg/dl                  CT abdomen pelvis wo contrast   Final Result by Jess Loo MD (01/23 1703)      Patchy infiltrate in the right lower lobe and tree-in-bud nodularity in the right middle lobe likely representing infection/pneumonia  Large amount stool in the rectum likely representing fecal impaction  Correlate clinically  Mild bladder wall thickening  Correlate for cystitis  The study was marked in [facility] for immediate notification  Workstation performed: [id number]         CT head without contrast   Final Result by Nathaly Novak MD (01/23 1326)      No acute intracranial abnormality  Workstation performed: [id number]         XR chest 1 view portable   Final Result by Rand Skelton MD (01/23 1413)      No acute cardiopulmonary disease  Workstation performed: [id number]         XR chest portable    (Results Pending)         Procedures  US Guided Peripheral IV    Date/Time: 1/23/2023 11:20 AM  Performed by: Peter Sarah DO  Authorized by: Peter Sarah DO     Patient location:  ED  Performed by:  Resident  Indications:     Indications: difficulty obtaining IV access    Procedure details:     Location:  Left forearm    Catheter size:  20 gauge    Number of attempts:  1    Successful placement: yes    Post-procedure details:     Post-procedure:  Dressing applied    Assessment: free fluid flow      Patient tolerance of procedure:   Tolerated well, no immediate complications  ECG 12 Lead Documentation Only    Date/Time: 1/23/2023 10:50 AM  Performed by: Peter Sarah DO  Authorized by: Peter Sarah DO     Indications / Diagnosis:  AMS  ECG reviewed by me, the ED Provider: yes    Patient location:  ED  Previous ECG:     Previous ECG:  Compared to current    Similarity:  Changes noted  Interpretation:     Interpretation: abnormal    Rate:     ECG rate:  112    ECG rate assessment: tachycardic    Rhythm:     Rhythm: sinus tachycardia    Ectopy:     Ectopy: none    QRS:     QRS axis:  Normal  Conduction:     Conduction: normal    ST segments:     ST segments:  Normal  T waves:     T waves: normal            ED Course                          Initial Sepsis Screening     Row Name 01/23/23 1110                Is the patient's history suggestive of a new or worsening infection? --        Suspected source of infection suspect infection, source unknown  -GJ        Are two or more of the following signs & symptoms of infection both present and new to the patient? Yes (Proceed)  -GJ        Indicate SIRS criteria Tachycardia > 90 bpm;Tachypnea > 20 resp per min  -GJ        If the answer is yes to both questions, suspicion of sepsis is present --        If severe sepsis is present AND tissue hypoperfusion perists in the hour after fluid resuscitation or lactate > 4, the patient meets criteria for SEPTIC SHOCK --        Are any of the following organ dysfunction criteria present within 6 hours of suspected infection and SIRS criteria that are NOT considered to be chronic conditions? Yes  -GJ        Organ dysfunction Lactate >/equal 4 0 mmol/L (MEETS CRITERIA FOR SEPTIC SHOCK)  -GJ        Date of presentation of severe sepsis 01/23/23  -GJ        Time of presentation of severe sepsis 1055  -GJ        Tissue hypoperfusion persists in the hour after crystalloid fluid administration, evidenced, by either: --        Was hypotension present within one hour of the conclusion of crystalloid fluid administration?  Yes  -GJ        Date of presentation of septic shock 01/23/23  -GJ        Time of presentation of septic shock 1155  [facility]  (r) = Recorded By, (t) = Taken By, (c) = Cosigned By    [address] Name Provider Type    [id number] [facility] Resident              Default Flowsheet Data (last 720 hours)     Sepsis Reassess     Row Name 01/23/23 2713 Repeat Volume Status and Tissue Perfusion Assessment Performed    Repeat Volume Status and Tissue Perfusion Assessment Performed Yes  -GJ           Volume Status and Tissue Perfusion Post Fluid Resuscitation * Must Document All *    Vital Signs Reviewed (HR, RR, BP, T) Yes  -GJ        Shock Index Reviewed Yes  -GJ        Arterial Oxygen Saturation Reviewed (POx, SaO2 or SpO2) Yes (comment %)  -GJ        Cardio Normal S1/S2; Regular rate and rhythm; No murmor; No rub or gallop  -GJ        Pulmonary Normal effort  -GJ        Capillary Refill Brisk  -GJ        Peripheral Pulses Radial  -GJ        Peripheral Pulse +2  -GJ        Skin Warm;Dry  -GJ        Urine output assessed Adequate  -GJ           *OR*   Intensive Monitoring- Must Document One of the Following Four *:    Vital Signs Reviewed Yes  -GJ        * Central Venous Pressure (CVP or RAP) --        * Central Venous Oxygen (SVO2, ScvO2 or Oxygen saturation via central catheter) --        * Bedside Cardiovascular US in IVC diameter and % collapse --        * Passive Leg Raise OR Crystalloid Challenge --              User Key  (r) = Recorded By, (t) = Taken By, (c) = Cosigned By    Initials Name Provider Type    [address] [place] [address] Resident                        Medical Decision Making  Patient is a 44-year-old male presenting with altered mental status, acutely ill with hypotension and tachypnea  Based on my initial evaluation, patient appears profoundly dehydrated  He is hypotensive on presentation  2 large-bore IVs were placed in addition to the intraosseous access already obtained via EMS   2 L normal saline were initiated with pressure bags secondary to hypotension with appropriate response and improved mentation  Given his history of diabetes and AMS, immediate fingerstick glucose was performed with a value greater than 500    Patient differential at this point includes dehydration with hypotension, DKA, HHS, less suspicious for sepsis, but patient is having tachycardia and tachypnea, source is undifferentiated at this point but includes urinary, pulmonary, sacral wound  Plan: Sepsis labs, ongoing fluid resuscitation, ECG, chest x-ray, CT head  Labs with multiple significant derangements  Of note, patient with a significant hyperkalemia  Hyperkalemia management was immediately initiated upon receiving this lab, including insulin which was given without dextrose given patient's hyperglycemia, albuterol, calcium gluconate  Labs also remarkable for a significant lactic acidosis for which fluid resuscitation is ongoing  Patient with a elevated anion gap and septicemia, most concerning for DKA  DKA insulin initiated  Hypernatremia likely in the setting of dehydration  Patient is meeting septic shock criteria, however continue to suspect that the presentation is more consistent with DKA and profound dehydration causing hypotension  However, as patient meets this criteria and infection is on the differential, will empirically initiate cefepime and vancomycin  Already giving appropriate fluid resuscitation  Discussed case with critical care who is agreeable to admit patient for the above findings  As patient is persistently hypotensive and is hypernatremic status post 2 L normal saline, discussed with critical care who recommends Isolyte bolus  This was initiated  CT head pending at time of admit  Discussed with patient wife who seems to understand and is agreeable  All questions answered  Patient admitted  Acute encephalopathy: self-limited or minor problem  ANGELICA (acute kidney injury) ([facility] [address] ): self-limited or minor problem  Hyperkalemia: self-limited or minor problem  Hypernatremia: self-limited or minor problem  Amount and/or Complexity of Data Reviewed  Labs: ordered  Radiology: ordered  Risk  OTC drugs  Prescription drug management  Decision regarding hospitalization              Disposition  Final diagnoses:   Hyperkalemia Hypernatremia   Acute encephalopathy   ANGELICA (acute kidney injury) ([facility] [address] )     Time reflects when diagnosis was documented in both MDM as applicable and the Disposition within this note     Time User Action Codes Description Comment    1/23/2023 12:05 PM Martha Hill Add [E87 5] Hyperkalemia     1/23/2023 12:05 PM Martha Hill Add [E87 0] Hypernatremia     1/23/2023 12:05 PM Martha Arreola Add [G93 40] Acute encephalopathy     1/23/2023 12:06 PM Martha Hill Add [N17 9] ANGELICA (acute kidney injury) ([facility] [address] )     1/23/2023  2:37 PM Orvan Jovan Add [T39 1X1A] Tylenol toxicity     1/23/2023  5:06 PM Orvan Jovan Add [Z13 39] Metabolic acidosis     2/08/7454  7:25 PM Myra Lama Add [R57 9] [facility])       ED Disposition     ED Disposition   Admit    Condition   Stable    Date/Time   Mon Jan 23, 2023 12:06 PM    Comment   Case was discussed with critical care and the patient's admission status was agreed to be Admission Status: inpatient status to the service of Dr Lucia              Follow-up Information    None         Current Discharge Medication List      CONTINUE these medications which have NOT CHANGED    Details   Blood Glucose Monitoring Suppl (OneTouch Verio) w/Device KIT Use 2 (two) times a day  Qty: 1 kit, Refills: 0    Associated Diagnoses: Type 2 diabetes mellitus without complication, without long-term current use of insulin ([facility])      calcium carbonate (OS-CARMENZA) 600 MG tablet Take 1 tablet (600 mg total) by mouth 2 (two) times a day with meals    Associated Diagnoses: Type 2 diabetes mellitus without complication, without long-term current use of insulin (HCC)      diphenhydrAMINE (BENADRYL) 2 % cream Apply topically 3 (three) times a day as needed for itching  Qty: 30 g, Refills: 0    Associated Diagnoses: Dry skin dermatitis      glucose blood (OneTouch Verio) test strip Test two times daily DX: E11 9  Qty: 100 each, Refills: 5    Associated Diagnoses: Type 2 diabetes mellitus without complication, without long-term current use of insulin (HCC)      Janumet  MG per tablet Take 1 tablet by mouth 2 (two) times a day with meals  Qty: 180 tablet, Refills: 1    Associated Diagnoses: Type 2 diabetes mellitus without complication, without long-term current use of insulin (HCC)      lactulose (CHRONULAC) 10 g/15 mL solution Take 15 mL (10 g total) by mouth 2 (two) times a day as needed (constipation) Take one in morning and if no bowel movement take one more at lunch  Qty: 240 mL, Refills: 0    Associated Diagnoses: Hematochezia      Lancets Micro Thin 33G MISC Use 2 (two) times a day Test two times daily DX:E11 9  Qty: 100 each, Refills: 3    Associated Diagnoses: Type 2 diabetes mellitus without complication, without long-term current use of insulin ([facility])      levothyroxine (Synthroid) 75 mcg tablet Take 1 tablet (75 mcg total) by mouth daily  Qty: 60 tablet, Refills: 0    Associated Diagnoses: Primary hypothyroidism      melatonin 3 mg Take 1 tablet (3 mg total) by mouth daily at bedtime  Qty: 90 tablet, Refills: 0    Associated Diagnoses: Primary insomnia      Nutritional Supplements (Glucerna Shake) LIQD Take 1 Bottle by mouth 2 (two) times a day  Qty: 237 mL, Refills: 20    Associated Diagnoses: Appetite loss      rosuvastatin (CRESTOR) 40 MG tablet Take 1 tablet (40 mg total) by mouth daily  Qty: 30 tablet, Refills: 11    Associated Diagnoses: Mixed hyperlipidemia           No discharge procedures on file  PDMP Review     None           ED Provider  Attending physically available and evaluated Sonny Muñoz I managed the patient along with the ED Attending      Electronically Signed by         Jeannette Page DO  01/23/23 [address]  01/23/23 2043

## 2023-01-23 NOTE — ED NOTES
Unable to get urine sample, bladder scan without significant fluid     Vesta Clause, RN  01/23/23 3555

## 2023-01-23 NOTE — ASSESSMENT & PLAN NOTE
· His creatinine was markedly elevated on admission ( baseline 0 92)  · We will trend his renal indices and urine output closely

## 2023-01-23 NOTE — CONSULTS
[facility] JENNA Castillo 69 y o  male MRN: [id number]  Unit/Bed#: ICU 14 Encounter: [id number]    ASSESSMENT and PLAN:  1  Anion gap metabolic acidosis:  · Admission AG of 34 and CO2 of 8 in the setting of hyperglycemia (Gluc 652)  · Initial consideration for DKA vs lactic acidosis  Lactic acid was 10 0 on presentation  · Initially treated with IVF and insulin drip  However, no improvement in CO2 and AG has remained 28 - 29 the past 2 readings  · Lactic acid is worse at 11 6  · In light of severe metabolic acidosis now felt to be due to lactic acidosis (possibly metformin related), will plan for emergent dialysis  · Given shock and vasopressor requirement, will plan for CVVHD  2  Hyperkalemia:  · Potassium 8 0 on admission  · K down to 5 8  · Should continue with improve with CVVHD  3  Acute kidney injury (POA)  · Creatinine 8 27 on admission on 1/23/23  · Suspecting prerenal azotemia from volume depletion  However, cannot rule out ATN in the setting of shock and severe acidosis  · Renal function is improving  SCr down to 6 78      4  Shock:  · Continue vasopressors   · Defer to crit care  5  Hypernatremia  · Na of 159 on admission  Corrected Na of 168  · Due to poor oral fluid intake and osmotic diuresis  · Monitor Na with CVVHD  6  DM with severe hyperglycemia  · On insulin drip per crit care  7  Encephalopathy  8  Dementia  9  Sacral decubitus ulcer    SUMMARY OF RECOMMENDATIONS:  · Place HD catheter  · We will start CVVHD once HD catheter is in place  · Hemodialysis consent was obtained from the wife at bedside  · Plan to run even on CVVHD since he is already 5L positive since admission  The above plan was discussed with the critical care fellow - we agreed that urgent dialysis tonight would be the most appropriate intervention to treat his acidosis  Previous records were personally reviewed by me to obtain a baseline creatinine     The images (CT) were personally reviewed by me in PACS    HISTORY OF PRESENT ILLNESS:  Requesting Physician: Raad Horton MD  Reason for Consult: ANGELICA, acidosis, hyperkalemia    Sonny Muñoz is a 69 y o  male who was admitted to [facility] on 1/23/23 after presenting with a change in mental status  A renal consultation is requested today on an urgent basis for assistance in the management of metabolic acidosis, hyperkalemia, and acute kidney injury  Mr Pipe White has a history of diabetes mellitus, hyperlipidemia, Alzheimer's dementia, hypothyroidism  The information contained in this consult was obtained upon review of the medical record as well as discussion with the ICU staff as the patient is unable to give any type of meaningful history  From what I gather, the patient was brought in to the hospital due to a change in mental status  The patient apparently had a similar presentation in the end of December 2022 when he was admitted to [facility] with ANGELICA, hypernatremia and hyperglycemia (SCr 2 42, Na 158, Gluc 621)  The patient was treated with hypotonic IVF with improvement in SCr down to 0 69 with Na of 142  Repeat labs on 1/12/23 showed SCr of 0 99 and Na of 149  He now presents with change in mental status and was found to have severe ANGELICA, hyperkalemia, acidosis, hypernatremia, and hyperglycemia  On presentation, the patient's creatinine was noted to be 8 27 with a CO2 of 8 and a potassium of 8 0  His glucose was 652 and anion gap was 34  Lactic acid was 10 0  The patient was given intravenous fluids and was placed on an insulin drip  Since being admitted a few hours ago, there has been improvement in his hyperglycemia  His anion gap initially improved to 28 but his CO2 remained at 6 to 7  Despite getting 4 liters of IVF, a repeat lactic acid was 11 6  Repeat anion gap was up to 29   Due to no improvement in his acidosis and anion gap despite IVF and insulin drip, concern for metformin related lactic acidosis was raised  In light of this, an urgent renal consult was requested  No reported fever, chest pain, SOB, leg edema       PAST MEDICAL HISTORY:  Past Medical History:   Diagnosis Date   • Alzheimer disease type 3 ([place] [address] )    • Cervical pain 4/16/2018   • Diabetes mellitus ([place] [address] )    • Diabetic peripheral neuropathy associated with type 2 diabetes mellitus ([place] [address] ) 3/18/2021   • Disease of thyroid gland    • Elevated PSA    • Hyperlipidemia    • Medicare annual wellness visit, subsequent 7/30/2020   • Prostate cancer ([place] [address] )    • Type 2 diabetes mellitus with diabetic peripheral angiopathy without gangrene ([place] [address] ) 5/17/2021    According to ICD10 guidelines, patient accept     PAST SURGICAL HISTORY:  Past Surgical History:   Procedure Laterality Date   • NECK SURGERY     • PROSTATE BIOPSY  07/18/2018     ALLERGIES:  Allergies   Allergen Reactions   • Other      seasonal       SOCIAL HISTORY:  Social History     Substance and Sexual Activity   Alcohol Use Never     Social History     Substance and Sexual Activity   Drug Use Never     Social History     Tobacco Use   Smoking Status Never   Smokeless Tobacco Never     FAMILY HISTORY:  Family History   Problem Relation Age of Onset   • No Known Problems Mother    • No Known Problems Father    • Dementia Sister    • Autism Daughter      MEDICATIONS:    Current Facility-Administered Medications:   •  [START ON 1/24/2023] cefepime (MAXIPIME) 1,000 mg in dextrose 5 % 50 mL IVPB, 1,000 mg, Intravenous, Q24H, Vale Suzi, CRNP  •  dextrose 5 % and sodium chloride 0 45 % infusion, 250 mL/hr, Intravenous, Continuous, [place] Suzi, CRNP, Last Rate: 250 mL/hr at 01/23/23 1834, 250 mL/hr at 01/23/23 1834  •  insulin regular (HumuLIN R,NovoLIN R) 1 Units/mL in sodium chloride 0 9 % 100 mL infusion, 0 1-30 Units/hr, Intravenous, Continuous, [place] Suzi, CRNP, Last Rate: 7 2 mL/hr at 01/23/23 1559, 7 2 Units/hr at 01/23/23 1559  •  levothyroxine tablet 75 mcg, 75 mcg, Oral, Daily Before Breakfast, AKSHTA Elizabeth  •  NOREPINEPHRINE 4 MG  ML NSS (CMPD ORDER) infusion, 1-30 mcg/min, Intravenous, Titrated, AlveADELINE HidalgoNP, Last Rate: 22 5 mL/hr at 01/23/23 1541, 6 mcg/min at 01/23/23 1541  •  NxStage K 2/Ca 3 dialysis solution (RFP-400) 20,000 mL, 20,000 mL, Dialysis, Continuous, Kurt Nelson MD  •  sodium chloride infusion 0 45 %, 250 mL/hr, Intravenous, Continuous, ADELINE ElizabethNP, Stopped at 01/23/23 1834  •  vancomycin (VANCOCIN) IVPB (premix in dextrose) 500 mg 100 mL, 500 mg, Intravenous, Daily PRN, ADELINE ElizabethNP    REVIEW OF SYSTEMS:  Full ROS could not be obtained due to mental status  Please refer to HPI for details  PHYSICAL EXAM:  Current Weight: Weight - Scale: 35 8 kg (79 lb)  First Weight: Weight - Scale: 36 kg (79 lb 5 9 oz)  Vitals:    01/23/23 1430 01/23/23 1500 01/23/23 1515 01/23/23 1535   BP: (!) 95/42 (!) 75/44 (!) 67/43 (!) 81/50   BP Location:       Pulse: 82 80 80 78   Resp: 18 19 18 18   Temp:    97 6 °F (36 4 °C)   TempSrc:    Axillary   SpO2: 100% 100% 100% 100%   Weight:    35 8 kg (79 lb)   Height:    4' 11" (1 499 m)       Intake/Output Summary (Last 24 hours) at 1/23/2023 1835  Last data filed at 1/23/2023 1830  Gross per 24 hour   Intake 4105 16 ml   Output --   Net 4105 16 ml     Physical Exam  General: critically ill appearing, lethargic  Skin: warm, dry, poor turgor  Eyes: pink conj  ENT: dry mucous membranes  Neck: supple  Chest/Lungs: equal chest expansion, coarse breath sounds  CVS: distinct heart sounds, normal rate, regular rhythm  Abdomen: soft, non-distended, normoactive bowel sounds  Extremities: no edema  : (+) ferreira catheter  Neuro: lethargic  Psych: could not evaluate       Lab Results:   Results from last 7 days   Lab Units 01/23/23  1609 01/23/23  1419 01/23/23  1137 01/23/23  1055   WBC Thousand/uL  --   --   --  9 04   HEMOGLOBIN g/dL  --   --   --  12 4   HEMATOCRIT %  --   --   --  43 1 PLATELETS Thousands/uL  --   --   --  223   POTASSIUM mmol/L 5 8* 6 6* 7 3* 8 0*   CHLORIDE mmol/L 122* 125* 124* 117*   CO2 mmol/L 7* 6* 6* 8*   BUN mg/dL 155* 168* 182* 200*   CREATININE mg/dL 6 78* 7 53* 8 03* 8 27*   CALCIUM mg/dL 7 7* 8 2* 8 3* 9 5   MAGNESIUM mg/dL 2 9* 2 9*  --  3 3*   PHOSPHORUS mg/dL 7 0* 8 5*  --   --    ALK PHOS U/L  --   --   --  105*   ALT U/L  --   --   --  24   AST U/L  --   --   --  26     Other Studies:   CT A/P personally reviewed by me in PACs showed no hydronephrosis

## 2023-01-23 NOTE — ASSESSMENT & PLAN NOTE
Lab Results   Component Value Date    HGBA1C 9 5 (H) 12/31/2022       Recent Labs     01/23/23  1045 01/23/23  1145 01/23/23  1227 01/23/23  1327   POCGLU >500* >500* >500* 433*       Blood Sugar Average: Last 72 hrs:  (P) 433   · We will treat his DKA per the protocol  · We will hold his outpatient dillan

## 2023-01-23 NOTE — ASSESSMENT & PLAN NOTE
· Likely toxic metabolic in the setting of his electrolyte abnormalities on his chronic dementia  · We will monitor his neuro status closely

## 2023-01-23 NOTE — ASSESSMENT & PLAN NOTE
· The patient presented to the ED with altered mental status  His labs revealed a significant metabolic acidosis, likely multifactorial related to DKA, severe volume depletion, elevated lactic acid level, ANGELICA  · We will continue with aggressive fluid resuscitation for now  · We will treat the patient per the DKA protocol   · We will trend his lactic acid level- it was elevated beyond what would be expected from volume depletion and DKA- CTAP was ordered  · He was on janumet as an outpatient, could this be metformin toxicity?   · We will check an ECHO to r/o a cardiac component to his shock and elevated lactic acid level  · We will trend his [place] [address]- he may require a bicarbonate infusion

## 2023-01-23 NOTE — ED ATTENDING ATTESTATION
1/23/2023  Martin Serrato MD, saw and evaluated the patient  I have discussed the patient with the resident/non-physician practitioner and agree with the resident's/non-physician practitioner's findings, Plan of Care, and MDM as documented in the resident's/non-physician practitioner's note, except where noted  All available labs and Radiology studies were reviewed  I was present for key portions of any procedure(s) performed by the resident/non-physician practitioner and I was immediately available to provide assistance  At this point I agree with the current assessment done in the Emergency Department  I have conducted an independent evaluation of this patient a history and physical is as follows:    ED Course         Critical Care Time  120 min  Procedures    68 y/o diabetic male brought in from home after being found less responsive this am   He was acting okay yest   Pt  Has a hx of dementia and is a poor historian  He is not answering questions, eyes open, withdraws from pain, cachectic, tachycardic, increased work of breathing, sbp in the 50's according to EMS, no fever here in the ER,  Blood sugar >500, no abd  Tenderness, no edema    After reviewing medical records - he had a similar presentation a few weeks ago      Pt  Found to be in DKA, hypernatremia, and acute renal failure, will cover for sepsis given 2 SIRS criteria and signs of organ failure (creat  And lactic acid elevated)    See sacral wound on images - may be wound infection - will cover with antibiotics

## 2023-01-23 NOTE — ED NOTES
Pt unable to verify name and birthday   Pt unable to verbally respond to questions; therefore, unable to respond to triage questions     Carolee Dumas, RN  01/23/23 [address], RN  01/23/23 5730

## 2023-01-23 NOTE — PROGRESS NOTES
Anyi Scott is a 69 y o  male who is currently ordered Vancomycin IV with management by the Pharmacy Consult service  Relevant clinical data and objective / subjective history reviewed  Vancomycin Assessment:  1  Indication and Goal AUC/Trough: Urinary tract infection (goal -600, trough >10)  2  Clinical Status: stable  3  Micro:     4  Renal Function:  · SCr: 6 78 mg/dL  · CrCl: 4 7 mL/min  · Renal replacement: CVVHD  5  Days of Therapy: 1  6  Current Dose: 1000mg IV loading dose  Vancomycin Plan:  1  New Dosinmg IV Q24H  2  Next Level:  with AM labs  3  Renal Function Monitoring: Daily BMP and [place] will continue to follow closely for s/sx of nephrotoxicity, infusion reactions and appropriateness of therapy  BMP and CBC will be ordered per protocol  We will continue to follow the patient’s culture results and clinical progress daily      Cori Kirby, PharmD

## 2023-01-23 NOTE — ASSESSMENT & PLAN NOTE
· A stage 4 ulcer is noted on the sacrum    · Local wound care for now  · CTAP to further eval for bony infection/underlying abscess  · Pressure offload

## 2023-01-23 NOTE — CONSULTS
PHONE [id number] Toxicology  Faust Host 69 y o  male MRN: [id number]  Unit/Bed#: ICU 14 Encounter: [id number]      Reason for Consult / Principal Problem: Metabolic acidosis    Inpatient consult to Toxicology  Consult performed by: Kentrell Benjamin MD  Consult ordered by: AKSHAT Miller        01/23/23      ASSESSMENT:  1) Metabolic acidosis with elevated anion gap and hyperlactatemia  2) Acute renal failure  3) Hypernatremia  4) Hyperkalemia  5) Hyperglycemia  6) Hypotension  7) Detectable acetaminophen concentration  8) Multisystem organ dysfunction    DISCUSSION/ RECOMMENDATIONS:  The patient's presenting history is unclear, however based on chart review including medication review I would have substantial concern for metformin associated lactic acidosis (ANDERSON), and DKA would also be a consideration  I have very low suspicion for toxic alcohols given overall clinical picture and chart review that did not demonstrate any clear history of alcohol use  Repeat labs are being drawn now, and if they are not clearly improving then I would recommend emergent hemodialysis given the concern for ANDERSON (in addition to the renal failure and electrolyte derangements)  This would also be successful at removing any toxic alcohols, although again I have very low suspicion for this  Please hold metformin  Patient did present with detectable acetaminophen concentration (14 ug/mL) which could be due to therapeutic use, but history is not clear  Presenting transaminases are normal   Would not recommend NAC is needed at this time, however would recommend repeating acetaminophen level and transaminases now, and if either is going up then we might consider NAC  Continued management of multiple medical issues per primary team   We will continue to follow along, please call with any further questions or concerns        For further questions, please call [facility]  Service or Patient Access Center to reach the medical  on call  Please see additional teaching note below (if available)    Hx and PE limited by the dynamics of a phone consultation  I have not personally interviewed or evaluated the patient, but only advised based on the information provided to me  Primary provider is responsible for all clinical decisions  Pertinent history, physical exam and clinical findings and course discussed: Kamila Blanco is a 69y o  year old male who presents with profound metabolic acidosis, acute renal failure, multiple electrolyte derangements, and hypotension  Presenting history is unclear  Has history of multiple medical problems  Review of systems and physical exam not performed by me  Historical Information   Past Medical History:   Diagnosis Date   • Alzheimer disease type 3 ([facility] )    • Cervical pain 4/16/2018   • Diabetes mellitus ([facility] )    • Diabetic peripheral neuropathy associated with type 2 diabetes mellitus ([place] [address] ) 3/18/2021   • Disease of thyroid gland    • Elevated PSA    • Hyperlipidemia    • Medicare annual wellness visit, subsequent 7/30/2020   • Prostate cancer ([place] [address] )    • Type 2 diabetes mellitus with diabetic peripheral angiopathy without gangrene ([place] [address] ) 5/17/2021    According to ICD10 guidelines, patient accept     Past Surgical History:   Procedure Laterality Date   • NECK SURGERY     • PROSTATE BIOPSY  07/18/2018     Social History   Social History     Substance and Sexual Activity   Alcohol Use Never     Social History     Substance and Sexual Activity   Drug Use Never     Social History     Tobacco Use   Smoking Status Never   Smokeless Tobacco Never     Family History   Problem Relation Age of Onset   • No Known Problems Mother    • No Known Problems Father    • Dementia Sister    • Autism Daughter         Prior to Admission medications    Medication Sig Start Date End Date Taking?  Authorizing Provider   Blood Glucose Monitoring Suppl (OneTouch Verio) w/Device KIT Use 2 (two) times a day 12/19/22   Natalie Russell MD   calcium carbonate (OS-CARMENZA) 600 MG tablet Take 1 tablet (600 mg total) by mouth 2 (two) times a day with meals 7/30/20   AKSHAT Lane   diphenhydrAMINE (BENADRYL) 2 % cream Apply topically 3 (three) times a day as needed for itching 10/6/22   Natalie Russell MD   glucose blood (OneTouch Verio) test strip Test two times daily DX: E11 9 12/19/22   Natalie Russell MD   Janumet  MG per tablet Take 1 tablet by mouth 2 (two) times a day with meals 1/9/23 4/9/23  Natalie Russell MD   lactulose (CHRONULAC) 10 g/15 mL solution Take 15 mL (10 g total) by mouth 2 (two) times a day as needed (constipation) Take one in morning and if no bowel movement take one more at lunch 1/9/23   Natalie Russlel MD   Lancets Micro Thin 33G MISC Use 2 (two) times a day Test two times daily DX:E11 9 12/19/22   Natalie Russell MD   levothyroxine (Synthroid) 75 mcg tablet Take 1 tablet (75 mcg total) by mouth daily 1/17/23   Natalie Russell MD   melatonin 3 mg Take 1 tablet (3 mg total) by mouth daily at bedtime 10/6/22   Natalie Russell MD   Nutritional Supplements (Glucerna Shake) LIQD Take 1 Bottle by mouth 2 (two) times a day 1/12/23   Natalie Russell MD   rosuvastatin (CRESTOR) 40 MG tablet Take 1 tablet (40 mg total) by mouth daily 8/25/22 8/25/23  Natalie Russell MD       Current Facility-Administered Medications   Medication Dose Route Frequency   • hydrocortisone (Solu-CORTEF) injection 100 mg  100 mg Intravenous Q8H [facility]   • insulin regular (HumuLIN R,NovoLIN R) 1 Units/mL in sodium chloride 0 9 % 100 mL infusion  0 1-30 Units/hr Intravenous Continuous   • levothyroxine tablet 75 mcg  75 mcg Oral Daily Before Breakfast   • multi-electrolyte (ISOLYTE-S PH 7 4) bolus 1,000 mL  1,000 mL Intravenous Once       Allergies   Allergen Reactions   • Other      seasonal       Objective       Intake/Output Summary (Last 24 hours) at 1/23/2023 1458  Last data filed at 1/23/2023 1356  Gross per 24 hour   Intake 3105 16 ml   Output --   Net 3105 16 ml       Invasive Devices:   Peripheral IV 01/23/23 Right Antecubital (Active)       Peripheral IV 01/23/23 Right;Ventral (anterior) Forearm (Active)   Site Assessment WDL; Clean;Dry; Intact 01/23/23 1211   Dressing Type Transparent 01/23/23 1211   Dressing Status Clean;Dry; Intact 01/23/23 1211       Peripheral IV 01/23/23 Left Antecubital (Active)       Vitals   Vitals:    01/23/23 1317 01/23/23 1327 01/23/23 1332 01/23/23 1400   BP: (!) 87/52 (!) 81/50 (!) 83/52 (!) 76/46   TempSrc:       Pulse: 88 96 92 88   Resp: 20 (!) 37 (!) 34 21   Patient Position - Orthostatic VS:       Temp:             EKG, Pathology, and/or Other Studies: I have personally reviewed pertinent reports  Lab Results: I have personally reviewed pertinent reports        Labs:  Results from last 7 days   Lab Units 01/23/23  1055   WBC Thousand/uL 9 04   HEMOGLOBIN g/dL 12 4   HEMATOCRIT % 43 1   PLATELETS Thousands/uL 223   NEUTROS PCT % 93*   LYMPHS PCT % 3*   MONOS PCT % 3*      Results from last 7 days   Lab Units 01/23/23  1137 01/23/23  1055   POTASSIUM mmol/L 7 3* 8 0*   CHLORIDE mmol/L 124* 117*   CO2 mmol/L 6* 8*   BUN mg/dL 182* 200*   CREATININE mg/dL 8 03* 8 27*   CALCIUM mg/dL 8 3* 9 5   ALK PHOS U/L  --  105*   ALT U/L  --  24   AST U/L  --  26   MAGNESIUM mg/dL  --  3 3*      Results from last 7 days   Lab Units 01/23/23  1055   INR  1 57*   PTT seconds 31     Results from last 7 days   Lab Units 01/23/23  1302 01/23/23  1055   LACTIC ACID mmol/L 10 0* 10 0*     No results found for: TROPONINI  Results from last 7 days   Lab Units 01/23/23  1422   PH ASHLEY  7 015*   PCO2 ASHLEY mm Hg 23 4*   PO2 ASHLEY mm Hg 41 7   HCO3 ASHLEY mmol/L 5 8*   O2 CONTENT ASHLEY ml/dL 9 0   O2 HGB, VENOUS % 56 6*     Results from last 7 days   Lab Units 01/23/23  1137   ACETAMINOPHEN LVL ug/mL 14   ETHANOL LVL mg/dL <41   SALICYLATE LVL mg/dL <5 Invalid input(s): EXTPREGUR    Imaging Studies: I have personally reviewed pertinent reports  Counseling / Coordination of Care  Total time spent today 31 minutes  This was a phone consultation

## 2023-01-24 PROBLEM — A41.9 SEPSIS (HCC): Status: ACTIVE | Noted: 2023-01-01

## 2023-01-24 PROBLEM — R78.81 BACTEREMIA: Status: ACTIVE | Noted: 2023-01-01

## 2023-01-24 PROBLEM — E87.0 HYPERNATREMIA: Status: RESOLVED | Noted: 2022-12-30 | Resolved: 2023-01-24

## 2023-01-24 PROBLEM — E83.51 HYPOCALCEMIA: Status: ACTIVE | Noted: 2023-01-01

## 2023-01-24 PROBLEM — N39.0 UTI (URINARY TRACT INFECTION): Status: ACTIVE | Noted: 2023-01-24

## 2023-01-24 PROBLEM — K59.00 CONSTIPATION: Status: ACTIVE | Noted: 2023-01-24

## 2023-01-24 PROBLEM — D61.818 PANCYTOPENIA (HCC): Status: ACTIVE | Noted: 2023-01-01

## 2023-01-24 LAB
ANION GAP SERPL CALCULATED.3IONS-SCNC: 15 MMOL/L (ref 4–13)
ANION GAP SERPL CALCULATED.3IONS-SCNC: 18 MMOL/L (ref 4–13)
ANION GAP SERPL CALCULATED.3IONS-SCNC: 22 MMOL/L (ref 4–13)
ANION GAP SERPL CALCULATED.3IONS-SCNC: 26 MMOL/L (ref 4–13)
ARTERIAL PATENCY WRIST A: NO
ATRIAL RATE: 80 BPM
BACTERIA UR CULT: ABNORMAL
BASE EXCESS BLDA CALC-SCNC: -19 MMOL/L
BODY TEMPERATURE: 92.3 DEGREES FEHRENHEIT
BUN SERPL-MCNC: 124 MG/DL (ref 5–25)
BUN SERPL-MCNC: 55 MG/DL (ref 5–25)
BUN SERPL-MCNC: 75 MG/DL (ref 5–25)
BUN SERPL-MCNC: 92 MG/DL (ref 5–25)
CA-I BLD-SCNC: 1.07 MMOL/L (ref 1.12–1.32)
CA-I BLD-SCNC: 1.08 MMOL/L (ref 1.12–1.32)
CA-I BLD-SCNC: 1.1 MMOL/L (ref 1.12–1.32)
CA-I BLD-SCNC: 1.13 MMOL/L (ref 1.12–1.32)
CALCIUM SERPL-MCNC: 7.5 MG/DL (ref 8.4–10.2)
CALCIUM SERPL-MCNC: 7.6 MG/DL (ref 8.4–10.2)
CALCIUM SERPL-MCNC: 8 MG/DL (ref 8.4–10.2)
CALCIUM SERPL-MCNC: 8.1 MG/DL (ref 8.4–10.2)
CHLORIDE SERPL-SCNC: 112 MMOL/L (ref 96–108)
CHLORIDE SERPL-SCNC: 114 MMOL/L (ref 96–108)
CHLORIDE SERPL-SCNC: 114 MMOL/L (ref 96–108)
CHLORIDE SERPL-SCNC: 118 MMOL/L (ref 96–108)
CO2 SERPL-SCNC: 10 MMOL/L (ref 21–32)
CO2 SERPL-SCNC: 13 MMOL/L (ref 21–32)
CO2 SERPL-SCNC: 7 MMOL/L (ref 21–32)
CO2 SERPL-SCNC: 9 MMOL/L (ref 21–32)
CORTIS SERPL-MCNC: 146.6 UG/DL
CREAT SERPL-MCNC: 1.99 MG/DL (ref 0.6–1.3)
CREAT SERPL-MCNC: 2.72 MG/DL (ref 0.6–1.3)
CREAT SERPL-MCNC: 3.38 MG/DL (ref 0.6–1.3)
CREAT SERPL-MCNC: 4.71 MG/DL (ref 0.6–1.3)
ERYTHROCYTE [DISTWIDTH] IN BLOOD BY AUTOMATED COUNT: 16.8 % (ref 11.6–15.1)
GFR SERPL CREATININE-BSD FRML MDRD: 11 ML/MIN/1.73SQ M
GFR SERPL CREATININE-BSD FRML MDRD: 16 ML/MIN/1.73SQ M
GFR SERPL CREATININE-BSD FRML MDRD: 21 ML/MIN/1.73SQ M
GFR SERPL CREATININE-BSD FRML MDRD: 31 ML/MIN/1.73SQ M
GLUCOSE SERPL-MCNC: 100 MG/DL (ref 65–140)
GLUCOSE SERPL-MCNC: 116 MG/DL (ref 65–140)
GLUCOSE SERPL-MCNC: 135 MG/DL (ref 65–140)
GLUCOSE SERPL-MCNC: 135 MG/DL (ref 65–140)
GLUCOSE SERPL-MCNC: 137 MG/DL (ref 65–140)
GLUCOSE SERPL-MCNC: 148 MG/DL (ref 65–140)
GLUCOSE SERPL-MCNC: 150 MG/DL (ref 65–140)
GLUCOSE SERPL-MCNC: 155 MG/DL (ref 65–140)
GLUCOSE SERPL-MCNC: 161 MG/DL (ref 65–140)
GLUCOSE SERPL-MCNC: 163 MG/DL (ref 65–140)
GLUCOSE SERPL-MCNC: 165 MG/DL (ref 65–140)
GLUCOSE SERPL-MCNC: 169 MG/DL (ref 65–140)
GLUCOSE SERPL-MCNC: 170 MG/DL (ref 65–140)
GLUCOSE SERPL-MCNC: 199 MG/DL (ref 65–140)
GLUCOSE SERPL-MCNC: 49 MG/DL (ref 65–140)
GLUCOSE SERPL-MCNC: 74 MG/DL (ref 65–140)
GLUCOSE SERPL-MCNC: 77 MG/DL (ref 65–140)
GLUCOSE SERPL-MCNC: 78 MG/DL (ref 65–140)
GLUCOSE SERPL-MCNC: 86 MG/DL (ref 65–140)
GLUCOSE SERPL-MCNC: 88 MG/DL (ref 65–140)
GLUCOSE SERPL-MCNC: 93 MG/DL (ref 65–140)
GLUCOSE SERPL-MCNC: 93 MG/DL (ref 65–140)
GLUCOSE SERPL-MCNC: 96 MG/DL (ref 65–140)
HCO3 BLDA-SCNC: 6.4 MMOL/L (ref 22–28)
HCT VFR BLD AUTO: 29.4 % (ref 36.5–49.3)
HGB BLD-MCNC: 9 G/DL (ref 12–17)
LACTATE SERPL-SCNC: 11.1 MMOL/L (ref 0.5–2)
LACTATE SERPL-SCNC: 11.7 MMOL/L (ref 0.5–2)
LACTATE SERPL-SCNC: 8.2 MMOL/L (ref 0.5–2)
MAGNESIUM SERPL-MCNC: 1.6 MG/DL (ref 1.9–2.7)
MAGNESIUM SERPL-MCNC: 1.8 MG/DL (ref 1.9–2.7)
MAGNESIUM SERPL-MCNC: 2 MG/DL (ref 1.9–2.7)
MAGNESIUM SERPL-MCNC: 2.3 MG/DL (ref 1.9–2.7)
MCH RBC QN AUTO: 29.1 PG (ref 26.8–34.3)
MCHC RBC AUTO-ENTMCNC: 30.6 G/DL (ref 31.4–37.4)
MCV RBC AUTO: 95 FL (ref 82–98)
NON VENT ROOM AIR: ABNORMAL %
O2 CT BLDA-SCNC: 14.2 ML/DL (ref 16–23)
OXYHGB MFR BLDA: 97.4 % (ref 94–97)
P AXIS: 73 DEGREES
PCO2 BLDA: 15.5 MM HG (ref 36–44)
PCO2 TEMP ADJ BLDA: 13.3 MM HG (ref 36–44)
PH BLD: 7.28 [PH] (ref 7.35–7.45)
PH BLDA: 7.23 [PH] (ref 7.35–7.45)
PHOSPHATE SERPL-MCNC: 2.2 MG/DL (ref 2.3–4.1)
PHOSPHATE SERPL-MCNC: 2.6 MG/DL (ref 2.3–4.1)
PHOSPHATE SERPL-MCNC: 3.2 MG/DL (ref 2.3–4.1)
PHOSPHATE SERPL-MCNC: 4.1 MG/DL (ref 2.3–4.1)
PLATELET # BLD AUTO: 100 THOUSANDS/UL (ref 149–390)
PMV BLD AUTO: 11.5 FL (ref 8.9–12.7)
PO2 BLD: 107.6 MM HG (ref 75–129)
PO2 BLDA: 127.6 MM HG (ref 75–129)
POTASSIUM SERPL-SCNC: 3.5 MMOL/L (ref 3.5–5.3)
POTASSIUM SERPL-SCNC: 3.8 MMOL/L (ref 3.5–5.3)
POTASSIUM SERPL-SCNC: 4 MMOL/L (ref 3.5–5.3)
POTASSIUM SERPL-SCNC: 4 MMOL/L (ref 3.5–5.3)
PR INTERVAL: 121 MS
QRS AXIS: 76 DEGREES
QRSD INTERVAL: 83 MS
QT INTERVAL: 350 MS
QTC INTERVAL: 404 MS
RBC # BLD AUTO: 3.09 MILLION/UL (ref 3.88–5.62)
SODIUM SERPL-SCNC: 140 MMOL/L (ref 135–147)
SODIUM SERPL-SCNC: 142 MMOL/L (ref 135–147)
SODIUM SERPL-SCNC: 145 MMOL/L (ref 135–147)
SODIUM SERPL-SCNC: 151 MMOL/L (ref 135–147)
SPECIMEN SOURCE: ABNORMAL
T WAVE AXIS: 91 DEGREES
VANCOMYCIN SERPL-MCNC: 10.9 UG/ML (ref 10–20)
VENTRICULAR RATE: 80 BPM
WBC # BLD AUTO: 2.55 THOUSAND/UL (ref 4.31–10.16)

## 2023-01-24 RX ORDER — POLYETHYLENE GLYCOL 3350 17 G/17G
17 POWDER, FOR SOLUTION ORAL DAILY
Status: DISCONTINUED | OUTPATIENT
Start: 2023-01-24 | End: 2023-01-24

## 2023-01-24 RX ORDER — AMOXICILLIN 250 MG
1 CAPSULE ORAL
Status: DISCONTINUED | OUTPATIENT
Start: 2023-01-24 | End: 2023-01-24

## 2023-01-24 RX ORDER — CALCIUM GLUCONATE 20 MG/ML
2 INJECTION, SOLUTION INTRAVENOUS ONCE
Status: COMPLETED | OUTPATIENT
Start: 2023-01-24 | End: 2023-01-24

## 2023-01-24 RX ORDER — POTASSIUM CHLORIDE 29.8 MG/ML
40 INJECTION INTRAVENOUS ONCE
Status: COMPLETED | OUTPATIENT
Start: 2023-01-24 | End: 2023-01-24

## 2023-01-24 RX ORDER — CALCIUM GLUCONATE 20 MG/ML
1 INJECTION, SOLUTION INTRAVENOUS ONCE
Status: COMPLETED | OUTPATIENT
Start: 2023-01-24 | End: 2023-01-24

## 2023-01-24 RX ORDER — DEXTROSE, SODIUM CHLORIDE, SODIUM LACTATE, POTASSIUM CHLORIDE, AND CALCIUM CHLORIDE 5; .6; .31; .03; .02 G/100ML; G/100ML; G/100ML; G/100ML; G/100ML
250 INJECTION, SOLUTION INTRAVENOUS CONTINUOUS
Status: DISCONTINUED | OUTPATIENT
Start: 2023-01-24 | End: 2023-01-24

## 2023-01-24 RX ORDER — DEXTROSE MONOHYDRATE 25 G/50ML
12.5 INJECTION, SOLUTION INTRAVENOUS ONCE
Status: COMPLETED | OUTPATIENT
Start: 2023-01-24 | End: 2023-01-24

## 2023-01-24 RX ORDER — MAGNESIUM SULFATE 1 G/100ML
1 INJECTION INTRAVENOUS ONCE
Status: COMPLETED | OUTPATIENT
Start: 2023-01-24 | End: 2023-01-24

## 2023-01-24 RX ORDER — MAGNESIUM SULFATE 1 G/100ML
1 INJECTION INTRAVENOUS ONCE
Status: COMPLETED | OUTPATIENT
Start: 2023-01-24 | End: 2023-01-25

## 2023-01-24 RX ORDER — HEPARIN SODIUM 10000 [USP'U]/100ML
500 INJECTION, SOLUTION INTRAVENOUS CONTINUOUS
Status: DISCONTINUED | OUTPATIENT
Start: 2023-01-24 | End: 2023-01-26

## 2023-01-24 RX ORDER — DEXTROSE, SODIUM CHLORIDE, SODIUM LACTATE, POTASSIUM CHLORIDE, AND CALCIUM CHLORIDE 5; .6; .31; .03; .02 G/100ML; G/100ML; G/100ML; G/100ML; G/100ML
250 INJECTION, SOLUTION INTRAVENOUS CONTINUOUS
Status: DISCONTINUED | OUTPATIENT
Start: 2023-01-24 | End: 2023-01-25

## 2023-01-24 RX ADMIN — DEXTROSE MONOHYDRATE 12.5 G: 25 INJECTION, SOLUTION INTRAVENOUS at 04:18

## 2023-01-24 RX ADMIN — CALCIUM GLUCONATE 1 G: 20 INJECTION, SOLUTION INTRAVENOUS at 13:44

## 2023-01-24 RX ADMIN — VASOPRESSIN 0.04 UNITS/MIN: 20 INJECTION INTRAVENOUS at 22:03

## 2023-01-24 RX ADMIN — CALCIUM GLUCONATE 2 G: 20 INJECTION, SOLUTION INTRAVENOUS at 20:29

## 2023-01-24 RX ADMIN — DEXTROSE, SODIUM CHLORIDE, SODIUM LACTATE, POTASSIUM CHLORIDE, AND CALCIUM CHLORIDE 250 ML: 5; .6; .31; .03; .02 INJECTION, SOLUTION INTRAVENOUS at 10:39

## 2023-01-24 RX ADMIN — NOREPINEPHRINE BITARTRATE 7 MCG/MIN: 1 INJECTION, SOLUTION, CONCENTRATE INTRAVENOUS at 13:44

## 2023-01-24 RX ADMIN — SODIUM PHOSPHATE, MONOBASIC, MONOHYDRATE AND SODIUM PHOSPHATE, DIBASIC, ANHYDROUS 6 MMOL: 276; 142 INJECTION, SOLUTION INTRAVENOUS at 14:49

## 2023-01-24 RX ADMIN — Medication 20000 ML: at 11:53

## 2023-01-24 RX ADMIN — VASOPRESSIN 0.04 UNITS/MIN: 20 INJECTION INTRAVENOUS at 05:36

## 2023-01-24 RX ADMIN — HEPARIN SODIUM 500 UNITS/HR: 10000 INJECTION, SOLUTION INTRAVENOUS at 12:16

## 2023-01-24 RX ADMIN — MAGNESIUM SULFATE HEPTAHYDRATE 1 G: 1 INJECTION, SOLUTION INTRAVENOUS at 19:23

## 2023-01-24 RX ADMIN — POTASSIUM CHLORIDE 40 MEQ: 29.8 INJECTION, SOLUTION INTRAVENOUS at 19:23

## 2023-01-24 RX ADMIN — NOREPINEPHRINE BITARTRATE 10 MCG/MIN: 1 INJECTION, SOLUTION, CONCENTRATE INTRAVENOUS at 05:25

## 2023-01-24 RX ADMIN — DEXTROSE, SODIUM CHLORIDE, SODIUM LACTATE, POTASSIUM CHLORIDE, AND CALCIUM CHLORIDE 250 ML/HR: 5; .6; .31; .03; .02 INJECTION, SOLUTION INTRAVENOUS at 23:05

## 2023-01-24 RX ADMIN — DEXTROSE, SODIUM CHLORIDE, SODIUM LACTATE, POTASSIUM CHLORIDE, AND CALCIUM CHLORIDE 250 ML/HR: 5; .6; .31; .03; .02 INJECTION, SOLUTION INTRAVENOUS at 18:10

## 2023-01-24 RX ADMIN — VASOPRESSIN 0.04 UNITS/MIN: 20 INJECTION INTRAVENOUS at 13:51

## 2023-01-24 RX ADMIN — CALCIUM GLUCONATE 1 G: 20 INJECTION, SOLUTION INTRAVENOUS at 06:35

## 2023-01-24 RX ADMIN — CEFEPIME HYDROCHLORIDE 2000 MG: 2 INJECTION, POWDER, FOR SOLUTION INTRAVENOUS at 23:52

## 2023-01-24 RX ADMIN — CALCIUM GLUCONATE 1 G: 20 INJECTION, SOLUTION INTRAVENOUS at 00:50

## 2023-01-24 RX ADMIN — DEXTROSE AND SODIUM CHLORIDE 250 ML/HR: 5; .45 INJECTION, SOLUTION INTRAVENOUS at 02:42

## 2023-01-24 RX ADMIN — MAGNESIUM SULFATE HEPTAHYDRATE 1 G: 1 INJECTION, SOLUTION INTRAVENOUS at 15:29

## 2023-01-24 RX ADMIN — DEXTROSE, SODIUM CHLORIDE, SODIUM LACTATE, POTASSIUM CHLORIDE, AND CALCIUM CHLORIDE 250 ML/HR: 5; .6; .31; .03; .02 INJECTION, SOLUTION INTRAVENOUS at 19:03

## 2023-01-24 RX ADMIN — POTASSIUM CHLORIDE 40 MEQ: 29.8 INJECTION, SOLUTION INTRAVENOUS at 07:16

## 2023-01-24 RX ADMIN — DEXTROSE AND SODIUM CHLORIDE 250 ML/HR: 5; .45 INJECTION, SOLUTION INTRAVENOUS at 06:44

## 2023-01-24 RX ADMIN — DEXTROSE, SODIUM CHLORIDE, SODIUM LACTATE, POTASSIUM CHLORIDE, AND CALCIUM CHLORIDE 250 ML: 5; .6; .31; .03; .02 INJECTION, SOLUTION INTRAVENOUS at 14:54

## 2023-01-24 RX ADMIN — CEFEPIME HYDROCHLORIDE 2000 MG: 2 INJECTION, POWDER, FOR SOLUTION INTRAVENOUS at 00:32

## 2023-01-24 RX ADMIN — CEFEPIME HYDROCHLORIDE 2000 MG: 2 INJECTION, POWDER, FOR SOLUTION INTRAVENOUS at 12:16

## 2023-01-24 NOTE — ASSESSMENT & PLAN NOTE
· Appears to be at baseline however will confirm with family  · We will monitor his mental status closely  · Hope to avoid sedatives, anxiolytics

## 2023-01-24 NOTE — PROCEDURES
Temporary HD Catheter    Date/Time: 1/23/2023 8:04 PM  Performed by: AKSHAT Avila  Authorized by: AKSHAT Avila     Patient location:  Bedside  Consent:     Consent obtained:  Written    Consent given by:  Spouse    Risks discussed:  Bleeding, infection and pneumothorax  Universal protocol:     Immediately prior to procedure, a time out was called: yes      Patient identity confirmed:  Arm band  Pre-procedure details:     Hand hygiene: Hand hygiene performed prior to insertion      Skin preparation:  2% chlorhexidine  Indications:     Central line indications: dialysis    Anesthesia (see MAR for exact dosages): Anesthesia method:  Local infiltration    Local anesthetic:  Lidocaine 1% w/o epi  Procedure details:     Location:  Right internal jugular    Vessel type: vein      Laterality:  Right    Approach: percutaneous technique used      Patient position:  Trendelenburg    Catheter length:  16 cm    Landmarks identified: yes      Ultrasound guidance: yes      Ultrasound image availability:  Not saved    Sterile ultrasound techniques: Sterile gel and sterile probe covers were used      Number of attempts:  2    Successful placement: yes    Post-procedure details:     Post-procedure:  Line sutured    Assessment:  Blood return through all ports and placement verified by x-ray    Post-procedure complications: pneumothroax      Patient tolerance of procedure:   Tolerated well, no immediate complications

## 2023-01-24 NOTE — ASSESSMENT & PLAN NOTE
Lab Results   Component Value Date    LACTICACID 11 1 () 01/24/2023    LACTICACID 11 7 () 01/24/2023    LACTICACID 11 7 ([facility]) 01/23/2023     · The patient presented to the ED with altered mental status   His labs revealed a significant metabolic acidosis, likely multifactorial related to DKA, severe volume depletion, elevated lactic acid level, ANGELICA, sepsis, metformin toxicity  · We will treat the patient per the DKA protocol   · Treat underlying infection  · Continue d5 1/2 infusion  · We will trend his [place]- he may require a bicarbonate infusion

## 2023-01-24 NOTE — PLAN OF CARE
Problem: PAIN - ADULT  Goal: Verbalizes/displays adequate comfort level or baseline comfort level  Description: Interventions:  - Encourage patient to monitor pain and request assistance  - Assess pain using appropriate pain scale  - Administer analgesics based on type and severity of pain and evaluate response  - Implement non-pharmacological measures as appropriate and evaluate response  - Consider cultural and social influences on pain and pain management  - Notify physician/advanced practitioner if interventions unsuccessful or patient reports new pain  Outcome: Progressing     Problem: INFECTION - ADULT  Goal: Absence or prevention of progression during hospitalization  Description: INTERVENTIONS:  - Assess and monitor for signs and symptoms of infection  - Monitor lab/diagnostic results  - Monitor all insertion sites, i e  indwelling lines, tubes, and drains  - Monitor endotracheal if appropriate and nasal secretions for changes in amount and color  - Corinne appropriate cooling/warming therapies per order  - Administer medications as ordered  - Instruct and encourage patient and family to use good hand hygiene technique  - Identify and instruct in appropriate isolation precautions for identified infection/condition  Outcome: Not Progressing     Problem: DISCHARGE PLANNING  Goal: Discharge to home or other facility with appropriate resources  Description: INTERVENTIONS:  - Identify barriers to discharge w/patient and caregiver  - Arrange for needed discharge resources and transportation as appropriate  - Identify discharge learning needs (meds, wound care, etc )  - Arrange for interpretive services to assist at discharge as needed  - Refer to Case Management Department for coordinating discharge planning if the patient needs post-hospital services based on physician/advanced practitioner order or complex needs related to functional status, cognitive ability, or social support system  Outcome: Not Progressing     Problem: NEUROSENSORY - ADULT  Goal: Achieves maximal functionality and self care  Description: INTERVENTIONS  - Monitor swallowing and airway patency with patient fatigue and changes in neurological status  - Encourage and assist patient to increase activity and self care     - Encourage visually impaired, hearing impaired and aphasic patients to use assistive/communication devices  Outcome: Progressing     Problem: METABOLIC, FLUID AND ELECTROLYTES - ADULT  Goal: Electrolytes maintained within normal limits  Description: INTERVENTIONS:  - Monitor labs and assess patient for signs and symptoms of electrolyte imbalances  - Administer electrolyte replacement as ordered  - Monitor response to electrolyte replacements, including repeat lab results as appropriate  - Instruct patient on fluid and nutrition as appropriate  Outcome: Not Progressing  Goal: Glucose maintained within target range  Description: INTERVENTIONS:  - Monitor Blood Glucose as ordered  - Assess for signs and symptoms of hyperglycemia and hypoglycemia  - Administer ordered medications to maintain glucose within target range  - Assess nutritional intake and initiate nutrition service referral as needed  Outcome: Not Progressing     Problem: SKIN/TISSUE INTEGRITY - ADULT  Goal: Pressure injury heals and does not worsen  Description: Interventions:  - Implement low air loss mattress or specialty surface (Criteria met)  - Apply silicone foam dressing  - Instruct/assist with weight shifting every *** minutes when in chair   - Limit chair time to *** hour intervals  - Use special pressure reducing interventions such as *** when in chair   - Apply fecal or urinary incontinence containment device   - Perform passive or active ROM every ***  - Turn and reposition patient & offload bony prominences every *** hours   - Utilize friction reducing device or surface for transfers   - Consider consults to  interdisciplinary teams such as ***  - Use incontinent care products after each incontinent episode such as ***  - Consider nutrition services referral as needed  Outcome: Progressing

## 2023-01-24 NOTE — SPEECH THERAPY NOTE
Speech Language/Pathology  Speech/Language Pathology  Assessment    Patient Name: Todd CURTIS Date: 1/24/2023     Problem List  Principal Problem:    Metabolic acidosis  Active Problems:    Type 2 diabetes mellitus without complication, without long-term current use of insulin (HCC)    Dementia without behavioral disturbance (HCC)    ANGELICA (acute kidney injury) ([facility] )    Skin ulcer of sacrum, limited to breakdown of skin ([facility])    Hyperkalemia    Acute encephalopathy    DKA (diabetic ketoacidosis) ([facility] )    Bacteremia    Sepsis ([facility] )    UTI (urinary tract infection)    Constipation    Hypocalcemia    Pancytopenia ([facility] )    Past Medical History  Past Medical History:   Diagnosis Date   • Alzheimer disease type 3 ([facility] )    • Cervical pain 4/16/2018   • Diabetes mellitus ([facility] )    • Diabetic peripheral neuropathy associated with type 2 diabetes mellitus ([facility] ) 3/18/2021   • Disease of thyroid gland    • Elevated PSA    • Hyperlipidemia    • Medicare annual wellness visit, subsequent 7/30/2020   • Prostate cancer ([facility] )    • Type 2 diabetes mellitus with diabetic peripheral angiopathy without gangrene ([facility] ) 5/17/2021    According to ICD10 guidelines, patient accept     Past Surgical History  Past Surgical History:   Procedure Laterality Date   • NECK SURGERY     • PROSTATE BIOPSY  07/18/2018        Bedside Swallow Evaluation:    Summary:  H/o dementia  Pt on CVVH, presented w/ lethargy  Wife came in shortly after I arrived  Stated he was on puree w/ thin liquids at home and spoke  Currently eyes are open but he is not really responding  Even to wife  Not following commands for oral mech  Oral care completed  Appears to have 1 tooth  trialed ice chips, water by tsp and water piped in w/ straw (wife stated he drank from a straw or bottle at home)  Also trialed 1/2 tsps of apple sauce  Pt was not opening his mouth readily for presentations  No attempt to manipulate or transfer the bolus after placement  No swallow   All trials were suctioned back out  Wife stated she thought he would do better if I gave him a bottle of water  I explained to her that it does not appear safe at this time as he is not swallowing  Even small amounts  Severe oral/pharyngeal dysphagia today  Will f/u for change  Recommendations:  Diet: NPO  Meds: tube/iv  Positioning:Upright  Oral care  Aspiration precautions  Reflux precautions  Therapy Prognosis: guarded  Prognosis considerations: dementia  comorbidities  Frequency: 2-5x/wlk as able and indicated  Consider consult w/:  Nutrition    Goal(s):  Pt will tolerate least restrictive diet w/out s/s aspiration or oral/pharyngeal difficulties  H&P/Admit info/ pertinent provider notes: (PMH noted above)    Attestation signed by Raad Horton MD at 1/23/2023  5:07 PM    I have personally seen and examined the patient on 01/23/2023  I discussed the patient with the Advanced Practitioner (AKSHAT or ALLI)  including, but not limited to, verifying findings; reviewing labs and x-rays; discussing with consultants; developing the plan of care with the bedside nurse; and discussing treatment plan with patient or surrogate  I have reviewed the note and assessment performed by the Advanced Practitioner (AKSHAT or ALLI) , and agree with the documented findings and plan of care with the following additions/exceptions  Please see my following comments for details and adjustments  Critical care time, excluding procedures, teaching, family meetings, and excludes any prior time recorded by providers other than myself, (60) minutes  68 y/o male w/ PMH of Dementia, DM II on janumet, chronic sacral wound being taken care of by wife at home  Patient was brought in for altered mental status  Was found to have severe metabolic derangements consistent with DKA  History was provided by wife     Received a total of 4L of IVF  Physical exam:  General: awake, cachectic  Pulm: no wheezing, no rhonchi  Cardiac: RRR, no murmurs  GI: abd soft, nontender  Skin: no rashes, no jaundice  Vitals: afebrile, hypotensive, O2 sat 100% on 2L NC  Labs:   Severe metabolic acidosis with partial respiratory compensation on ABG  Hypernatremia  Hypokalemia  Elevated BUN and creatinine  Hyperglycemia  Hyperphosphatemia  Hypermagnesemia  Macrocytosis without anemia  Influenza, RSV and flu negative  Blood cultures pending  Elevated beta hydroxybuterate  UA with bacterira, 1-2 WBC and positive nitrites  Imaging:   CXR on 1/23 w/ clear bilateral lung fields  CT head on 1/23- no acute intracranial pathology  CT abd pelvis w/o contrast on 1/23- pending official read  Assessment:  1  Severe DKA  2  Lactic acidosis- possibly due to metformin (type B) which he is on from the Janumet  3  Suspected metformin toxicity  4  Undifferentiated shock- multifactorial acidosis, hypovolemia, cannot rule out sepsis or cardiogenic  5  ANGELICA- significant uremia  6  Toxic metabolic encephalopathy  7  Hypernatremia  8  Hyperkalemia  9  DM II on janumet  10  Questionable UTI- with positive nitrites  11  Dementia  12  Chronic sacral decubitus ulcer  13  Severe cachexia  Plan:  • Will likely need emergent dialysis for suspected metformin toxicity  • Consult Nephro  • Continue insulin drip  • Continue labs q2hrs including BG checks  • Switch isolyte to 1/2 NS, switch to D5 with 1/2 NS once BG <250  • Replace electrolytes  • F/up CT abd pelvis  • F/up cardiac echo  • Place A-line and central line  • Start levophed  • Stop solucortef  • Consult wound care  • Place Pizano  • F/up blood cultures  • Will call lab to reflex to urine culture  • Start cefepime/vanc  • Keep NPO  Celine Benitez MD 01/23/23    Special Studies:  Ct abd/pelvis:1/23/23  Patchy infiltrate in the right lower lobe and tree-in-bud nodularity in the right middle lobe likely representing infection/pneumonia  Large amount stool in the rectum likely representing fecal impaction  Correlate clinically  Mild bladder wall thickening  Correlate for cystitis  CT head neg acute  cxr 1/23 nad  Repeat pending  Previous MBS:  none    Patient's goal:unable to state    Did the pt report pain? No nonverbal indication  If yes, was nursing notified/was it addressed?  na    Reason for consult:  R/o aspiration  Determine safest and least restrictive diet  Change in mental status  H/o neurological disease  h/o dysphagia     Precautions:  Aspiration  Fall    Food Allergies: none   Current Diet: npo   Premorbid diet: Puree/ thin   O2 requirement: 2L nc   Social/Prior living Home w  wife   Voice/Speech:  vocalized x 1 only   Follows commands: no   Cognitive status: lethargic     Oral mech exam:  Dentition:1 tooth  Lips (VII):no gross asymmetry  Secretion management:mouth slightly dry  Oral care completed    Esophageal stage:  No s/s reported    Results d/w:  Pt, nursing

## 2023-01-24 NOTE — UTILIZATION REVIEW
Initial Clinical Review    Admission: Date/Time/Statement:   Admission Orders (From admission, onward)     Ordered        01/23/23 1206  [address] [place]  Once                      Orders Placed This Encounter   Procedures   • INPATIENT ADMISSION     Standing Status:   Standing     Number of Occurrences:   1     Order Specific Question:   Level of Care     Answer:   Critical Care [15]     Order Specific Question:   Estimated length of stay     Answer:   More than 2 Midnights     Order Specific Question:   Certification     Answer:   I certify that inpatient services are medically necessary for this patient for a duration of greater than two midnights  See H&P and MD Progress Notes for additional information about the patient's course of treatment  ED Arrival Information     Expected   -    Arrival   1/23/2023 10:41    Acuity   Immediate            Means of arrival   Ambulance    Escorted by   [place] ([address])    Service   Critical Care/ICU    Admission type   Emergency            Arrival complaint   Unresponsive           Chief Complaint   Patient presents with   • Altered Mental Status       Initial Presentation: 69 y o  male presents to ED v ia  EMS with altered mental status  PMH  Is  DM, sacral wound and  Dementia  The patient was admitted to [facility] on 12/30 with a similar presentation of hyperglycemia, hypernatremia and generalized weakness  Does have  Home  PT and  VN  Labs revealed  BS  >  600,  Bicarb  6, lactic acid  10 and creatinine  8 27  Admit  Ip   With  Metabolic  Acidosis, ANGELICA,  DKA,  Hypernatremia and  Acute  Encephalopathy and plan is  Monitor neuro status closely, sacral wound care, IVF, LILIANA, insulin drip, 2 DE,  Try to avoid  Sedatives  Nephrology consult  Potassium   8 on admission,  Now  5 8,  Should  Continue to improve with  CVVHD  Lactic acid now worse,  11 6  Planning   CVVHD  Given  Shock and  Vasopressor requirements  Anion gap remains  28 - 29  Plan  HD  Catheter  Urgent  Dialysis  Most appropriate intervention  [place] and temporary  Dialysis catheter inserted    Date:     1/24       Day 2:   Overnight: initiated on CVVHD, required dual pressors, continued to receive IVF  Septic shock, proteus bacteremia, suspect source  Urinary  Also with  RLL  Infiltrate   Continue  LILIANA, continue  CVVHD  Continue  IVF,   Monitor labs  Remains on insulin drip  Remain on   Vasopressor,  Wean  Levo  Remains on  hany hugger  Monitor mental status closely  Remains hypotensive  Not oriented,  Cachectic   No respiratory distress      ED Triage Vitals   Temperature Pulse Respirations Blood Pressure SpO2   01/23/23 1115 01/23/23 1049 01/23/23 1049 01/23/23 1049 01/23/23 1049   97 7 °F (36 5 °C) 104 (!) 33 (!) 81/47 98 %      Temp Source Heart Rate Source Patient Position - Orthostatic VS BP Location FiO2 (%)   01/23/23 1115 01/23/23 1115 01/23/23 1115 01/23/23 1115 --   Rectal Monitor Lying Left arm       Pain Score       --                 Wt Readings from Last 1 Encounters:   01/24/23 39 8 kg (87 lb 11 9 oz)     Additional Vital Signs:   24/23 0630 96 4 °F (35 8 °C) Abnormal  82 18 -- -- 108/46 64 mmHg Abnormal  100 % -- -- -- --   01/24/23 0030 93 6 °F (34 2 °C) Abnormal  86 18 -- -- 126/50 72 mmHg 100 % -- -- -- --   01/24/23 0020 93 2 °F (34 °C) Abnormal  86 21 -- -- 126/50 72 mmHg 100 % -- -- -- --   01/24/23 0010 93 2 °F (34 °C) Abnormal  76 29 Abnormal  -- -- 102/48 66 mmHg 100 % -- -- -- --   01/24/23 0000 92 8 °F (33 8 °C) Abnormal  82 13 -- -- 120/46 66 mmHg 100 % -- -- -- --   01/23/23 2350 92 5 °F (33 6 °C) Abnormal  86 17 -- -- 126/52 74 mmHg 100 % -- -- -- --   01/23/23 2340 92 5 °F (33 6 °C) Abnormal  84 20 -- -- 122/50 72 mmHg 100 % -- -- -- --   01/23/23 2330 92 1 °F (33 4 °C) Abnormal  82 12 -- -- 104/42 58 mmHg Abnormal  -- -- -- -- --   01/23/23 2324 92 1 °F (33 4 °C) Abnormal  82 20 -- -- 100/38 54 mmHg Abnormal  -- -- -- -- --   01/23/23 2228 91 8 °F (33 2 °C) Abnormal  80 12 -- -- 98/34 50 mmHg Abnormal  100 % -- -- -- --   01/23/23 2140 -- 82 27 Abnormal  -- -- 104/34 52 mmHg Abnormal  100 % -- -- -- --   01/23/23 2130 -- 82 15 -- -- 102/34 54 mmHg Abnormal  100 % -- -- -- --   01/23/23 2120 -- 82 15 -- -- 94/34 48 mmHg Abnormal  100 % -- -- -- --   01/23/23 2110 -- 80 14 -- -- 102/36 54 mmHg Abnormal  100 % -- -- -- --   01/23/23 2100 -- 78 12 -- -- 96/34 50 mmHg Abnormal  100 % -- -- -- --   01/23/23 2022 89 8 °F (32 1 °C) Abnormal  -- -- -- -- -- -- -- -- --         Pertinent Labs/Diagnostic Test Results:   CT abdomen pelvis wo contrast   Final Result by Benedict Jaimes MD (01/23 1703)      Patchy infiltrate in the right lower lobe and tree-in-bud nodularity in the right middle lobe likely representing infection/pneumonia  Large amount stool in the rectum likely representing fecal impaction  Correlate clinically  Mild bladder wall thickening  Correlate for cystitis  The study was marked in [facility] for immediate notification  Workstation performed: [id number]         CT head without contrast   Final Result by Yaz Sorto MD (01/23 1326)      No acute intracranial abnormality  Workstation performed: [id number]         XR chest 1 view portable   Final Result by Deisy Khan MD (01/23 1413)      No acute cardiopulmonary disease                    Workstation performed: [id number]         XR chest portable    (Results Pending)     Results from last 7 days   Lab Units 01/23/23  1122   SARS-COV-2  Negative     Results from last 7 days   Lab Units 01/24/23  0507 01/23/23  1055   WBC Thousand/uL 2 55* 9 04   HEMOGLOBIN g/dL 9 0* 12 4   HEMATOCRIT % 29 4* 43 1   PLATELETS Thousands/uL 100* 223   NEUTROS ABS Thousands/µL  --  8 46*         Results from last 7 days   Lab Units 01/24/23  0507 01/24/23  0014 01/23/23  2245 01/23/23 2002 01/23/23 2000 01/23/23  1609 01/23/23  1422   SODIUM mmol/L 145 151* 153* 157* 157*   < >  --    POTASSIUM mmol/L 3 8 4 0 4 2 5 0 4 9   < >  --    CHLORIDE mmol/L 114* 118* 119* 121* 121*   < >  --    CO2 mmol/L 9* 7* 7* <6* <6*   < >  --    ANION GAP mmol/L 22* 26* 27* >30* >30*   < >  --    BUN mg/dL 92* 124* 132* 155* 150*   < >  --    CREATININE mg/dL 3 38* 4 71* 5 20* 6 27* 6 38*   < >  --    EGFR ml/min/1 73sq m 16 11 9 7 7   < >  --    CALCIUM mg/dL 8 0* 8 1* 7 2* 7 6* 7 6*   < >  --    CALCIUM, IONIZED mmol/L 1 10* 1 13 1 01*  --  1 03*  --  1 14   MAGNESIUM mg/dL 2 0 2 3 2 5 2 8* 2 9*   < >  --    PHOSPHORUS mg/dL 3 2 4 1 4 6* 6 1* 6 5*   < >  --     < > = values in this interval not displayed       Results from last 7 days   Lab Units 01/23/23  1055   AST U/L 26   ALT U/L 24   ALK PHOS U/L 105*   TOTAL PROTEIN g/dL 7 2   ALBUMIN g/dL 3 2*   TOTAL BILIRUBIN mg/dL 0 64     Results from last 7 days   Lab Units 01/24/23  0825 01/24/23  0715 01/24/23  2574 01/24/23  0506 01/24/23  8700 01/24/23  0408 01/24/23  4435 01/24/23  0214 01/24/23  0102 01/24/23  0012 01/23/23  2305 01/23/23  2203   POC GLUCOSE mg/dl 170* 116 77 86 135 49* 74 93 155* 163* 208* 219*     Results from last 7 days   Lab Units 01/24/23  0507 01/24/23  0014 01/23/23  2245 01/23/23 2002 01/23/23 2000 01/23/23  1609 01/23/23  1419 01/23/23  1137 01/23/23  1055   GLUCOSE RANDOM mg/dL 93 165* 204* 236* 233* 329* 480* 583* 652*             BETA-HYDROXYBUTYRATE   Date Value Ref Range Status   01/23/2023 4 9 (H) <0 6 mmol/L Final      Results from last 7 days   Lab Units 01/24/23  0015   PH ART  7 231*   PCO2 ART mm Hg 15 5*   PO2 ART mm Hg 127 6   HCO3 ART mmol/L 6 4*   BASE EXC ART mmol/L -19 0   O2 CONTENT ART mL/dL 14 2*   O2 HGB, ARTERIAL % 97 4*   ABG SOURCE  Line, Arterial     Results from last 7 days   Lab Units 01/23/23  1422 01/23/23  1137   PH ASHLEY  7 015* 7 086*   PCO2 ASHLEY mm Hg 23 4* 17 7*   PO2 ASHLEY mm Hg 41 7 113 5*   HCO3 ASHLEY mmol/L 5 8* 5 2*   BASE EXC ASHLEY mmol/L -23 7 -22 8   O2 CONTENT ASHLEY ml/dL 9 0 15 5   O2 HGB, VENOUS % 56 6* 94 9*             Results from last 7 days   Lab Units 01/23/23  2246 01/23/23  1302 01/23/23  1055   HS TNI 0HR ng/L  --   --  103*   HS TNI 2HR ng/L  --  100*  --    HSTNI D2 ng/L  --  -3  --    HS TNI 4HR ng/L 91*  --   --    HSTNI D4 ng/L -12  --   --          Results from last 7 days   Lab Units 01/23/23  1055   PROTIME seconds 18 7*   INR  1 57*   PTT seconds 31     Results from last 7 days   Lab Units 01/23/23  1055   TSH 3RD GENERATON uIU/mL 6 561*     Results from last 7 days   Lab Units 01/23/23  1609   PROCALCITONIN ng/ml 12 76*     Results from last 7 days   Lab Units 01/24/23  0824 01/24/23  0507 01/24/23  0014 01/23/23  2245 01/23/23  2000 01/23/23  1609 01/23/23  1422   LACTIC ACID mmol/L 8 2* 11 1* 11 7* 11 7* 13 0* 11 6*  11 6* 11 1*             Results from last 7 days   Lab Units 01/23/23  1055   BNP pg/mL 111*               Results from last 7 days   Lab Units 01/23/23  2058   CLARITY UA  Cloudy   COLOR UA  Yellow   SPEC GRAV UA  >=1 030   PH UA  5 0   GLUCOSE UA mg/dl 250 (1/4%)*   KETONES UA mg/dl Negative   BLOOD UA  Large*   PROTEIN UA mg/dl 100 (2+)*   NITRITE UA  Negative   BILIRUBIN UA  Negative   UROBILINOGEN UA E U /dl 0 2   LEUKOCYTES UA  Small*   WBC UA /hpf 10-20*   RBC UA /hpf Innumerable*   BACTERIA UA /hpf Innumerable*   EPITHELIAL CELLS WET PREP /hpf Occasional     Results from last 7 days   Lab Units 01/23/23  1122   INFLUENZA A PCR  Negative   INFLUENZA B PCR  Negative   RSV PCR  Negative             Results from last 7 days   Lab Units 01/23/23 2002 01/23/23  1137   ETHANOL LVL mg/dL  --  <10   ACETAMINOPHEN LVL ug/mL 10 14   SALICYLATE LVL mg/dL  --  <5                 Results from last 7 days   Lab Units 01/23/23  1206 01/23/23  1143   GRAM STAIN RESULT  Gram negative rods* Gram negative rods*               ED Treatment:   Medication Administration from 01/23/2023 1041 to 01/23/2023 1339       Date/Time Order Dose Route Action Comments     01/23/2023 1100 EST lidocaine (cardiac) (FOR EMS ONLY) 20 mg/mL injection 100 mg 0 mg Does not apply Given to EMS --     01/23/2023 1201 EST sodium chloride 0 9 % bolus 1,000 mL 0 mL Intravenous Stopped --     01/23/2023 1101 EST sodium chloride 0 9 % bolus 1,000 mL 1,000 mL Intravenous New Bag --     01/23/2023 1153 EST sodium chloride 0 9 % bolus 1,000 mL 0 mL Intravenous Stopped --     01/23/2023 1050 EST sodium chloride 0 9 % bolus 1,000 mL 1,000 mL Intravenous New Bag --     01/23/2023 1323 EST vancomycin (VANCOCIN) IVPB (premix in dextrose) 1,000 mg 200 mL 1,000 mg Intravenous New Bag --     01/23/2023 1145 EST insulin regular (HumuLIN R,NovoLIN R) injection 7 Units 7 Units Intravenous Given --     01/23/2023 1152 EST albuterol inhalation solution 10 mg -- Nebulization Canceled Entry --     01/23/2023 1211 EST calcium gluconate 1 g in sodium chloride 0 9% 50 mL (premix) 0 g Intravenous Stopped --     01/23/2023 1141 EST calcium gluconate 1 g in sodium chloride 0 9% 50 mL (premix) 1 g Intravenous New Bag --     01/23/2023 1237 EST cefepime (MAXIPIME) 2 g/50 mL dextrose IVPB 0 mg Intravenous Stopped --     01/23/2023 1207 EST cefepime (MAXIPIME) 2 g/50 mL dextrose IVPB 2,000 mg Intravenous New Bag --     01/23/2023 1230 EST insulin regular (HumuLIN R,NovoLIN R) 1 Units/mL in sodium chloride 0 9 % 100 mL infusion 3 6 Units/hr Intravenous New Bag --     01/23/2023 1213 EST albuterol inhalation solution 10 mg 10 mg Nebulization Given --     01/23/2023 1240 EST multi-electrolyte (ISOLYTE-S PH 7 4) bolus 1,000 mL 1,000 mL Intravenous New Bag --        Present on Admission:  • Type 2 diabetes mellitus without complication, without long-term current use of insulin (HCC)  • Dementia without behavioral disturbance (HCC)  • Skin ulcer of sacrum, limited to breakdown of skin (HCC)      Admitting Diagnosis: Hyperkalemia [E87 5]  Hypernatremia [E87 0]  Unresponsive [R41 89]  ANGELICA (acute kidney injury) ([facility] [address] ) [N17 9]  Acute encephalopathy [G93 40]  Age/Sex: 69 y o  male  Admission Orders:  Scheduled Medications:  cefepime, 2,000 mg, Intravenous, Q12H  levothyroxine, 75 mcg, Oral, Daily Before Breakfast      Continuous IV Infusions:  dextrose 5% lactated ringer's, 250 mL, Intravenous, Continuous  insulin regular (HumuLIN R,NovoLIN R) infusion, 0 1-30 Units/hr, Intravenous, Continuous  norepinephrine, 1-30 mcg/min, Intravenous, Titrated  NxStage K 2/Ca 3, 20,000 mL, Dialysis, Continuous  sodium chloride, 250 mL/hr, Intravenous, Continuous  vasopressin, 0 04 Units/min, Intravenous, Continuous      PRN Meds:       IP CONSULT TO TOXICOLOGY  IP CONSULT TO NEPHROLOGY  IP CONSULT TO PHARMACY    Network Utilization Review Department  ATTENTION: Please call with any questions or concerns to [phone] and carefully listen to the prompts so that you are directed to the right person  All voicemails are confidential   Harika Keita all requests for admission clinical reviews, approved or denied determinations and any other requests to dedicated fax number below belonging to the campus where the patient is receiving treatment   List of dedicated fax numbers for the Facilities:  [address] DENIALS (Administrative/Medical Necessity) [phone]   [address] (Maternity/NICU/Pediatrics) [phone]   5 Kendy Aldridge [phone]   [facility] [phone]   [address] [address] [facility] 28 [phone]   [facility]7 [facility] Sarika Lombardo [facility] 134 815 [facility] [phone]

## 2023-01-24 NOTE — ASSESSMENT & PLAN NOTE
· A stage 4 ulcer is noted on the sacrum    · Local wound care for now  · CTAP to further eval for bony infection/underlying abscess  · Pressure offload  · Wound care consult

## 2023-01-24 NOTE — CONSULTS
Vancomycin therapy has been discontinued  Pharmacy will sign off  Thank you for this consult  Please do not hesitate to call us with questions or re-consult us if the need arises       Lianne Kaiser, PharmD, [address] and Internal Medicine Clinical Pharmacist  [phone] or via Car

## 2023-01-24 NOTE — ASSESSMENT & PLAN NOTE
Lab Results   Component Value Date    CREATININE 3 38 (H) 01/24/2023    CREATININE 4 71 (H) 01/24/2023    CREATININE 5 20 (H) 01/23/2023       Lab Results   Component Value Date    EGFR 16 01/24/2023    EGFR 11 01/24/2023    EGFR 9 01/23/2023     Bl 0 9  (POA 6)    Plan:    • d5 0 5 normal saline  • CVVH  • Nephro following  • Monitor BMP daily and observe for downward trend of creatinine  • Avoid hypoperfusion of the kidneys, minimize nephrotoxins

## 2023-01-24 NOTE — PROCEDURES
Arterial Line Insertion    Date/Time: 1/23/2023 7:01 PM  Performed by: Alfonso Tay DO  Authorized by: Alfonso Tay DO     Patient location:  Bedside  Consent:     Consent obtained:  Written    Consent given by:  Spouse    Risks discussed:  Bleeding, ischemia, repeat procedure, pain and infection  Universal protocol:     Procedure explained and questions answered to patient or proxy's satisfaction: yes      Patient identity confirmed:  Arm band  Indications:     Indications: hemodynamic monitoring and continuous blood pressure monitoring    Pre-procedure details:     Skin preparation:  Chlorhexidine    Preparation: Patient was prepped and draped in sterile fashion    Anesthesia (see MAR for exact dosages):      Anesthesia method:  Local infiltration    Local anesthetic:  Lidocaine 1% w/o epi  Procedure details:     Location / Tip of Catheter:  Radial    Laterality:  Right    Needle gauge:  18 G    Placement technique:  Percutaneous    Number of attempts:  2    Successful placement: yes      Transducer: waveform confirmed    Post-procedure details:     Post-procedure:  Sterile dressing applied and sutured    CMS:  Normal

## 2023-01-24 NOTE — ASSESSMENT & PLAN NOTE
Lab Results   Component Value Date    CAIONIZED 1 10 (L) 01/24/2023    CAIONIZED 1 13 01/24/2023    CAIONIZED 1 01 (L) 01/23/2023     Given 1 g sodium bicarb  Prob secondary to decreased oral intake

## 2023-01-24 NOTE — ASSESSMENT & PLAN NOTE
Lab Results   Component Value Date    HGBA1C 9 5 (H) 12/31/2022       Recent Labs     01/24/23  0506 01/24/23  0609 01/24/23  0715 01/24/23  0825   POCGLU 86 77 116 170*       Blood Sugar Average: Last 72 hrs:  (P) 093 [id number]   · Blood glucose and betahydroxybuterate were elevated on admission  · Currently still on insulin drip  Gap not yet closed, but possibly due to sepsis/ metformin toxicity  Patient is still not able to eat as well  · Currently on d5 1/2 normal saline  · edno consult  · Possibly use 8 units lantus once patient can continue diet

## 2023-01-24 NOTE — MALNUTRITION/BMI
This medical record reflects one or more clinical indicators suggestive of malnutrition and/or morbid obesity  Malnutrition Findings:   Adult Malnutrition type: Chronic illness  Adult Degree of Malnutrition: Other severe protein calorie malnutrition  Malnutrition Characteristics: Fat loss, Muscle loss, Weight loss                  360 Statement: Severe protein-calorie malnutrition in context of chronic illness r/t inadequate energy intake, increased needs as evidance by severe body fat (orbital, triceps, ribcage are), and muscle mass depletions (temporal wasting, protruding clavicles, shoulders), significant 12%, 5 6kg wt loss x 3 months (45 4kg 10/6 -> 39 8kg 1/24/23); treated with PO diet with oral supplements vs EN    BMI Findings:  Adult BMI Classifications: Underweight < 18 5        Body mass index is 17 72 kg/m²  See Nutrition note dated 1/24/23for additional details  Completed nutrition assessment is viewable in the nutrition documentation

## 2023-01-24 NOTE — PROGRESS NOTES
NEPHROLOGY PROGRESS NOTE   Jeni Rosenthal 69 y o  male MRN: [id number]  Unit/Bed#: ICU 14 Encounter: [id number]      HPI/24hr EVENTS:    -67-year-old male past medical history of DM2, hyperlipidemia, Alzheimer's dementia, hypothyroidism  Presented with altered mental status, found to have ANGELICA, metabolic acidosis, hyperkalemia, hyponatremia, hyperglycemia    Nephrology consulted for assistance in management    -Started on CVVHD running even overnight, per nursing filter clotted once overnight    ASSESSMENT/PLAN:    ANGELICA  (POA)  -Baseline creatinine: 0 7-0 9 according to prior labs from 2021, did have recent ANGELICA in the end of December with a peak creatinine of 2 42 and on dialysis requiring  -Creatinine on admission 8 27, most recent creatinine 3 38 on CVVHD  -Etiology: Prerenal azotemia due to osmotic diuresis decreased oral intake with subsequent conversion to ATN, sepsis, hypotension  -UA: Glucose, large blood, small leukocytes, 2+ protein, innumerable RBCs, 10-20 WBCs, normal bacteria  -Renal imaging: CT abdomen pelvis without contrast on admission without hydronephrosis  -Avoid hypotension, avoid nephrotoxins, avoid NSAIDS  -Trend BMP  -Current access is temp HD catheter placed 1/23  -CVVHD was initiated 1/23, currently running blood flow rate 300, DFR 30 ml/kg running even  -Currently has Pizano catheter, made 360 mL urine overnight  -Received aggressive IV fluid resuscitation on admission, is currently on hypotonic fluid, will discontinue this given reduction in sodium level however this is also likely secondary to CVVHD, could utilize isotonic IV fluids if requiring ongoing volume expansion  -Hemodialysis consent obtained 1/23 by Dr Ofelia montes metabolic acidosis  -Anion gap of 34 with a bicarbonate of 8 on admission in the setting of profound hypoglycemia  -Differential include DKA versus lactic acidosis due to metformin toxicity  -Toxicology was consulted admission  -Initially treated with aggressive IV fluid hydration and IV insulin with minimal improvement  -Recent bicarbonate is minimally improved to 9, and anion gap is improved to 22, has had persistent lactic acid elevations in the 11 range, most recently 8 2  -Continue to monitor with volume expansion and CVVHD, CVVHD is slower in clearing metformin then IHD  -Recent ABG overnight with pH 7 23/13/127/6 appearing appropriately respiratory compensated  -If developing concern for pulmonary edema/volume overload defer to critical care team to start running negative fluid balance the CVVHD    Shock  -Differentials include hypovolemic due to DKA/osmotic diuresis/decreased oral intake versus septic in the setting of gram-negative bacteremia suspected to due to UTI    Hypernatremia  -Had a recent admission at the end of December for hyponatremia requiring hypotonic IV solutions  -Sodium was 159/160 on admission glucose corrected to 168, most recently is 145 after receiving hypotonic fluids and started on CVVHD overnight  -Attempt to avoid rapid overcorrection, would stop hypotonic IV fluid at this point and do further resuscitation with isotonic solutions, hypernatremia likely also corrected due to CVVHD use for severe acidosis    Anemia  -Recent hemoglobin 9 0  -Recommend transfuse goal greater than 7 per primary team  -Care per primary team    Thrombocytopenia  -Recent platelets of 351  -Care per primary team    Sepsis/gram-negative bacteremia  -Concern for urinary source  -On broad-spectrum antibiotics per primary team    Hyperglycemia  -On insulin infusion per critical care team    Encephalopathy  -Care per primary team    Addition medical problems: Dementia, sacral decubitus ulcer    SUBJECTIVE:  Nonverbal    ROS:  Review of Systems   Unable to perform ROS: Mental status change      A complete 10 point review of systems was performed and found to be negative unless otherwise noted above or in the HPI      OBJECTIVE:  Current Weight: Weight - Scale: 39 8 kg (87 lb 11 9 oz)  Vitals:    01/24/23 0700 01/24/23 0800 01/24/23 0815 01/24/23 0900   BP:       BP Location:       Pulse: 82 82  78   Resp: 17 18  20   Temp: (!) 96 8 °F (36 °C) 97 5 °F (36 4 °C) 97 9 °F (36 6 °C) 97 5 °F (36 4 °C)   TempSrc:   Bladder Bladder   SpO2: 100% 100%  100%   Weight:       Height:           Intake/Output Summary (Last 24 hours) at 1/24/2023 0945  Last data filed at 1/24/2023 0600  Gross per 24 hour   Intake 9190 95 ml   Output 2851 ml   Net 6339 95 ml     Physical Exam  Vitals and nursing note reviewed  Constitutional:       General: He is not in acute distress  Appearance: He is ill-appearing  He is not toxic-appearing or diaphoretic  Comments: Frail-appearing   HENT:      Head: Normocephalic and atraumatic  Nose: Nose normal       Mouth/Throat:      Mouth: Mucous membranes are dry  Eyes:      General: No scleral icterus  Cardiovascular:      Rate and Rhythm: Normal rate and regular rhythm  Pulses: Normal pulses  Pulmonary:      Effort: Pulmonary effort is normal  No respiratory distress  Breath sounds: No wheezing or rales  Abdominal:      General: Abdomen is flat  Palpations: Abdomen is soft  Musculoskeletal:      Cervical back: Neck supple  Right lower leg: No edema  Left lower leg: No edema  Skin:     General: Skin is warm and dry  Capillary Refill: Capillary refill takes less than 2 seconds     Neurological:      Comments: Does not follow verbal commands          Medications:    Current Facility-Administered Medications:   •  cefepime (MAXIPIME) 2,000 mg in dextrose 5 % 50 mL IVPB, 2,000 mg, Intravenous, Q12H, AKSHAT Ziegler, Stopped at 01/24/23 0102  •  dextrose 5 % in lactated Ringer's infusion, 250 mL, Intravenous, Continuous, Chris Yang MD  •  insulin regular (HumuLIN R,NovoLIN R) 1 Units/mL in sodium chloride 0 9 % 100 mL infusion, 0 1-30 Units/hr, Intravenous, Continuous, AKSHAT Ziegler, Last Rate: 3 6 mL/hr at 01/24/23 0826, 3 6 Units/hr at 01/24/23 6754  •  levothyroxine tablet 75 mcg, 75 mcg, Oral, Daily Before Breakfast, AKSHAT Jules  •  NOREPINEPHRINE 4 MG  ML NSS (CMPD ORDER) infusion, 1-30 mcg/min, Intravenous, Titrated, AKSHAT Jules, Last Rate: 37 5 mL/hr at 01/24/23 0525, 10 mcg/min at 01/24/23 4587  •  NxStage K 2/Ca 3 dialysis solution (RFP-400) 20,000 mL, 20,000 mL, Dialysis, Continuous, Ike Tello MD, 20,000 mL at 01/23/23 2130  •  sodium chloride infusion 0 45 %, 250 mL/hr, Intravenous, Continuous, AKSHAT Jules, Stopped at 01/23/23 1834  •  vasopressin (PITRESSIN) 20 Units in sodium chloride 0 9 % 100 mL infusion, 0 04 Units/min, Intravenous, Continuous, Treva Kwan, AKSHAT, Last Rate: 12 mL/hr at 01/24/23 0536, 0 04 Units/min at 01/24/23 0536    Laboratory Results:  Results from last 7 days   Lab Units 01/24/23  0507 01/24/23  0014 01/23/23  2245 01/23/23 2002 01/23/23 2000 01/23/23  1609 01/23/23  1419 01/23/23  1137 01/23/23  1055   WBC Thousand/uL 2 55*  --   --   --   --   --   --   --  9 04   HEMOGLOBIN g/dL 9 0*  --   --   --   --   --   --   --  12 4   HEMATOCRIT % 29 4*  --   --   --   --   --   --   --  43 1   PLATELETS Thousands/uL 100*  --   --   --   --   --   --   --  223   POTASSIUM mmol/L 3 8 4 0 4 2 5 0 4 9 5 8* 6 6*   < > 8 0*   CHLORIDE mmol/L 114* 118* 119* 121* 121* 122* 125*   < > 117*   CO2 mmol/L 9* 7* 7* <6* <6* 7* 6*   < > 8*   BUN mg/dL 92* 124* 132* 155* 150* 155* 168*   < > 200*   CREATININE mg/dL 3 38* 4 71* 5 20* 6 27* 6 38* 6 78* 7 53*   < > 8 27*   CALCIUM mg/dL 8 0* 8 1* 7 2* 7 6* 7 6* 7 7* 8 2*   < > 9 5   MAGNESIUM mg/dL 2 0 2 3 2 5 2 8* 2 9* 2 9* 2 9*  --  3 3*   PHOSPHORUS mg/dL 3 2 4 1 4 6* 6 1* 6 5* 7 0* 8 5*  --   --     < > = values in this interval not displayed  I have personally reviewed the blood work as stated above and in my note  I have personally reviewed CXR imaging studies    I have personally reviewed critical care note

## 2023-01-24 NOTE — PROGRESS NOTES
[address] [facility] 1945, 69 y o  male MRN: [id number]  Unit/Bed#: ICU 14 Encounter: [id number]  Primary Care Provider: Kenan Cruz MD   Date and time admitted to hospital: 1/23/2023 90:19 AM    * Metabolic acidosis  Assessment & Plan  Lab Results   Component Value Date    LACTICACID 11 1 (HH) 01/24/2023    LACTICACID 11 7 ([facility]) 01/24/2023    LACTICACID 11 7 ([facility]) 01/23/2023     · The patient presented to the ED with altered mental status  His labs revealed a significant metabolic acidosis, likely multifactorial related to DKA, severe volume depletion, elevated lactic acid level, ANGELICA, sepsis, metformin toxicity  · We will treat the patient per the DKA protocol   · Treat underlying infection  · Continue d5 1/2 infusion  · We will trend his [place]- he may require a bicarbonate infusion    Sepsis [facility])  Assessment & Plan  Lab Results   Component Value Date    LACTICACID 11 1 ([facility]) 01/24/2023    LACTICACID 11 7 ([facility]) 01/24/2023    LACTICACID 11 7 (HH) 01/23/2023    WBC 2 55 (L) 01/24/2023    WBC 9 04 01/23/2023    WBC 7 45 01/12/2023     Hypothermic, hypotensive requiring vasopressin 0 04 units/min, levo 10 mcg/min  Secondary to bacteremia from UTI  BC proteus x2 awaiting sensitivities  UC pending    Continue D5 1/2 fluid  Patient is positive 6 L from admission  I and O  Follow cultures  Cefipime and vanc day 2  Possibly discontine vancomycin, deescalate to ceftriaxone once cultures show sensitivities    DKA (diabetic ketoacidosis) [facility])  Assessment & Plan  Lab Results   Component Value Date    HGBA1C 9 5 (H) 12/31/2022       Recent Labs     01/24/23  0506 01/24/23  0609 01/24/23  0715 01/24/23  0825   POCGLU 86 77 116 170*       Blood Sugar Average: Last 72 hrs:  (P) 046 [id number]   · Blood glucose and betahydroxybuterate were elevated on admission  · Currently still on insulin drip  Gap not yet closed, but possibly due to sepsis/ metformin toxicity   Patient is still not able to eat as well  · Currently on d5 1/2 normal saline  · edno consult  · Possibly use 8 units lantus once patient can continue diet  UTI (urinary tract infection)  Assessment & Plan  See sepsis plan    Bacteremia  Assessment & Plan  See plan above    Pancytopenia [facility])  Assessment & Plan  Lab Results   Component Value Date     (L) 01/24/2023    WBC 2 55 (L) 01/24/2023    HGB 9 0 (L) 01/24/2023     Postive 6 l yesterday  Possibly from dilution  Continue to monitor    Hypocalcemia  Assessment & Plan  Lab Results   Component Value Date    CAIONIZED 1 10 (L) 01/24/2023    CAIONIZED 1 13 01/24/2023    CAIONIZED 1 01 (L) 01/23/2023     Given 1 g sodium bicarb  Prob secondary to decreased oral intake    Constipation  Assessment & Plan  Fecal impaction  Bowel regimen started    Acute encephalopathy  Assessment & Plan  · Likely toxic metabolic in the setting of his electrolyte abnormalities/ DKA/ Sepsis on his chronic dementia  · We will monitor his neuro status closely    Hyperkalemia  Assessment & Plan  · Likely multifactorial related to his volume depletion and ANGELICA  · We will trend this for now    Skin ulcer of sacrum, limited to breakdown of skin (HCC)  Assessment & Plan  · A stage 4 ulcer is noted on the sacrum    · Local wound care for now  · CTAP to further eval for bony infection/underlying abscess  · Pressure offload  · Wound care consult    ANGELICA (acute kidney injury) [facility])  Assessment & Plan  Lab Results   Component Value Date    CREATININE 3 38 (H) 01/24/2023    CREATININE 4 71 (H) 01/24/2023    CREATININE 5 20 (H) 01/23/2023       Lab Results   Component Value Date    EGFR 16 01/24/2023    EGFR 11 01/24/2023    EGFR 9 01/23/2023     Bl 0 9  (POA 6)    Plan:    • d5 0 5 normal saline  • CVVH  • Nephro following  • Monitor BMP daily and observe for downward trend of creatinine  • Avoid hypoperfusion of the kidneys, minimize nephrotoxins        Dementia without behavioral disturbance [facility])  Assessment & Plan  · Appears to be at baseline however will confirm with family  · We will monitor his mental status closely  · Hope to avoid sedatives, anxiolytics    Type 2 diabetes mellitus without complication, without long-term current use of insulin [facility])  Assessment & Plan  Lab Results   Component Value Date    HGBA1C 9 5 (H) 2022       Recent Labs     23  0312 23  0408 23  0438 23  0506   POCGLU 74 49* 135 86       Blood Sugar Average: Last 72 hrs:  (P) 211 3125   · We will treat his DKA per the protocol  · We will hold his outpatient dillan    Hypernatremia-resolved as of 2023  Assessment & Plan  · Likely related to volume depletion  · We will attempt to slowly correct this      ----------------------------------------------------------------------------------------  HPI/24hr events: Patient iss hypotensive    Patient appropriate for transfer out of the ICU today?: No  Disposition: Continue Critical Care   Code Status: Level 3 - DNAR and DNI  ---------------------------------------------------------------------------------------  SUBJECTIVE  Patient demented    Review of Systems   Unable to perform ROS: Dementia   Endocrine: Negative for cold intolerance       Review of systems was unable to be performed secondary to Altered mental status  ---------------------------------------------------------------------------------------  OBJECTIVE    Vitals   Vitals:    23 0020 23 0030 23 0600 23 0630   BP:       BP Location:       Pulse: 86 86  82   Resp: 21 18  18   Temp: (!) 93 2 °F (34 °C) (!) 93 6 °F (34 2 °C)  (!) 96 4 °F (35 8 °C)   TempSrc:       SpO2: 100% 100%  100%   Weight:   39 8 kg (87 lb 11 9 oz)    Height:         Temp (24hrs), Av 5 °F (34 2 °C), Min:89 8 °F (32 1 °C), Max:97 7 °F (36 5 °C)  Current: Temperature: (!) 96 4 °F (35 8 °C)  Arterial Line BP: 108/46  Arterial Line MAP (mmHg): (!) 64 mmHg    Respiratory:  SpO2: SpO2: 100 %  Nasal Cannula O2 Flow Rate (L/min): 2 L/min    Invasive/non-invasive ventilation settings   Respiratory    Lab Data (Last 4 hours)    None         O2/Vent Data (Last 4 hours)    None                Physical Exam  Vitals and nursing note reviewed  Constitutional:       General: He is not in acute distress  Appearance: He is well-developed  Comments: Cachectic  Not oriented  On bear hugger   HENT:      Head: Normocephalic and atraumatic  Eyes:      Conjunctiva/sclera: Conjunctivae normal    Cardiovascular:      Rate and Rhythm: Normal rate and regular rhythm  Heart sounds: No murmur heard  Pulmonary:      Effort: Pulmonary effort is normal  No respiratory distress  Breath sounds: Normal breath sounds  Abdominal:      Palpations: Abdomen is soft  Tenderness: There is no abdominal tenderness  Musculoskeletal:         General: No swelling  Cervical back: Neck supple  Skin:     General: Skin is warm and dry  Capillary Refill: Capillary refill takes less than 2 seconds  Neurological:      Mental Status: He is alert     Psychiatric:         Mood and Affect: Mood normal                 Laboratory and Diagnostics:  Results from last 7 days   Lab Units 01/24/23  0507 01/23/23  1055   WBC Thousand/uL 2 55* 9 04   HEMOGLOBIN g/dL 9 0* 12 4   HEMATOCRIT % 29 4* 43 1   PLATELETS Thousands/uL 100* 223   NEUTROS PCT %  --  93*   MONOS PCT %  --  3*     Results from last 7 days   Lab Units 01/24/23  0507 01/24/23  0014 01/23/23  2245 01/23/23 2002 01/23/23 2000 01/23/23  1609 01/23/23  1419 01/23/23  1137 01/23/23  1055   SODIUM mmol/L 145 151* 153* 157* 157* 158* 159*   < > 159*   POTASSIUM mmol/L 3 8 4 0 4 2 5 0 4 9 5 8* 6 6*   < > 8 0*   CHLORIDE mmol/L 114* 118* 119* 121* 121* 122* 125*   < > 117*   CO2 mmol/L 9* 7* 7* <6* <6* 7* 6*   < > 8*   ANION GAP mmol/L 22* 26* 27* >30* >30* 29* 28*   < > 34*   BUN mg/dL 92* 124* 132* 155* 150* 155* 168*   < > 200*   CREATININE mg/dL 3 38* 4 71* 5 20* 6 27* 6 38* 6 78* 7 53*   < > 8 27*   CALCIUM mg/dL 8 0* 8 1* 7 2* 7 6* 7 6* 7 7* 8 2*   < > 9 5   GLUCOSE RANDOM mg/dL 93 165* 204* 236* 233* 329* 480*   < > 652*   ALT U/L  --   --   --   --   --   --   --   --  24   AST U/L  --   --   --   --   --   --   --   --  26   ALK PHOS U/L  --   --   --   --   --   --   --   --  105*   ALBUMIN g/dL  --   --   --   --   --   --   --   --  3 2*   TOTAL BILIRUBIN mg/dL  --   --   --   --   --   --   --   --  0 64    < > = values in this interval not displayed  Results from last 7 days   Lab Units 01/24/23  0507 01/24/23  0014 01/23/23  2245 01/23/23 2002 01/23/23 2000 01/23/23  1609 01/23/23  1419   MAGNESIUM mg/dL 2 0 2 3 2 5 2 8* 2 9* 2 9* 2 9*   PHOSPHORUS mg/dL 3 2 4 1 4 6* 6 1* 6 5* 7 0* 8 5*      Results from last 7 days   Lab Units 01/23/23  1055   INR  1 57*   PTT seconds 31          Results from last 7 days   Lab Units 01/24/23  0507 01/24/23  0014 01/23/23 2245 01/23/23 2000 01/23/23  1609 01/23/23  1422 01/23/23  1302   LACTIC ACID mmol/L 11 1* 11 7* 11 7* 13 0* 11 6*  11 6* 11 1* 10 0*     ABG:  Results from last 7 days   Lab Units 01/24/23  0015   PH ART  7 231*   PCO2 ART mm Hg 15 5*   PO2 ART mm Hg 127 6   HCO3 ART mmol/L 6 4*   BASE EXC ART mmol/L -19 0   ABG SOURCE  Line, Arterial     VBG:  Results from last 7 days   Lab Units 01/24/23  0015 01/23/23  1422   PH ASHLEY   --  7 015*   PCO2 ASHLEY mm Hg  --  23 4*   PO2 ASHLEY mm Hg  --  41 7   HCO3 ASHLEY mmol/L  --  5 8*   BASE EXC ASHLEY mmol/L  --  -23 7   ABG SOURCE  Line, Arterial  --      Results from last 7 days   Lab Units 01/23/23  1609   PROCALCITONIN ng/ml 12 76*       Micro  Results from last 7 days   Lab Units 01/23/23  1206 01/23/23  1143   GRAM STAIN RESULT  Gram negative rods* Gram negative rods*       EKG:    Imaging: I have personally reviewed pertinent reports     and I have personally reviewed pertinent films in PACS    Intake and Output  I/O       01/22 0701  01/23 0700 01/23 0701  01/24 0700 01/24 0701  01/25 0700    I V  (mL/kg)  6791 (170 6)     IV Piggyback  2400     Total Intake(mL/kg)  9191 (230 9)     Urine (mL/kg/hr)  360     Emesis/NG output  30     Other  2461     Total Output  2851     Net  +6340            Unmeasured Emesis Occurrence  1 x           Height and Weights   Height: 4' 11" (149 9 cm)  IBW (Ideal Body Weight): 47 7 kg  Body mass index is 17 72 kg/m²    Weight (last 2 days)     Date/Time Weight    01/24/23 0600 39 8 (87 74)    01/23/23 1535 35 8 (79)    01/23/23 1127 36 (79 37)            Nutrition        Active Medications  Scheduled Meds:  Current Facility-Administered Medications   Medication Dose Route Frequency Provider Last Rate   • cefepime  2,000 mg Intravenous Q12H Margaretann Ang, CRNP Stopped (01/24/23 0102)   • dextrose 5 % and sodium chloride 0 45 %  250 mL/hr Intravenous Continuous Margaretann Ang, CRNP 250 mL/hr (01/24/23 1622)   • insulin regular (HumuLIN R,NovoLIN R) infusion  0 1-30 Units/hr Intravenous Continuous Margaretann Ang, CRNP 3 6 Units/hr (01/24/23 0826)   • levothyroxine  75 mcg Oral Daily Before Breakfast Margaretann Ang, CRNP     • norepinephrine  1-30 mcg/min Intravenous Titrated Margaretann Ang, CRNP 10 mcg/min (01/24/23 0525)   • NxStage K 2/Ca 3  20,000 mL Dialysis Continuous Deja Musa MD     • polyethylene glycol  17 g Oral Daily Daniella Harris MD     • potassium chloride  40 mEq Intravenous Once John Levi MD     • senna-docusate sodium  1 tablet Oral HS Daniella Harris MD     • sodium chloride  250 mL/hr Intravenous Continuous Margaretann Ang, CRNP Stopped (01/23/23 1834)   • vancomycin  500 mg Intravenous Q24H Margaretann Ang, CRNP     • vasopressin  0 04 Units/min Intravenous Continuous Sara Saltness, CRNP 0 04 Units/min (01/24/23 0536)     Continuous Infusions:  dextrose 5 % and sodium chloride 0 45 %, 250 mL/hr, Last Rate: 250 mL/hr (01/24/23 0644)  insulin regular (HumuLIN R,NovoLIN R) infusion, 0 1-30 Units/hr, Last Rate: 3 6 Units/hr (01/24/23 0826)  norepinephrine, 1-30 mcg/min, Last Rate: 10 mcg/min (01/24/23 0503)  NxStage K 2/Ca 3, 20,000 mL  sodium chloride, 250 mL/hr, Last Rate: Stopped (01/23/23 4184)  vasopressin, 0 04 Units/min, Last Rate: 0 04 Units/min (01/24/23 0536)      PRN Meds:        Invasive Devices Review  Invasive Devices     Peripheral Intravenous Line  Duration           Peripheral IV 01/23/23 Left Antecubital <1 day    Peripheral IV 01/23/23 Right Antecubital <1 day    Peripheral IV 01/23/23 Right;Ventral (anterior) Forearm <1 day          Arterial Line  Duration           Arterial Line 01/23/23 Radial <1 day          Hemodialysis Catheter  Duration           HD Temporary Double Catheter <1 day          Drain  Duration           Urethral Catheter Coude 18 Fr  <1 day                Rationale for remaining devices none  ---------------------------------------------------------------------------------------  Advance Directive and Living Will:      Power of :    POLST:    ---------------------------------------------------------------------------------------  Care Time Delivered:     Yogi Banda MD      Portions of the record may have been created with voice recognition software  Occasional wrong word or "sound a like" substitutions may have occurred due to the inherent limitations of voice recognition software    Read the chart carefully and recognize, using context, where substitutions have occurred

## 2023-01-24 NOTE — ASSESSMENT & PLAN NOTE
Lab Results   Component Value Date     (L) 01/24/2023    WBC 2 55 (L) 01/24/2023    HGB 9 0 (L) 01/24/2023     Postive 6 l yesterday    Possibly from dilution  Continue to monitor

## 2023-01-24 NOTE — ASSESSMENT & PLAN NOTE
· Likely toxic metabolic in the setting of his electrolyte abnormalities/ DKA/ Sepsis on his chronic dementia  · We will monitor his neuro status closely

## 2023-01-24 NOTE — ASSESSMENT & PLAN NOTE
Lab Results   Component Value Date    LACTICACID 11 1 (HH) 01/24/2023    LACTICACID 11 7 (HH) 01/24/2023    LACTICACID 11 7 () 01/23/2023    WBC 2 55 (L) 01/24/2023    WBC 9 04 01/23/2023    WBC 7 45 01/12/2023     Hypothermic, hypotensive requiring vasopressin 0 04 units/min, levo 10 mcg/min  Secondary to bacteremia from UTI  BC proteus x2 awaiting sensitivities  UC pending    Continue D5 1/2 fluid   Patient is positive 6 L from admission  I and O  Follow cultures  Cefipime and vanc day 2  Possibly discontine vancomycin, deescalate to ceftriaxone once cultures show sensitivities

## 2023-01-24 NOTE — ASSESSMENT & PLAN NOTE
Lab Results   Component Value Date    HGBA1C 9 5 (H) 12/31/2022       Recent Labs     01/24/23  0312 01/24/23  0408 01/24/23  0438 01/24/23  0506   POCGLU 74 49* 135 86       Blood Sugar Average: Last 72 hrs:  (P) 211 3125   · We will treat his DKA per the protocol  · We will hold his outpatient dillan

## 2023-01-25 PROBLEM — R93.89 ABNORMAL CT OF THE CHEST: Status: ACTIVE | Noted: 2023-01-01

## 2023-01-25 PROBLEM — R19.7 DIARRHEA: Status: ACTIVE | Noted: 2023-01-01

## 2023-01-25 PROBLEM — E43 SEVERE PROTEIN-CALORIE MALNUTRITION (HCC): Status: ACTIVE | Noted: 2023-01-01

## 2023-01-25 PROBLEM — K92.1 HEMATOCHEZIA: Status: RESOLVED | Noted: 2023-01-01 | Resolved: 2023-01-01

## 2023-01-25 PROBLEM — E11.10 DKA (DIABETIC KETOACIDOSIS) (HCC): Status: RESOLVED | Noted: 2023-01-01 | Resolved: 2023-01-01

## 2023-01-25 PROBLEM — D64.9 ANEMIA: Status: ACTIVE | Noted: 2023-01-01

## 2023-01-25 PROBLEM — E83.51 HYPOCALCEMIA: Status: RESOLVED | Noted: 2023-01-01 | Resolved: 2023-01-01

## 2023-01-25 PROBLEM — E87.5 HYPERKALEMIA: Status: RESOLVED | Noted: 2023-01-01 | Resolved: 2023-01-01

## 2023-01-25 PROBLEM — E87.20 LACTIC ACIDOSIS: Status: ACTIVE | Noted: 2023-01-25

## 2023-01-25 PROBLEM — D61.818 PANCYTOPENIA (HCC): Status: RESOLVED | Noted: 2023-01-24 | Resolved: 2023-01-25

## 2023-01-25 PROBLEM — K59.00 CONSTIPATION: Status: RESOLVED | Noted: 2023-01-01 | Resolved: 2023-01-01

## 2023-01-25 LAB
ABO GROUP BLD: NORMAL
ABO GROUP BLD: NORMAL
ACANTHOCYTES BLD QL SMEAR: PRESENT
ACANTHOCYTES BLD QL SMEAR: PRESENT
ANION GAP SERPL CALCULATED.3IONS-SCNC: 11 MMOL/L (ref 4–13)
ANION GAP SERPL CALCULATED.3IONS-SCNC: 13 MMOL/L (ref 4–13)
ANION GAP SERPL CALCULATED.3IONS-SCNC: 13 MMOL/L (ref 4–13)
ANION GAP SERPL CALCULATED.3IONS-SCNC: 9 MMOL/L (ref 4–13)
ANISOCYTOSIS BLD QL SMEAR: PRESENT
ANISOCYTOSIS BLD QL SMEAR: PRESENT
ARTERIAL PATENCY WRIST A: NO
BASE EXCESS BLDA CALC-SCNC: -6.7 MMOL/L
BASOPHILS # BLD MANUAL: 0 THOUSAND/UL (ref 0–0.1)
BASOPHILS NFR MAR MANUAL: 0 % (ref 0–1)
BILIRUB DIRECT SERPL-MCNC: 0.26 MG/DL (ref 0–0.2)
BLD GP AB SCN SERPL QL: NEGATIVE
BLD SMEAR INTERP: NORMAL
BODY TEMPERATURE: 97.3 DEGREES FEHRENHEIT
BUN SERPL-MCNC: 17 MG/DL (ref 5–25)
BUN SERPL-MCNC: 20 MG/DL (ref 5–25)
BUN SERPL-MCNC: 29 MG/DL (ref 5–25)
BUN SERPL-MCNC: 41 MG/DL (ref 5–25)
BURR CELLS BLD QL SMEAR: PRESENT
BURR CELLS BLD QL SMEAR: PRESENT
CA-I BLD-SCNC: 1.08 MMOL/L (ref 1.12–1.32)
CA-I BLD-SCNC: 1.09 MMOL/L (ref 1.12–1.32)
CA-I BLD-SCNC: 1.12 MMOL/L (ref 1.12–1.32)
CA-I BLD-SCNC: 1.13 MMOL/L (ref 1.12–1.32)
CALCIUM SERPL-MCNC: 7.5 MG/DL (ref 8.4–10.2)
CALCIUM SERPL-MCNC: 7.8 MG/DL (ref 8.4–10.2)
CHLORIDE SERPL-SCNC: 104 MMOL/L (ref 96–108)
CHLORIDE SERPL-SCNC: 109 MMOL/L (ref 96–108)
CHLORIDE SERPL-SCNC: 109 MMOL/L (ref 96–108)
CHLORIDE SERPL-SCNC: 111 MMOL/L (ref 96–108)
CO2 SERPL-SCNC: 15 MMOL/L (ref 21–32)
CO2 SERPL-SCNC: 16 MMOL/L (ref 21–32)
CO2 SERPL-SCNC: 18 MMOL/L (ref 21–32)
CO2 SERPL-SCNC: 19 MMOL/L (ref 21–32)
CREAT SERPL-MCNC: 0.68 MG/DL (ref 0.6–1.3)
CREAT SERPL-MCNC: 0.79 MG/DL (ref 0.6–1.3)
CREAT SERPL-MCNC: 1.07 MG/DL (ref 0.6–1.3)
CREAT SERPL-MCNC: 1.59 MG/DL (ref 0.6–1.3)
DOHLE BOD BLD QL SMEAR: PRESENT
EOSINOPHIL # BLD MANUAL: 0 THOUSAND/UL (ref 0–0.4)
EOSINOPHIL NFR BLD MANUAL: 0 % (ref 0–6)
ERYTHROCYTE [DISTWIDTH] IN BLOOD BY AUTOMATED COUNT: 16 % (ref 11.6–15.1)
ERYTHROCYTE [DISTWIDTH] IN BLOOD BY AUTOMATED COUNT: 16.4 % (ref 11.6–15.1)
FERRITIN SERPL-MCNC: 558 NG/ML (ref 8–388)
FIBRINOGEN PPP-MCNC: 399 MG/DL (ref 227–495)
GFR SERPL CREATININE-BSD FRML MDRD: 41 ML/MIN/1.73SQ M
GFR SERPL CREATININE-BSD FRML MDRD: 67 ML/MIN/1.73SQ M
GFR SERPL CREATININE-BSD FRML MDRD: 87 ML/MIN/1.73SQ M
GFR SERPL CREATININE-BSD FRML MDRD: 92 ML/MIN/1.73SQ M
GLUCOSE SERPL-MCNC: 102 MG/DL (ref 65–140)
GLUCOSE SERPL-MCNC: 106 MG/DL (ref 65–140)
GLUCOSE SERPL-MCNC: 107 MG/DL (ref 65–140)
GLUCOSE SERPL-MCNC: 111 MG/DL (ref 65–140)
GLUCOSE SERPL-MCNC: 113 MG/DL (ref 65–140)
GLUCOSE SERPL-MCNC: 121 MG/DL (ref 65–140)
GLUCOSE SERPL-MCNC: 127 MG/DL (ref 65–140)
GLUCOSE SERPL-MCNC: 159 MG/DL (ref 65–140)
GLUCOSE SERPL-MCNC: 164 MG/DL (ref 65–140)
GLUCOSE SERPL-MCNC: 167 MG/DL (ref 65–140)
GLUCOSE SERPL-MCNC: 172 MG/DL (ref 65–140)
GLUCOSE SERPL-MCNC: 183 MG/DL (ref 65–140)
GLUCOSE SERPL-MCNC: 209 MG/DL (ref 65–140)
GLUCOSE SERPL-MCNC: 242 MG/DL (ref 65–140)
GLUCOSE SERPL-MCNC: 273 MG/DL (ref 65–140)
GLUCOSE SERPL-MCNC: 293 MG/DL (ref 65–140)
GLUCOSE SERPL-MCNC: 78 MG/DL (ref 65–140)
GLUCOSE SERPL-MCNC: 79 MG/DL (ref 65–140)
GLUCOSE SERPL-MCNC: 84 MG/DL (ref 65–140)
GLUCOSE SERPL-MCNC: 86 MG/DL (ref 65–140)
GLUCOSE SERPL-MCNC: 92 MG/DL (ref 65–140)
HCO3 BLDA-SCNC: 14.7 MMOL/L (ref 22–28)
HCT VFR BLD AUTO: 22.1 % (ref 36.5–49.3)
HCT VFR BLD AUTO: 25 % (ref 36.5–49.3)
HGB BLD-MCNC: 7.4 G/DL (ref 12–17)
HGB BLD-MCNC: 8.1 G/DL (ref 12–17)
HYPERCHROMIA BLD QL SMEAR: PRESENT
IRON SATN MFR SERPL: 12 % (ref 20–50)
IRON SERPL-MCNC: 16 UG/DL (ref 65–175)
LACTATE SERPL-SCNC: 3.6 MMOL/L (ref 0.5–2)
LACTATE SERPL-SCNC: 4.4 MMOL/L (ref 0.5–2)
LACTATE SERPL-SCNC: 6.3 MMOL/L (ref 0.5–2)
LACTATE SERPL-SCNC: 6.4 MMOL/L (ref 0.5–2)
LDH SERPL-CCNC: 376 U/L (ref 140–271)
LYMPHOCYTES # BLD AUTO: 0.41 THOUSAND/UL (ref 0.6–4.47)
LYMPHOCYTES # BLD AUTO: 7 % (ref 14–44)
LYMPHOCYTES NFR BLD: 6 % (ref 14–44)
MAGNESIUM SERPL-MCNC: 1.6 MG/DL (ref 1.9–2.7)
MAGNESIUM SERPL-MCNC: 1.7 MG/DL (ref 1.9–2.7)
MAGNESIUM SERPL-MCNC: 1.8 MG/DL (ref 1.9–2.7)
MAGNESIUM SERPL-MCNC: 1.9 MG/DL (ref 1.9–2.7)
MCH RBC QN AUTO: 28.8 PG (ref 26.8–34.3)
MCH RBC QN AUTO: 28.9 PG (ref 26.8–34.3)
MCHC RBC AUTO-ENTMCNC: 32.4 G/DL (ref 31.4–37.4)
MCHC RBC AUTO-ENTMCNC: 33.5 G/DL (ref 31.4–37.4)
MCV RBC AUTO: 86 FL (ref 82–98)
MCV RBC AUTO: 89 FL (ref 82–98)
MONOCYTES # BLD AUTO: 0.3 THOUSAND/UL (ref 0–1.22)
MONOCYTES NFR BLD: 5 % (ref 4–12)
MYELOCYTES NFR BLD: 2 % (ref 0–1)
NEUTROPHILS # BLD MANUAL: 5.21 THOUSAND/UL (ref 1.85–7.62)
NEUTS BAND NFR BLD MANUAL: 13 THOUSAND/UL
NEUTS BAND NFR BLD MANUAL: 5 % (ref 0–8)
NEUTS SEG NFR BLD AUTO: 79 % (ref 45–77)
NEUTS SEG NFR BLD AUTO: 83 % (ref 43–75)
NON VENT ROOM AIR: 21 %
NRBC BLD AUTO-RTO: 0 /100 WBCS
O2 CT BLDA-SCNC: 17.5 ML/DL (ref 16–23)
OVALOCYTES BLD QL SMEAR: PRESENT
OXYHGB MFR BLDA: 97.8 % (ref 94–97)
PCO2 BLDA: 20.2 MM HG (ref 36–44)
PCO2 TEMP ADJ BLDA: 19.6 MM HG (ref 36–44)
PH BLD: 7.49 [PH] (ref 7.35–7.45)
PH BLDA: 7.48 [PH] (ref 7.35–7.45)
PHOSPHATE SERPL-MCNC: 1.5 MG/DL (ref 2.3–4.1)
PHOSPHATE SERPL-MCNC: 1.9 MG/DL (ref 2.3–4.1)
PHOSPHATE SERPL-MCNC: 2.1 MG/DL (ref 2.3–4.1)
PHOSPHATE SERPL-MCNC: 2.6 MG/DL (ref 2.3–4.1)
PLATELET # BLD AUTO: 42 THOUSANDS/UL (ref 149–390)
PLATELET # BLD AUTO: 54 THOUSANDS/UL (ref 149–390)
PLATELET BLD QL SMEAR: ABNORMAL
PMV BLD AUTO: 11 FL (ref 8.9–12.7)
PMV BLD AUTO: 11.7 FL (ref 8.9–12.7)
PO2 BLD: 123.6 MM HG (ref 75–129)
PO2 BLDA: 127.9 MM HG (ref 75–129)
POIKILOCYTOSIS BLD QL SMEAR: PRESENT
POTASSIUM SERPL-SCNC: 3.3 MMOL/L (ref 3.5–5.3)
POTASSIUM SERPL-SCNC: 3.6 MMOL/L (ref 3.5–5.3)
POTASSIUM SERPL-SCNC: 3.8 MMOL/L (ref 3.5–5.3)
POTASSIUM SERPL-SCNC: 4 MMOL/L (ref 3.5–5.3)
PROCALCITONIN SERPL-MCNC: 7.97 NG/ML
RBC # BLD AUTO: 2.56 MILLION/UL (ref 3.88–5.62)
RBC # BLD AUTO: 2.81 MILLION/UL (ref 3.88–5.62)
RBC MORPH BLD: PRESENT
RETICS # AUTO: ABNORMAL 10*3/UL (ref 14356–105094)
RETICS # CALC: 0.49 % (ref 0.37–1.87)
RH BLD: POSITIVE
RH BLD: POSITIVE
SODIUM SERPL-SCNC: 134 MMOL/L (ref 135–147)
SODIUM SERPL-SCNC: 136 MMOL/L (ref 135–147)
SODIUM SERPL-SCNC: 138 MMOL/L (ref 135–147)
SODIUM SERPL-SCNC: 139 MMOL/L (ref 135–147)
SPECIMEN EXPIRATION DATE: NORMAL
SPECIMEN SOURCE: ABNORMAL
SPHEROCYTES BLD QL SMEAR: PRESENT
TIBC SERPL-MCNC: 131 UG/DL (ref 250–450)
TOTAL CELLS COUNTED SPEC: 100
WBC # BLD AUTO: 5.92 THOUSAND/UL (ref 4.31–10.16)
WBC # BLD AUTO: 6.03 THOUSAND/UL (ref 4.31–10.16)

## 2023-01-25 RX ORDER — CALCIUM GLUCONATE 20 MG/ML
1 INJECTION, SOLUTION INTRAVENOUS ONCE
Status: COMPLETED | OUTPATIENT
Start: 2023-01-25 | End: 2023-01-25

## 2023-01-25 RX ORDER — POTASSIUM CHLORIDE 29.8 MG/ML
40 INJECTION INTRAVENOUS ONCE
Status: COMPLETED | OUTPATIENT
Start: 2023-01-25 | End: 2023-01-25

## 2023-01-25 RX ORDER — MAGNESIUM SULFATE 1 G/100ML
1 INJECTION INTRAVENOUS ONCE
Status: COMPLETED | OUTPATIENT
Start: 2023-01-25 | End: 2023-01-25

## 2023-01-25 RX ORDER — PANTOPRAZOLE SODIUM 40 MG/10ML
40 INJECTION, POWDER, LYOPHILIZED, FOR SOLUTION INTRAVENOUS
Status: DISCONTINUED | OUTPATIENT
Start: 2023-01-25 | End: 2023-01-26

## 2023-01-25 RX ORDER — INSULIN LISPRO 100 [IU]/ML
1-5 INJECTION, SOLUTION INTRAVENOUS; SUBCUTANEOUS EVERY 6 HOURS SCHEDULED
Status: DISCONTINUED | OUTPATIENT
Start: 2023-01-25 | End: 2023-01-26

## 2023-01-25 RX ORDER — CALCIUM GLUCONATE 20 MG/ML
2 INJECTION, SOLUTION INTRAVENOUS ONCE
Status: COMPLETED | OUTPATIENT
Start: 2023-01-25 | End: 2023-01-26

## 2023-01-25 RX ORDER — POTASSIUM CHLORIDE 14.9 MG/ML
20 INJECTION INTRAVENOUS ONCE
Status: COMPLETED | OUTPATIENT
Start: 2023-01-25 | End: 2023-01-25

## 2023-01-25 RX ORDER — CALCIUM GLUCONATE 20 MG/ML
2 INJECTION, SOLUTION INTRAVENOUS ONCE
Status: COMPLETED | OUTPATIENT
Start: 2023-01-25 | End: 2023-01-25

## 2023-01-25 RX ORDER — SODIUM CHLORIDE, SODIUM GLUCONATE, SODIUM ACETATE, POTASSIUM CHLORIDE, MAGNESIUM CHLORIDE, SODIUM PHOSPHATE, DIBASIC, AND POTASSIUM PHOSPHATE .53; .5; .37; .037; .03; .012; .00082 G/100ML; G/100ML; G/100ML; G/100ML; G/100ML; G/100ML; G/100ML
100 INJECTION, SOLUTION INTRAVENOUS CONTINUOUS
Status: DISCONTINUED | OUTPATIENT
Start: 2023-01-25 | End: 2023-01-26

## 2023-01-25 RX ORDER — HEPARIN SODIUM 5000 [USP'U]/ML
5000 INJECTION, SOLUTION INTRAVENOUS; SUBCUTANEOUS EVERY 12 HOURS SCHEDULED
Status: DISCONTINUED | OUTPATIENT
Start: 2023-01-25 | End: 2023-01-26

## 2023-01-25 RX ORDER — ALBUMIN, HUMAN INJ 5% 5 %
25 SOLUTION INTRAVENOUS ONCE
Status: COMPLETED | OUTPATIENT
Start: 2023-01-25 | End: 2023-01-25

## 2023-01-25 RX ORDER — POTASSIUM CHLORIDE 14.9 MG/ML
20 INJECTION INTRAVENOUS
Status: COMPLETED | OUTPATIENT
Start: 2023-01-25 | End: 2023-01-25

## 2023-01-25 RX ADMIN — CALCIUM GLUCONATE 1 G: 20 INJECTION, SOLUTION INTRAVENOUS at 07:52

## 2023-01-25 RX ADMIN — SODIUM PHOSPHATE, MONOBASIC, MONOHYDRATE AND SODIUM PHOSPHATE, DIBASIC, ANHYDROUS 9 MMOL: 276; 142 INJECTION, SOLUTION INTRAVENOUS at 21:01

## 2023-01-25 RX ADMIN — POTASSIUM CHLORIDE 20 MEQ: 14.9 INJECTION, SOLUTION INTRAVENOUS at 07:52

## 2023-01-25 RX ADMIN — POTASSIUM CHLORIDE 40 MEQ: 29.8 INJECTION, SOLUTION INTRAVENOUS at 19:49

## 2023-01-25 RX ADMIN — VASOPRESSIN 0.04 UNITS/MIN: 20 INJECTION INTRAVENOUS at 16:26

## 2023-01-25 RX ADMIN — MAGNESIUM SULFATE HEPTAHYDRATE 1 G: 1 INJECTION, SOLUTION INTRAVENOUS at 07:52

## 2023-01-25 RX ADMIN — CALCIUM GLUCONATE 1 G: 20 INJECTION, SOLUTION INTRAVENOUS at 00:39

## 2023-01-25 RX ADMIN — MAGNESIUM SULFATE HEPTAHYDRATE 1 G: 1 INJECTION, SOLUTION INTRAVENOUS at 19:49

## 2023-01-25 RX ADMIN — POTASSIUM CHLORIDE 20 MEQ: 14.9 INJECTION, SOLUTION INTRAVENOUS at 10:35

## 2023-01-25 RX ADMIN — PANTOPRAZOLE SODIUM 40 MG: 40 INJECTION, POWDER, FOR SOLUTION INTRAVENOUS at 10:35

## 2023-01-25 RX ADMIN — CEFEPIME HYDROCHLORIDE 2000 MG: 2 INJECTION, POWDER, FOR SOLUTION INTRAVENOUS at 12:27

## 2023-01-25 RX ADMIN — DEXTROSE, SODIUM CHLORIDE, SODIUM LACTATE, POTASSIUM CHLORIDE, AND CALCIUM CHLORIDE 250 ML/HR: 5; .6; .31; .03; .02 INJECTION, SOLUTION INTRAVENOUS at 11:15

## 2023-01-25 RX ADMIN — ALBUMIN (HUMAN) 25 G: 12.5 INJECTION, SOLUTION INTRAVENOUS at 14:08

## 2023-01-25 RX ADMIN — DEXTROSE, SODIUM CHLORIDE, SODIUM LACTATE, POTASSIUM CHLORIDE, AND CALCIUM CHLORIDE 250 ML/HR: 5; .6; .31; .03; .02 INJECTION, SOLUTION INTRAVENOUS at 07:08

## 2023-01-25 RX ADMIN — Medication 20000 ML: at 01:57

## 2023-01-25 RX ADMIN — NOREPINEPHRINE BITARTRATE 4 MCG/MIN: 1 INJECTION, SOLUTION, CONCENTRATE INTRAVENOUS at 11:15

## 2023-01-25 RX ADMIN — MAGNESIUM SULFATE HEPTAHYDRATE 1 G: 1 INJECTION, SOLUTION INTRAVENOUS at 01:24

## 2023-01-25 RX ADMIN — VASOPRESSIN 0.04 UNITS/MIN: 20 INJECTION INTRAVENOUS at 07:16

## 2023-01-25 RX ADMIN — CALCIUM GLUCONATE 2 G: 20 INJECTION, SOLUTION INTRAVENOUS at 14:09

## 2023-01-25 RX ADMIN — SODIUM PHOSPHATE, MONOBASIC, MONOHYDRATE AND SODIUM PHOSPHATE, DIBASIC, ANHYDROUS 9 MMOL: 276; 142 INJECTION, SOLUTION INTRAVENOUS at 01:37

## 2023-01-25 RX ADMIN — SODIUM CHLORIDE, SODIUM GLUCONATE, SODIUM ACETATE, POTASSIUM CHLORIDE, MAGNESIUM CHLORIDE, SODIUM PHOSPHATE, DIBASIC, AND POTASSIUM PHOSPHATE 100 ML/HR: .53; .5; .37; .037; .03; .012; .00082 INJECTION, SOLUTION INTRAVENOUS at 14:08

## 2023-01-25 RX ADMIN — POTASSIUM CHLORIDE 40 MEQ: 400 INJECTION, SOLUTION INTRAVENOUS at 01:24

## 2023-01-25 RX ADMIN — NOREPINEPHRINE BITARTRATE 7 MCG/MIN: 1 INJECTION, SOLUTION, CONCENTRATE INTRAVENOUS at 00:30

## 2023-01-25 RX ADMIN — SODIUM PHOSPHATE, MONOBASIC, MONOHYDRATE AND SODIUM PHOSPHATE, DIBASIC, ANHYDROUS 6 MMOL: 276; 142 INJECTION, SOLUTION INTRAVENOUS at 09:03

## 2023-01-25 RX ADMIN — Medication 20000 ML: at 15:12

## 2023-01-25 RX ADMIN — HEPARIN SODIUM 5000 UNITS: 5000 INJECTION INTRAVENOUS; SUBCUTANEOUS at 15:23

## 2023-01-25 RX ADMIN — POTASSIUM CHLORIDE 20 MEQ: 14.9 INJECTION, SOLUTION INTRAVENOUS at 21:01

## 2023-01-25 RX ADMIN — DEXTROSE, SODIUM CHLORIDE, SODIUM LACTATE, POTASSIUM CHLORIDE, AND CALCIUM CHLORIDE 250 ML/HR: 5; .6; .31; .03; .02 INJECTION, SOLUTION INTRAVENOUS at 03:11

## 2023-01-25 RX ADMIN — CALCIUM GLUCONATE 2 G: 20 INJECTION, SOLUTION INTRAVENOUS at 19:49

## 2023-01-25 RX ADMIN — HEPARIN SODIUM 5000 UNITS: 5000 INJECTION INTRAVENOUS; SUBCUTANEOUS at 23:19

## 2023-01-25 NOTE — ASSESSMENT & PLAN NOTE
· A stage 4 ulcer is noted on the sacrum  · CTAP to further eval for bony infection/underlying abscess  · Pressure offload  · Wound care consult  · Surgery consulted  - however patient is opting towards hospice

## 2023-01-25 NOTE — CONSULTS
Consultation Note - General Surgery  : [facility] Surgery Resident role on Cecilia Castillo 69 y o  male MRN: [id number]  Unit/Bed#: ICU 14 Encounter: [id number]        Assessment:  69y o  year old male with unstageable sacral wound  Plan:  - Will follow up results of goals of care meeting scheduled for 1/26   - Will likely not require debridement  If debridement is required, would prefer platelet count >67H  - Please reach out with any specific questions or concerns  HPI:  Ruddy Wilder is a 69 y o  male with a history of DM2, hyperlipidemia, Alzheimer's dementia, hypothyroidism  He resented with altered mental status, found to have ANGELICA, metabolic acidosis, hyperkalemia, hyponatremia, hyperglycemia, and sepsis  Requiring pressors which are being weaned, on CRRT  Currently being treated with cefepime  We are consulted for a sacral wound  Does not appear to be the source of sepsis  Unstageable on exam, but with hard leathery surface  Per primary team, family is planning on having a meeting tomorrow to discuss goals of care  Physical Exam:  General: No acute distress  CV: Regular rate, requiring pressors for hemodynamic support  Lungs: Normal work of breathing, no increased respiratory effort on room air  Abdomen: Soft, non-distended    Extremities: No edema, clubbing or cyanosis  Skin: sacral decubitus ulcer, unstageable         Review of Systems   Unable to perform ROS: Mental status change        Objective         Intake/Output Summary (Last 24 hours) at 1/25/2023 1531  Last data filed at 1/25/2023 1500  Gross per 24 hour   Intake 9199 35 ml   Output 7714 ml   Net 1485 35 ml       First Vitals:   Blood Pressure: (!) 81/47 (01/23/23 1049)  Pulse: 104 (01/23/23 1049)  Temperature: 97 7 °F (36 5 °C) (01/23/23 1115)  Temp Source: Rectal (01/23/23 1115)  Respirations: (!) 33 (01/23/23 1049)  Height: 4' 11" (149 9 cm) (01/23/23 1535)  Weight - Scale: 36 kg (79 lb 5 9 oz) (01/23/23 1127)  SpO2: 98 % (01/23/23 1049)    Current Vitals:   Blood Pressure: 91/54 (01/23/23 1800)  Pulse: 72 (01/25/23 1400)  Temperature: (!) 96 4 °F (35 8 °C) (01/25/23 1400)  Temp Source: Rectal (01/25/23 1300)  Respirations: 16 (01/25/23 1400)  Height: 4' 11" (149 9 cm) (01/23/23 1535)  Weight - Scale: 42 1 kg (92 lb 13 oz) (01/25/23 0600)  SpO2: 100 % (01/25/23 1400)    Invasive Devices     Peripheral Intravenous Line  Duration           Peripheral IV 01/23/23 Left Antecubital 2 days    Peripheral IV 01/23/23 Right Antecubital 2 days    Peripheral IV 01/23/23 Right;Ventral (anterior) Forearm 2 days          Arterial Line  Duration           Arterial Line 01/23/23 Radial 1 day          Hemodialysis Catheter  Duration           HD Temporary Double Catheter 1 day          Drain  Duration           Urethral Catheter Coude 18 Fr  1 day                Imaging: I have personally reviewed pertinent reports  CT abdomen pelvis wo contrast    Result Date: 1/23/2023  Impression: Patchy infiltrate in the right lower lobe and tree-in-bud nodularity in the right middle lobe likely representing infection/pneumonia  Large amount stool in the rectum likely representing fecal impaction  Correlate clinically  Mild bladder wall thickening  Correlate for cystitis  The study was marked in [facility] for immediate notification  Workstation performed: [id number]     XR chest portable    Result Date: 1/24/2023  Impression: No acute cardiopulmonary disease  Right IJ dialysis catheter with tip overlying the cavoatrial junction  Workstation performed: [id number]     XR chest 1 view portable    Result Date: 1/23/2023  Impression: No acute cardiopulmonary disease  Workstation performed: [id number]     CT head without contrast    Result Date: 1/23/2023  Impression: No acute intracranial abnormality  Workstation performed: [id number]       EKG, Pathology, and Other Studies: I have personally reviewed pertinent reports        Historical Information   Past Medical History:   Diagnosis Date   • Alzheimer disease type 3 ([place] [address] )    • Cervical pain 4/16/2018   • Diabetes mellitus ([place] [address] )    • Diabetic peripheral neuropathy associated with type 2 diabetes mellitus ([place] [address] ) 3/18/2021   • Disease of thyroid gland    • Elevated PSA    • Hyperlipidemia    • Medicare annual wellness visit, subsequent 7/30/2020   • Prostate cancer ([place] [address] )    • Type 2 diabetes mellitus with diabetic peripheral angiopathy without gangrene ([place] [address] ) 5/17/2021    According to ICD10 guidelines, patient accept     Past Surgical History:   Procedure Laterality Date   • NECK SURGERY     • PROSTATE BIOPSY  07/18/2018     Social History   Social History     Substance and Sexual Activity   Alcohol Use Never     Social History     Substance and Sexual Activity   Drug Use Never     Social History     Tobacco Use   Smoking Status Never   Smokeless Tobacco Never     Family History   Problem Relation Age of Onset   • No Known Problems Mother    • No Known Problems Father    • Dementia Sister    • Autism Daughter        Meds/Allergies   all current active meds have been reviewed, current meds:   Current Facility-Administered Medications   Medication Dose Route Frequency   • cefepime (MAXIPIME) 2,000 mg in dextrose 5 % 50 mL IVPB  2,000 mg Intravenous Q12H   • heparin (porcine) 25,000 units in 0 45% NaCl 250 mL infusion (premix)  500 Units/hr Pre Filter Continuous   • heparin (porcine) subcutaneous injection 5,000 Units  5,000 Units Subcutaneous Q12H [facility]   • insulin lispro (HumaLOG) 100 units/mL subcutaneous injection 1-5 Units  1-5 Units Subcutaneous Q6H [facility]   • levothyroxine tablet 75 mcg  75 mcg Oral Daily Before Breakfast   • multi-electrolyte (PLASMALYTE-A/ISOLYTE-S PH 7 4) IV solution  100 mL/hr Intravenous Continuous   • NOREPINEPHRINE 4 MG  ML NSS (CMPD ORDER) infusion  1-30 mcg/min Intravenous Titrated   • NxStage K 4/Ca 3 dialysis solution (RFP-401) 20,000 mL  20,000 mL Dialysis Continuous   • pantoprazole (PROTONIX) injection 40 mg  40 mg Intravenous Q24H [facility]   • vasopressin (PITRESSIN) 20 Units in sodium chloride 0 9 % 100 mL infusion  0 04 Units/min Intravenous Continuous    and PTA meds:   Prior to Admission Medications   Prescriptions Last Dose Informant Patient Reported? Taking? Blood Glucose Monitoring Suppl (OneTouch Verio) w/Device KIT   No No   Sig: Use 2 (two) times a day   Janumet  MG per tablet 1/23/2023  No Yes   Sig: Take 1 tablet by mouth 2 (two) times a day with meals   Lancets Micro Thin 33G MISC   No No   Sig: Use 2 (two) times a day Test two times daily DX:E11 9   Nutritional Supplements (Glucerna Shake) LIQD Not Taking  No No   Sig: Take 1 Bottle by mouth 2 (two) times a day   Patient not taking: Reported on 1/23/2023   calcium carbonate (OS-CARMENZA) 600 MG tablet Not Taking  No No   Sig: Take 1 tablet (600 mg total) by mouth 2 (two) times a day with meals   Patient not taking: Reported on 1/23/2023   diphenhydrAMINE (BENADRYL) 2 % cream 1/23/2023  No Yes   Sig: Apply topically 3 (three) times a day as needed for itching   glucose blood (OneTouch Verio) test strip   No No   Sig: Test two times daily DX: E11 9   lactulose (CHRONULAC) 10 g/15 mL solution 1/22/2023  No Yes   Sig: Take 15 mL (10 g total) by mouth 2 (two) times a day as needed (constipation) Take one in morning and if no bowel movement take one more at lunch   levothyroxine (Synthroid) 75 mcg tablet 1/23/2023  No Yes   Sig: Take 1 tablet (75 mcg total) by mouth daily   melatonin 3 mg 1/22/2023  No Yes   Sig: Take 1 tablet (3 mg total) by mouth daily at bedtime   rosuvastatin (CRESTOR) 40 MG tablet 1/22/2023  No Yes   Sig: Take 1 tablet (40 mg total) by mouth daily      Facility-Administered Medications: None     No Known Allergies    Lab Results: I have personally reviewed pertinent lab results    , CBC:   Lab Results   Component Value Date    WBC 6 03 01/25/2023    HGB 7 4 (L) 01/25/2023    HCT 22 1 (L) 01/25/2023    MCV 86 01/25/2023    PLT 42 (LL) 01/25/2023    MCH 28 9 01/25/2023    MCHC 33 5 01/25/2023    RDW 16 0 (H) 01/25/2023    MPV 11 7 01/25/2023    NRBC 0 01/25/2023   , CMP:   Lab Results   Component Value Date    SODIUM 136 01/25/2023    K 4 0 01/25/2023     (H) 01/25/2023    CO2 18 (L) 01/25/2023    BUN 20 01/25/2023    CREATININE 0 79 01/25/2023    CALCIUM 7 5 (L) 01/25/2023    EGFR 87 01/25/2023       Counseling / Coordination of Care  Total floor / unit time spent today 25 minutes  Greater than 50% of total time was spent with the patient and / or family counseling and / or coordination of care      Inpatient consult to Acute Care Surgery  Consult performed by: Michel Landaverde MD  Consult ordered by: Lou Bosworth, Norrine Roses, MD  1/25/2023 3:31 PM

## 2023-01-25 NOTE — ASSESSMENT & PLAN NOTE
Malnutrition Findings:   Adult Malnutrition type: Chronic illness  Adult Degree of Malnutrition: Other severe protein calorie malnutrition  Malnutrition Characteristics: Fat loss, Muscle loss, Weight loss              Speech does not recommend oral intake  Will need feeding tube/peg  Will discuss with family    360 Statement: Severe protein-calorie malnutrition in context of chronic illness r/t inadequate energy intake, increased needs as evidance by severe body fat (orbital, triceps, ribcage are), and muscle mass depletions (temporal wasting, protruding clavicles, shoulders), significant 12%, 5 6kg wt loss x 3 months (45 4kg 10/6 -> 39 8kg 1/24/23); treated with PO diet with oral supplements vs EN    BMI Findings:  Adult BMI Classifications: Underweight < 18 5        Body mass index is 18 75 kg/m²

## 2023-01-25 NOTE — CASE MANAGEMENT
Case Management Assessment & Discharge Planning Note    Patient name Conrad Mcnally  Location ICU 14/ICU 14 MRN [id number]  : 1945 Date 2023       Current Admission Date: 2023  Current Admission Diagnosis:Sepsis [facility])   Patient Active Problem List    Diagnosis Date Noted   • Severe protein-calorie malnutrition ([facility] ) 2023   • Anemia 2023   • Lactic acidosis 2023   • Diarrhea 2023   • Bacteremia 2023   • Sepsis ([facility] [address] ) 2023   • UTI (urinary tract infection) 2023   • Acute encephalopathy    • Metabolic acidosis    • Skin ulcer of sacrum, limited to breakdown of skin ([facility] [address] ) 2023   • Pain of lower extremity 2023   • Ambulatory dysfunction 2023   • ANGELICA (acute kidney injury) ([facility] [address] ) 2022   • Healthcare maintenance 10/06/2022   • Dementia without behavioral disturbance ([facility] [address] ) 10/20/2021   • Primary hypothyroidism 2020   • Mixed hyperlipidemia 2018   • Prostate cancer ([facility] [address] ) 2018   • Type 2 diabetes mellitus without complication, without long-term current use of insulin [facility] ) 2018   • Ill-fitting dentures 2018   • Hearing difficulty of both ears 2018      LOS (days): 2  Geometric Mean LOS (GMLOS) (days): 5 00  Days to GMLOS:2 8     OBJECTIVE:  PATIENT READMITTED TO HOSPITAL  Risk of Unplanned Readmission Score: 27 33         Current admission status: Inpatient       Preferred Pharmacy:   [address] [place] [address]  Phone: [phone] Fax: [phone]    Primary Care Provider: Irais Caruso MD    Primary Insurance: Jass Billingsley [facility]  Secondary Insurance: Bhupendra Salinas [address]:  [address] Proxies    There are no active Health Care Proxies on file                   Readmission Root Cause  30 Day Readmission: No    Patient Information  Admitted from[de-identified] Home  Mental Status: Confused  During Assessment patient was accompanied by: Not accompanied during assessment  Assessment information provided by[de-identified] Spouse  Primary Caregiver: Spouse  Caregiver's Name[de-identified] Smooth Powers Relationship to Patient[de-identified] Significant Other  Caregiver's Telephone Number[de-identified] [phone]  Support Systems: Spouse/significant other  [place] of Residence: [address] [place] [address] [facility]  Type of Current Residence: 2 story home  Living Arrangements: Lives w/ Spouse/significant other  Is patient a ?: No    Activities of Daily Living Prior to Admission  Functional Status: Total dependent  Completes ADLs independently?: No  Level of ADL dependence: Total Dependent  Ambulates independently?: No  Level of ambulatory dependence: Total Dependent  Does patient use assisted devices?: Yes  Assisted Devices (DME) used: Wheelchair  Does patient currently own DME?: Yes  What DME does the patient currently own?: Wheelchair  Does patient have a history of [facility]?: Yes  Does patient currently have [facility]?: Yes    [facility]  Type of Current Home Care Services: Home health aide, Home OT, Home PT (3x a week aide through [employer], [facility] PT/OT through [employer])  [address][de-identified] Other (please enter name in comment)  [facility] Provider[de-identified] PCP    Patient Information Continued  Income Source: SSI/SSD  Does patient have prescription coverage?: Yes  Does patient receive dialysis treatments?: No (Currently on CVVHD but no history)         Means of Transportation  [facility] Friends        DISCHARGE DETAILS:    Discharge planning discussed with[de-identified] Patient's wife Sydney Sprague of Choice: Yes  Comments - Freedom of Choice: Chano Solomon interested in [address] [place] [address] and would like to speak to [facility] liaison - hopeful to transition patient to comfort care tomorrow after famiy comes from [place]    CM contacted family/caregiver?: Yes  Were Treatment Team discharge recommendations reviewed with patient/caregiver?: Yes  Did patient/caregiver verbalize understanding of patient care needs?: Yes  Were patient/caregiver advised of the risks associated with not following Treatment Team discharge recommendations?: Yes    Contacts  Patient Contacts: Musa Martinezer  Relationship to Patient[de-identified] Family (wife)  Contact Method: In Person  Reason/Outcome: Continuity of Care, Referral, Discharge Planning              Other Referral/Resources/Interventions Provided:  Interventions: Hospice  Referral Comments: Referral made to [facility] in [address]  Family interested in home hospice  Government Services[de-identified] Adult Protective Services / Elder Abuse (Referral made to [facility] by ICU RN earlier in admission - [facility]   Requested H&P and facesheet - CM faxed to [phone] )         Treatment Team Recommendation: Hospice  Discharge Destination Plan[de-identified] Hospice  Transport at Discharge : BLS Ambulance

## 2023-01-25 NOTE — PROGRESS NOTES
[address] [facility] 1945, 69 y o  male MRN: [id number]  Unit/Bed#: ICU 14 Encounter: [id number]  Primary Care Provider: Kenan Cruz MD   Date and time admitted to hospital: 1/23/2023 58:01 AM    * Metabolic acidosis  Assessment & Plan  Lab Results   Component Value Date    LACTICACID 6 4 (HH) 01/25/2023    LACTICACID 6 3 ([place]) 01/25/2023    LACTICACID 8 2 ([place]) 01/24/2023   Gap closed  · The patient presented to the ED with altered mental status   His labs revealed a significant metabolic acidosis, likely multifactorial related to DKA, severe volume depletion, elevated lactic acid level, ANGELICA, sepsis, metformin toxicity, elevated urate  · Treat underlying causes  · Continue d5 1/2 infusion  · We will trend his [place]- he may require a bicarbonate infusion    Sepsis [facility])  Assessment & Plan  Lab Results   Component Value Date    LACTICACID 6 4 ([place]) 01/25/2023    LACTICACID 6 3 (HH) 01/25/2023    LACTICACID 8 2 (HH) 01/24/2023    WBC 5 92 01/25/2023    WBC 2 55 (L) 01/24/2023    WBC 9 04 01/23/2023     Hypothermic, hypotensive requiring vasopressin 0 04 units/min, levo 10 mcg/min  Secondary to bacteremia from UTI  BC proteus x2 awaiting sensitivities  UC negative    Patient is positive 8 L from admission  I and O  Follow cultures  Cefipime day 3      UTI (urinary tract infection)  Assessment & Plan  See sepsis plan    Bacteremia  Assessment & Plan  See plan above    Lactic acidosis  Assessment & Plan  Probably from metformin toxicity as well as dehydration, septic shock  Continue to trend  No more metformin in the future    Severe protein-calorie malnutrition (HCC)  Assessment & Plan  Malnutrition Findings:   Adult Malnutrition type: Chronic illness  Adult Degree of Malnutrition: Other severe protein calorie malnutrition  Malnutrition Characteristics: Fat loss, Muscle loss, Weight loss              Speech does not recommend oral intake  Will need feeding tube/peg  Will discuss with family    360 Statement: Severe protein-calorie malnutrition in context of chronic illness r/t inadequate energy intake, increased needs as evidance by severe body fat (orbital, triceps, ribcage are), and muscle mass depletions (temporal wasting, protruding clavicles, shoulders), significant 12%, 5 6kg wt loss x 3 months (45 4kg 10/6 -> 39 8kg 1/24/23); treated with PO diet with oral supplements vs EN    BMI Findings:  Adult BMI Classifications: Underweight < 18 5        Body mass index is 18 75 kg/m²  Pancytopenia [facility])  Assessment & Plan  Lab Results   Component Value Date    PLT 54 (L) 01/25/2023    WBC 5 92 01/25/2023    HGB 8 1 (L) 01/25/2023   Hemolysis workup negative  Postive 8 l since admission  Possibly stop fluids  Continue to monitor    Acute encephalopathy  Assessment & Plan  · Likely toxic metabolic in the setting of his electrolyte abnormalities/ DKA/ Sepsis on his chronic dementia  · We will monitor his neuro status closely      Skin ulcer of sacrum, limited to breakdown of skin (HCC)  Assessment & Plan  · A stage 4 ulcer is noted on the sacrum    · Local wound care for now  · CTAP to further eval for bony infection/underlying abscess  · Pressure offload  · Wound care consult    ANGELICA (acute kidney injury) [facility])  Assessment & Plan  Lab Results   Component Value Date    CREATININE 1 07 01/25/2023    CREATININE 1 59 (H) 01/24/2023    CREATININE 1 99 (H) 01/24/2023       Lab Results   Component Value Date    EGFR 67 01/25/2023    EGFR 41 01/24/2023    EGFR 31 01/24/2023     Bl 0 9  (POA 6)    Plan:    • CVVH  • Nephro following  • Monitor BMP daily and observe for downward trend of creatinine  • Avoid hypoperfusion of the kidneys, minimize nephrotoxins        Dementia without behavioral disturbance ([facility] [address] )  Assessment & Plan  · Appears to be at baseline however will confirm with family  · We will monitor his mental status closely  · Hope to avoid sedatives, anxiolytics    Primary hypothyroidism  Assessment & Plan  Levothyroxine 75 mcg    Mixed hyperlipidemia  Assessment & Plan  Continue rosuvastatin    Type 2 diabetes mellitus without complication, without long-term current use of insulin [facility])  Assessment & Plan  Lab Results   Component Value Date    HGBA1C 9 5 (H) 2022       Recent Labs     23  0515 23  0614 23  0701 23  0814   POCGLU 164* 102 78 79       Blood Sugar Average: Last 72 hrs:  (P) 177 15   · We will treat his DKA per the protocol   · Gap closed  · Will resume with diet  · May need to start on insulin 8 units lantus per day  To overlap with starting food  · No more metformin in the future  · We will hold his outpatient dillan    ----------------------------------------------------------------------------------------  HPI/24hr events: None    Patient appropriate for transfer out of the ICU today?: No  Disposition: Continue Critical Care   Code Status: Level 3 - DNAR and DNI  ---------------------------------------------------------------------------------------  SUBJECTIVE  Patient demented    Review of Systems   Unable to perform ROS: Dementia   Endocrine: Negative for cold intolerance       Review of systems was unable to be performed secondary to Altered mental status  ---------------------------------------------------------------------------------------  OBJECTIVE    Vitals   Vitals:    23 0610 23 0700 23 0800 23 0825   BP:       BP Location:       Pulse: 76 74 70    Resp: (!) 36 21 16    Temp: (!) 96 8 °F (36 °C) (!) 96 4 °F (35 8 °C) (!) 96 1 °F (35 6 °C) (!) 95 7 °F (35 4 °C)   TempSrc:   Rectal    SpO2:  100% 100%    Weight:       Height:         Temp (24hrs), Av 9 °F (36 1 °C), Min:95 4 °F (35 2 °C), Max:97 9 °F (36 6 °C)  Current: Temperature: (!) 95 7 °F (35 4 °C)  Arterial Line BP: 124/56  Arterial Line MAP (mmHg): 82 mmHg    Respiratory:  SpO2: SpO2: 100 %  Nasal Cannula O2 Flow Rate (L/min): 2 L/min    Invasive/non-invasive ventilation settings   Respiratory    Lab Data (Last 4 hours)    None         O2/Vent Data (Last 4 hours)    None                Physical Exam  Vitals and nursing note reviewed  Constitutional:       General: He is not in acute distress  Appearance: He is well-developed  Comments: Cachectic  Not oriented  HENT:      Head: Normocephalic and atraumatic  Eyes:      Conjunctiva/sclera: Conjunctivae normal    Cardiovascular:      Rate and Rhythm: Normal rate and regular rhythm  Heart sounds: No murmur heard  Pulmonary:      Effort: Pulmonary effort is normal  No respiratory distress  Breath sounds: Normal breath sounds  Abdominal:      Palpations: Abdomen is soft  Tenderness: There is no abdominal tenderness  Musculoskeletal:         General: No swelling  Cervical back: Neck supple  Skin:     General: Skin is warm and dry  Capillary Refill: Capillary refill takes less than 2 seconds  Neurological:      Mental Status: He is alert     Psychiatric:         Mood and Affect: Mood normal                 Laboratory and Diagnostics:  Results from last 7 days   Lab Units 01/25/23  0023 01/24/23  0507 01/23/23  1055   WBC Thousand/uL 5 92 2 55* 9 04   HEMOGLOBIN g/dL 8 1* 9 0* 12 4   HEMATOCRIT % 25 0* 29 4* 43 1   PLATELETS Thousands/uL 54* 100* 223   NEUTROS PCT %  --   --  93*   BANDS PCT % 5  --   --    MONOS PCT %  --   --  3*   MONO PCT % 5  --   --      Results from last 7 days   Lab Units 01/25/23  0614 01/24/23  2355 01/24/23  1809 01/24/23  1245 01/24/23  0507 01/24/23  0014 01/23/23  2245 01/23/23  1137 01/23/23  1055   SODIUM mmol/L 138 139 140 142 145 151* 153*   < > 159*   POTASSIUM mmol/L 3 6 3 8 3 5 4 0 3 8 4 0 4 2   < > 8 0*   CHLORIDE mmol/L 109* 111* 112* 114* 114* 118* 119*   < > 117*   CO2 mmol/L 16* 15* 13* 10* 9* 7* 7*   < > 8*   ANION GAP mmol/L 13 13 15* 18* 22* 26* 27*   < > 34*   BUN mg/dL 29* 41* 55* 75* 92* 124* 132*   < > 200*   CREATININE mg/dL 1 07 1 59* 1 99* 2 72* 3 38* 4 71* 5 20*   < > 8 27*   CALCIUM mg/dL 7 8* 7 8* 7 6* 7 5* 8 0* 8 1* 7 2*   < > 9 5   GLUCOSE RANDOM mg/dL 106 183* 150* 78 93 165* 204*   < > 652*   ALT U/L  --   --   --   --   --   --   --   --  24   AST U/L  --   --   --   --   --   --   --   --  26   ALK PHOS U/L  --   --   --   --   --   --   --   --  105*   ALBUMIN g/dL  --   --   --   --   --   --   --   --  3 2*   TOTAL BILIRUBIN mg/dL  --   --   --   --   --   --   --   --  0 64    < > = values in this interval not displayed  Results from last 7 days   Lab Units 01/25/23  0614 01/24/23  2355 01/24/23  1809 01/24/23  1245 01/24/23  0507 01/24/23  0014 01/23/23  2245   MAGNESIUM mg/dL 1 7* 1 8* 1 8* 1 6* 2 0 2 3 2 5   PHOSPHORUS mg/dL 2 1* 1 9* 2 6 2 2* 3 2 4 1 4 6*      Results from last 7 days   Lab Units 01/23/23  1055   INR  1 57*   PTT seconds 31          Results from last 7 days   Lab Units 01/25/23  0614 01/25/23  0023 01/24/23  0824 01/24/23  0507 01/24/23  0014 01/23/23  2245 01/23/23  2000   LACTIC ACID mmol/L 6 4* 6 3* 8 2* 11 1* 11 7* 11 7* 13 0*     ABG:  Results from last 7 days   Lab Units 01/25/23  0023   PH ART  7 479*   PCO2 ART mm Hg 20 2*   PO2 ART mm Hg 127 9   HCO3 ART mmol/L 14 7*   BASE EXC ART mmol/L -6 7   ABG SOURCE  Line, Arterial     VBG:  Results from last 7 days   Lab Units 01/25/23  0023 01/24/23  0015 01/23/23  1422   PH ASHLEY   --   --  7 015*   PCO2 ASHLEY mm Hg  --   --  23 4*   PO2 ASHLEY mm Hg  --   --  41 7   HCO3 ASHLEY mmol/L  --   --  5 8*   BASE EXC ASHLEY mmol/L  --   --  -23 7   ABG SOURCE  Line, Arterial   < >  --     < > = values in this interval not displayed       Results from last 7 days   Lab Units 01/25/23  0614 01/23/23  1609   PROCALCITONIN ng/ml 7 97* 12 76*       Micro  Results from last 7 days   Lab Units 01/23/23  2058 01/23/23  1206 01/23/23  1143   GRAM STAIN RESULT   --  Gram negative rods* Gram negative rods*   URINE CULTURE  <10,000 cfu/ml Gram Negative Demarcus Enteric Like*  --   --        EKG:  None recent  Imaging: I have personally reviewed pertinent reports  and I have personally reviewed pertinent films in PACS    Intake and Output  I/O       01/22 0701 01/23 0700 01/23 0701 01/24 0700 01/24 0701 01/25 0700    I V  (mL/kg)  6791 (170 6)     IV Piggyback  2400     Total Intake(mL/kg)  9191 (230 9)     Urine (mL/kg/hr)  360     Emesis/NG output  30     Other  2461     Total Output  2851     Net  +6340            Unmeasured Emesis Occurrence  1 x           Height and Weights   Height: 4' 11" (149 9 cm)  IBW (Ideal Body Weight): 47 7 kg  Body mass index is 18 75 kg/m²  Weight (last 2 days)     Date/Time Weight    01/25/23 0600 42 1 (92 81)    01/24/23 0600 39 8 (87 74)    01/23/23 1535 35 8 (79)    01/23/23 1127 36 (79 37)            Nutrition       Diet Orders   (From admission, onward)             Start     Ordered    01/25/23 0025  Diet NPO  Diet effective now        References:    Nutrtion Support Algorithm Enteral vs  Parenteral   Question Answer Comment   Diet Type NPO    RD to adjust diet per protocol?  Yes        01/25/23 0024                  Active Medications  Scheduled Meds:  Current Facility-Administered Medications   Medication Dose Route Frequency Provider Last Rate   • cefepime  2,000 mg Intravenous Q12H AKSHAT Maddox Stopped (01/25/23 0022)   • dextrose 5% lactated ringer's  250 mL/hr Intravenous Continuous AKSHAT Maddox 250 mL/hr (01/25/23 0708)   • heparin (porcine)  500 Units/hr Pre Filter Continuous Mayra Fleming  Units/hr (01/24/23 1216)   • insulin regular (HumuLIN R,NovoLIN R) infusion  0 1-30 Units/hr Intravenous Continuous AKSHAT Maddox 0 9 Units/hr (01/25/23 0815)   • levothyroxine  75 mcg Oral Daily Before Breakfast AKSHAT Maddox     • magnesium sulfate  1 g Intravenous Once Rafael Mendoza MD     • norepinephrine  1-30 mcg/min Intravenous Titrated AKSHAT Maddox 7 mcg/min (01/25/23 0530)   • NxStage K 4/Ca 3  20,000 mL Dialysis Continuous Anat Bedolla MD     • potassium chloride  20 mEq Intravenous Q2H Anat Bedolla MD     • sodium phosphate  6 mmol Intravenous Once Anat Bedolla MD     • vasopressin  0 04 Units/min Intravenous Continuous Milo [place], CRNP 0 04 Units/min (01/25/23 0716)     Continuous Infusions:  dextrose 5% lactated ringer's, 250 mL/hr, Last Rate: 250 mL/hr (01/25/23 0708)  heparin (porcine), 500 Units/hr, Last Rate: 500 Units/hr (01/24/23 1216)  insulin regular (HumuLIN R,NovoLIN R) infusion, 0 1-30 Units/hr, Last Rate: 0 9 Units/hr (01/25/23 0815)  norepinephrine, 1-30 mcg/min, Last Rate: 7 mcg/min (01/25/23 0530)  NxStage K 4/Ca 3, 20,000 mL  vasopressin, 0 04 Units/min, Last Rate: 0 04 Units/min (01/25/23 0716)      PRN Meds:        Invasive Devices Review  Invasive Devices     Peripheral Intravenous Line  Duration           Peripheral IV 01/23/23 Left Antecubital 1 day    Peripheral IV 01/23/23 Right Antecubital 1 day    Peripheral IV 01/23/23 Right;Ventral (anterior) Forearm 1 day          Arterial Line  Duration           Arterial Line 01/23/23 Radial 1 day          Hemodialysis Catheter  Duration           HD Temporary Double Catheter 1 day          Drain  Duration           Urethral Catheter Coude 18 Fr  1 day                Rationale Continue A line for bp monitoring   HD cath for CVVH  Pizano for urine output monitoring  ---------------------------------------------------------------------------------------  Advance Directive and Living Will:      Power of :    POLST:    ---------------------------------------------------------------------------------------  Care Time Delivered: per attending    Yarely Moon MD      Portions of the record may have been created with voice recognition software    Occasional wrong word or "sound a like" substitutions may have occurred due to the inherent limitations of voice recognition software    Read the chart carefully and recognize, using context, where substitutions have occurred

## 2023-01-25 NOTE — PROGRESS NOTES
NEPHROLOGY PROGRESS NOTE   Kalen Hinds 69 y o  male MRN: [id number]  Unit/Bed#: ICU 14 Encounter: [id number]      HPI/24hr EVENTS:    -23-year-old male past medical history of DM2, hyperlipidemia, Alzheimer's dementia, hypothyroidism  Presented with altered mental status, found to have ANGELICA, metabolic acidosis, hyperkalemia, hyponatremia, hyperglycemia    Nephrology consulted for assistance in management    -Continued on CVVHD, 2 3 L urine output noted over the past 24 hours, running UF +100 mL for several hours overnight    ASSESSMENT/PLAN:    ANGELICA  (POA)  -Baseline creatinine: 0 7-0 9 according to prior labs from 2021, did have recent ANGELICA in the end of December with a peak creatinine of 2 42 and on dialysis requiring  -Creatinine on admission 8 27, most recent creatinine 3 38 on CVVHD  -Etiology: Prerenal azotemia due to osmotic diuresis decreased oral intake with subsequent conversion to ATN, sepsis, hypotension  -UA: Glucose, large blood, small leukocytes, 2+ protein, innumerable RBCs, 10-20 WBCs, normal bacteria  -Renal imaging: CT abdomen pelvis without contrast on admission without hydronephrosis  -Avoid hypotension, avoid nephrotoxins, avoid NSAIDS  -Trend BMP  -Current access is temp HD catheter placed 1/23  -CVVHD was initiated 1/23, currently running blood flow rate 300, DFR 30 ml/kg running even, 4K/3 CA, defer to critical care team for UF rate  -Currently has Pizano catheter, made 360 mL urine overnight  -Currently on D5  mill per hour  -Hemodialysis consent obtained 1/23 by Dr Taylor Magana     Anion gap metabolic acidosis  -Anion gap of 34 with a bicarbonate of 8 on admission in the setting of profound hypoglycemia  -Differential include DKA versus lactic acidosis due to metformin toxicity  -Toxicology was consulted admission  -Initially treated with aggressive IV fluid hydration and IV insulin with minimal improvement, subsequently started on CVVHD as above  -Slow improvement of bicarbonate now 16, with lactic of 6 4 overall improved over the past 24 to 48 hours  -Continue to monitor with volume expansion and CVVHD, CVVHD is slower in clearing metformin then IHD     Shock  -Differentials include hypovolemic due to DKA/osmotic diuresis/decreased oral intake versus septic in the setting of gram-negative bacteremia suspected to due to UTI  -Continues on Levophed and vasopressin    Hypernatremia  -Had a recent admission at the end of December for hyponatremia requiring hypotonic IV solutions  -Sodium was 159/160 on admission glucose corrected to 168, most recently is 138 after receiving hypotonic fluids and started on CVVHD  -Attempt to avoid rapid overcorrection, would stop hypotonic IV fluid at this point and do further resuscitation with isotonic solutions, hypernatremia likely also corrected due to CVVHD use for severe acidosis     Anemia  -Recent hemoglobin 8 1  -Recommend transfuse goal greater than 7 per primary team  -Care per primary team     Thrombocytopenia  -Recent platelets of 54  -Care per primary team     Sepsis/gram-negative bacteremia  -Concern for urinary source, blood cultures growing Proteus, urine culture with gram-negative braxton enteric like  -On broad-spectrum antibiotics per primary team     Hyperglycemia  -On insulin infusion per critical care team     Encephalopathy  -Care per primary team     Addition medical problems: Dementia, sacral decubitus ulcer    SUBJECTIVE:  No complaints offered, altered mental status    ROS:  Review of Systems   Unable to perform ROS: Mental status change      A complete 10 point review of systems was performed and found to be negative unless otherwise noted above or in the HPI      OBJECTIVE:  Current Weight: Weight - Scale: 42 1 kg (92 lb 13 oz)  Vitals:    01/25/23 0700 01/25/23 0800 01/25/23 0825 01/25/23 0846   BP:       BP Location:       Pulse: 74 70 72 72   Resp: 21 16  (!) 26   Temp: (!) 96 4 °F (35 8 °C) (!) 96 1 °F (35 6 °C) (!) 95 7 °F (35 4 °C) (!) 95 7 °F (35 4 °C)   TempSrc:  Rectal     SpO2: 100% 100%     Weight:       Height:           Intake/Output Summary (Last 24 hours) at 1/25/2023 1035  Last data filed at 1/25/2023 0800  Gross per 24 hour   Intake 7898 31 ml   Output 5126 ml   Net 2772 31 ml     Physical Exam  Vitals and nursing note reviewed  Constitutional:       General: He is not in acute distress  Appearance: He is not toxic-appearing or diaphoretic  HENT:      Head: Normocephalic and atraumatic  Nose: Nose normal       Mouth/Throat:      Mouth: Mucous membranes are dry  Eyes:      General: No scleral icterus  Neck:      Comments: Right IJ temp HD catheter with clean dressing  Cardiovascular:      Rate and Rhythm: Normal rate and regular rhythm  Pulses: Normal pulses  Heart sounds: Normal heart sounds  Pulmonary:      Effort: Pulmonary effort is normal  No respiratory distress  Breath sounds: No wheezing or rales  Abdominal:      General: Abdomen is flat  There is no distension  Palpations: Abdomen is soft  Musculoskeletal:      Cervical back: Neck supple  Right lower leg: No edema  Left lower leg: No edema  Comments: Pizano catheter with clear yellow urine   Skin:     General: Skin is warm and dry  Capillary Refill: Capillary refill takes less than 2 seconds  Neurological:      Mental Status: He is alert            Medications:    Current Facility-Administered Medications:   •  cefepime (MAXIPIME) 2,000 mg in dextrose 5 % 50 mL IVPB, 2,000 mg, Intravenous, Q12H, AKSHAT Gonzalez, Stopped at 01/25/23 0022  •  dextrose 5 % in lactated Ringer's infusion, 250 mL/hr, Intravenous, Continuous, AKSHAT Gonzalez, Last Rate: 250 mL/hr at 01/25/23 0708, 250 mL/hr at 01/25/23 0708  •  heparin (porcine) 25,000 units in 0 45% NaCl 250 mL infusion (premix), 500 Units/hr, Pre Filter, Continuous, Merlyn Self MD, Last Rate: 5 mL/hr at 01/24/23 1216, 500 Units/hr at 01/24/23 1216  • insulin regular (HumuLIN R,NovoLIN R) 1 Units/mL in sodium chloride 0 9 % 100 mL infusion, 0 1-30 Units/hr, Intravenous, Continuous, Reji Civatte, CRNP, Last Rate: 0 9 mL/hr at 01/25/23 0815, 0 9 Units/hr at 01/25/23 0815  •  levothyroxine tablet 75 mcg, 75 mcg, Oral, Daily Before Breakfast, Reji Civatte, CRNP  •  NOREPINEPHRINE 4 MG  ML NSS (CMPD ORDER) infusion, 1-30 mcg/min, Intravenous, Titrated, Reji Civatte, CRNP, Last Rate: 15 mL/hr at 01/25/23 1032, 4 mcg/min at 01/25/23 1032  •  NxStage K 4/Ca 3 dialysis solution (RFP-401) 20,000 mL, 20,000 mL, Dialysis, Continuous, Paul Hester MD, 20,000 mL at 01/25/23 0157  •  pantoprazole (PROTONIX) injection 40 mg, 40 mg, Intravenous, Q24H [facility], Eather Sever, MD, 40 mg at 01/25/23 1035  •  potassium chloride 20 mEq IVPB (premix), 20 mEq, Intravenous, Q2H, Paul Hester MD, 20 mEq at 01/25/23 1035  •  sodium phosphate 6 mmol in sodium chloride 0 9 % 100 mL Infusion, 6 mmol, Intravenous, Once, Paul Hester MD, Last Rate: 50 mL/hr at 01/25/23 0903, 6 mmol at 01/25/23 0903  •  vasopressin (PITRESSIN) 20 Units in sodium chloride 0 9 % 100 mL infusion, 0 04 Units/min, Intravenous, Continuous, Cornell Courts, CRNP, Last Rate: 12 mL/hr at 01/25/23 0716, 0 04 Units/min at 01/25/23 0716    Laboratory Results:  Results from last 7 days   Lab Units 01/25/23  0614 01/25/23  0023 01/24/23  2355 01/24/23  1809 01/24/23  1245 01/24/23  0507 01/24/23  0014 01/23/23  2245 01/23/23  1137 01/23/23  1055   WBC Thousand/uL  --  5 92  --   --   --  2 55*  --   --   --  9 04   HEMOGLOBIN g/dL  --  8 1*  --   --   --  9 0*  --   --   --  12 4   HEMATOCRIT %  --  25 0*  --   --   --  29 4*  --   --   --  43 1   PLATELETS Thousands/uL  --  54*  --   --   --  100*  --   --   --  223   POTASSIUM mmol/L 3 6  --  3 8 3 5 4 0 3 8 4 0 4 2   < > 8 0*   CHLORIDE mmol/L 109*  --  111* 112* 114* 114* 118* 119*   < > 117*   CO2 mmol/L 16*  --  15* 13* 10* 9* 7* 7*   < > 8*   BUN mg/dL 29*  --  41* 55* 75* 92* 124* 132*   < > 200*   CREATININE mg/dL 1 07  --  1 59* 1 99* 2 72* 3 38* 4 71* 5 20*   < > 8 27*   CALCIUM mg/dL 7 8*  --  7 8* 7 6* 7 5* 8 0* 8 1* 7 2*   < > 9 5   MAGNESIUM mg/dL 1 7*  --  1 8* 1 8* 1 6* 2 0 2 3 2 5   < > 3 3*   PHOSPHORUS mg/dL 2 1*  --  1 9* 2 6 2 2* 3 2 4 1 4 6*   < >  --     < > = values in this interval not displayed  I have personally reviewed the blood work as stated above and in my note  I have personally reviewed critical care note

## 2023-01-25 NOTE — ASSESSMENT & PLAN NOTE
Lab Results   Component Value Date    HGBA1C 9 5 (H) 12/31/2022       Recent Labs     01/25/23  1223 01/25/23  1313 01/25/23  1416 01/25/23  1525   POCGLU 84 113 92 107       Blood Sugar Average: Last 72 hrs:  (P) 450 [id number]   · Patient was suspected dka on admission and was elevated blood glucose levels and ketones, elevated anion gap metabolic acidosis  Patient weaned off insulin drip    Transition to sliding scale  · Will resume with diet   · No more metformin in the future

## 2023-01-25 NOTE — WOUND OSTOMY CARE
Consult Note - Wound   Terris Floor 69 y o  male MRN: [id number]  Unit/Bed#: ICU 14 Encounter: [id number]      History and Present Illness:  69year old male presented to the hospital with altered mental status  Patient's history significant for DM, dementia, sacral wound  Assessment Findings:   Patient assessed along with primary RN  Patient on CVVH, requiring pressors, dependent for all cares and repositioning  On low air-loss mattress with positioning wedges in use  Incontinent of stool on exam, tatiana-anal care provided  Pizano catheter in place  Nutrition team following, currently NPO  1   Present on admission unstageable pressure injury to coccyx/sacrum--wound with dry, thick, black eschar  There is a white area in center of wound that feels as though it may be a dressing that is grown over by eschar  Surrounding the eschar there is dark purple and pink tissue  Tatiana-wound without induration or erythema  No foul odor or drainage appreciated  CT abdomen/pelvis on 1/23/2023 showing "no acute fracture or destructive osseous lesion "  2  Present on admission deep tissue pressure injury to left lower back--light purple/maroon, non-blanchable with intact epidermis  Tatiana-wound intact without redness  No induration  3   Present on admission re-absorbing deep tissue pressure injury--black/dark purple and pink re-absorbing blister with brown edges  Tatiana-wound intact, with blanchable erythema  4   Present on admission deep tissue pressure injury to right medial foot--two areas measured together  Distal area with non-blanchable maroon and surrounding erythema  Proximal area dark purple/black with surrounding blanchable erythema  No induration or fluctuance at this time  5   Present on admission deep tissue pressure injury to left heel--non-blanchable erythema/maroon  Intact epidermis  Tatiana-wound intact  No induration    6   Present on admission deep tissue pressure injury to left lateral foot--light purple, non-blanchable erythema  Filomena-wound intact  No induration  Epidermis intact  See flowsheet for wound details  Wound Care Plan:   1-Low air-loss mattress--place order for P500 specialty bed if transferred out of ICU  2-Elevate heels off of bed/chair surface to offload pressure  3-Offloading air cushion in chair if/when out of bed  4-Moisturize skin daily with skin nourishing cream   5-Turn/reposition every 2 hours while in bed using positioning wedges; and weight shift frequently while in chair for pressure re-distribution on skin  6-Left lateral foot, right medial foot, bilateral heels, left back--cleanse with soap and water, pat dry  Apply Allevyn Life foam dressings  Change dressings every 3 days and as needed with soilage/dislodgement  7-UNTIL SEEN BY SURGERY TEAM--cleanse with normal saline, pat dry  Apply Allevyn Life foam dressing  Change dressing every other day and as needed with soilage/dislodgement  Wound care team to follow  Plan of care reviewed with primary RN  Spoke with Dr Sonu Carlos and critical care team about assessment findings and treatment best to be determined depending on goals of care--wound would require sharp debridement if pursuing all treatments  However, patient may not tolerate debridement and follow-up care, unlikely to heal if fully debrided  If pursuing more of a palliative path, would recommend betadine and foam dressing        Wound 01/23/23 Pressure Injury Coccyx (Active)   Wound Image   01/25/23 1154   Wound Length (cm) 12 cm 01/25/23 1154   Wound Width (cm) 9 5 cm 01/25/23 1154   Wound Depth (cm) 0 4 cm 01/25/23 1154   Wound Surface Area (cm^2) 114 cm^2 01/25/23 1154   Wound Volume (cm^3) 45 6 cm^3 01/25/23 1154   Calculated Wound Volume (cm^3) 45 6 cm^3 01/25/23 1154       Wound 01/23/23 Pressure Injury Back Lateral;Left;Lower (Active)   Wound Image   01/25/23 1155   Wound Length (cm) 0 8 cm 01/25/23 1155   Wound Width (cm) 1 8 cm 01/25/23 1155   Wound Depth (cm) 0 cm 01/25/23 1155   Wound Surface Area (cm^2) 1 44 cm^2 01/25/23 1155   Wound Volume (cm^3) 0 cm^3 01/25/23 1155   Calculated Wound Volume (cm^3) 0 cm^3 01/25/23 1155   Change in Wound Size % 100 01/25/23 1155       Wound 01/23/23 Pressure Injury Heel Right (Active)   Wound Image   01/25/23 1201   Wound Length (cm) 4 cm 01/25/23 1201   Wound Width (cm) 3 cm 01/25/23 1201   Wound Depth (cm) 0 cm 01/25/23 1201   Wound Surface Area (cm^2) 12 cm^2 01/25/23 1201   Wound Volume (cm^3) 0 cm^3 01/25/23 1201   Calculated Wound Volume (cm^3) 0 cm^3 01/25/23 1201       Wound 01/23/23 Pressure Injury Foot Right;Medial (Active)   Wound Image   01/25/23 1202   Wound Length (cm) 5 8 cm 01/25/23 1202   Wound Width (cm) 3 cm 01/25/23 1202   Wound Depth (cm) 0 cm 01/25/23 1202   Wound Surface Area (cm^2) 17 4 cm^2 01/25/23 1202   Wound Volume (cm^3) 0 cm^3 01/25/23 1202   Calculated Wound Volume (cm^3) 0 cm^3 01/25/23 1202       Wound 01/23/23 Pressure Injury Heel Left (Active)   Wound Image   01/25/23 1206   Wound Length (cm) 1 cm 01/25/23 1206   Wound Width (cm) 1 cm 01/25/23 1206   Wound Depth (cm) 0 cm 01/25/23 1206   Wound Surface Area (cm^2) 1 cm^2 01/25/23 1206   Wound Volume (cm^3) 0 cm^3 01/25/23 1206   Calculated Wound Volume (cm^3) 0 cm^3 01/25/23 1206       Wound 01/23/23 Pressure Injury Foot Left;Lateral (Active)   Wound Image   01/25/23 1206   Wound Length (cm) 1 8 cm 01/25/23 1206   Wound Width (cm) 1 cm 01/25/23 1206   Wound Depth (cm) 0 cm 01/25/23 1206   Wound Surface Area (cm^2) 1 8 cm^2 01/25/23 1206   Wound Volume (cm^3) 0 cm^3 01/25/23 1206   Calculated Wound Volume (cm^3) 0 cm^3 01/25/23 [address] [facility] BSN, RN, [facility]

## 2023-01-25 NOTE — ASSESSMENT & PLAN NOTE
Lab Results   Component Value Date    HGB 6 8 (LL) 01/26/2023    HGB 6 8 (LL) 01/26/2023    HGB 7 4 (L) 01/25/2023   No schistocytes, fibrinogen and hemolysis smear negative  Will order haptoglobin, LDH, bilirubin, reticulocytes  Blood consent, transfuse if less than 7  Will hold off on transfusion since family agreed on comfort care

## 2023-01-25 NOTE — ASSESSMENT & PLAN NOTE
Malnutrition Findings:   Adult Malnutrition type: Chronic illness  Adult Degree of Malnutrition: Other severe protein calorie malnutrition  Malnutrition Characteristics: Fat loss, Muscle loss, Weight loss                360 Statement: Severe protein-calorie malnutrition in context of chronic illness r/t inadequate energy intake, increased needs as evidance by severe body fat (orbital, triceps, ribcage are), and muscle mass depletions (temporal wasting, protruding clavicles, shoulders), significant 12%, 5 6kg wt loss x 3 months (45 4kg 10/6 -> 39 8kg 1/24/23); treated with PO diet with oral supplements vs EN    BMI Findings:  Adult BMI Classifications: Underweight < 18 5        Body mass index is 18 75 kg/m²       Speech does not recommend oral intake  Will need feeding tube/peg  Will discuss with family

## 2023-01-25 NOTE — ASSESSMENT & PLAN NOTE
Lab Results   Component Value Date    LACTICACID 6 4 (HH) 01/25/2023    LACTICACID 6 3 (HH) 01/25/2023    LACTICACID 8 2 (HH) 01/24/2023    WBC 5 92 01/25/2023    WBC 2 55 (L) 01/24/2023    WBC 9 04 01/23/2023     Hypothermic, hypotensive requiring vasopressin 0 04 units/min, levo 10 mcg/min  Secondary to bacteremia from UTI  BC proteus x2 awaiting sensitivities  UC negative    Patient is positive 8 L from admission  I and O  Follow cultures  Cefipime day 3

## 2023-01-25 NOTE — ASSESSMENT & PLAN NOTE
Probably from metformin toxicity as well as dehydration, septic shock  Continue to trend  No more metformin in the future  Continue CVVHD, (+100), isolyte fluid

## 2023-01-25 NOTE — ASSESSMENT & PLAN NOTE
Lab Results   Component Value Date    PLT 54 (L) 01/25/2023    WBC 5 92 01/25/2023    HGB 8 1 (L) 01/25/2023   Hemolysis workup negative  Postive 8 l since admission  Possibly stop fluids  Continue to monitor

## 2023-01-25 NOTE — ASSESSMENT & PLAN NOTE
Lab Results   Component Value Date    PLT 42 (LL) 01/25/2023    WBC 6 03 01/25/2023    HGB 7 4 (L) 01/25/2023   Hemolysis workup negative  Postive 8 l since admission  Possibly stop fluids  Continue to monitor

## 2023-01-25 NOTE — ASSESSMENT & PLAN NOTE
Probably from metformin toxicity as well as dehydration, septic shock  Continue to trend  No more metformin in the future

## 2023-01-25 NOTE — ASSESSMENT & PLAN NOTE
Lab Results   Component Value Date    LACTICACID 4 4 () 01/25/2023    LACTICACID 6 4 () 01/25/2023    LACTICACID 6 3 () 01/25/2023    WBC 6 03 01/25/2023    WBC 5 92 01/25/2023    WBC 2 55 (L) 01/24/2023     Hypothermic, hypotensive requiring levo 3 mcg/min  Secondary to bacteremia from UTI  BC proteus x2 awaiting sensitivities  UC negative    I and O  Follow cultures  Cefipime day 4

## 2023-01-25 NOTE — ASSESSMENT & PLAN NOTE
Lab Results   Component Value Date    HGBA1C 9 5 (H) 12/31/2022       Recent Labs     01/25/23  0515 01/25/23  0614 01/25/23  0701 01/25/23  0814   POCGLU 164* 102 78 79       Blood Sugar Average: Last 72 hrs:  (P) 177 15   · We will treat his DKA per the protocol   · Gap closed  · Will resume with diet  · May need to start on insulin 8 units lantus per day   To overlap with starting food  · No more metformin in the future  · We will hold his outpatient dillan

## 2023-01-25 NOTE — PLAN OF CARE
Problem: PAIN - ADULT  Goal: Verbalizes/displays adequate comfort level or baseline comfort level  Description: Interventions:  - Encourage patient to monitor pain and request assistance  - Assess pain using appropriate pain scale  - Administer analgesics based on type and severity of pain and evaluate response  - Implement non-pharmacological measures as appropriate and evaluate response  - Consider cultural and social influences on pain and pain management  - Notify physician/advanced practitioner if interventions unsuccessful or patient reports new pain  Outcome: Progressing     Problem: INFECTION - ADULT  Goal: Absence or prevention of progression during hospitalization  Description: INTERVENTIONS:  - Assess and monitor for signs and symptoms of infection  - Monitor lab/diagnostic results  - Monitor all insertion sites, i e  indwelling lines, tubes, and drains  - Monitor endotracheal if appropriate and nasal secretions for changes in amount and color  - [place] appropriate cooling/warming therapies per order  - Administer medications as ordered  - Instruct and encourage patient and family to use good hand hygiene technique  - Identify and instruct in appropriate isolation precautions for identified infection/condition  Outcome: Not Progressing     Problem: DISCHARGE PLANNING  Goal: Discharge to home or other facility with appropriate resources  Description: INTERVENTIONS:  - Identify barriers to discharge w/patient and caregiver  - Arrange for needed discharge resources and transportation as appropriate  - Identify discharge learning needs (meds, wound care, etc )  - Arrange for interpretive services to assist at discharge as needed  - Refer to Case Management Department for coordinating discharge planning if the patient needs post-hospital services based on physician/advanced practitioner order or complex needs related to functional status, cognitive ability, or social support system  Outcome: Not Progressing     Problem: METABOLIC, FLUID AND ELECTROLYTES - ADULT  Goal: Electrolytes maintained within normal limits  Description: INTERVENTIONS:  - Monitor labs and assess patient for signs and symptoms of electrolyte imbalances  - Administer electrolyte replacement as ordered  - Monitor response to electrolyte replacements, including repeat lab results as appropriate  - Instruct patient on fluid and nutrition as appropriate  Outcome: Not Progressing  Goal: Fluid balance maintained  Description: INTERVENTIONS:  - Monitor labs   - Monitor I/O and WT  - Instruct patient on fluid and nutrition as appropriate  - Assess for signs & symptoms of volume excess or deficit  Outcome: Not Progressing  Goal: Glucose maintained within target range  Description: INTERVENTIONS:  - Monitor Blood Glucose as ordered  - Assess for signs and symptoms of hyperglycemia and hypoglycemia  - Administer ordered medications to maintain glucose within target range  - Assess nutritional intake and initiate nutrition service referral as needed  Outcome: Progressing     Problem: SKIN/TISSUE INTEGRITY - ADULT  Goal: Skin Integrity remains intact(Skin Breakdown Prevention)  Description: Assess:  -Perform Saman assessment every ***  -Clean and moisturize skin every ***  -Inspect skin when repositioning, toileting, and assisting with ADLS  -Assess under medical devices such as *** every ***  -Assess extremities for adequate circulation and sensation     Bed Management:  -Have minimal linens on bed & keep smooth, unwrinkled  -Change linens as needed when moist or perspiring  -Avoid sitting or lying in one position for more than *** hours while in bed  -Keep HOB at ***degrees     Toileting:  -Offer bedside commode  -Assess for incontinence every ***  -Use incontinent care products after each incontinent episode such as ***    Activity:  -Mobilize patient *** times a day  -Encourage activity and walks on unit  -Encourage or provide ROM exercises -Turn and reposition patient every *** Hours  -Use appropriate equipment to lift or move patient in bed  -Instruct/ Assist with weight shifting every *** when out of bed in chair  -Consider limitation of chair time *** hour intervals    Skin Care:  -Avoid use of baby powder, tape, friction and shearing, hot water or constrictive clothing  -Relieve pressure over bony prominences using ***  -Do not massage red bony areas    Next Steps:  -Teach patient strategies to minimize risks such as ***   -Consider consults to  interdisciplinary teams such as ***  Outcome: Progressing  Goal: Incision(s), wounds(s) or drain site(s) healing without S/S of infection  Description: INTERVENTIONS  - Assess and document dressing, incision, wound bed, drain sites and surrounding tissue  - Provide patient and family education  - Perform skin care/dressing changes every ***  Outcome: Progressing  Goal: Pressure injury heals and does not worsen  Description: Interventions:  - Implement low air loss mattress or specialty surface (Criteria met)  - Apply silicone foam dressing  - Instruct/assist with weight shifting every *** minutes when in chair   - Limit chair time to *** hour intervals  - Use special pressure reducing interventions such as *** when in chair   - Apply fecal or urinary incontinence containment device   - Perform passive or active ROM every ***  - Turn and reposition patient & offload bony prominences every *** hours   - Utilize friction reducing device or surface for transfers   - Consider consults to  interdisciplinary teams such as ***  - Use incontinent care products after each incontinent episode such as ***  - Consider nutrition services referral as needed  Outcome: Progressing     Problem: MOBILITY - ADULT  Goal: Maintain or return to baseline ADL function  Description: INTERVENTIONS:  -  Assess patient's ability to carry out ADLs; assess patient's baseline for ADL function and identify physical deficits which impact ability to perform ADLs (bathing, care of mouth/teeth, toileting, grooming, dressing, etc )  - Assess/evaluate cause of self-care deficits   - Assess range of motion  - Assess patient's mobility; develop plan if impaired  - Assess patient's need for assistive devices and provide as appropriate  - Encourage maximum independence but intervene and supervise when necessary  - Involve family in performance of ADLs  - Assess for home care needs following discharge   - Consider OT consult to assist with ADL evaluation and planning for discharge  - Provide patient education as appropriate  Outcome: Not Progressing  Goal: Maintains/Returns to pre admission functional level  Description: INTERVENTIONS:  - Perform BMAT or MOVE assessment daily    - Set and communicate daily mobility goal to care team and patient/family/caregiver  - Collaborate with rehabilitation services on mobility goals if consulted  - Perform Range of Motion *** times a day  - Reposition patient every *** hours    - Dangle patient *** times a day  - Stand patient *** times a day  - Ambulate patient *** times a day  - Out of bed to chair *** times a day   - Out of bed for meals *** times a day  - Out of bed for toileting  - Record patient progress and toleration of activity level   Outcome: Not Progressing     Problem: SAFETY,RESTRAINT: NV/NON-SELF DESTRUCTIVE BEHAVIOR  Goal: Remains free of harm/injury (restraint for non violent/non self-detsructive behavior)  Description: INTERVENTIONS:  - Instruct patient/family regarding restraint use   - Assess and monitor physiologic and psychological status   - Provide interventions and comfort measures to meet assessed patient needs   - Identify and implement measures to help patient regain control  - Assess readiness for release of restraint   Outcome: Not Progressing  Goal: Returns to optimal restraint-free functioning  Description: INTERVENTIONS:  - Assess the patient's behavior and symptoms that indicate continued need for restraint  - Identify and implement measures to help patient regain control  - Assess readiness for release of restraint   Outcome: Not Progressing     Problem: Nutrition/Hydration-ADULT  Goal: Nutrient/Hydration intake appropriate for improving, restoring or maintaining nutritional needs  Description: Monitor and assess patient's nutrition/hydration status for malnutrition  Collaborate with interdisciplinary team and initiate plan and interventions as ordered  Monitor patient's weight and dietary intake as ordered or per policy  Utilize nutrition screening tool and intervene as necessary  Determine patient's food preferences and provide high-protein, high-caloric foods as appropriate       INTERVENTIONS:  - Monitor oral intake, urinary output, labs, and treatment plans  - Assess nutrition and hydration status and recommend course of action  - Evaluate amount of meals eaten  - Assist patient with eating if necessary   - Allow adequate time for meals  - Recommend/ encourage appropriate diets, oral nutritional supplements, and vitamin/mineral supplements  - Order, calculate, and assess calorie counts as needed  - Recommend, monitor, and adjust tube feedings and TPN/PPN based on assessed needs  - Assess need for intravenous fluids  - Provide specific nutrition/hydration education as appropriate  - Include patient/family/caregiver in decisions related to nutrition  Outcome: Not Progressing

## 2023-01-25 NOTE — ASSESSMENT & PLAN NOTE
Lab Results   Component Value Date    CREATININE 1 07 01/25/2023    CREATININE 1 59 (H) 01/24/2023    CREATININE 1 99 (H) 01/24/2023       Lab Results   Component Value Date    EGFR 67 01/25/2023    EGFR 41 01/24/2023    EGFR 31 01/24/2023     Bl 0 9  (POA 6)    Plan:    • CVVH  • Nephro following  • Monitor BMP daily and observe for downward trend of creatinine  • Avoid hypoperfusion of the kidneys, minimize nephrotoxins

## 2023-01-25 NOTE — ASSESSMENT & PLAN NOTE
Lab Results   Component Value Date    CREATININE 0 79 01/25/2023    CREATININE 1 07 01/25/2023    CREATININE 1 59 (H) 01/24/2023       Lab Results   Component Value Date    EGFR 87 01/25/2023    EGFR 67 01/25/2023    EGFR 41 01/24/2023     Bl 0 9  (POA 6)    Plan:    • CVVH  • Nephro following  • Monitor BMP daily and observe for downward trend of creatinine  • Avoid hypoperfusion of the kidneys, minimize nephrotoxins

## 2023-01-25 NOTE — ASSESSMENT & PLAN NOTE
Lab Results   Component Value Date    LACTICACID 6 4 () 01/25/2023    LACTICACID 6 3 () 01/25/2023    LACTICACID 8 2 ([place]) 01/24/2023   Gap closed  · The patient presented to the ED with altered mental status   His labs revealed a significant metabolic acidosis, likely multifactorial related to DKA, severe volume depletion, elevated lactic acid level, ANGELICA, sepsis, metformin toxicity, elevated urate  · Treat underlying causes  · Continue d5 1/2 infusion  · We will trend his [place] 30- he may require a bicarbonate infusion

## 2023-01-25 NOTE — CONSULTS
Consultation - Jimmie Yogi Castillo  69 y o   male  ICU 14/ICU 15   MRN: [id number]  Encounter: [id number]    ASSESSMENT:    Patient Active Problem List   Diagnosis   • Type 2 diabetes mellitus without complication, without long-term current use of insulin ([facility] )   • Ill-fitting dentures   • Hearing difficulty of both ears   • Prostate cancer ([facility] )   • Mixed hyperlipidemia   • Primary hypothyroidism   • Dementia without behavioral disturbance ([facility] )   • Healthcare maintenance   • ANGELICA (acute kidney injury) ([facility] )   • Ambulatory dysfunction   • Pain of lower extremity   • Skin ulcer of sacrum, limited to breakdown of skin ([facility])   • Acute encephalopathy   • Metabolic acidosis   • Bacteremia   • Sepsis ([facility])   • UTI (urinary tract infection)   • Pancytopenia ([facility])   • Severe protein-calorie malnutrition ([facility])   • Anemia   • Lactic acidosis   • Abnormal CT of the chest       Active problems addressed:  Dementia, possibly Alzhemiers  Unstagable sacral ulcer  Dysphagia  Severe protein calorie malnutrition  Septic shock  UTI  Bacteremia  Goals of care    Consult is for goals of care    PLAN:    1  Goals:   • Patient appears to lack capacity to make complex medical choices on my visit today  • Spoke to his wife/POA/Dariana about progressive and incurable nature of dementia and the consequences of progressive disease which the patient is currently experiencing including dysphagia, malnutrition and sacral pressure wound  He appears to be approaching end stages of dementia given presence of ulcer and severe malnutrition  At this point, I recommend a transition of goals from restoration to that of comfort on hospice  Discussed also that studies have shown that Artificial Nutrition and Hydration or JULIETTE in patients with advanced dementia did not result in increase survivability (J Am Geriatric Soc  2014   American Geriatric Society feeding tubes in advanced dementia position statement) nor increase in the quality of life  Moreover, JULIETTE resulted in increased re-hospitalizations from TF complications (CADY Ward et al  2009  Hospital transfers of nursing home residents with advanced dementia), increased risk of aspiration ([employer] and [address]  1993  Key Ethical Issues in Palliative Care  [address]), exacerbation of delirium or agitation, and increased risk for pressure ulcers  More importantly, dementia patients on JULIETTE still continued to experience weight loss and depletion of lean and fat body mass despite receiving adequate calories from tube feedings in long-term care facilities [facility], [address] et al  2009  Review of literature on cultural competence and end-of-life treatment decisions: the role of the hospitalists)  Given all these, we strongly recommend AGAINST JULIETTE via feeding tube, ie PEG  Instead, we offer pleasure feedings with careful hand feeding strategies, as endorsed by the Alzheimer's Association (October 2015), which have been shown to offer the highest quality of care, especially in patients with advanced dementia [facility] et al  2001  Oral feeding options for people with dementia: A systematic review  Journal of [address])  • However, the burden of a feeding tube and other interventions falls on the patient or his POA  I encouraged his wife to consider this carefully  • Wife is aware of what hospice is, she had been discussing hospice with her aunt who had a friend die 5 months ago on hospice  We discussed further that this is for end of life care to ensure that the patient does not suffer in the end, for however long he has left  • His wife asked if he will die, and I said that he will die someday likely from the complications of dementia  • She will think about this some more and will discuss further with the ICU team  • Palliative will only follow along peripherally   Ongoing conversations as per ICU  • D/w Dr Kimberly Morales status: Level 3 - DNAR and DNI   Decisional apparatus:  Patient does not have capacity to make medical decisions on my exam today  If such capacity is lost, patient's substitute decision maker would default to wife by [place]  Advance Directive / Living Will / POLST:  His 5 Wishes was witnessed incorrectly and therefore, not valid  But he has another paperwork naming a POA that was notarized  2  Social Support:  • Supportive listening provided  • Patient lives with wife who appears to also be his primary caregiver  3  Symptom management:  • Per ICU    We appreciate the opportunity to participate in this patient's care  We will continue to follow  Please do not hesitate to contact our on-call provider through our clinic answering service at [phone] should you have acute symptom control concerns  IDENTIFICATION:  Consults  Reason for Consult / Principal Problem: goals of care    HISTORY OF PRESENT ILLNESS:    Rishbah Cabrera is a 69 y o  male with DM, HTN, and worsening cognition, who presented to the hospital on 1/23/2023 due to altered mental status  He was found to be in DKA and ANGELICA  He was started on CVVH for suspected metformin toxicity and on insulin drip+IVF for DKA  He was also found to have a sacral decubitus ulcer on arrival to hospital  Ulcer appears unstagable with part of a bone sticking out  He is referred to wound care and surgery for evaluation  He was evaluated by SLP who found him with severe oropharyngeal dysphagia and recommended NPO  He is currently in the ICU requiring vasopressors for septic shock from urosepsis  Given above, palliative consulted to establish goals of care  Patient was seen in the ICU awake but not really following commands per the ICU RN  He would participate in cares with careful coaching and encouragement  He appears comfortable overall  Spoke to wife   Per wife, patient is not formally diagnosed with dementia but she noticed a gradual and progressive decline in cognition and functioning (needing assistance with ADLs and iADLs) since 2 5 years ago  She wanted him to be formally evaluated but it's been a hardship getting an appointment to see either neurology or geriatrics  Since then, she noted continued decline in his memory and functioning  Rest of the conversation as above       Interview and exam limited by: none     Review of Systems   Unable to perform ROS: Dementia       Past Medical History:   Diagnosis Date   • Alzheimer disease type 3 ([place] [address] )    • Cervical pain 4/16/2018   • Diabetes mellitus ([place] [address] )    • Diabetic peripheral neuropathy associated with type 2 diabetes mellitus ([place] [address] ) 3/18/2021   • Disease of thyroid gland    • Elevated PSA    • Hyperlipidemia    • Medicare annual wellness visit, subsequent 7/30/2020   • Prostate cancer ([place] [address] )    • Type 2 diabetes mellitus with diabetic peripheral angiopathy without gangrene ([place] [address] ) 5/17/2021    According to ICD10 guidelines, patient accept     Past Surgical History:   Procedure Laterality Date   • NECK SURGERY     • PROSTATE BIOPSY  07/18/2018     Social History     Socioeconomic History   • Marital status: /Civil Union     Spouse name: Not on file   • Number of children: Not on file   • Years of education: Not on file   • Highest education level: Not on file   Occupational History   • Occupation:    retired   Tobacco Use   • Smoking status: Never   • Smokeless tobacco: Never   Vaping Use   • Vaping Use: Never used   Substance and Sexual Activity   • Alcohol use: Never   • Drug use: Never   • Sexual activity: Not Currently   Other Topics Concern   • Not on file   Social History Narrative   • Not on file     Social Determinants of Health     Financial Resource Strain: Low Risk    • Difficulty of Paying Living Expenses: Not hard at all   Food Insecurity: No Food Insecurity   • Worried About Running Out of Food in the Last Year: Never true   • Ran Out of Food in the Last Year: Never true Transportation Needs: No Transportation Needs   • Lack of Transportation (Medical): No   • Lack of Transportation (Non-Medical): No   Physical Activity: Not on file   Stress: Not on file   Social Connections: Not on file   Intimate Partner Violence: Not on file   Housing Stability: Not on file     Family History   Problem Relation Age of Onset   • No Known Problems Mother    • No Known Problems Father    • Dementia Sister    • Autism Daughter        MEDICATIONS / ALLERGIES:  all current active meds have been reviewed    No Known Allergies    OBJECTIVE:  BP 91/54   Pulse 72   Temp (!) 96 4 °F (35 8 °C)   Resp 16   Ht 4' 11" (1 499 m)   Wt 42 1 kg (92 lb 13 oz)   SpO2 100%   BMI 18 75 kg/m²   Physical Exam:  Constitutional: Appears thin, frail, chronically ill looking, tired looking, not toxic appearing  In no acute physical or emotional distress  Head: Normocephalic and atraumatic  Temporal and submaxillary muscle wasting  Eyes: EOM are normal  No ocular discharge  No scleral icterus  Neck: No visible adenopathy or masses  Respiratory: Effort normal  No stridor  No respiratory distress  Gastrointestinal: No abdominal distension  Musculoskeletal: No edema  Neurological: Awake, alert, confused  Skin: Dry, no diaphoresis  Pale   Psychiatric: unable to assess fully  But calm and not combative    Lab Results: I have personally reviewed pertinent labs  Imaging Studies: I have personally reviewed pertinent reports  EKG, Pathology, and Other Studies: I have personally reviewed pertinent reports  Counseling / Coordination of Care:  Counseling / Coordination of Care  Total floor / unit time spent today 60+ minutes  Greater than 50% of total time was spent with the patient and / or family counseling and / or coordination of care   A description of the counseling / coordination of care: provided medical updates, discussed palliative care, discussed hospice care, determined competency, determined goals of care, determined POA, determined social/family support, discussed plans of care, discussed symptom management, provided psychosocial support       Nael Herrera MD  [address] [facility]  [phone]

## 2023-01-25 NOTE — ASSESSMENT & PLAN NOTE
Lab Results   Component Value Date    LACTICACID 4 4 () 01/25/2023    LACTICACID 6 4 () 01/25/2023    LACTICACID 6 3 ([facility]) 01/25/2023   Gap closed, lactic acid trending down  · The patient presented to the ED with altered mental status   His labs revealed a significant metabolic acidosis, likely multifactorial related to DKA, severe volume depletion, elevated lactic acid level, ANGELICA, sepsis, metformin toxicity, elevated urate  · Treat underlying causes  · Continue isolyte infusion  · Palliative onboard Family will meet today and decide on hospice

## 2023-01-25 NOTE — SPEECH THERAPY NOTE
Speech Language/Pathology    Speech/Language Pathology Progress Note    Patient Name: Govind XIONG Date: 1/25/2023     Problem List  Principal Problem:    Metabolic acidosis  Active Problems:    Type 2 diabetes mellitus without complication, without long-term current use of insulin (HCC)    Mixed hyperlipidemia    Primary hypothyroidism    Dementia without behavioral disturbance (HCC)    ANGELICA (acute kidney injury) ([facility] )    Skin ulcer of sacrum, limited to breakdown of skin (HCC)    Acute encephalopathy    Bacteremia    Sepsis ([facility] )    UTI (urinary tract infection)    Pancytopenia (HCC)    Severe protein-calorie malnutrition (HCC)    Lactic acidosis    Abnormal CT of the chest       Past Medical History  Past Medical History:   Diagnosis Date   • Alzheimer disease type 3 ([facility] )    • Cervical pain 4/16/2018   • Diabetes mellitus ([facility] )    • Diabetic peripheral neuropathy associated with type 2 diabetes mellitus ([facility] ) 3/18/2021   • Disease of thyroid gland    • Elevated PSA    • Hyperlipidemia    • Medicare annual wellness visit, subsequent 7/30/2020   • Prostate cancer ([facility] )    • Type 2 diabetes mellitus with diabetic peripheral angiopathy without gangrene ([facility] ) 5/17/2021    According to ICD10 guidelines, patient accept        Past Surgical History  Past Surgical History:   Procedure Laterality Date   • NECK SURGERY     • PROSTATE BIOPSY  07/18/2018         Subjective:  Pt a bit more alert today  Objective:  Pt seen for po tolerance and diet recs, Oral care completed, though pt was resistive  Mouth dry  trialed water by tsp  Pt was holding the po inhis mouth, made a slight grimace  ? Du e to pain w/ swallow vs other  Weak swallow x 1 but ? If he transferred the bolus fully  Trialed room temp water, which he seemed more accepting of but still needed a lengthy amount of time to produce a weak swallow  Again ? If it completely transferred A-->P  Unable to suck from the straw  Trialed piping water in and then resumed tsps  Trialed applesauce  Pt held it in his mouth and I eventually had to suction the trials out  Materia included some water as well  Nonverbal  Nodded a few times  Did not follow commands  Assessment:  Required suction of PO  Not transferring  Weak swallow x 1  Plan/Recommendations:  NPO w  Oral care  Offer tsps of room temp water for comfort if desired, and as tolerated  Suction as needed  Palliative consult noted  Now following peripherally  Recommended comfort care on hospice  ST will check status tomorrow  Baseline diet was puree and thin  Consider liberalizing to same if pt is hospice and desires PO

## 2023-01-26 ENCOUNTER — HOME CARE VISIT (OUTPATIENT)
Dept: HOME HEALTH SERVICES | Facility: HOME HEALTHCARE | Age: 78
End: 2023-01-26

## 2023-01-26 PROBLEM — G93.40 ACUTE ENCEPHALOPATHY: Status: RESOLVED | Noted: 2023-01-01 | Resolved: 2023-01-01

## 2023-01-26 PROBLEM — R19.7 DIARRHEA: Status: RESOLVED | Noted: 2023-01-01 | Resolved: 2023-01-01

## 2023-01-26 PROBLEM — N39.0 UTI (URINARY TRACT INFECTION): Status: RESOLVED | Noted: 2023-01-01 | Resolved: 2023-01-01

## 2023-01-26 PROBLEM — Z51.5 HOSPICE CARE: Status: ACTIVE | Noted: 2022-01-01

## 2023-01-26 LAB
ALBUMIN SERPL BCP-MCNC: 2.5 G/DL (ref 3.5–5)
ALP SERPL-CCNC: 140 U/L (ref 34–104)
ALT SERPL W P-5'-P-CCNC: 29 U/L (ref 7–52)
ANION GAP SERPL CALCULATED.3IONS-SCNC: 8 MMOL/L (ref 4–13)
ANION GAP SERPL CALCULATED.3IONS-SCNC: 8 MMOL/L (ref 4–13)
ANION GAP SERPL CALCULATED.3IONS-SCNC: 9 MMOL/L (ref 4–13)
AST SERPL W P-5'-P-CCNC: 49 U/L (ref 13–39)
BACTERIA BLD CULT: ABNORMAL
BACTERIA BLD CULT: ABNORMAL
BILIRUB DIRECT SERPL-MCNC: 0.32 MG/DL (ref 0–0.2)
BILIRUB SERPL-MCNC: 0.91 MG/DL (ref 0.2–1)
BUN SERPL-MCNC: 10 MG/DL (ref 5–25)
BUN SERPL-MCNC: 12 MG/DL (ref 5–25)
BUN SERPL-MCNC: 13 MG/DL (ref 5–25)
C DIFF TOX GENS STL QL NAA+PROBE: NEGATIVE
CA-I BLD-SCNC: 1.08 MMOL/L (ref 1.12–1.32)
CA-I BLD-SCNC: 1.11 MMOL/L (ref 1.12–1.32)
CA-I BLD-SCNC: 1.14 MMOL/L (ref 1.12–1.32)
CALCIUM SERPL-MCNC: 7.7 MG/DL (ref 8.4–10.2)
CALCIUM SERPL-MCNC: 7.8 MG/DL (ref 8.4–10.2)
CALCIUM SERPL-MCNC: 7.9 MG/DL (ref 8.4–10.2)
CAMPYLOBACTER DNA SPEC NAA+PROBE: NORMAL
CHLORIDE SERPL-SCNC: 104 MMOL/L (ref 96–108)
CHLORIDE SERPL-SCNC: 105 MMOL/L (ref 96–108)
CHLORIDE SERPL-SCNC: 105 MMOL/L (ref 96–108)
CO2 SERPL-SCNC: 20 MMOL/L (ref 21–32)
CO2 SERPL-SCNC: 22 MMOL/L (ref 21–32)
CO2 SERPL-SCNC: 22 MMOL/L (ref 21–32)
CREAT SERPL-MCNC: 0.47 MG/DL (ref 0.6–1.3)
CREAT SERPL-MCNC: 0.59 MG/DL (ref 0.6–1.3)
CREAT SERPL-MCNC: 0.62 MG/DL (ref 0.6–1.3)
ERYTHROCYTE [DISTWIDTH] IN BLOOD BY AUTOMATED COUNT: 16 % (ref 11.6–15.1)
GFR SERPL CREATININE-BSD FRML MDRD: 107 ML/MIN/1.73SQ M
GFR SERPL CREATININE-BSD FRML MDRD: 96 ML/MIN/1.73SQ M
GFR SERPL CREATININE-BSD FRML MDRD: 98 ML/MIN/1.73SQ M
GLUCOSE SERPL-MCNC: 135 MG/DL (ref 65–140)
GLUCOSE SERPL-MCNC: 136 MG/DL (ref 65–140)
GLUCOSE SERPL-MCNC: 176 MG/DL (ref 65–140)
GLUCOSE SERPL-MCNC: 177 MG/DL (ref 65–140)
GLUCOSE SERPL-MCNC: 177 MG/DL (ref 65–140)
GLUCOSE SERPL-MCNC: 228 MG/DL (ref 65–140)
GLUCOSE SERPL-MCNC: 315 MG/DL (ref 65–140)
GRAM STN SPEC: ABNORMAL
GRAM STN SPEC: ABNORMAL
HCT VFR BLD AUTO: 19.9 % (ref 36.5–49.3)
HGB BLD-MCNC: 6.8 G/DL (ref 12–17)
HGB BLD-MCNC: 6.8 G/DL (ref 12–17)
LACTATE SERPL-SCNC: 1 MMOL/L (ref 0.5–2)
LACTATE SERPL-SCNC: 1.7 MMOL/L (ref 0.5–2)
LACTATE SERPL-SCNC: 2 MMOL/L (ref 0.5–2)
MAGNESIUM SERPL-MCNC: 1.8 MG/DL (ref 1.9–2.7)
MAGNESIUM SERPL-MCNC: 1.9 MG/DL (ref 1.9–2.7)
MAGNESIUM SERPL-MCNC: 2 MG/DL (ref 1.9–2.7)
MCH RBC QN AUTO: 29.8 PG (ref 26.8–34.3)
MCHC RBC AUTO-ENTMCNC: 34.2 G/DL (ref 31.4–37.4)
MCV RBC AUTO: 87 FL (ref 82–98)
PHOSPHATE SERPL-MCNC: 1.6 MG/DL (ref 2.3–4.1)
PHOSPHATE SERPL-MCNC: 2.3 MG/DL (ref 2.3–4.1)
PHOSPHATE SERPL-MCNC: 2.8 MG/DL (ref 2.3–4.1)
PLATELET # BLD AUTO: 31 THOUSANDS/UL (ref 149–390)
PMV BLD AUTO: 12.3 FL (ref 8.9–12.7)
POTASSIUM SERPL-SCNC: 3.7 MMOL/L (ref 3.5–5.3)
POTASSIUM SERPL-SCNC: 3.7 MMOL/L (ref 3.5–5.3)
POTASSIUM SERPL-SCNC: 3.9 MMOL/L (ref 3.5–5.3)
PROT SERPL-MCNC: 4.5 G/DL (ref 6.4–8.4)
PROTEUS SP DNA BLD POS QL NAA+NON-PROBE: DETECTED
RBC # BLD AUTO: 2.28 MILLION/UL (ref 3.88–5.62)
SALMONELLA DNA SPEC QL NAA+PROBE: NORMAL
SHIGA TOXIN STX GENE SPEC NAA+PROBE: NORMAL
SHIGELLA DNA SPEC QL NAA+PROBE: NORMAL
SODIUM SERPL-SCNC: 134 MMOL/L (ref 135–147)
SODIUM SERPL-SCNC: 134 MMOL/L (ref 135–147)
SODIUM SERPL-SCNC: 135 MMOL/L (ref 135–147)
WBC # BLD AUTO: 4.95 THOUSAND/UL (ref 4.31–10.16)

## 2023-01-26 RX ORDER — CALCIUM GLUCONATE 20 MG/ML
2 INJECTION, SOLUTION INTRAVENOUS ONCE
Status: COMPLETED | OUTPATIENT
Start: 2023-01-26 | End: 2023-01-26

## 2023-01-26 RX ORDER — MAGNESIUM SULFATE 1 G/100ML
1 INJECTION INTRAVENOUS ONCE
Status: COMPLETED | OUTPATIENT
Start: 2023-01-26 | End: 2023-01-26

## 2023-01-26 RX ORDER — LORAZEPAM 2 MG/ML
1 INJECTION INTRAMUSCULAR
Status: DISCONTINUED | OUTPATIENT
Start: 2023-01-26 | End: 2023-01-27 | Stop reason: HOSPADM

## 2023-01-26 RX ORDER — CALCIUM GLUCONATE 20 MG/ML
1 INJECTION, SOLUTION INTRAVENOUS ONCE
Status: COMPLETED | OUTPATIENT
Start: 2023-01-26 | End: 2023-01-26

## 2023-01-26 RX ORDER — POTASSIUM CHLORIDE 14.9 MG/ML
20 INJECTION INTRAVENOUS
Status: COMPLETED | OUTPATIENT
Start: 2023-01-26 | End: 2023-01-26

## 2023-01-26 RX ORDER — BISACODYL 10 MG
10 SUPPOSITORY, RECTAL RECTAL DAILY PRN
Status: DISCONTINUED | OUTPATIENT
Start: 2023-01-26 | End: 2023-01-27 | Stop reason: HOSPADM

## 2023-01-26 RX ORDER — POTASSIUM CHLORIDE 29.8 MG/ML
40 INJECTION INTRAVENOUS ONCE
Status: COMPLETED | OUTPATIENT
Start: 2023-01-26 | End: 2023-01-26

## 2023-01-26 RX ADMIN — MORPHINE SULFATE 2 MG: 2 INJECTION, SOLUTION INTRAMUSCULAR; INTRAVENOUS at 00:20

## 2023-01-26 RX ADMIN — POTASSIUM CHLORIDE 20 MEQ: 14.9 INJECTION, SOLUTION INTRAVENOUS at 08:07

## 2023-01-26 RX ADMIN — SODIUM PHOSPHATE, MONOBASIC, MONOHYDRATE AND SODIUM PHOSPHATE, DIBASIC, ANHYDROUS 6 MMOL: 276; 142 INJECTION, SOLUTION INTRAVENOUS at 14:35

## 2023-01-26 RX ADMIN — MORPHINE SULFATE 2 MG: 2 INJECTION, SOLUTION INTRAMUSCULAR; INTRAVENOUS at 20:34

## 2023-01-26 RX ADMIN — HEPARIN SODIUM 5000 UNITS: 5000 INJECTION INTRAVENOUS; SUBCUTANEOUS at 08:07

## 2023-01-26 RX ADMIN — CEFEPIME HYDROCHLORIDE 2000 MG: 2 INJECTION, POWDER, FOR SOLUTION INTRAVENOUS at 00:21

## 2023-01-26 RX ADMIN — NOREPINEPHRINE BITARTRATE 6 MCG/MIN: 1 INJECTION, SOLUTION, CONCENTRATE INTRAVENOUS at 14:41

## 2023-01-26 RX ADMIN — POTASSIUM CHLORIDE 40 MEQ: 29.8 INJECTION, SOLUTION INTRAVENOUS at 02:06

## 2023-01-26 RX ADMIN — SODIUM PHOSPHATE, MONOBASIC, MONOHYDRATE AND SODIUM PHOSPHATE, DIBASIC, ANHYDROUS 9 MMOL: 276; 142 INJECTION, SOLUTION INTRAVENOUS at 09:09

## 2023-01-26 RX ADMIN — MAGNESIUM SULFATE HEPTAHYDRATE 1 G: 1 INJECTION, SOLUTION INTRAVENOUS at 08:07

## 2023-01-26 RX ADMIN — MAGNESIUM SULFATE HEPTAHYDRATE 1 G: 1 INJECTION, SOLUTION INTRAVENOUS at 02:06

## 2023-01-26 RX ADMIN — POTASSIUM CHLORIDE 20 MEQ: 14.9 INJECTION, SOLUTION INTRAVENOUS at 10:09

## 2023-01-26 RX ADMIN — Medication 20000 ML: at 01:43

## 2023-01-26 RX ADMIN — CEFEPIME HYDROCHLORIDE 2000 MG: 2 INJECTION, POWDER, FOR SOLUTION INTRAVENOUS at 12:02

## 2023-01-26 RX ADMIN — CALCIUM GLUCONATE 1 G: 20 INJECTION, SOLUTION INTRAVENOUS at 08:07

## 2023-01-26 RX ADMIN — INSULIN LISPRO 1 UNITS: 100 INJECTION, SOLUTION INTRAVENOUS; SUBCUTANEOUS at 12:11

## 2023-01-26 RX ADMIN — HEPARIN SODIUM 500 UNITS/HR: 10000 INJECTION, SOLUTION INTRAVENOUS at 09:20

## 2023-01-26 RX ADMIN — CALCIUM GLUCONATE 1 G: 20 INJECTION, SOLUTION INTRAVENOUS at 14:35

## 2023-01-26 RX ADMIN — PANTOPRAZOLE SODIUM 40 MG: 40 INJECTION, POWDER, FOR SOLUTION INTRAVENOUS at 08:07

## 2023-01-26 RX ADMIN — INSULIN LISPRO 3 UNITS: 100 INJECTION, SOLUTION INTRAVENOUS; SUBCUTANEOUS at 00:20

## 2023-01-26 RX ADMIN — SODIUM CHLORIDE, SODIUM GLUCONATE, SODIUM ACETATE, POTASSIUM CHLORIDE, MAGNESIUM CHLORIDE, SODIUM PHOSPHATE, DIBASIC, AND POTASSIUM PHOSPHATE 100 ML/HR: .53; .5; .37; .037; .03; .012; .00082 INJECTION, SOLUTION INTRAVENOUS at 12:01

## 2023-01-26 RX ADMIN — CALCIUM GLUCONATE 2 G: 20 INJECTION, SOLUTION INTRAVENOUS at 02:06

## 2023-01-26 NOTE — PROGRESS NOTES
[address] [facility] 1945, 69 y o  male MRN: [id number]  Unit/Bed#: ICU 14 Encounter: [id number]  Primary Care Provider: Daron Aguirre MD   Date and time admitted to hospital: 1/23/2023 73:46 AM    Metabolic acidosis  Assessment & Plan  Lab Results   Component Value Date    LACTICACID 4 4 (HH) 01/25/2023    LACTICACID 6 4 ([place]) 01/25/2023    LACTICACID 6 3 ([place]) 01/25/2023   Gap closed, lactic acid trending down  · The patient presented to the ED with altered mental status   His labs revealed a significant metabolic acidosis, likely multifactorial related to DKA, severe volume depletion, elevated lactic acid level, ANGELICA, sepsis, metformin toxicity, elevated urate  · Treat underlying causes  · Continue isolyte infusion  · Palliative onboard Family will meet today and decide on hospice    * Sepsis [facility])  Assessment & Plan  Lab Results   Component Value Date    LACTICACID 4 4 (HH) 01/25/2023    LACTICACID 6 4 (HH) 01/25/2023    LACTICACID 6 3 ([place]) 01/25/2023    WBC 6 03 01/25/2023    WBC 5 92 01/25/2023    WBC 2 55 (L) 01/24/2023     Hypothermic, hypotensive requiring levo 3 mcg/min  Secondary to bacteremia from UTI  BC proteus x2 awaiting sensitivities  UC negative    I and O  Follow cultures  Cefipime day 4      UTI (urinary tract infection)  Assessment & Plan  See sepsis plan    Bacteremia  Assessment & Plan  See plan above    Diarrhea  Assessment & Plan  Greater than 10 loose bowel movement  C  difficile and stool enteric panel pending    Lactic acidosis  Assessment & Plan  Probably from metformin toxicity as well as dehydration, septic shock  Continue to trend  No more metformin in the future  Continue CVVHD, (+100), isolyte fluid    Anemia  Assessment & Plan  Lab Results   Component Value Date    HGB 6 8 (LL) 01/26/2023    HGB 6 8 (LL) 01/26/2023    HGB 7 4 (L) 01/25/2023   No schistocytes, fibrinogen and hemolysis smear negative  Will order haptoglobin, LDH, bilirubin, reticulocytes  Blood consent, transfuse if less than 7  Will hold off on transfusion since family agreed on comfort care       Severe protein-calorie malnutrition ([address] )  Assessment & Plan  Malnutrition Findings:   Adult Malnutrition type: Chronic illness  Adult Degree of Malnutrition: Other severe protein calorie malnutrition  Malnutrition Characteristics: Fat loss, Muscle loss, Weight loss                360 Statement: Severe protein-calorie malnutrition in context of chronic illness r/t inadequate energy intake, increased needs as evidance by severe body fat (orbital, triceps, ribcage are), and muscle mass depletions (temporal wasting, protruding clavicles, shoulders), significant 12%, 5 6kg wt loss x 3 months (45 4kg 10/6 -> 39 8kg 1/24/23); treated with PO diet with oral supplements vs EN    BMI Findings:  Adult BMI Classifications: Underweight < 18 5        Body mass index is 18 75 kg/m²  Speech does not recommend oral intake  Will need feeding tube/peg  Will discuss with family    Acute encephalopathy  Assessment & Plan  · Likely toxic metabolic in the setting of his electrolyte abnormalities/ DKA/ Sepsis on his chronic dementia  · We will monitor his neuro status closely      Skin ulcer of sacrum, limited to breakdown of skin (HCC)  Assessment & Plan  · A stage 4 ulcer is noted on the sacrum  · CTAP to further eval for bony infection/underlying abscess  · Pressure offload  · Wound care consult  · Surgery consulted  - however patient is opting towards hospice       ANGELICA (acute kidney injury) [facility])  Assessment & Plan  Lab Results   Component Value Date    CREATININE 0 79 01/25/2023    CREATININE 1 07 01/25/2023    CREATININE 1 59 (H) 01/24/2023       Lab Results   Component Value Date    EGFR 87 01/25/2023    EGFR 67 01/25/2023    EGFR 41 01/24/2023     Bl 0 9  (POA 6)    Plan:    • CVVH  • Nephro following  • Monitor BMP daily and observe for downward trend of creatinine  • Avoid hypoperfusion of the kidneys, minimize nephrotoxins        Hospice care  Assessment & Plan  Goals of care discussion was done with family  Palliative was also involved  POA decided on hospice yesterday, but hopes to continue treatment until family from [place] can see him today  They came overnight and will return this morning    Dementia without behavioral disturbance ([address] )  Assessment & Plan  · Appears to be at baseline however will confirm with family  · We will monitor his mental status closely  · Hope to avoid sedatives, anxiolytics    Primary hypothyroidism  Assessment & Plan  Levothyroxine 75 mcg    Mixed hyperlipidemia  Assessment & Plan  Continue rosuvastatin    Type 2 diabetes mellitus without complication, without long-term current use of insulin [facility])  Assessment & Plan  Lab Results   Component Value Date    HGBA1C 9 5 (H) 12/31/2022       Recent Labs     01/25/23  1223 01/25/23  1313 01/25/23  1416 01/25/23  1525   POCGLU 84 113 92 107       Blood Sugar Average: Last 72 hrs:  (P) 225 [id number]   · Patient was suspected dka on admission and was elevated blood glucose levels and ketones, elevated anion gap metabolic acidosis  Patient weaned off insulin drip    Transition to sliding scale  · Will resume with diet   · No more metformin in the future    Pancytopenia (HCC)-resolved as of 1/25/2023  Assessment & Plan  Lab Results   Component Value Date    PLT 42 (LL) 01/25/2023    WBC 6 03 01/25/2023    HGB 7 4 (L) 01/25/2023   Hemolysis workup negative  Postive 8 l since admission  Possibly stop fluids  Continue to monitor    ----------------------------------------------------------------------------------------  HPI/24hr events: None    Patient appropriate for transfer out of the ICU today?: No  Disposition: Continue Critical Care   Code Status: Level 3 - DNAR and DNI  ---------------------------------------------------------------------------------------  SUBJECTIVE  Patient demented    Review of Systems   Unable to perform ROS: Dementia   Endocrine: Negative for cold intolerance  Review of systems was unable to be performed secondary to Altered mental status  ---------------------------------------------------------------------------------------  OBJECTIVE    Vitals   Vitals:    23 0505 23 0510 23 0538 23 0600   BP:       BP Location:       Pulse: 64 60  60   Resp: 14 18  21   Temp: (!) 97 2 °F (36 2 °C) (!) 97 2 °F (36 2 °C)  (!) 97 2 °F (36 2 °C)   TempSrc:       SpO2: 100% 100%  100%   Weight:   42 kg (92 lb 9 5 oz)    Height:         Temp (24hrs), Av 9 °F (36 1 °C), Min:95 °F (35 °C), Max:98 6 °F (37 °C)  Current: Temperature: (!) 97 2 °F (36 2 °C)  Arterial Line BP: 116/52  Arterial Line MAP (mmHg): 76 mmHg    Respiratory:  SpO2: SpO2: 100 %  Nasal Cannula O2 Flow Rate (L/min): 2 L/min    Invasive/non-invasive ventilation settings   Respiratory    Lab Data (Last 4 hours)    None         O2/Vent Data (Last 4 hours)    None                Physical Exam  Vitals and nursing note reviewed  Constitutional:       General: He is not in acute distress  Appearance: He is well-developed  Comments: Cachectic  Not oriented  HENT:      Head: Normocephalic and atraumatic  Eyes:      Conjunctiva/sclera: Conjunctivae normal    Cardiovascular:      Rate and Rhythm: Normal rate and regular rhythm  Heart sounds: No murmur heard  Pulmonary:      Effort: Pulmonary effort is normal  No respiratory distress  Breath sounds: Normal breath sounds  Abdominal:      Palpations: Abdomen is soft  Tenderness: There is no abdominal tenderness  Musculoskeletal:    Large sacral wound to the bone     General: No swelling  Cervical back: Neck supple  Skin:     General: Skin is warm and dry  Capillary Refill: Capillary refill takes less than 2 seconds  Neurological:      Mental Status: He is alert     Psychiatric:         Mood and Affect: Mood normal                 Laboratory and Diagnostics:  Results from last 7 days   Lab Units 01/26/23  0616 01/26/23  0459 01/25/23  1416 01/25/23  0023 01/24/23  0507 01/23/23  1055   WBC Thousand/uL  --  4 95 6 03 5 92 2 55* 9 04   HEMOGLOBIN g/dL 6 8* 6 8* 7 4* 8 1* 9 0* 12 4   HEMATOCRIT %  --  19 9* 22 1* 25 0* 29 4* 43 1   PLATELETS Thousands/uL  --  31* 42* 54* 100* 223   NEUTROS PCT %  --   --   --   --   --  93*   BANDS PCT %  --   --   --  5  --   --    MONOS PCT %  --   --   --   --   --  3*   MONO PCT %  --   --   --  5  --   --      Results from last 7 days   Lab Units 01/26/23  0616 01/26/23  0459 01/26/23  0025 01/25/23  1748 01/25/23  1223 01/25/23  0614 01/24/23  2355 01/24/23  1809 01/23/23  1137 01/23/23  1055   SODIUM mmol/L 135  --  134* 134* 136 138 139 140   < > 159*   POTASSIUM mmol/L 3 7  --  3 7 3 3* 4 0 3 6 3 8 3 5   < > 8 0*   CHLORIDE mmol/L 105  --  105 104 109* 109* 111* 112*   < > 117*   CO2 mmol/L 22  --  20* 19* 18* 16* 15* 13*   < > 8*   ANION GAP mmol/L 8  --  9 11 9 13 13 15*   < > 34*   BUN mg/dL 12  --  13 17 20 29* 41* 55*   < > 200*   CREATININE mg/dL 0 59*  --  0 62 0 68 0 79 1 07 1 59* 1 99*   < > 8 27*   CALCIUM mg/dL 7 9*  --  7 8* 7 8* 7 5* 7 8* 7 8* 7 6*   < > 9 5   GLUCOSE RANDOM mg/dL 136  --  228* 167* 86 106 183* 150*   < > 652*   ALT U/L  --  29  --   --   --   --   --   --   --  24   AST U/L  --  49*  --   --   --   --   --   --   --  26   ALK PHOS U/L  --  140*  --   --   --   --   --   --   --  105*   ALBUMIN g/dL  --  2 5*  --   --   --   --   --   --   --  3 2*   TOTAL BILIRUBIN mg/dL  --  0 91  --   --   --   --   --   --   --  0 64    < > = values in this interval not displayed       Results from last 7 days   Lab Units 01/26/23  0616 01/26/23  0025 01/25/23  1748 01/25/23  1223 01/25/23  2291 01/24/23  2355 01/24/23  1809   MAGNESIUM mg/dL 1 9 1 8* 1 6* 1 9 1 7* 1 8* 1 8*   PHOSPHORUS mg/dL 1 6* 2 8 1 5* 2 6 2 1* 1 9* 2 6      Results from last 7 days   Lab Units 01/23/23  1055   INR  1 57*   PTT seconds 31          Results from last 7 days   Lab Units 01/26/23  0616 01/26/23  0025 01/25/23  1748 01/25/23  1223 01/25/23  9661 01/25/23  0023 01/24/23  0824   LACTIC ACID mmol/L 2 0 1 7 3 6* 4 4* 6 4* 6 3* 8 2*     ABG:  Results from last 7 days   Lab Units 01/25/23  0023   PH ART  7 479*   PCO2 ART mm Hg 20 2*   PO2 ART mm Hg 127 9   HCO3 ART mmol/L 14 7*   BASE EXC ART mmol/L -6 7   ABG SOURCE  Line, Arterial     VBG:  Results from last 7 days   Lab Units 01/25/23  0023 01/24/23  0015 01/23/23  1422   PH ASHLEY   --   --  7 015*   PCO2 ASHLEY mm Hg  --   --  23 4*   PO2 ASHLEY mm Hg  --   --  41 7   HCO3 ASHLEY mmol/L  --   --  5 8*   BASE EXC ASHLEY mmol/L  --   --  -23 7   ABG SOURCE  Line, Arterial   < >  --     < > = values in this interval not displayed  Results from last 7 days   Lab Units 01/25/23  0614 01/23/23  1609   PROCALCITONIN ng/ml 7 97* 12 76*       Micro  Results from last 7 days   Lab Units 01/25/23  0521 01/23/23  2058 01/23/23  1206 01/23/23  1143   BLOOD CULTURE  Received in Microbiology Lab  Culture in Progress  Received in Microbiology Lab  Culture in Progress  --  Proteus mirabilis* Proteus mirabilis*   GRAM STAIN RESULT   --   --  Gram negative rods* Gram negative rods*   URINE CULTURE   --  <10,000 cfu/ml Gram Negative Demarcus Enteric Like*  --   --        EKG:  None recent  Imaging: I have personally reviewed pertinent reports  and I have personally reviewed pertinent films in PACS    Intake and Output  I/O       01/22 0701 01/23 0700 01/23 0701  01/24 0700 01/24 0701 01/25 0700    I V  (mL/kg)  6791 (170 6)     IV Piggyback  2400     Total Intake(mL/kg)  9191 (230 9)     Urine (mL/kg/hr)  360     Emesis/NG output  30     Other  2461     Total Output  2851     Net  +6340            Unmeasured Emesis Occurrence  1 x           Height and Weights   Height: 4' 11" (149 9 cm)  IBW (Ideal Body Weight): 47 7 kg  Body mass index is 18 7 kg/m²    Weight (last 2 days)     Date/Time Weight    01/26/23 0538 42 (92 59)    01/25/23 0600 42 1 (92 81)    01/24/23 0600 39 8 (87 74)            Nutrition       Diet Orders   (From admission, onward)             Start     Ordered    01/25/23 0025  Diet NPO  Diet effective now        References:    Nutrtion Support Algorithm Enteral vs  Parenteral   Question Answer Comment   Diet Type NPO    RD to adjust diet per protocol?  Yes        01/25/23 0024                  Active Medications  Scheduled Meds:  Current Facility-Administered Medications   Medication Dose Route Frequency Provider Last Rate   • calcium gluconate  1 g Intravenous Once Julee Screen, CRNP     • cefepime  2,000 mg Intravenous Q12H AKSHAT Emmanuel 2,000 mg (01/26/23 0021)   • heparin (porcine)  500 Units/hr Pre Filter Continuous Radha Decker  Units/hr (01/24/23 1216)   • heparin (porcine)  5,000 Units Subcutaneous Q12H Tony Pal MD     • insulin lispro  1-5 Units Subcutaneous Q6H [facility], CRNP     • levothyroxine  75 mcg Oral Daily Before Breakfast AKSHAT Emmanuel     • magnesium sulfate  1 g Intravenous Once Julee Screen, CRNP     • morphine injection  2 mg Intravenous Q6H PRN Julee Beaulieu, CRNP     • multi-electrolyte  100 mL/hr Intravenous Continuous [place] AKSHAT Lr 100 mL/hr (01/25/23 1408)   • norepinephrine  1-30 mcg/min Intravenous Titrated AKSHAT Emmanuel 3 mcg/min (01/26/23 0511)   • NxStage K 4/Ca 3  20,000 mL Dialysis Continuous Mariela Ochoa MD     • pantoprazole  40 mg Intravenous Q24H [address] Radha Decker MD     • potassium chloride  20 mEq Intravenous Q2H [facility], CRNP     • sodium phosphate  9 mmol Intravenous Once Julee Screen, CRNP     • vasopressin  0 04 Units/min Intravenous Continuous AKSHAT Traore 0 04 Units/min (01/25/23 2250)     Continuous Infusions:  heparin (porcine), 500 Units/hr, Last Rate: 500 Units/hr (01/24/23 1216)  multi-electrolyte, 100 mL/hr, Last Rate: 100 mL/hr (01/25/23 1408)  norepinephrine, 1-30 mcg/min, Last Rate: 3 mcg/min (01/26/23 0511)  NxStage K 4/Ca 3, 20,000 mL  vasopressin, 0 04 Units/min, Last Rate: 0 04 Units/min (01/25/23 2250)      PRN Meds:   morphine injection, 2 mg, Q6H PRN        Invasive Devices Review  Invasive Devices     Peripheral Intravenous Line  Duration           Peripheral IV 01/23/23 Left Antecubital 2 days    Peripheral IV 01/23/23 Right Antecubital 2 days    Peripheral IV 01/23/23 Right;Ventral (anterior) Forearm 2 days          Arterial Line  Duration           Arterial Line 01/23/23 Radial 2 days          Hemodialysis Catheter  Duration           HD Temporary Double Catheter 2 days          Drain  Duration           Urethral Catheter Coude 18 Fr  2 days                Rationale Continue A line for bp monitoring   HD cath for CVVH  Pizano for urine output monitoring  ---------------------------------------------------------------------------------------  Advance Directive and Living Will:      Power of :    POLST:    ---------------------------------------------------------------------------------------  Care Time Delivered: per attending    Josefina Tejeda MD      Portions of the record may have been created with voice recognition software  Occasional wrong word or "sound a like" substitutions may have occurred due to the inherent limitations of voice recognition software    Read the chart carefully and recognize, using context, where substitutions have occurred

## 2023-01-26 NOTE — HOSPICE NOTE
Hospice referral received this AM  Keep Liaison updated on how patient does after Levo has been discontinued and BP is stable for transport home  Liaison will then assist in DC home with hospice

## 2023-01-26 NOTE — CASE MANAGEMENT
Case Management Discharge Planning Note    Patient name Ruddy Wilder  Location ICU 14/ICU 14 MRN [id number]  : 1945 Date 2023       Current Admission Date: 2023  Current Admission Diagnosis:Sepsis [facility])   Patient Active Problem List    Diagnosis Date Noted   • Severe protein-calorie malnutrition ([facility] ) 2023   • Anemia 2023   • Lactic acidosis 2023   • Diarrhea 2023   • Bacteremia 2023   • Sepsis ([facility] ) 2023   • UTI (urinary tract infection) 2023   • Acute encephalopathy    • Metabolic acidosis    • Skin ulcer of sacrum, limited to breakdown of skin ([facility] ) 2023   • Pain of lower extremity 2023   • Ambulatory dysfunction 2023   • ANGELICA (acute kidney injury) ([facility] ) 2022   • Hospice care 10/06/2022   • Dementia without behavioral disturbance ([facility] ) 10/20/2021   • Primary hypothyroidism 2020   • Mixed hyperlipidemia 2018   • Prostate cancer ([facility] ) 2018   • Type 2 diabetes mellitus without complication, without long-term current use of insulin ([facility] ) 2018   • Ill-fitting dentures 2018   • Hearing difficulty of both ears 2018      LOS (days): 3  Geometric Mean LOS (GMLOS) (days): 5 00  Days to GMLOS:2 1     OBJECTIVE:  Risk of Unplanned Readmission Score: 28 83         Current admission status: Inpatient   Preferred Pharmacy:   [address] [place] [address] [address] [place] [address]  Phone: [phone] Fax: [phone]    Primary Care Provider: Mariposa Clark MD    Primary Insurance: [facility]  Secondary Insurance: [address] [place] [address],[address] DETAILS:                           Contacts  Patient Contacts: Wayne Angelo  Relationship to Patient[de-identified] Other (Comment) (APS )  Contact Method: Phone  Phone Number: [phone]  Reason/Outcome: Discharge Planning, Continuity of Care Other Referral/Resources/Interventions Provided:  Government Services[de-identified] Adult Protective Services / Elder Abuse                                                      Additional Comments: CM called APS  Norman Tinajero to inform her that family would like to pursue home hospice  Norman Tinajero reported concerns with this plan as report was put in by RN during this admission d/t patient's condition/wound on arrival  CM informed Norman Tinajero that per chart, patient's PCP was aware of wound and made recommendations accordingly  Lionel Camacho requested this note be faxed to her  CM called Medical Records and requested they reach out to Norman Tinajero to get any requested records to her as the note she requested was outside of this admission

## 2023-01-26 NOTE — ASSESSMENT & PLAN NOTE
Gap closed, lactic acid trending down  • The patient presented to the ED with altered mental status  His labs revealed a significant metabolic acidosis, likely multifactorial related to DKA, severe volume depletion, elevated lactic acid level, ANGELICA, sepsis, metformin toxicity, elevated urate  • Hospice patient  Stop treatment

## 2023-01-26 NOTE — ASSESSMENT & PLAN NOTE
Hypothermic, hypotensive requiring levo 3 mcg/min  Secondary to bacteremia from UTI  BC proteus x2 awaiting sensitivities  UC negative     Hospice   Patient ab discontinued

## 2023-01-26 NOTE — PLAN OF CARE
Problem: PAIN - ADULT  Goal: Verbalizes/displays adequate comfort level or baseline comfort level  Description: Interventions:  - Encourage patient to monitor pain and request assistance  - Assess pain using appropriate pain scale  - Administer analgesics based on type and severity of pain and evaluate response  - Implement non-pharmacological measures as appropriate and evaluate response  - Consider cultural and social influences on pain and pain management  - Notify physician/advanced practitioner if interventions unsuccessful or patient reports new pain  Outcome: Progressing     Problem: INFECTION - ADULT  Goal: Absence or prevention of progression during hospitalization  Description: INTERVENTIONS:  - Assess and monitor for signs and symptoms of infection  - Monitor lab/diagnostic results  - Monitor all insertion sites, i e  indwelling lines, tubes, and drains  - Monitor endotracheal if appropriate and nasal secretions for changes in amount and color  - [place] appropriate cooling/warming therapies per order  - Administer medications as ordered  - Instruct and encourage patient and family to use good hand hygiene technique  - Identify and instruct in appropriate isolation precautions for identified infection/condition  Outcome: Progressing     Problem: DISCHARGE PLANNING  Goal: Discharge to home or other facility with appropriate resources  Description: INTERVENTIONS:  - Identify barriers to discharge w/patient and caregiver  - Arrange for needed discharge resources and transportation as appropriate  - Identify discharge learning needs (meds, wound care, etc )  - Arrange for interpretive services to assist at discharge as needed  - Refer to Case Management Department for coordinating discharge planning if the patient needs post-hospital services based on physician/advanced practitioner order or complex needs related to functional status, cognitive ability, or social support system  Outcome: Progressing Problem: METABOLIC, FLUID AND ELECTROLYTES - ADULT  Goal: Electrolytes maintained within normal limits  Description: INTERVENTIONS:  - Monitor labs and assess patient for signs and symptoms of electrolyte imbalances  - Administer electrolyte replacement as ordered  - Monitor response to electrolyte replacements, including repeat lab results as appropriate  - Instruct patient on fluid and nutrition as appropriate  Outcome: Not Progressing  Goal: Fluid balance maintained  Description: INTERVENTIONS:  - Monitor labs   - Monitor I/O and WT  - Instruct patient on fluid and nutrition as appropriate  - Assess for signs & symptoms of volume excess or deficit  Outcome: Progressing  Goal: Glucose maintained within target range  Description: INTERVENTIONS:  - Monitor Blood Glucose as ordered  - Assess for signs and symptoms of hyperglycemia and hypoglycemia  - Administer ordered medications to maintain glucose within target range  - Assess nutritional intake and initiate nutrition service referral as needed  Outcome: Progressing     Problem: SKIN/TISSUE INTEGRITY - ADULT  Goal: Skin Integrity remains intact(Skin Breakdown Prevention)  Description: Assess:  -Perform [place] assessment every ***  -Clean and moisturize skin every ***  -Inspect skin when repositioning, toileting, and assisting with ADLS  -Assess under medical devices such as *** every ***  -Assess extremities for adequate circulation and sensation     Bed Management:  -Have minimal linens on bed & keep smooth, unwrinkled  -Change linens as needed when moist or perspiring  -Avoid sitting or lying in one position for more than *** hours while in bed  -Keep HOB at ***degrees     Toileting:  -Offer bedside commode  -Assess for incontinence every ***  -Use incontinent care products after each incontinent episode such as ***    Activity:  -Mobilize patient *** times a day  -Encourage activity and walks on unit  -Encourage or provide ROM exercises   -Turn and reposition patient every *** Hours  -Use appropriate equipment to lift or move patient in bed  -Instruct/ Assist with weight shifting every *** when out of bed in chair  -Consider limitation of chair time *** hour intervals    Skin Care:  -Avoid use of baby powder, tape, friction and shearing, hot water or constrictive clothing  -Relieve pressure over bony prominences using ***  -Do not massage red bony areas    Next Steps:  -Teach patient strategies to minimize risks such as ***   -Consider consults to  interdisciplinary teams such as ***  Outcome: Progressing  Goal: Incision(s), wounds(s) or drain site(s) healing without S/S of infection  Description: INTERVENTIONS  - Assess and document dressing, incision, wound bed, drain sites and surrounding tissue  - Provide patient and family education  - Perform skin care/dressing changes every ***  Outcome: Progressing  Goal: Pressure injury heals and does not worsen  Description: Interventions:  - Implement low air loss mattress or specialty surface (Criteria met)  - Apply silicone foam dressing  - Instruct/assist with weight shifting every *** minutes when in chair   - Limit chair time to *** hour intervals  - Use special pressure reducing interventions such as *** when in chair   - Apply fecal or urinary incontinence containment device   - Perform passive or active ROM every ***  - Turn and reposition patient & offload bony prominences every *** hours   - Utilize friction reducing device or surface for transfers   - Consider consults to  interdisciplinary teams such as ***  - Use incontinent care products after each incontinent episode such as ***  - Consider nutrition services referral as needed  Outcome: Progressing     Problem: Prexisting or High Potential for Compromised Skin Integrity  Goal: Skin integrity is maintained or improved  Description: INTERVENTIONS:  - Identify patients at risk for skin breakdown  - Assess and monitor skin integrity  - Assess and monitor nutrition and hydration status  - Monitor labs   - Assess for incontinence   - Turn and reposition patient  - Assist with mobility/ambulation  - Relieve pressure over bony prominences  - Avoid friction and shearing  - Provide appropriate hygiene as needed including keeping skin clean and dry  - Evaluate need for skin moisturizer/barrier cream  - Collaborate with interdisciplinary team   - Patient/family teaching  - Consider wound care consult   Outcome: Progressing     Problem: Nutrition/Hydration-ADULT  Goal: Nutrient/Hydration intake appropriate for improving, restoring or maintaining nutritional needs  Description: Monitor and assess patient's nutrition/hydration status for malnutrition  Collaborate with interdisciplinary team and initiate plan and interventions as ordered  Monitor patient's weight and dietary intake as ordered or per policy  Utilize nutrition screening tool and intervene as necessary  Determine patient's food preferences and provide high-protein, high-caloric foods as appropriate       INTERVENTIONS:  - Monitor oral intake, urinary output, labs, and treatment plans  - Assess nutrition and hydration status and recommend course of action  - Evaluate amount of meals eaten  - Assist patient with eating if necessary   - Allow adequate time for meals  - Recommend/ encourage appropriate diets, oral nutritional supplements, and vitamin/mineral supplements  - Order, calculate, and assess calorie counts as needed  - Recommend, monitor, and adjust tube feedings and TPN/PPN based on assessed needs  - Assess need for intravenous fluids  - Provide specific nutrition/hydration education as appropriate  - Include patient/family/caregiver in decisions related to nutrition  Outcome: Not Progressing

## 2023-01-26 NOTE — PLAN OF CARE
Problem: PAIN - ADULT  Goal: Verbalizes/displays adequate comfort level or baseline comfort level  Description: Interventions:  - Encourage patient to monitor pain and request assistance  - Assess pain using appropriate pain scale  - Administer analgesics based on type and severity of pain and evaluate response  - Implement non-pharmacological measures as appropriate and evaluate response  - Consider cultural and social influences on pain and pain management  - Notify physician/advanced practitioner if interventions unsuccessful or patient reports new pain  Outcome: Progressing     Problem: INFECTION - ADULT  Goal: Absence or prevention of progression during hospitalization  Description: INTERVENTIONS:  - Assess and monitor for signs and symptoms of infection  - Monitor lab/diagnostic results  - Monitor all insertion sites, i e  indwelling lines, tubes, and drains  - Monitor endotracheal if appropriate and nasal secretions for changes in amount and color  - [place] appropriate cooling/warming therapies per order  - Administer medications as ordered  - Instruct and encourage patient and family to use good hand hygiene technique  - Identify and instruct in appropriate isolation precautions for identified infection/condition  Outcome: Progressing     Problem: DISCHARGE PLANNING  Goal: Discharge to home or other facility with appropriate resources  Description: INTERVENTIONS:  - Identify barriers to discharge w/patient and caregiver  - Arrange for needed discharge resources and transportation as appropriate  - Identify discharge learning needs (meds, wound care, etc )  - Arrange for interpretive services to assist at discharge as needed  - Refer to Case Management Department for coordinating discharge planning if the patient needs post-hospital services based on physician/advanced practitioner order or complex needs related to functional status, cognitive ability, or social support system  Outcome: Progressing Problem: NEUROSENSORY - ADULT  Goal: Achieves stable or improved neurological status  Description: INTERVENTIONS  - Monitor and report changes in neurological status  - Monitor vital signs such as temperature, blood pressure, glucose, and any other labs ordered   - Initiate measures to prevent increased intracranial pressure  - Monitor for seizure activity and implement precautions if appropriate      Outcome: Progressing  Goal: Achieves maximal functionality and self care  Description: INTERVENTIONS  - Monitor swallowing and airway patency with patient fatigue and changes in neurological status  - Encourage and assist patient to increase activity and self care     - Encourage visually impaired, hearing impaired and aphasic patients to use assistive/communication devices  Outcome: Progressing     Problem: CARDIOVASCULAR - ADULT  Goal: Maintains optimal cardiac output and hemodynamic stability  Description: INTERVENTIONS:  - Monitor I/O, vital signs and rhythm  - Monitor for S/S and trends of decreased cardiac output  - Administer and titrate ordered vasoactive medications to optimize hemodynamic stability  - Assess quality of pulses, skin color and temperature  - Assess for signs of decreased coronary artery perfusion  - Instruct patient to report change in severity of symptoms  Outcome: Progressing  Goal: Absence of cardiac dysrhythmias or at baseline rhythm  Description: INTERVENTIONS:  - Continuous cardiac monitoring, vital signs, obtain 12 lead EKG if ordered  - Administer antiarrhythmic and heart rate control medications as ordered  - Monitor electrolytes and administer replacement therapy as ordered  Outcome: Progressing     Problem: METABOLIC, FLUID AND ELECTROLYTES - ADULT  Goal: Electrolytes maintained within normal limits  Description: INTERVENTIONS:  - Monitor labs and assess patient for signs and symptoms of electrolyte imbalances  - Administer electrolyte replacement as ordered  - Monitor response to electrolyte replacements, including repeat lab results as appropriate  - Instruct patient on fluid and nutrition as appropriate  Outcome: Progressing  Goal: Fluid balance maintained  Description: INTERVENTIONS:  - Monitor labs   - Monitor I/O and WT  - Instruct patient on fluid and nutrition as appropriate  - Assess for signs & symptoms of volume excess or deficit  Outcome: Progressing  Goal: Glucose maintained within target range  Description: INTERVENTIONS:  - Monitor Blood Glucose as ordered  - Assess for signs and symptoms of hyperglycemia and hypoglycemia  - Administer ordered medications to maintain glucose within target range  - Assess nutritional intake and initiate nutrition service referral as needed  Outcome: Progressing     Problem: SKIN/TISSUE INTEGRITY - ADULT  Goal: Skin Integrity remains intact(Skin Breakdown Prevention)  Description: Assess:    -Inspect skin when repositioning, toileting, and assisting with ADLS    -Assess extremities for adequate circulation and sensation     Bed Management:  -Have minimal linens on bed & keep smooth, unwrinkled  -Change linens as needed when moist or perspiring  -Avoid sitting or lying in one position for more thahours while in bed  s     Toileting:  -Offer bedside commode      Activity:-Encourage activity and walks on unit  -Encourage or provide ROM exercises   -Use appropriate equipment to lift or move patient in bedchair    Skin Care:  -Avoid use of baby powder, tape, friction and shearing, hot water or constrictive clothing  -Do not massage red bony areas    Outcome: Progressing  Goal: Incision(s), wounds(s) or drain site(s) healing without S/S of infection  Description: INTERVENTIONS  - Assess and document dressing, incision, wound bed, drain sites and surrounding tissue  - Provide patient and family education  Outcome: Progressing  Goal: Pressure injury heals and does not worsen  Description: Interventions:  - Implement low air loss mattress or specialty surface (Criteria met)  - Apply silicone foam dressing  - Apply fecal or urinary incontinence containment device  hours   - Utilize friction reducing device or surface for transfers   - Consider nutrition services referral as needed  Outcome: Progressing     Problem: MOBILITY - ADULT  Goal: Maintain or return to baseline ADL function  Description: INTERVENTIONS:  -  Assess patient's ability to carry out ADLs; assess patient's baseline for ADL function and identify physical deficits which impact ability to perform ADLs (bathing, care of mouth/teeth, toileting, grooming, dressing, etc )  - Assess/evaluate cause of self-care deficits   - Assess range of motion  - Assess patient's mobility; develop plan if impaired  - Assess patient's need for assistive devices and provide as appropriate  - Encourage maximum independence but intervene and supervise when necessary  - Involve family in performance of ADLs  - Assess for home care needs following discharge   - Consider OT consult to assist with ADL evaluation and planning for discharge  - Provide patient education as appropriate  Outcome: Progressing  Goal: Maintains/Returns to pre admission functional level  Description: INTERVENTIONS:  - Perform BMAT or MOVE assessment daily    - Set and communicate daily mobility goal to care team and patient/family/caregiver     - Collaborate with rehabilitation services on mobility goals if consulted  - Out of bed for toileting  - Record patient progress and toleration of activity level   Outcome: Progressing     Problem: Prexisting or High Potential for Compromised Skin Integrity  Goal: Skin integrity is maintained or improved  Description: INTERVENTIONS:  - Identify patients at risk for skin breakdown  - Assess and monitor skin integrity  - Assess and monitor nutrition and hydration status  - Monitor labs   - Assess for incontinence   - Turn and reposition patient  - Assist with mobility/ambulation  - Relieve pressure over bony prominences  - Avoid friction and shearing  - Provide appropriate hygiene as needed including keeping skin clean and dry  - Evaluate need for skin moisturizer/barrier cream  - Collaborate with interdisciplinary team   - Patient/family teaching  - Consider wound care consult   Outcome: Progressing     Problem: SAFETY,RESTRAINT: NV/NON-SELF DESTRUCTIVE BEHAVIOR  Goal: Remains free of harm/injury (restraint for non violent/non self-detsructive behavior)  Description: INTERVENTIONS:  - Instruct patient/family regarding restraint use   - Assess and monitor physiologic and psychological status   - Provide interventions and comfort measures to meet assessed patient needs   - Identify and implement measures to help patient regain control  - Assess readiness for release of restraint   Outcome: Progressing  Goal: Returns to optimal restraint-free functioning  Description: INTERVENTIONS:  - Assess the patient's behavior and symptoms that indicate continued need for restraint  - Identify and implement measures to help patient regain control  - Assess readiness for release of restraint   Outcome: Progressing     Problem: Nutrition/Hydration-ADULT  Goal: Nutrient/Hydration intake appropriate for improving, restoring or maintaining nutritional needs  Description: Monitor and assess patient's nutrition/hydration status for malnutrition  Collaborate with interdisciplinary team and initiate plan and interventions as ordered  Monitor patient's weight and dietary intake as ordered or per policy  Utilize nutrition screening tool and intervene as necessary  Determine patient's food preferences and provide high-protein, high-caloric foods as appropriate       INTERVENTIONS:  - Monitor oral intake, urinary output, labs, and treatment plans  - Assess nutrition and hydration status and recommend course of action  - Evaluate amount of meals eaten  - Assist patient with eating if necessary   - Allow adequate time for meals  - Recommend/ encourage appropriate diets, oral nutritional supplements, and vitamin/mineral supplements  - Order, calculate, and assess calorie counts as needed  - Recommend, monitor, and adjust tube feedings and TPN/PPN based on assessed needs  - Assess need for intravenous fluids  - Provide specific nutrition/hydration education as appropriate  - Include patient/family/caregiver in decisions related to nutrition  Outcome: Progressing

## 2023-01-26 NOTE — Clinical Note
Approval for [address] [birth year] 70 YO male currently in the ICU 14 at [facility]  Dxs: DM2, Dementia, ANGELICA, encephalopathy, metabolic acidosis, severe PCM, weight 92 Lbs  anemia, HH RN was unable to get a BP at home  EMS was called  Had BP of 52I systolic He was tachypneic and was placed on nonrebreather mask and required pressors for hypotension  Now on O2 at 2 liters Dxed with sepsis secondary to bacteremia from UTI Pressors stopped yesterday  Is resting comfortably now   Required a dose of morphine 1/26 @ 2034 and 1/27 @ 0005  PPS 10%

## 2023-01-26 NOTE — PROGRESS NOTES
[address] [facility] JENNA Castillo 69 y o  male MRN: [id number]  Unit/Bed#: ICU 14 Encounter: [id number]  Reason for Consult: Acute kidney injury    ASSESSMENT and PLAN:  59-year-old male with a past medical history of diabetes mellitus type 2, hyperlipidemia, Alzheimer's dementia, hypothyroidism who was admitted on 2023 with altered mental status  Found to have ANGELICA, metabolic acidosis, hyponatremia and hyperglycemia prompting nephrology consult for management of ANGELICA  Required initiation of CRRT    Severe acute kidney injury (POA):  · Etiology: Prerenal with osmotic diuresis, sepsis, hypotension; likely ATN  · Significant azotemia, hypotension requiring pressor support, creatinine 8 2, acidosis therefore started on CVVHD on admission  · CVVHD: 35 bicarbonate, 4K  Running even and on Plasma-Lyte at 100 mL/h for volume repletion due to diuresis  · Acidosis improving  · Excellent urine output/possibly diuretic phase of ATN  · Plan/recommendations:  · Filter will  in approximately 5 hours therefore will discontinue treatment and monitor  · Continue strict IRENA  · Continue IV fluids per critical care team    Anion gap metabolic acidosis:  · Anion gap 34, bicarbonate on admission  · On CVVHD, treatment of DKA  · Gap closed with improvement in lactic acid    Shock/sepsis:  · Likely multifactorial: Bacteremia, volume depletion  · Severe pressure wound on back  · UTI  · On Levophed and vasopressin    Requiring low-dose support, 2 mcg of Levophed at this time    Hypernatremia: Resolved  · Related to decreased intake of free water we will  · Current sodium is 135    Anemia:  · Current hemoglobin 6 8  · Severe thrombocytopenia platelet count 31  · Management per primary team  · Conservative management considering hospice discussion    Hypophosphatemia: Replacement per protocol    Goals of care: Per primary team discussion regarding hospice    UTI: Treatment for sepsis per primary team    Severe protein calorie malnutrition: Patient cachectic    [place]'s dementia: Hospice discussion      DISPOSITION:  Continue CVVHD when filter expires  Goals of care discussion in progress  Plan discussed with primary team    SUBJECTIVE / 24H INTERVAL HISTORY:  Critically ill, cachectic, nonverbal    OBJECTIVE:  Current Weight: Weight - Scale: 42 kg (92 lb 9 5 oz)  Vitals:    01/26/23 0600 01/26/23 0700 01/26/23 0748 01/26/23 0751   BP:       BP Location:       Pulse: 60 60 60 60   Resp: 21 20  21   Temp: (!) 97 2 °F (36 2 °C) 97 5 °F (36 4 °C)  97 5 °F (36 4 °C)   TempSrc:  Rectal     SpO2: 100% 100%  100%   Weight:       Height:           Intake/Output Summary (Last 24 hours) at 1/26/2023 0815  Last data filed at 1/26/2023 0600  Gross per 24 hour   Intake 4859 7 ml   Output 5612 ml   Net -752 3 ml     General: Critically ill, cachectic  Skin: no rash, pale  Warming blanket on  Skin warm  Eyes: anicteric sclera  ENT: moist mucous membrane  Neck: supple  Chest: Anterior chest clear, decreased breath sounds  CVS: Normal rate regular rhythm  Abdomen: soft, nontender, nl sounds  Extremities: no edema LE b/l    Contractures  :  ferreira  Neuro: Opens eyes to voice, nonverbal  Psych: Lying quietly in bed  Medications:    Current Facility-Administered Medications:   •  calcium gluconate 1 g in sodium chloride 0 9% 50 mL (premix), 1 g, Intravenous, Once, [employer], CRNP, Last Rate: 100 mL/hr at 01/26/23 0807, 1 g at 01/26/23 0807  •  cefepime (MAXIPIME) 2,000 mg in dextrose 5 % 50 mL IVPB, 2,000 mg, Intravenous, Q12H, ADELINE ZieglerNP, Last Rate: 100 mL/hr at 01/26/23 0021, 2,000 mg at 01/26/23 0021  •  heparin (porcine) 25,000 units in 0 45% NaCl 250 mL infusion (premix), 500 Units/hr, Pre Filter, Continuous, Chris Yang MD, Last Rate: 5 mL/hr at 01/24/23 1216, 500 Units/hr at 01/24/23 1216  •  heparin (porcine) subcutaneous injection 5,000 Units, 5,000 Units, Subcutaneous, Q12H [address], Delfin Pastor Blanquita Mcclendon MD, 5,000 Units at 01/26/23 6808  •  insulin lispro (HumaLOG) 100 units/mL subcutaneous injection 1-5 Units, 1-5 Units, Subcutaneous, Q6H [address], 3 Units at 01/26/23 0020 **AND** Fingerstick Glucose (POCT), , , Q6H, AKSHAT Boyd  •  levothyroxine tablet 75 mcg, 75 mcg, Oral, Daily Before Breakfast, AKSHAT Ackerman  •  magnesium sulfate IVPB (premix) SOLN 1 g, 1 g, Intravenous, Once, Xavi Congress, AKSHAT, 1 g at 01/26/23 3217  •  morphine injection 2 mg, 2 mg, Intravenous, Q6H PRN, AKSHAT Marie, 2 mg at 01/26/23 0020  •  multi-electrolyte (PLASMALYTE-A/ISOLYTE-S PH 7 4) IV solution, 100 mL/hr, Intravenous, Continuous, AKSHAT Boyd, Last Rate: 100 mL/hr at 01/25/23 1408, 100 mL/hr at 01/25/23 1408  •  NOREPINEPHRINE 4 MG  ML NSS (CMPD ORDER) infusion, 1-30 mcg/min, Intravenous, Titrated, AKSHAT Ackerman, Last Rate: 7 5 mL/hr at 01/26/23 0814, 2 mcg/min at 01/26/23 7994  •  NxStage K 4/Ca 3 dialysis solution (RFP-401) 20,000 mL, 20,000 mL, Dialysis, Continuous, Matt Meraz MD, 20,000 mL at 01/26/23 0143  •  pantoprazole (PROTONIX) injection 40 mg, 40 mg, Intravenous, Q24H [address], Nasra Melara MD, 40 mg at 01/26/23 2946  •  potassium chloride 20 mEq IVPB (premix), 20 mEq, Intravenous, Q2H, AKSHAT Marie, 20 mEq at 01/26/23 7270  •  sodium phosphate 9 mmol in sodium chloride 0 9 % 100 mL Infusion, 9 mmol, Intravenous, Once, AKSHAT Roldan  •  vasopressin (PITRESSIN) 20 Units in sodium chloride 0 9 % 100 mL infusion, 0 04 Units/min, Intravenous, Continuous, AKSHAT Inman, Last Rate: 12 mL/hr at 01/25/23 2250, 0 04 Units/min at 01/25/23 2250    Laboratory Results:  Results from last 7 days   Lab Units 01/26/23  0616 01/26/23  0459 01/26/23  0025 01/25/23  1748 01/25/23  1416 01/25/23  1223 01/25/23  0614 01/25/23  0023 01/24/23  2355 01/24/23  1809 01/24/23  1245 01/24/23  0507 01/23/23  1137 01/23/23  1055   WBC Thousand/uL --  4 95  --   --  6 03  --   --  5 92  --   --   --  2 55*  --  9 04   HEMOGLOBIN g/dL 6 8* 6 8*  --   --  7 4*  --   --  8 1*  --   --   --  9 0*  --  12 4   HEMATOCRIT %  --  19 9*  --   --  22 1*  --   --  25 0*  --   --   --  29 4*  --  43 1   PLATELETS Thousands/uL  --  31*  --   --  42*  --   --  54*  --   --   --  100*  --  223   POTASSIUM mmol/L 3 7  --  3 7 3 3*  --  4 0 3 6  --  3 8 3 5   < > 3 8   < > 8 0*   CHLORIDE mmol/L 105  --  105 104  --  109* 109*  --  111* 112*   < > 114*   < > 117*   CO2 mmol/L 22  --  20* 19*  --  18* 16*  --  15* 13*   < > 9*   < > 8*   BUN mg/dL 12  --  13 17  --  20 29*  --  41* 55*   < > 92*   < > 200*   CREATININE mg/dL 0 59*  --  0 62 0 68  --  0 79 1 07  --  1 59* 1 99*   < > 3 38*   < > 8 27*   CALCIUM mg/dL 7 9*  --  7 8* 7 8*  --  7 5* 7 8*  --  7 8* 7 6*   < > 8 0*   < > 9 5   MAGNESIUM mg/dL 1 9  --  1 8* 1 6*  --  1 9 1 7*  --  1 8* 1 8*   < > 2 0   < > 3 3*   PHOSPHORUS mg/dL 1 6*  --  2 8 1 5*  --  2 6 2 1*  --  1 9* 2 6   < > 3 2   < >  --     < > = values in this interval not displayed

## 2023-01-26 NOTE — ASSESSMENT & PLAN NOTE
Goals of care discussion was done with family  Palliative was also involved  POA decided on hospice yesterday, but hopes to continue treatment until family from [place] can see him today   They came overnight and will return this morning

## 2023-01-27 VITALS
HEART RATE: 76 BPM | DIASTOLIC BLOOD PRESSURE: 54 MMHG | TEMPERATURE: 97.6 F | RESPIRATION RATE: 12 BRPM | SYSTOLIC BLOOD PRESSURE: 91 MMHG | BODY MASS INDEX: 18.18 KG/M2 | OXYGEN SATURATION: 100 % | WEIGHT: 92.59 LBS | HEIGHT: 60 IN

## 2023-01-27 RX ADMIN — MORPHINE SULFATE 2 MG: 2 INJECTION, SOLUTION INTRAMUSCULAR; INTRAVENOUS at 00:05

## 2023-01-27 RX ADMIN — MORPHINE SULFATE 2 MG: 2 INJECTION, SOLUTION INTRAMUSCULAR; INTRAVENOUS at 15:31

## 2023-01-27 NOTE — DISCHARGE SUMMARY
Qi [place] 1945, 69 y o  male MRN: [id number]  Unit/Bed#: ICU 14 Encounter: [id number]  Primary Care Provider: Roland Pfeiffer MD   Date and time admitted to hospital: 1/23/2023 10:41 AM    * Sepsis [facility])  Assessment & Plan  Hypothermic, hypotensive requiring levo 3 mcg/min  Secondary to bacteremia from [facility] proteus x2 awaiting sensitivities  UC negative     Hospice  Patient ab discontinued    Bacteremia  Assessment & Plan  Hospice  AB discontinued    Metabolic acidosis  Assessment & Plan  Gap closed, lactic acid trending down  • The patient presented to the ED with altered mental status  His labs revealed a significant metabolic acidosis, likely multifactorial related to DKA, severe volume depletion, elevated lactic acid level, ANGELICA, sepsis, metformin toxicity, elevated urate  • Hospice patient  Stop treatment  ANGELICA (acute kidney injury) [facility])  Assessment & Plan  Patient received CVVHD, nephro was onboard  Discontinued  Family decided hospice    Hospice care  Assessment & Plan  Case management working on home hospice    Dementia without behavioral disturbance [facility])  Assessment & Plan  Patient has severe dementia   Patient at baseline      Transition of Care Discharge Summary - [facility]    Patient Information: Kalen Hinds 69 y o  male MRN: [id number]  Unit/Bed#: ICU 14 Encounter: [id number]    Discharging Physician / Practitioner: Rocael Adam MD  PCP: Roland Pfeiffer MD  Admission Date: 1/23/2023  Discharge Date: 01/27/23    Disposition:      Other: [facility]      Reason for Admission: AMS    Discharge Diagnoses:     Principal Problem:    Sepsis ([facility] [address] )  Active Problems:    Type 2 diabetes mellitus without complication, without long-term current use of insulin ([facility] [address] )    Mixed hyperlipidemia    Primary hypothyroidism    Dementia without behavioral disturbance ([facility] [address] )    Hospice care    [facility]ICA [facility]    Skin ulcer of sacrum, limited to breakdown of skin (HCC)    Metabolic acidosis    Bacteremia    Severe protein-calorie malnutrition (HCC)    Lactic acidosis  Resolved Problems:    Hypernatremia    Hyperkalemia    Acute encephalopathy    DKA (diabetic ketoacidosis) ([facility] [address] )    UTI (urinary tract infection)    Constipation    Hypocalcemia    Pancytopenia ([facility] )    Diarrhea      Consultations During Hospital Stay:  IP CONSULT TO TOXICOLOGY  IP CONSULT TO NEPHROLOGY  IP CONSULT TO PHARMACY  IP CONSULT TO ACUTE CARE SURGERY  IP CONSULT TO PALLIATIVE CARE      Procedures Performed:     · Arterial line    Medication Adjustments and Discharge Medications:  · Medication Dosing Tapers - Please refer to Discharge Medication List for details on any medication dosing tapers (if applicable to patient)  · Discharge Medication List: See after visit summary for reconciled discharge medications  Wound Care Recommendations:  When applicable, please see wound care section of After Visit Summary  Diet Recommendations at Discharge:  Diet -        Diet Orders   (From admission, onward)             Start     Ordered    01/26/23 1632  Diet Regular; Pleasure Feed  Diet effective now        References:    Nutrtion Support Algorithm Enteral vs  Parenteral   Question Answer Comment   Diet Type Regular    Regular Pleasure Feed    RD to adjust diet per protocol? No        01/26/23 1631              Fluid Restriction - No Fluid Restriction at Discharge  Significant Findings / Test Results:     · CXR; NAD  · CT Head: no acute intracranial abnormality  · CT A/P: Patchy infiltrate in the right lower lobe and tree-in-bud nodularity in the right middle lobe likely representing infection/pneumonia      Large amount stool in the rectum likely representing fecal impaction  Correlate clinically      Mild bladder wall thickening  Correlate for cystitis      Hospital Course:     Ruddy Wilder is a 69 y o  male patient who originally presented to the hospital on 1/23/2023 due to AMS  Patient has history of dementia, diabetes mellitus, chronic sacral wounds brought in for altered mental status admitted to ICU for severe metabolic derangements consistent with DKA  Patient was found to have hyponatremia, hypokalemia, acute kidney injury, DKA  Patient required pressors, IV infusions, nephrology was closely following  Patient required CVVHD  For further discussions and palliative care involvement and patient was ultimately made a level for comfort care  He will be discharged to inpatient hospice house today  Please see above problem list for further details  Condition at Discharge: poor     Discharge Day Visit / Exam:     Subjective:  Appears comfortable    Vitals: Blood Pressure: 91/54 (01/23/23 1800)  Pulse: 76 (01/27/23 0900)  Temperature: 97 6 °F (36 4 °C) (01/27/23 1100)  Temp Source: Axillary (01/27/23 1100)  Respirations: 12 (01/27/23 0900)  Height: 4' 11" (149 9 cm) (01/23/23 1535)  Weight - Scale: 42 kg (92 lb 9 5 oz) (01/26/23 0538)  SpO2: 100 % (01/27/23 0900)    Physical Exam:    Constitutional: Patient is in no acute distress  HEENT:  Normocephalic, atraumatic  Cardiovascular: Normal S1S2, RRR, No murmurs/rubs/gallops appreciated  Pulmonary:  Bilateral air entry, No rhonchi/rales/wheezing appreciated  Abdominal: Soft, Bowel sounds present, Non-tender, Non-distended  Extremities:  No cyanosis, clubbing or edema  Neurological: difficult to assess due to mental status  Discharge instructions/Information to patient and family:   See after visit summary section titled Discharge Instructions for information provided to patient and family  Planned Readmission: no     Discharge Statement:  I spent 35 minutes discharging the patient  This time was spent on the day of discharge  I had direct contact with the patient on the day of discharge   Greater than 50% of the total time was spent examining patient, answering all patient questions, arranging and discussing plan of care with patient as well as directly providing post-discharge instructions  Additional time then spent on discharge activities      ** Please Note: This note has been constructed using a voice recognition system **

## 2023-01-27 NOTE — CASE MANAGEMENT
Case Management Discharge Planning Note    Patient name Sol Ewing  Location ICU 14/ICU 14 MRN [id number]  : 1945 Date 2023       Current Admission Date: 2023  Current Admission Diagnosis:Sepsis [facility])   Patient Active Problem List    Diagnosis Date Noted   • Severe protein-calorie malnutrition ([facility] [address] ) 2023   • Lactic acidosis 2023   • Bacteremia 2023   • Sepsis ([facility] [address] )    • Metabolic acidosis    • Skin ulcer of sacrum, limited to breakdown of skin ([facility] [address] ) 2023   • Pain of lower extremity 2023   • Ambulatory dysfunction 2023   • ANGELICA (acute kidney injury) ([facility] [address] ) 2022   • Hospice care 10/06/2022   • Dementia without behavioral disturbance ([facility] [address] ) 10/20/2021   • Primary hypothyroidism 2020   • Mixed hyperlipidemia 2018   • Prostate cancer ([facility] [address] ) 2018   • Type 2 diabetes mellitus without complication, without long-term current use of insulin [facility] 2018   • Ill-fitting dentures 2018   • Hearing difficulty of both ears 2018      LOS (days): 4  Geometric Mean LOS (GMLOS) (days): 5 00  Days to GMLOS:0 9     OBJECTIVE:  Risk of Unplanned Readmission Score: 20 69         Current admission status: Inpatient   Preferred Pharmacy:   [address] [place] [address]  Phone: [phone] Fax: [phone]    Primary Care Provider: Gerardo Martínez MD    Primary Insurance: [facility]  Secondary Insurance: [address]    DISCHARGE DETAILS:    Discharge planning discussed with[de-identified] Patient's wife  Freedom of Choice: Yes  Comments - Freedom of Choice: Patient's wife decided she would no longer like to pursue home hospice as they do not feel they can manage his care at home  CM spoke with hospice liaison Johana, who called wife   Wife originally told Johana that she cannot commute to [place] as she does not drive and requested a referral to [address] [facility] stated they do not have a bed today or for the forseeable future  CM relayed this to hospice liaison  CM was then informed that patient's wife was able to arrange transport to see patient at [facility] in [place] via a family member  CM confirmed this with liaison Radha Salinas, who stated patient was approved by Dr Maurisio Grant for transport to [facility] today, Wife in agreement with this plan and signed consents with family  CM contacted family/caregiver?: Yes  Were Treatment Team discharge recommendations reviewed with patient/caregiver?: Yes  Did patient/caregiver verbalize understanding of patient care needs?: Yes  Were patient/caregiver advised of the risks associated with not following Treatment Team discharge recommendations?: Yes    Contacts  Patient Contacts: Tracey Catalan  Relationship to Patient[de-identified] Family (Spouse)  Contact Method: In Person  Reason/Outcome: Discharge Planning, Continuity of [address]         Is the patient interested in [address] at discharge?: No    DME Referral Provided  Referral made for DME?: No    Other Referral/Resources/Interventions Provided:  Interventions: Hospice  Referral Comments: [facility] - accepted patient for transfer today  Consent forms signed with family by liaison Radha Salinas  Out of Hospital DNR signed by Dr Anna Chow and given to liaison as well  Government Services[de-identified] Adult Protective Services / Elder Abuse (CM called APS  Jackie Johnson - no answer, left VM informing her that patient will now be going to [address] [facility] )    Would you like to participate in our [address] service program?  : No - Declined    Treatment Team Recommendation: Hospice  Discharge Destination Plan[de-identified] Hospice  Transport at Discharge : \[facility]\"" Ambulance  Dispatcher Contacted: Yes  Number/Name of Dispatcher: Ana María Valenzuela by Katrin and Unit #):  SLETS  ETA of Transport (Date): 01/27/23  ETA of Transport (Time): 6469     Transfer Mode: Stretcher  Accompanied by: EMS personnel                   Accepting Facility Name, [address] : [address] [address]  Receiving Facility/Agency Phone Number: [phone]

## 2023-01-27 NOTE — HOSPICE NOTE
Hospice Liaison talked with wife Markel Goldmann via telephone this AM  She reported that our inpatient unit is too far from home for her  [facility]  Made CM aware

## 2023-01-27 NOTE — QUICK NOTE
Chart reviewed  Patient's POA now deciding on hospice and patient made level 4  Palliative will sign off  Dispo per primary team/CM      Erica Frazier MD  Palliative Medicine & Supportive Care  Internal Medicine  Available via [facility] Text  Office: [phone]  Fax: [phone]

## 2023-01-27 NOTE — WOUND OSTOMY CARE
Progress Note - Wound   Tawanda Romano 69 y o  male MRN: [id number]  Unit/Bed#: ICU 14 Encounter: [id number]          Patient has transitioned to level 4 comfort care  Wound care team will sign-off at this time  Recommendation for discharge wound care for home hospice--paint sacral wound with betadine and cover with dry dressing or bordered foam dressing for comfort  Change dressing every other day or as needed with soilage/dislodgement      Kathy VILLAN, RN, [employer]

## 2023-01-27 NOTE — QUICK NOTE
Reviewed recent notes, patient now level 4/comfort measures, nephrology will sign off at this time, discussed with CC AP via TT  Please re-consult as needed

## 2023-01-27 NOTE — QUICK NOTE
Discussed with family at bedside  POA spouse does not want to put  through anymore procedures  They would like to defer peg tube placement, surgical debridement, coronary catheterization, treatment of hypotension and treatment of infection  Hospice vs treatment was discussed with patient with a  at bedside and she Wishes to switch to comfort care  All therapeutics were discontinued  Hospice liason will be notified and  are working with placement to home hospice vs inpatient hospice

## 2023-01-27 NOTE — PLAN OF CARE
Problem: PAIN - ADULT  Goal: Verbalizes/displays adequate comfort level or baseline comfort level  Description: Interventions:  - Encourage patient to monitor pain and request assistance  - Assess pain using appropriate pain scale  - Administer analgesics based on type and severity of pain and evaluate response  - Implement non-pharmacological measures as appropriate and evaluate response  - Consider cultural and social influences on pain and pain management  - Notify physician/advanced practitioner if interventions unsuccessful or patient reports new pain  Outcome: Progressing     Problem: INFECTION - ADULT  Goal: Absence or prevention of progression during hospitalization  Description: INTERVENTIONS:  - Assess and monitor for signs and symptoms of infection  - Monitor lab/diagnostic results  - Monitor all insertion sites, i e  indwelling lines, tubes, and drains  - Monitor endotracheal if appropriate and nasal secretions for changes in amount and color  - [place] appropriate cooling/warming therapies per order  - Administer medications as ordered  - Instruct and encourage patient and family to use good hand hygiene technique  - Identify and instruct in appropriate isolation precautions for identified infection/condition  Outcome: Progressing     Problem: DISCHARGE PLANNING  Goal: Discharge to home or other facility with appropriate resources  Description: INTERVENTIONS:  - Identify barriers to discharge w/patient and caregiver  - Arrange for needed discharge resources and transportation as appropriate  - Identify discharge learning needs (meds, wound care, etc )  - Arrange for interpretive services to assist at discharge as needed  - Refer to Case Management Department for coordinating discharge planning if the patient needs post-hospital services based on physician/advanced practitioner order or complex needs related to functional status, cognitive ability, or social support system  Outcome: Progressing Problem: NEUROSENSORY - ADULT  Goal: Achieves stable or improved neurological status  Description: INTERVENTIONS  - Monitor and report changes in neurological status  - Monitor vital signs such as temperature, blood pressure, glucose, and any other labs ordered   - Initiate measures to prevent increased intracranial pressure  - Monitor for seizure activity and implement precautions if appropriate      Outcome: Progressing  Goal: Achieves maximal functionality and self care  Description: INTERVENTIONS  - Monitor swallowing and airway patency with patient fatigue and changes in neurological status  - Encourage and assist patient to increase activity and self care     - Encourage visually impaired, hearing impaired and aphasic patients to use assistive/communication devices  Outcome: Progressing     Problem: CARDIOVASCULAR - ADULT  Goal: Maintains optimal cardiac output and hemodynamic stability  Description: INTERVENTIONS:  - Monitor I/O, vital signs and rhythm  - Monitor for S/S and trends of decreased cardiac output  - Administer and titrate ordered vasoactive medications to optimize hemodynamic stability  - Assess quality of pulses, skin color and temperature  - Assess for signs of decreased coronary artery perfusion  - Instruct patient to report change in severity of symptoms  Outcome: Progressing  Goal: Absence of cardiac dysrhythmias or at baseline rhythm  Description: INTERVENTIONS:  - Continuous cardiac monitoring, vital signs, obtain 12 lead EKG if ordered  - Administer antiarrhythmic and heart rate control medications as ordered  - Monitor electrolytes and administer replacement therapy as ordered  Outcome: Progressing     Problem: METABOLIC, FLUID AND ELECTROLYTES - ADULT  Goal: Electrolytes maintained within normal limits  Description: INTERVENTIONS:  - Monitor labs and assess patient for signs and symptoms of electrolyte imbalances  - Administer electrolyte replacement as ordered  - Monitor response to electrolyte replacements, including repeat lab results as appropriate  - Instruct patient on fluid and nutrition as appropriate  Outcome: Progressing  Goal: Fluid balance maintained  Description: INTERVENTIONS:  - Monitor labs   - Monitor I/O and WT  - Instruct patient on fluid and nutrition as appropriate  - Assess for signs & symptoms of volume excess or deficit  Outcome: Progressing  Goal: Glucose maintained within target range  Description: INTERVENTIONS:  - Monitor Blood Glucose as ordered  - Assess for signs and symptoms of hyperglycemia and hypoglycemia  - Administer ordered medications to maintain glucose within target range  - Assess nutritional intake and initiate nutrition service referral as needed  Outcome: Progressing     Problem: SKIN/TISSUE INTEGRITY - ADULT  Goal: Skin Integrity remains intact(Skin Breakdown Prevention)  Description: Assess:    -Assess extremities for adequate circulation and sensation     Bed Management:  -Have minimal linens on bed & keep smooth, unwrinkled  -Change linens as needed when moist or perspiring    Activity:    -Do not massage red bony areas      Outcome: Progressing  Goal: Incision(s), wounds(s) or drain site(s) healing without S/S of infection  Description: INTERVENTIONS  - Assess and document dressing, incision, wound bed, drain sites and surrounding tissue    Outcome: Progressing  Goal: Pressure injury heals and does not worsen  Description: Interventions:    - Consider nutrition services referral as needed  Outcome: Progressing     Problem: MOBILITY - ADULT  Goal: Maintain or return to baseline ADL function  Description: INTERVENTIONS:  -  Assess patient's ability to carry out ADLs; assess patient's baseline for ADL function and identify physical deficits which impact ability to perform ADLs (bathing, care of mouth/teeth, toileting, grooming, dressing, etc )  - Assess/evaluate cause of self-care deficits   - Assess range of motion  - Assess patient's mobility; develop plan if impaired  - Assess patient's need for assistive devices and provide as appropriate  - Encourage maximum independence but intervene and supervise when necessary  - Involve family in performance of ADLs  - Assess for home care needs following discharge   - Consider OT consult to assist with ADL evaluation and planning for discharge  - Provide patient education as appropriate  Outcome: Progressing  Goal: Maintains/Returns to pre admission functional level  Description: INTERVENTIONS:  - Perform BMAT or MOVE assessment daily    - Set and communicate daily mobility goal to care team and patient/family/caregiver     - Collaborate with rehabilitation services on mobility goals if consulted  y  - Out of bed for toileting  - Record patient progress and toleration of activity level   Outcome: Progressing     Problem: Prexisting or High Potential for Compromised Skin Integrity  Goal: Skin integrity is maintained or improved  Description: INTERVENTIONS:  - Identify patients at risk for skin breakdown  - Assess and monitor skin integrity  - Assess and monitor nutrition and hydration status  - Monitor labs   - Assess for incontinence   - Turn and reposition patient  - Assist with mobility/ambulation  - Relieve pressure over bony prominences  - Avoid friction and shearing  - Provide appropriate hygiene as needed including keeping skin clean and dry  - Evaluate need for skin moisturizer/barrier cream  - Collaborate with interdisciplinary team   - Patient/family teaching  - Consider wound care consult   Outcome: Progressing     Problem: SAFETY,RESTRAINT: NV/NON-SELF DESTRUCTIVE BEHAVIOR  Goal: Remains free of harm/injury (restraint for non violent/non self-detsructive behavior)  Description: INTERVENTIONS:  - Instruct patient/family regarding restraint use   - Assess and monitor physiologic and psychological status   - Provide interventions and comfort measures to meet assessed patient needs   - Identify and implement measures to help patient regain control  - Assess readiness for release of restraint   Outcome: Progressing  Goal: Returns to optimal restraint-free functioning  Description: INTERVENTIONS:  - Assess the patient's behavior and symptoms that indicate continued need for restraint  - Identify and implement measures to help patient regain control  - Assess readiness for release of restraint   Outcome: Progressing     Problem: Nutrition/Hydration-ADULT  Goal: Nutrient/Hydration intake appropriate for improving, restoring or maintaining nutritional needs  Description: Monitor and assess patient's nutrition/hydration status for malnutrition  Collaborate with interdisciplinary team and initiate plan and interventions as ordered  Monitor patient's weight and dietary intake as ordered or per policy  Utilize nutrition screening tool and intervene as necessary  Determine patient's food preferences and provide high-protein, high-caloric foods as appropriate       INTERVENTIONS:  - Monitor oral intake, urinary output, labs, and treatment plans  - Assess nutrition and hydration status and recommend course of action  - Evaluate amount of meals eaten  - Assist patient with eating if necessary   - Allow adequate time for meals  - Recommend/ encourage appropriate diets, oral nutritional supplements, and vitamin/mineral supplements  - Order, calculate, and assess calorie counts as needed  - Recommend, monitor, and adjust tube feedings and TPN/PPN based on assessed needs  - Assess need for intravenous fluids  - Provide specific nutrition/hydration education as appropriate  - Include patient/family/caregiver in decisions related to nutrition  Outcome: Progressing

## 2023-01-29 LAB
BACTERIA BLD CULT: NORMAL
BACTERIA BLD CULT: NORMAL

## 2023-01-30 LAB
BACTERIA BLD CULT: NORMAL
BACTERIA BLD CULT: NORMAL

## 2023-02-02 NOTE — UTILIZATION REVIEW
NOTIFICATION OF ADMISSION DISCHARGE   This is a Notification of Discharge from [facility]  Please be advised that this patient has been discharge from our facility  Below you will find the admission and discharge date and time including the patient’s disposition  UTILIZATION REVIEW CONTACT:  Elsy Rubio  Utilization   Network Utilization Review Department  Phone: [phone] x carefully listen to the prompts  All voicemails are confidential   Email: [email or web address]     ADMISSION INFORMATION  PRESENTATION DATE: 2023 10:41 AM  OBERVATION ADMISSION DATE:   INPATIENT ADMISSION DATE: 23 12:06 PM   DISCHARGE DATE: 2023  3:52 PM   DISPOSITION:    IMPORTANT INFORMATION:  Send all requests for admission clinical reviews, approved or denied determinations and any other requests to dedicated fax number below belonging to the campus where the patient is receiving treatment   List of dedicated fax numbers:  [address] DENIALS (Administrative/Medical Necessity) [phone]   [address] (Maternity/NICU/Pediatrics) [phone]   [facility] [phone]   [facility] [phone]   [address] [phone]   [address] [facility] [phone]   90 [facility] [phone]   [address] [place] [address] 119 [phone]   [facility]  [phone]   [address] [facility] [phone]   [address] [facility] [address] [facility] [phone]

## 2023-02-03 ENCOUNTER — TELEPHONE (OUTPATIENT)
Dept: FAMILY MEDICINE CLINIC | Facility: CLINIC | Age: 78
End: 2023-02-03

## 2023-02-03 NOTE — TELEPHONE ENCOUNTER
02/03/23        Ironmaia Aleman (Volunteer) from [facility] called office today to inform PCP and clinic that Pt has passed away 02/02/23  Any questions, please contact Miss Perdue  @ [phone]

## 2023-02-09 ENCOUNTER — HOME CARE VISIT (OUTPATIENT)
Dept: HOME HOSPICE | Facility: HOSPICE | Age: 78
End: 2023-02-09

## 2023-02-09 NOTE — CASE COMMUNICATION
Cortney Castillo, Bereavement Final IDG 23 (1MR) Due: 23  : 1945  SOC: 23  DOD: 23  Diagnosis: Sepsis  Primary: Wife - Rosemarie Wilson was a 68year old man at the [facility]  He had 5 children with a previous spouse and one with present wife, Katlyn Norm  Dariana's 21year old daughter with Meryle Littler Kenyon Lerner) is on the autism spectrum, non-verbal, requires 24/7 care and was staying with other family  It was stated she would not und erstand what was going on with her father  Merary Heller was [employer]  The Buddhist is very supportive  Katlyn Zheng described her own prayer practices and how she trusted in God's timing for Merary Heller  She voiced being comforted knowing Arnulfo cried when his friend Linsey Mendoza  and said this validates her feelings of sadness, even as [facility]  At this time, only Katlyn Zheng provided information for bereavement follow-up  BC to ask if any one else is open to follow-up on MR call to Katlyn Zheng  Financial concerns were mentioned  TOD: Katlyn Zheng and Michelle Ram were both in the room sleeping when Merary Heller   Katlyn Zheng declined support and took her time in the room  Katlyn Zheng called family and awaited a ride home in the lobby with Michelle Rma  Call Assignments:  Blanca Reardon to reassess wife Brenda Betts at MR   (Due: 23)

## 2023-03-03 ENCOUNTER — HOME CARE VISIT (OUTPATIENT)
Dept: HOME HOSPICE | Facility: HOSPICE | Age: 78
End: 2023-03-03

## 2023-07-25 ENCOUNTER — TELEPHONE (OUTPATIENT)
Dept: FAMILY MEDICINE CLINIC | Facility: CLINIC | Age: 78
End: 2023-07-25

## 2023-07-25 NOTE — TELEPHONE ENCOUNTER
PCP [address] FORM RECEIVED VIA FAX AND PLACED IN PCP FOLDER TO BE DELIVERED AT ASSIGNED TIMES.     PHYSICIANS ORDER   CERTIFICATION PERIOD 01/03/23-03/03/2023